# Patient Record
Sex: MALE | Race: WHITE | NOT HISPANIC OR LATINO | Employment: OTHER | URBAN - METROPOLITAN AREA
[De-identification: names, ages, dates, MRNs, and addresses within clinical notes are randomized per-mention and may not be internally consistent; named-entity substitution may affect disease eponyms.]

---

## 2018-12-12 ENCOUNTER — TRANSCRIBE ORDERS (OUTPATIENT)
Dept: PHYSICAL THERAPY | Facility: HOSPITAL | Age: 63
End: 2018-12-12

## 2018-12-12 DIAGNOSIS — R60.0 LOWER EXTREMITY EDEMA: Primary | ICD-10-CM

## 2018-12-14 ENCOUNTER — HOSPITAL ENCOUNTER (OUTPATIENT)
Dept: PHYSICAL THERAPY | Facility: HOSPITAL | Age: 63
Setting detail: THERAPIES SERIES
Discharge: HOME | End: 2018-12-14

## 2018-12-14 DIAGNOSIS — I89.0 LYMPHEDEMA: Primary | ICD-10-CM

## 2018-12-14 PROCEDURE — 97161 PT EVAL LOW COMPLEX 20 MIN: CPT

## 2018-12-14 NOTE — THERAPY EVALUATION
Physical Therapy Lymphedema Initial Evaluation  [facility]     Patient Name: Sergio Daniel  : 1955  MRN: [id number]  Today's Date: 2018      Visit Date: 2018    Visit Dx:    ICD-10-CM ICD-9-CM   1. Lymphedema I89.0 457.1       There is no problem list on file for this patient.       Past Medical History:   Diagnosis Date   • Diabetes mellitus (CMS/HCC)         No past surgical history on file.    Visit Dx:    ICD-10-CM ICD-9-CM   1. Lymphedema I89.0 457.1       Patient History     Row Name 18 1100             History    Chief Complaint  Swelling;Ulcer, wound or other skin conditions  -PC      Date Current Problem(s) Began  18  -PC      Brief Description of Current Complaint  Pt states he began having a rash on legs approx 1 month ago.  He scratched them a lot and develped an infection and swelling.  He is taking antibiotics now.  States he gets this skin rash every winter, but it has never been this bad.  He has very sensitive skin and is allergic to a lot of things. States his wife has been rubbing  his legs with witch hazel and applying tea tree oil and sometimes vaseline. States he has worn compression stockings but they irritate his skin.   -PC      Patient/Caregiver Goals  Decrease swelling;Know what to do to help the symptoms  -PC         Pain     Pain at Present  0  -PC      Pain at Best  0  -PC      Pain at Worst  0  -PC      What Performance Factors Make the Current Problem(s) WORSE?  winter, sitting  -PC      What Performance Factors Make the Current Problem(s) BETTER?  walking, swimming, summer  -PC      Difficulties at work?  no- sits at desk  -PC      Difficulties with ADL's?  able to do regular activities  -PC         Fall Risk Assessment    Any falls in the past year:  No  -PC         Services    Are you currently receiving Home Health services  No  -PC         Daily Activities    Primary Language  English  -PC      How does patient learn best?  Listening;Reading   -PC      Teaching needs identified  Management of Condition  -PC      Does patient have problems with the following?  None  -PC      Barriers to learning  None  -PC      Pt Participated in POC and Goals  Yes  -PC         Safety    Are you being hurt, hit, or frightened by anyone at home or in your life?  No  -PC      Are you being neglected by a caregiver  No  -PC        User Key  (r) = Recorded By, (t) = Taken By, (c) = Cosigned By    Initials Name Provider Type    Lyla Villafana PT Physical Therapist          Lymphedema     Row Name 12/14/18 1100             Subjective Pain    Able to rate subjective pain?  yes  -PC      Pre-Treatment Pain Level  0  -PC      Post-Treatment Pain Level  0  -PC         Subjective Comments    Subjective Comments  States his skin seems to break out in the winter.    -PC         Lymphedema Assessment    Lymphedema Classification  RLE:;LLE:;stage 2 (Spontaneously Irreversible)  -PC      Infections or Cellulitis?  yes  -PC      Infection/Cellulitis Treatment  antibiotics  -PC      Lymphedema Assessment Comments  Mod edema left foot to knee, min edema right foot to knee  -PC         General ROM    GENERAL ROM COMMENTS  WFL, left knee slightly limited.  -PC         Lymphedema Edema Assessment    Ptting Edema Category  By grade out of 4  -PC      Pitting Edema  + 3/4;+ 2/4 +3 on left, +2 on right  -PC         Skin Changes/Observations    Skin Observations Comment  skin is red with rash, dry, scaly, flaky. No open sores or weeping noted  -PC         Lymphedema Sensation    Lymphedema Sensation Tests  light touch  -PC      Lymphedema Light Touch  WNL  -PC         Lymphedema Measurements    Measurement Type(s)  Circumferential  -PC      Circumferential Areas  Lower extremities  -PC         BLE Circumferential (cm)    Measurement Location 1  Knee  -PC      Left 1  43.2 cm  -PC      Right 1  42 cm  -PC      Measurement Location 2  10cm below knee  -PC      Left 2  49 cm  -PC      Right 2   45.5 cm  -PC      Measurement Location 3  20cm below knee  -PC      Left 3  44.3 cm  -PC      Right 3  40.5 cm  -PC      Measurement Location 4  30cm below knee  -PC      Left 4  33 cm  -PC      Right 4  31 cm  -PC      Measurement Location 5  Ankle  -PC      Left 5  26.3 cm  -PC      Right 5  24.5 cm  -PC      Measurement Location 6  Midfoot  -PC      Left 6  23.8 cm  -PC      Right 6  23.5 cm  -PC      LLE Circumferential Total  219.6 cm  -PC      RLE Circumferential Total  207 cm  -PC        User Key  (r) = Recorded By, (t) = Taken By, (c) = Cosigned By    Initials Name Provider Type    Lyla Villafana PT Physical Therapist                          Therapy Education  Education Details: Reviewed treatment program and plan of care.  Discussed skin care.  Given: Other (comment)  Program: New  How Provided: Verbal  Provided to: Patient  Level of Understanding: Verbalized      Exercises     Row Name 12/14/18 1100             Subjective Comments    Subjective Comments  States his skin seems to break out in the winter.    -PC         Subjective Pain    Able to rate subjective pain?  yes  -PC      Pre-Treatment Pain Level  0  -PC      Post-Treatment Pain Level  0  -PC        User Key  (r) = Recorded By, (t) = Taken By, (c) = Cosigned By    Initials Name Provider Type    Lyla Villafana, PT Physical Therapist                        PT OP Goals     Row Name 12/14/18 1100          PT Short Term Goals    STG Date to Achieve  12/28/18  -PC     STG 1  Pt demo awareness of condition and precautions for improved prevention, management, care of symptoms, and ease of transition to self-care of condition.  -PC     STG 1 Progress  New  -PC     STG 2  Pt/family independent with self-wrapping techniques of compression bandages as indicated for improved self-management of condition.  -PC     STG 2 Progress  New  -PC     STG 3  Pt demo decreased net edema of >/=5-10cm for decreased edema symptoms, decreased risk of infection, and  improved skin care.  -PC     STG 3 Progress  New  -PC        Long Term Goals    LTG Date to Achieve  01/14/19  -PC     LTG 1  Pt/family independent with self-care techniques for self-management of condition.  -PC     LTG 1 Progress  New  -PC     LTG 2  Pt demo decreased net edema of >/=10-20cm for decreased edema symptoms, decreased risk of infection, and improved skin care.  -PC     LTG 2 Progress  New  -PC     LTG 3  Pt/family independent with compression garments as indicated for self-management of condition.  -PC     LTG 3 Progress  New  -PC        Time Calculation    PT Goal Re-Cert Due Date  03/14/19  -PC       User Key  (r) = Recorded By, (t) = Taken By, (c) = Cosigned By    Initials Name Provider Type    Lyla Villafana, PT Physical Therapist          PT Assessment/Plan     Row Name 12/14/18 1207          PT Assessment    Functional Limitations  Performance in self-care ADL  -PC     Impairments  Edema;Integumentary integrity;Impaired lymphatic circulation  -PC     Assessment Comments  Pt  is a 63 yr old male who presents with mod edema in left leg foot to knee, min edema in right leg foot to knee.  His lower legs are bright red with rash and skin is dry, flaky, and scaly.  Msmts total 219.6cm on left and 207cm on right.  Pt reports having very sensitive skin and many products cause his skin to break out.  He has worn compression stockings in the past, but they seem to break his legs out as well.   Pt is a good candidate for therapy to decrease edema, improve skin condition, and learn self care.  -PC     Please refer to paper survey for additional self-reported information  Yes  -PC     Rehab Potential  Good  -PC     Patient/caregiver participated in establishment of treatment plan and goals  Yes  -PC     Patient would benefit from skilled therapy intervention  Yes  -PC        PT Plan    PT Frequency  5x/week  -PC     Predicted Duration of Therapy Intervention (Therapy Eval)  4 weeks  -PC     Planned  CPT's?  PT EVAL LOW COMPLEXITY: 29193;PT MANUAL THERAPY EA 15 MIN: 43061;PT SELF CARE/HOME MGMT/TRAIN EA 15: 08491  -PC     Physical Therapy Interventions (Optional Details)  bandaging;home exercise program;manual lymphatic drainage;patient/family education  -PC     PT Plan Comments  See POC.  -PC       User Key  (r) = Recorded By, (t) = Taken By, (c) = Cosigned By    Initials Name Provider Type    PC Lyla Santana, PT Physical Therapist                       Time Calculation:   Start Time: 1100  Stop Time: 1130  Time Calculation (min): 30 min   Therapy Suggested Charges     Code   Minutes Charges    None           Therapy Charges for Today     Code Description Service Date Service Provider Modifiers Qty    57041968924  PT EVAL LOW COMPLEXITY 2 12/14/2018 Lyla Santana, PT GP 1                    Lyla Santana, PT  12/14/2018

## 2019-02-07 ENCOUNTER — HOSPITAL ENCOUNTER (OUTPATIENT)
Dept: PHYSICAL THERAPY | Facility: HOSPITAL | Age: 64
Setting detail: THERAPIES SERIES
Discharge: HOME | End: 2019-02-07

## 2019-02-07 DIAGNOSIS — I89.0 LYMPHEDEMA: Primary | ICD-10-CM

## 2019-02-07 PROCEDURE — 97140 MANUAL THERAPY 1/> REGIONS: CPT

## 2019-02-07 NOTE — THERAPY TREATMENT NOTE
Outpatient Physical Therapy Lymphedema Re-assessment/ Treatment Note  [facility]     Patient Name: Sergio Daniel  : 1955  MRN: [id number]  Today's Date: 2019        Visit Date: 2019    Visit Dx:    ICD-10-CM ICD-9-CM   1. Lymphedema I89.0 457.1       There is no problem list on file for this patient.       Lymphedema     Row Name 19 1100             Subjective Pain    Able to rate subjective pain?  yes  -KD      Pre-Treatment Pain Level  0  -KD      Post-Treatment Pain Level  0  -KD         Subjective Comments    Subjective Comments  States his medical condition is about the same as when he was here for an evaluation. States he took a steroid for the rash on his legs and arms which helped, but rash has returned since finishing the steroids. Had a skin cultur, waiting for results. States wife has been wrapping his legs at home.  -KD         Skin Changes/Observations    Skin Observations Comment  min to mod weeping left leg, radha post. ; skin is red, rashy, sclay, flaky.  -KD         Lymphedema Measurements    Measurement Type(s)  Circumferential  -KD      Circumferential Areas  Lower extremities  -KD         BLE Circumferential (cm)    Measurement Location 1  Knee  -KD      Left 1  43.5 cm  -KD      Right 1  42 cm  -KD      Measurement Location 2  10cm below knee  -KD      Left 2  46 cm  -KD      Right 2  44.5 cm  -KD      Measurement Location 3  20cm below knee  -KD      Left 3  44.5 cm  -KD      Right 3  39.5 cm  -KD      Measurement Location 4  30cm below knee  -KD      Left 4  33.5 cm  -KD      Right 4  30 cm  -KD      Measurement Location 5  Ankle  -KD      Left 5  27 cm  -KD      Right 5  26 cm  -KD      Measurement Location 6  Midfoot  -KD      Left 6  23.5 cm  -KD      Right 6  23.5 cm  -KD      LLE Circumferential Total  218 cm  -KD      RLE Circumferential Total  205.5 cm  -KD         Manual Lymphatic Drainage    Manual Lymphatic Drainage Comments  B inguinal, B LE's  -KD          Compression/Skin Care    Compression/Skin Care  skin care  -KD      Skin Care  moisturizing lotion applied Eucerin lotion to intact skin  -KD      Wrapping Location  lower extremity  -KD      Wrapping Location LE  bilateral:;foot to knee  -KD      Wrapping Comments  Tg9, Artiflex X 1 1/2, short stretch bandages 1-8cm & 2-10cm.  -KD      Compression/Skin Care Comments  ABD pad X 2 to lower half of left leg & Kerlix roll X 1 ankle to knee.  -KD        User Key  (r) = Recorded By, (t) = Taken By, (c) = Cosigned By    Initials Name Provider Type    Charisma De La Paz, PT Physical Therapist                        PT Assessment/Plan     Row Name 02/07/19 1140          PT Assessment    Assessment Comments  Pt starting therapy today, condition essentially the same as evaluation except today he did have min to mod drainage of left lower leg so that was addressed. Also awaiting results of skin test-- will need to monitor his skin closely.  -KD        PT Plan    PT Plan Comments  See pt per PT Plan.  -KD       User Key  (r) = Recorded By, (t) = Taken By, (c) = Cosigned By    Initials Name Provider Type    Charisma De La Paz, PT Physical Therapist               Exercises     Row Name 02/07/19 1144 02/07/19 1100          Subjective Comments    Subjective Comments  --  States his medical condition is about the same as when he was here for an evaluation. States he took a steroid for the rash on his legs and arms which helped, but rash has returned since finishing the steroids. Had a skin cultur, waiting for results. States wife has been wrapping his legs at home.  -KD        Subjective Pain    Able to rate subjective pain?  --  yes  -KD     Pre-Treatment Pain Level  --  0  -KD     Post-Treatment Pain Level  --  0  -KD        Total Minutes    62353 - PT Manual Therapy Minutes  62  -KD  --       User Key  (r) = Recorded By, (t) = Taken By, (c) = Cosigned By    Initials Name Provider Type    Charisma De La Paz, PT Physical  Therapist                        PT OP Goals     Row Name 02/07/19 1100          PT Short Term Goals    STG Date to Achieve  02/22/18  -KD     STG 1  Pt demo awareness of condition and precautions for improved prevention, management, care of symptoms, and ease of transition to self-care of condition.  -KD     STG 1 Progress  New  -KD     STG 2  Pt/family independent with self-wrapping techniques of compression bandages as indicated for improved self-management of condition.  -KD     STG 2 Progress  New  -KD     STG 3  Pt demo decreased net edema of >/=5-10cm for decreased edema symptoms, decreased risk of infection, and improved skin care.  -KD     STG 3 Progress  New  -KD        Long Term Goals    LTG Date to Achieve  03/09/19  -KD     LTG 1  Pt/family independent with self-care techniques for self-management of condition.  -KD     LTG 1 Progress  New  -KD     LTG 2  Pt demo decreased net edema of >/=10-20cm for decreased edema symptoms, decreased risk of infection, and improved skin care.  -KD     LTG 2 Progress  New  -KD     LTG 3  Pt/family independent with compression garments as indicated for self-management of condition.  -KD     LTG 3 Progress  New  -KD        Time Calculation    PT Goal Re-Cert Due Date  03/14/19  -KD       User Key  (r) = Recorded By, (t) = Taken By, (c) = Cosigned By    Initials Name Provider Type    Charisma De La Paz, PT Physical Therapist          Therapy Education  Education Details: Bandage precautions and wear, bandaging technique.  Given: Bandaging/dressing change  Program: New  How Provided: Verbal, Demonstration  Provided to: Patient  Level of Understanding: Verbalized              Time Calculation:   Start Time: 0945  Stop Time: 1047  Time Calculation (min): 62 min  Total Timed Code Minutes- PT: 62 minute(s)   Therapy Suggested Charges     Code   Minutes Charges    05062 (CPT®) Hc Pt Neuromusc Re Education Ea 15 Min      87613 (CPT®) Hc Pt Ther Proc Ea 15 Min      30869 (CPT®)  Hc Gait Training Ea 15 Min      19862 (CPT®) Hc Pt Therapeutic Act Ea 15 Min      61153 (CPT®) Hc Pt Manual Therapy Ea 15 Min 62 4    04501 (CPT®) Hc Pt Ther Massage- Per 15 Min      03109 (CPT®) Hc Pt Iontophoresis Ea 15 Min      53279 (CPT®) Hc Pt Elec Stim Ea-Per 15 Min      22615 (CPT®) Hc Pt Ultrasound Ea 15 Min      75690 (CPT®) Hc Pt Self Care/Mgmt/Train Ea 15 Min      60933 (CPT®) Hc Pt Prosthetic (S) Train Initial Encounter, Each 15 Min      38209 (CPT®) Hc Orthotic(S) Mgmt/Train Initial Encounter, Each 15min      39803 (CPT®) Hc Pt Aquatic Therapy Ea 15 Min      37264 (CPT®) Hc Pt Orthotic(S)/Prosthetic(S) Encounter, Each 15 Min       (CPT®) Hc Pt Electrical Stim Unattended      Total  62 4        Therapy Charges for Today     Code Description Service Date Service Provider Modifiers Qty    90819086540 HC PT MANUAL THERAPY EA 15 MIN 2/7/2019 Charisma Jacobo, PT GP 4                    Charisma Jacobo, PT  2/7/2019

## 2019-02-08 ENCOUNTER — HOSPITAL ENCOUNTER (OUTPATIENT)
Dept: PHYSICAL THERAPY | Facility: HOSPITAL | Age: 64
Setting detail: THERAPIES SERIES
Discharge: HOME | End: 2019-02-08

## 2019-02-08 DIAGNOSIS — I89.0 LYMPHEDEMA: Primary | ICD-10-CM

## 2019-02-08 PROCEDURE — 97140 MANUAL THERAPY 1/> REGIONS: CPT

## 2019-02-08 NOTE — THERAPY TREATMENT NOTE
Outpatient Physical Therapy Lymphedema Treatment Note  [facility]     Patient Name: Sergio Daniel  : 1955  MRN: [id number]  Today's Date: 2019        Visit Date: 2019    Visit Dx:    ICD-10-CM ICD-9-CM   1. Lymphedema I89.0 457.1       There is no problem list on file for this patient.       Lymphedema     Row Name 19 1600             Subjective Pain    Able to rate subjective pain?  yes  -PC      Pre-Treatment Pain Level  0  -PC      Post-Treatment Pain Level  0  -PC         Subjective Comments    Subjective Comments  States he did fine with the bandages.  -PC         Skin Changes/Observations    Skin Observations Comment  Min weeping around left ankle, soaked through to bandages.  SKin very dry and flaky on rest of lower legs.  -PC         Manual Lymphatic Drainage    Manual Lymphatic Drainage Comments  B inguinal, B LE's  -PC         Compression/Skin Care    Compression/Skin Care  remove bandages  -PC      Skin Care  washed/dried;moisturizing lotion applied Zinc oxide to weeping areas, Hydrophor to rest of lower legs  -PC      Wrapping Location  lower extremity  -PC      Wrapping Location LE  bilateral:;foot to knee  -PC      Wrapping Comments  Tg9, Artiflex X 1 1/2, short stretch bandages 1-8cm & 2-10cm.  -PC      Compression/Skin Care Comments  ABD pad X 2 to lower half of left leg & Kerlix roll X 1 ankle to knee.  -PC        User Key  (r) = Recorded By, (t) = Taken By, (c) = Cosigned By    Initials Name Provider Type    Lyla Villafana, PT Physical Therapist                        PT Assessment/Plan     Row Name 19 7812          PT Assessment    Assessment Comments  Pt had weeping around left ankle through the abd pads and Kerlix and onto the short stretch bandages. Added zinc oxide around areas of drainage for skin protection, and Hydrophor to rest of lower legs to help decreased the dryness and flaking.  -PC        PT Plan    PT Plan Comments  Cont.  -PC       User Key   (r) = Recorded By, (t) = Taken By, (c) = Cosigned By    Initials Name Provider Type    PC Lyla Santana, JASMIN Physical Therapist               Exercises     Row Name 02/08/19 1656 02/08/19 1600          Subjective Comments    Subjective Comments  --  States he did fine with the bandages.  -PC        Subjective Pain    Able to rate subjective pain?  --  yes  -PC     Pre-Treatment Pain Level  --  0  -PC     Post-Treatment Pain Level  --  0  -PC        Total Minutes    25300 - PT Manual Therapy Minutes  60  -PC  --       User Key  (r) = Recorded By, (t) = Taken By, (c) = Cosigned By    Initials Name Provider Type    PC Lyla Santana, PT Physical Therapist                            Therapy Education  Education Details: Bandaging change for the weekend, skin care, use of zinc oxide and Hydrophor.  Given: Bandaging/dressing change  Program: Reinforced  How Provided: Verbal, Demonstration, Written(Issued written bandaging instructions.)  Provided to: Patient  Level of Understanding: Verbalized              Time Calculation:   Start Time: 0945  Stop Time: 1045  Time Calculation (min): 60 min  Total Timed Code Minutes- PT: 60 minute(s)   Therapy Suggested Charges     Code   Minutes Charges    09130 (CPT®) Hc Pt Neuromusc Re Education Ea 15 Min      76876 (CPT®) Hc Pt Ther Proc Ea 15 Min      74041 (CPT®) Hc Gait Training Ea 15 Min      50869 (CPT®) Hc Pt Therapeutic Act Ea 15 Min      18467 (CPT®) Hc Pt Manual Therapy Ea 15 Min 60 4    19444 (CPT®) Hc Pt Ther Massage- Per 15 Min      13552 (CPT®) Hc Pt Iontophoresis Ea 15 Min      92102 (CPT®) Hc Pt Elec Stim Ea-Per 15 Min      40260 (CPT®) Hc Pt Ultrasound Ea 15 Min      47804 (CPT®) Hc Pt Self Care/Mgmt/Train Ea 15 Min      88466 (CPT®) Hc Pt Prosthetic (S) Train Initial Encounter, Each 15 Min      33017 (CPT®) Hc Orthotic(S) Mgmt/Train Initial Encounter, Each 15min      03857 (CPT®) Hc Pt Aquatic Therapy Ea 15 Min      90700 (CPT®) Hc Pt Orthotic(S)/Prosthetic(S)  Encounter, Each 15 Min       (CPT®) Hc Pt Electrical Stim Unattended      Total  60 4        Therapy Charges for Today     Code Description Service Date Service Provider Modifiers Qty    85244503189 HC PT MANUAL THERAPY EA 15 MIN 2/8/2019 Lyla Santana, PT GP 4                    Lyla Santana, PT  2/8/2019

## 2019-02-11 ENCOUNTER — APPOINTMENT (OUTPATIENT)
Dept: PHYSICAL THERAPY | Facility: HOSPITAL | Age: 64
End: 2019-02-11

## 2019-02-12 ENCOUNTER — HOSPITAL ENCOUNTER (OUTPATIENT)
Dept: PHYSICAL THERAPY | Facility: HOSPITAL | Age: 64
Setting detail: THERAPIES SERIES
Discharge: HOME | End: 2019-02-12

## 2019-02-12 DIAGNOSIS — I89.0 LYMPHEDEMA: Primary | ICD-10-CM

## 2019-02-12 PROCEDURE — 97140 MANUAL THERAPY 1/> REGIONS: CPT

## 2019-02-12 NOTE — THERAPY TREATMENT NOTE
Outpatient Physical Therapy Lymphedema Treatment Note  [facility]     Patient Name: Sergio Daniel  : 1955  MRN: [id number]  Today's Date: 2019        Visit Date: 2019    Visit Dx:    ICD-10-CM ICD-9-CM   1. Lymphedema I89.0 457.1       There is no problem list on file for this patient.       Lymphedema     Row Name 19 1200             Subjective Pain    Able to rate subjective pain?  yes  -KD      Pre-Treatment Pain Level  0  -KD      Post-Treatment Pain Level  0  -KD         Subjective Comments    Subjective Comments  States his wife wrapped his legs over the weekend without difficulty.  -KD         Manual Lymphatic Drainage    Manual Lymphatic Drainage Comments  B inguinal, B LE's  -KD         Compression/Skin Care    Compression/Skin Care  remove bandages  -KD      Skin Care  washed/dried;moisturizing lotion applied Zinc oxide to weeping areas, Hydrophor to rest of lower legs  -KD      Wrapping Location  lower extremity  -KD      Wrapping Location LE  bilateral:;foot to knee  -KD      Wrapping Comments  Tg9, Artiflex X 1 1/2, short stretch bandages 1-8cm & 2-10cm.  -KD      Compression/Skin Care Comments  Kerlix X 1 to left leg  -KD        User Key  (r) = Recorded By, (t) = Taken By, (c) = Cosigned By    Initials Name Provider Type    Charisma De La Paz, PT Physical Therapist                        PT Assessment/Plan     Row Name 19 1238          PT Assessment    Assessment Comments  Pt's wife had bandaged over the weekend with good technique (had left cotton off), less drainage from left lower leg today.  -KD       User Key  (r) = Recorded By, (t) = Taken By, (c) = Cosigned By    Initials Name Provider Type    Charisma De La Paz, PT Physical Therapist               Exercises     Row Name 19 1240 19 1200          Subjective Comments    Subjective Comments  --  States his wife wrapped his legs over the weekend without difficulty.  -KD        Subjective Pain    Able  to rate subjective pain?  --  yes  -KD     Pre-Treatment Pain Level  --  0  -KD     Post-Treatment Pain Level  --  0  -KD        Total Minutes    42989 - PT Manual Therapy Minutes  64  -KD  --       User Key  (r) = Recorded By, (t) = Taken By, (c) = Cosigned By    Initials Name Provider Type    Charisma De La Paz PT Physical Therapist                       Manual Rx (last 36 hours)      Manual Treatments     Row Name 02/12/19 1240             Total Minutes    61286 - PT Manual Therapy Minutes  64  -KD        User Key  (r) = Recorded By, (t) = Taken By, (c) = Cosigned By    Initials Name Provider Type    Charisma De La Paz PT Physical Therapist              Therapy Education  Education Details: skin care  Given: Symptoms/condition management  Program: Reinforced  How Provided: Verbal  Provided to: Patient  Level of Understanding: Verbalized              Time Calculation:   Start Time: 0950  Stop Time: 1054  Time Calculation (min): 64 min  Total Timed Code Minutes- PT: 64 minute(s)   Therapy Suggested Charges     Code   Minutes Charges    64283 (CPT®) Hc Pt Neuromusc Re Education Ea 15 Min      46523 (CPT®) Hc Pt Ther Proc Ea 15 Min      71825 (CPT®) Hc Gait Training Ea 15 Min      81864 (CPT®) Hc Pt Therapeutic Act Ea 15 Min      56986 (CPT®) Hc Pt Manual Therapy Ea 15 Min 64 4    94457 (CPT®) Hc Pt Ther Massage- Per 15 Min      84099 (CPT®) Hc Pt Iontophoresis Ea 15 Min      61265 (CPT®) Hc Pt Elec Stim Ea-Per 15 Min      66644 (CPT®) Hc Pt Ultrasound Ea 15 Min      99103 (CPT®) Hc Pt Self Care/Mgmt/Train Ea 15 Min      30829 (CPT®) Hc Pt Prosthetic (S) Train Initial Encounter, Each 15 Min      92914 (CPT®) Hc Orthotic(S) Mgmt/Train Initial Encounter, Each 15min      46015 (CPT®) Hc Pt Aquatic Therapy Ea 15 Min      23623 (CPT®) Hc Pt Orthotic(S)/Prosthetic(S) Encounter, Each 15 Min       (CPT®) Hc Pt Electrical Stim Unattended      Total  64 4        Therapy Charges for Today     Code Description Service  Date Service Provider Modifiers Qty    38116982771 HC PT MANUAL THERAPY EA 15 MIN 2/12/2019 Charisma Jacobo, PT GP 4                    Charisma Jacobo, PT  2/12/2019

## 2019-02-13 ENCOUNTER — APPOINTMENT (OUTPATIENT)
Dept: PHYSICAL THERAPY | Facility: HOSPITAL | Age: 64
End: 2019-02-13

## 2019-02-14 ENCOUNTER — HOSPITAL ENCOUNTER (OUTPATIENT)
Dept: PHYSICAL THERAPY | Facility: HOSPITAL | Age: 64
Setting detail: THERAPIES SERIES
Discharge: HOME | End: 2019-02-14

## 2019-02-14 DIAGNOSIS — I89.0 LYMPHEDEMA: Primary | ICD-10-CM

## 2019-02-14 PROCEDURE — 97140 MANUAL THERAPY 1/> REGIONS: CPT

## 2019-02-14 NOTE — THERAPY TREATMENT NOTE
Outpatient Physical Therapy Lymphedema Treatment Note  [facility]     Patient Name: Sergio Daniel  : 1955  MRN: [id number]  Today's Date: 2019        Visit Date: 2019    Visit Dx:    ICD-10-CM ICD-9-CM   1. Lymphedema I89.0 457.1       There is no problem list on file for this patient.       Lymphedema     Row Name 19 1200             Subjective Pain    Able to rate subjective pain?  yes  -KD      Pre-Treatment Pain Level  0  -KD      Post-Treatment Pain Level  0  -KD         Subjective Comments    Subjective Comments  States his wife re-wrapped his legs yesterday.  -KD         Skin Changes/Observations    Skin Observations Comment  Slight to min drainage on right distal lower leg, min on left.  -KD         Lymphedema Measurements    Measurement Type(s)  Circumferential  -KD      Circumferential Areas  Lower extremities  -KD         BLE Circumferential (cm)    Measurement Location 1  Knee  -KD      Left 1  42.5 cm  -KD      Right 1  42.3 cm  -KD      Measurement Location 2  10cm below knee  -KD      Left 2  44 cm  -KD      Right 2  42.5 cm  -KD      Measurement Location 3  20cm below knee  -KD      Left 3  42.5 cm  -KD      Right 3  38.5 cm  -KD      Measurement Location 4  30cm below knee  -KD      Left 4  31.5 cm  -KD      Right 4  28.5 cm  -KD      Measurement Location 5  Ankle  -KD      Left 5  26 cm  -KD      Right 5  26 cm  -KD      Measurement Location 6  Midfoot  -KD      Left 6  22 cm  -KD      Right 6  23 cm  -KD      LLE Circumferential Total  208.5 cm  -KD      RLE Circumferential Total  200.8 cm  -KD         Manual Lymphatic Drainage    Manual Lymphatic Drainage Comments  B inguinal, B LE's  -KD         Compression/Skin Care    Compression/Skin Care  remove bandages  -KD      Skin Care  washed/dried;moisturizing lotion applied Zinc oxide to weeping areas, Hydrophor to rest of lower legs  -KD      Wrapping Location  lower extremity  -KD      Wrapping Location LE   bilateral:;foot to knee  -KD      Wrapping Comments  Tg9, Artiflex X 1 1/2, short stretch bandages 1-8cm & 2-10cm.  -KD      Compression/Skin Care Comments  Kerlix on each leg.  -KD        User Key  (r) = Recorded By, (t) = Taken By, (c) = Cosigned By    Initials Name Provider Type    Charisma De La Paz, PT Physical Therapist                        PT Assessment/Plan     Row Name 02/14/19 1223          PT Assessment    Assessment Comments  Feel that his right leg may have been a bit weepy today due to friction of short stretch bandage on his shin, using padding consistently should help. Decrease in edema of 4.7cm on right, 9.5cm on left.  -KD        PT Plan    PT Plan Comments  Cont.  -KD       User Key  (r) = Recorded By, (t) = Taken By, (c) = Cosigned By    Initials Name Provider Type    Charisma De La Paz, PT Physical Therapist               Exercises     Row Name 02/14/19 1225 02/14/19 1200          Subjective Comments    Subjective Comments  --  States his wife re-wrapped his legs yesterday.  -KD        Subjective Pain    Able to rate subjective pain?  --  yes  -KD     Pre-Treatment Pain Level  --  0  -KD     Post-Treatment Pain Level  --  0  -KD        Total Minutes    54165 - PT Manual Therapy Minutes  68  -KD  --       User Key  (r) = Recorded By, (t) = Taken By, (c) = Cosigned By    Initials Name Provider Type    Charisma De La Paz, PT Physical Therapist                       Manual Rx (last 36 hours)      Manual Treatments     Row Name 02/14/19 1225             Total Minutes    24615 - PT Manual Therapy Minutes  68  -KD        User Key  (r) = Recorded By, (t) = Taken By, (c) = Cosigned By    Initials Name Provider Type    Charisma De La Paz, PT Physical Therapist          PT OP Goals     Row Name 02/14/19 1200          PT Short Term Goals    STG Date to Achieve  02/22/18  -KD     STG 1  Pt demo awareness of condition and precautions for improved prevention, management, care of symptoms, and ease of  transition to self-care of condition.  -KD     STG 1 Progress  Progressing  -KD     STG 2  Pt/family independent with self-wrapping techniques of compression bandages as indicated for improved self-management of condition.  -KD     STG 2 Progress  Met  -KD     STG 2 Progress Comments  Pt's wife able to bandage his legs.  -KD     STG 3  Pt demo decreased net edema of >/=5-10cm for decreased edema symptoms, decreased risk of infection, and improved skin care.  -KD     STG 3 Progress  Partially Met  -KD     STG 3 Progress Comments  Left leg decrease of 9.5cm, right 4.7cm.  -KD        Long Term Goals    LTG Date to Achieve  03/09/19  -KD     LTG 1  Pt/family independent with self-care techniques for self-management of condition.  -KD     LTG 1 Progress  New  -KD     LTG 2  Pt demo decreased net edema of >/=10-20cm for decreased edema symptoms, decreased risk of infection, and improved skin care.  -KD     LTG 2 Progress  New  -KD     LTG 3  Pt/family independent with compression garments as indicated for self-management of condition.  -KD     LTG 3 Progress  New  -KD        Time Calculation    PT Goal Re-Cert Due Date  03/14/19  -KD       User Key  (r) = Recorded By, (t) = Taken By, (c) = Cosigned By    Initials Name Provider Type    Charisma De La Paz, JASMIN Physical Therapist          Therapy Education  Education Details: Advised pt to have his wife use the Artiflex padding when bandaging his legs at home.   Given: Bandaging/dressing change  Program: Reinforced  How Provided: Verbal, Demonstration  Provided to: Patient  Level of Understanding: Verbalized              Time Calculation:   Start Time: 0949  Stop Time: 1057  Time Calculation (min): 68 min  Total Timed Code Minutes- PT: 68 minute(s)   Therapy Suggested Charges     Code   Minutes Charges    55833 (CPT®) Hc Pt Neuromusc Re Education Ea 15 Min      95577 (CPT®) Hc Pt Ther Proc Ea 15 Min      87660 (CPT®) Hc Gait Training Ea 15 Min      55503 (CPT®) Hc Pt  Therapeutic Act Ea 15 Min      14868 (CPT®) Hc Pt Manual Therapy Ea 15 Min 68 5    83147 (CPT®) Hc Pt Ther Massage- Per 15 Min      70978 (CPT®) Hc Pt Iontophoresis Ea 15 Min      56163 (CPT®) Hc Pt Elec Stim Ea-Per 15 Min      64964 (CPT®) Hc Pt Ultrasound Ea 15 Min      26226 (CPT®) Hc Pt Self Care/Mgmt/Train Ea 15 Min      04709 (CPT®) Hc Pt Prosthetic (S) Train Initial Encounter, Each 15 Min      42269 (CPT®) Hc Orthotic(S) Mgmt/Train Initial Encounter, Each 15min      83018 (CPT®) Hc Pt Aquatic Therapy Ea 15 Min      00868 (CPT®) Hc Pt Orthotic(S)/Prosthetic(S) Encounter, Each 15 Min       (CPT®) Hc Pt Electrical Stim Unattended      Total  68 5        Therapy Charges for Today     Code Description Service Date Service Provider Modifiers Qty    25461431740 HC PT MANUAL THERAPY EA 15 MIN 2/14/2019 Charisma Jacobo, PT GP 5                    Charisma Jacobo, PT  2/14/2019

## 2019-02-15 ENCOUNTER — HOSPITAL ENCOUNTER (OUTPATIENT)
Dept: PHYSICAL THERAPY | Facility: HOSPITAL | Age: 64
Setting detail: THERAPIES SERIES
Discharge: HOME | End: 2019-02-15

## 2019-02-15 DIAGNOSIS — I89.0 LYMPHEDEMA: Primary | ICD-10-CM

## 2019-02-15 PROCEDURE — 97140 MANUAL THERAPY 1/> REGIONS: CPT

## 2019-02-15 NOTE — THERAPY TREATMENT NOTE
Outpatient Physical Therapy Lymphedema Treatment Note  [facility]     Patient Name: Sergio Daniel  : 1955  MRN: [id number]  Today's Date: 2/15/2019        Visit Date: 02/15/2019    Visit Dx:    ICD-10-CM ICD-9-CM   1. Lymphedema I89.0 457.1       There is no problem list on file for this patient.       Lymphedema     Row Name 02/15/19 1200             Subjective Pain    Able to rate subjective pain?  yes  -PC      Pre-Treatment Pain Level  0  -PC      Post-Treatment Pain Level  0  -PC         Subjective Comments    Subjective Comments  States his skin is getting better, hasn't had as much weeping.  -PC         Skin Changes/Observations    Skin Observations Comment  Slight drainage on both legs around ankles.  -PC         Manual Lymphatic Drainage    Manual Lymphatic Drainage Comments  B inguinal, B LE's  -PC         Compression/Skin Care    Compression/Skin Care  remove bandages  -PC      Skin Care  washed/dried;moisturizing lotion applied Zinc oxide to weeping areas, Hydrophor to rest of lower legs  -PC      Wrapping Location  lower extremity  -PC      Wrapping Location LE  bilateral:;foot to knee  -PC      Wrapping Comments  Kerlix ankle to knee, Tg9, Artiflex X 1 1/2, short stretch bandages 1-8cm & 2-10cm.  -PC        User Key  (r) = Recorded By, (t) = Taken By, (c) = Cosigned By    Initials Name Provider Type    Lyla Villafana, PT Physical Therapist                        PT Assessment/Plan     Row Name 02/15/19 1232          PT Assessment    Assessment Comments  Skin is still very red, dry, and flaky, but the drainage is less.  -PC        PT Plan    PT Plan Comments  Cont.  -PC       User Key  (r) = Recorded By, (t) = Taken By, (c) = Cosigned By    Initials Name Provider Type    Lyla Villafana, PT Physical Therapist               Exercises     Row Name 02/15/19 1233 02/15/19 1200          Subjective Comments    Subjective Comments  --  States his skin is getting better, hasn't had as much  gerald.  -PC        Subjective Pain    Able to rate subjective pain?  --  yes  -PC     Pre-Treatment Pain Level  --  0  -PC     Post-Treatment Pain Level  --  0  -PC        Total Minutes    71767 - PT Manual Therapy Minutes  65  -PC  --       User Key  (r) = Recorded By, (t) = Taken By, (c) = Cosigned By    Initials Name Provider Type    PC Lyla Santana, PT Physical Therapist                       Manual Rx (last 36 hours)      Manual Treatments     Row Name 02/15/19 1233 02/14/19 1225          Total Minutes    66143 - PT Manual Therapy Minutes  65  -PC  68  -KD       User Key  (r) = Recorded By, (t) = Taken By, (c) = Cosigned By    Initials Name Provider Type    PC Lyla Santana, PT Physical Therapist    Charisma De La Paz, JASMIN Physical Therapist              Therapy Education  Given: Bandaging/dressing change  Program: Reinforced  How Provided: Verbal, Demonstration  Provided to: Patient  Level of Understanding: Verbalized              Time Calculation:   Start Time: 0945  Stop Time: 1050  Time Calculation (min): 65 min  Total Timed Code Minutes- PT: 65 minute(s)   Therapy Suggested Charges     Code   Minutes Charges    13344 (CPT®) Hc Pt Neuromusc Re Education Ea 15 Min      16184 (CPT®) Hc Pt Ther Proc Ea 15 Min      26716 (CPT®) Hc Gait Training Ea 15 Min      86092 (CPT®) Hc Pt Therapeutic Act Ea 15 Min      55666 (CPT®) Hc Pt Manual Therapy Ea 15 Min 65 4    29882 (CPT®) Hc Pt Ther Massage- Per 15 Min      50410 (CPT®) Hc Pt Iontophoresis Ea 15 Min      08680 (CPT®) Hc Pt Elec Stim Ea-Per 15 Min      49976 (CPT®) Hc Pt Ultrasound Ea 15 Min      15201 (CPT®) Hc Pt Self Care/Mgmt/Train Ea 15 Min      43044 (CPT®) Hc Pt Prosthetic (S) Train Initial Encounter, Each 15 Min      29924 (CPT®) Hc Orthotic(S) Mgmt/Train Initial Encounter, Each 15min      65717 (CPT®) Hc Pt Aquatic Therapy Ea 15 Min      54839 (CPT®) Hc Pt Orthotic(S)/Prosthetic(S) Encounter, Each 15 Min       (CPT®) Hc Pt Electrical Stim  Unattended      Total  65 4        Therapy Charges for Today     Code Description Service Date Service Provider Modifiers Qty    79448928805 HC PT MANUAL THERAPY EA 15 MIN 2/15/2019 Lyla Santana, PT GP 4                    Lyla Santana, PT  2/15/2019

## 2019-02-18 ENCOUNTER — APPOINTMENT (OUTPATIENT)
Dept: PHYSICAL THERAPY | Facility: HOSPITAL | Age: 64
End: 2019-02-18

## 2019-02-19 ENCOUNTER — HOSPITAL ENCOUNTER (OUTPATIENT)
Dept: PHYSICAL THERAPY | Facility: HOSPITAL | Age: 64
Setting detail: THERAPIES SERIES
Discharge: HOME | End: 2019-02-19

## 2019-02-19 DIAGNOSIS — I89.0 LYMPHEDEMA: Primary | ICD-10-CM

## 2019-02-19 PROCEDURE — 97140 MANUAL THERAPY 1/> REGIONS: CPT

## 2019-02-19 NOTE — THERAPY TREATMENT NOTE
Outpatient Physical Therapy Lymphedema Treatment Note  [facility]     Patient Name: Sergio Daniel  : 1955  MRN: [id number]  Today's Date: 2019        Visit Date: 2019    Visit Dx:    ICD-10-CM ICD-9-CM   1. Lymphedema I89.0 457.1       There is no problem list on file for this patient.       Lymphedema     Row Name 19 1200             Subjective Pain    Able to rate subjective pain?  yes  -KD      Pre-Treatment Pain Level  0  -KD      Post-Treatment Pain Level  0  -KD         Subjective Comments    Subjective Comments  States his wife re-wrapped his legs Saturday night and he hasn't changed them since then. States it's hard for his wife to change his wraps since she is also caring for her mother.  -KD         Skin Changes/Observations    Skin Observations Comment  Min drainage right, mod on dressings on the left but very little active drainage during treatment.  -KD         Manual Lymphatic Drainage    Manual Lymphatic Drainage Comments  B inguinal, B LE's  -KD         Compression/Skin Care    Compression/Skin Care  remove bandages  -KD      Skin Care  washed/dried;moisturizing lotion applied Zinc oxide to weeping areas, Hydrophor to rest of lower legs  -KD      Wrapping Location  lower extremity  -KD      Wrapping Location LE  bilateral:;foot to knee  -KD      Wrapping Comments  Kerlix ankle to knee, Tg9, Artiflex X 1 1/2, short stretch bandages 1-8cm & 3-10cm.  -KD      Compression/Skin Care Comments  1 roll of Ketrlix on right, 1 1/2 on left.  -KD        User Key  (r) = Recorded By, (t) = Taken By, (c) = Cosigned By    Initials Name Provider Type    Charisma De La Paz, PT Physical Therapist                        PT Assessment/Plan     Row Name 19 1196          PT Assessment    Assessment Comments  Noted moderate drainage on the left leg dressings today, but very little active drainage during treatment. Stressed to him the importance of daily bandage changes for skin health.  Also added 4th bandage today to encourage further reduction.  -KD        PT Plan    PT Plan Comments  Cont.  -KD       User Key  (r) = Recorded By, (t) = Taken By, (c) = Cosigned By    Initials Name Provider Type    Charisma De La Paz, JASMIN Physical Therapist               Exercises     Row Name 02/19/19 1229 02/19/19 1200          Subjective Comments    Subjective Comments  --  States his wife re-wrapped his legs Saturday night and he hasn't changed them since then. States it's hard for his wife to change his wraps since she is also caring for her mother.  -KD        Subjective Pain    Able to rate subjective pain?  --  yes  -KD     Pre-Treatment Pain Level  --  0  -KD     Post-Treatment Pain Level  --  0  -KD        Total Minutes    73318 - PT Manual Therapy Minutes  71  -KD  --       User Key  (r) = Recorded By, (t) = Taken By, (c) = Cosigned By    Initials Name Provider Type    Charisma De La Paz, JASMIN Physical Therapist                       Manual Rx (last 36 hours)      Manual Treatments     Row Name 02/19/19 1229             Total Minutes    36077 - PT Manual Therapy Minutes  71  -KD        User Key  (r) = Recorded By, (t) = Taken By, (c) = Cosigned By    Initials Name Provider Type    Charisma De La Paz, JASMIN Physical Therapist              Therapy Education  Education Details: Discussed the need to re-bandage daily while skin is weeping.  Given: Bandaging/dressing change  Program: Reinforced  How Provided: Verbal  Provided to: Patient  Level of Understanding: Verbalized              Time Calculation:   Start Time: 0948  Stop Time: 1059  Time Calculation (min): 71 min  Total Timed Code Minutes- PT: 71 minute(s)   Therapy Suggested Charges     Code   Minutes Charges    12243 (CPT®) Hc Pt Neuromusc Re Education Ea 15 Min      98012 (CPT®) Hc Pt Ther Proc Ea 15 Min      67911 (CPT®) Hc Gait Training Ea 15 Min      52727 (CPT®) Hc Pt Therapeutic Act Ea 15 Min      28754 (CPT®) Hc Pt Manual Therapy Ea 15 Min 71 5     25512 (CPT®) Hc Pt Ther Massage- Per 15 Min      48818 (CPT®) Hc Pt Iontophoresis Ea 15 Min      44621 (CPT®) Hc Pt Elec Stim Ea-Per 15 Min      66926 (CPT®) Hc Pt Ultrasound Ea 15 Min      47078 (CPT®) Hc Pt Self Care/Mgmt/Train Ea 15 Min      21769 (CPT®) Hc Pt Prosthetic (S) Train Initial Encounter, Each 15 Min      83230 (CPT®) Hc Orthotic(S) Mgmt/Train Initial Encounter, Each 15min      90787 (CPT®) Hc Pt Aquatic Therapy Ea 15 Min      88936 (CPT®) Hc Pt Orthotic(S)/Prosthetic(S) Encounter, Each 15 Min       (CPT®) Hc Pt Electrical Stim Unattended      Total  71 5        Therapy Charges for Today     Code Description Service Date Service Provider Modifiers Qty    [id number] HC PT MANUAL THERAPY EA 15 MIN 2/19/2019 Charisma Jacobo, PT GP 5                    Charisma Jacobo, PT  2/19/2019

## 2019-02-20 ENCOUNTER — HOSPITAL ENCOUNTER (OUTPATIENT)
Dept: PHYSICAL THERAPY | Facility: HOSPITAL | Age: 64
Setting detail: THERAPIES SERIES
Discharge: HOME | End: 2019-02-20

## 2019-02-20 DIAGNOSIS — I89.0 LYMPHEDEMA: Primary | ICD-10-CM

## 2019-02-20 PROCEDURE — 97140 MANUAL THERAPY 1/> REGIONS: CPT

## 2019-02-20 NOTE — THERAPY TREATMENT NOTE
Outpatient Physical Therapy Lymphedema Treatment Note  [facility]     Patient Name: Sergio Daniel  : 1955  MRN: [id number]  Today's Date: 2019        Visit Date: 2019    Visit Dx:    ICD-10-CM ICD-9-CM   1. Lymphedema I89.0 457.1       There is no problem list on file for this patient.       Lymphedema     Row Name 19 1200             Subjective Pain    Able to rate subjective pain?  yes  -PC      Pre-Treatment Pain Level  0  -PC      Post-Treatment Pain Level  0  -PC         Subjective Comments    Subjective Comments  States his legs are getting smaller.  -PC         Skin Changes/Observations    Skin Observations Comment  Min drainage on right, mod on left, but no active drainage during treatment.  -PC         Manual Lymphatic Drainage    Manual Lymphatic Drainage Comments  B inguinal, B LE's  -PC         Compression/Skin Care    Compression/Skin Care  remove bandages  -PC      Skin Care  washed/dried;moisturizing lotion applied Zinc oxide to weeping areas, Hydrophor to rest of lower legs  -PC      Wrapping Location  lower extremity  -PC      Wrapping Location LE  bilateral:;foot to knee  -PC      Wrapping Comments  Kerlix ankle to knee, Tg9, Artiflex X 1 1/2, short stretch bandages 1-8cm & 3-10cm.  -PC      Compression/Skin Care Comments  1 roll of Ketrlix on right, 1 1/2 on left.  -PC        User Key  (r) = Recorded By, (t) = Taken By, (c) = Cosigned By    Initials Name Provider Type    PC Lyla Santana, PT Physical Therapist                        PT Assessment/Plan     Row Name 19 1233          PT Assessment    Assessment Comments  Skin is slowly improving, but cont to have some weeping onto Kerlix.  -PC        PT Plan    PT Plan Comments  Cont.  -PC       User Key  (r) = Recorded By, (t) = Taken By, (c) = Cosigned By    Initials Name Provider Type    PC Lyla Santana, PT Physical Therapist               Exercises     Row Name 19 1236 19 1200           Subjective Comments    Subjective Comments  --  States his legs are getting smaller.  -PC        Subjective Pain    Able to rate subjective pain?  --  yes  -PC     Pre-Treatment Pain Level  --  0  -PC     Post-Treatment Pain Level  --  0  -PC        Total Minutes    54217 - PT Manual Therapy Minutes  70  -PC  --       User Key  (r) = Recorded By, (t) = Taken By, (c) = Cosigned By    Initials Name Provider Type    Lyla Villafana, PT Physical Therapist                       Manual Rx (last 36 hours)      Manual Treatments     Row Name 02/20/19 1236 02/19/19 1229          Total Minutes    24721 - PT Manual Therapy Minutes  70  -PC  71  -KD       User Key  (r) = Recorded By, (t) = Taken By, (c) = Cosigned By    Initials Name Provider Type    Lyla Villafana, PT Physical Therapist    Charisma De La Paz PT Physical Therapist              Therapy Education  Given: Symptoms/condition management  Program: Reinforced  How Provided: Verbal  Provided to: Patient  Level of Understanding: Verbalized              Time Calculation:   Start Time: 0948  Stop Time: 1058  Time Calculation (min): 70 min  Total Timed Code Minutes- PT: 70 minute(s)   Therapy Suggested Charges     Code   Minutes Charges    25240 (CPT®) Hc Pt Neuromusc Re Education Ea 15 Min      29212 (CPT®) Hc Pt Ther Proc Ea 15 Min      83194 (CPT®) Hc Gait Training Ea 15 Min      42173 (CPT®) Hc Pt Therapeutic Act Ea 15 Min      97021 (CPT®) Hc Pt Manual Therapy Ea 15 Min 70 5    80528 (CPT®) Hc Pt Ther Massage- Per 15 Min      69716 (CPT®) Hc Pt Iontophoresis Ea 15 Min      18799 (CPT®) Hc Pt Elec Stim Ea-Per 15 Min      40097 (CPT®) Hc Pt Ultrasound Ea 15 Min      97120 (CPT®) Hc Pt Self Care/Mgmt/Train Ea 15 Min      50640 (CPT®) Hc Pt Prosthetic (S) Train Initial Encounter, Each 15 Min      52471 (CPT®) Hc Orthotic(S) Mgmt/Train Initial Encounter, Each 15min      91709 (CPT®) Hc Pt Aquatic Therapy Ea 15 Min      05466 (CPT®) Hc Pt Orthotic(S)/Prosthetic(S)  Encounter, Each 15 Min       (CPT®) Hc Pt Electrical Stim Unattended      Total  70 5        Therapy Charges for Today     Code Description Service Date Service Provider Modifiers Qty    [id number] HC PT MANUAL THERAPY EA 15 MIN 2/20/2019 Lyla Santana, PT GP 5                    Lyla Santana, PT  2/20/2019

## 2019-02-21 ENCOUNTER — HOSPITAL ENCOUNTER (OUTPATIENT)
Dept: PHYSICAL THERAPY | Facility: HOSPITAL | Age: 64
Setting detail: THERAPIES SERIES
Discharge: HOME | End: 2019-02-21

## 2019-02-21 DIAGNOSIS — I89.0 LYMPHEDEMA: Primary | ICD-10-CM

## 2019-02-21 PROCEDURE — 97140 MANUAL THERAPY 1/> REGIONS: CPT

## 2019-02-21 NOTE — THERAPY TREATMENT NOTE
Outpatient Physical Therapy Lymphedema Treatment Note  [facility]     Patient Name: Sergio Daniel  : 1955  MRN: [id number]  Today's Date: 2019        Visit Date: 2019    Visit Dx:    ICD-10-CM ICD-9-CM   1. Lymphedema I89.0 457.1       There is no problem list on file for this patient.       Lymphedema     Row Name 19 1100 19 1200          Subjective Pain    Able to rate subjective pain?  yes  -KD  yes  -PC     Pre-Treatment Pain Level  0  -KD  0  -PC     Post-Treatment Pain Level  0  -KD  0  -PC        Subjective Comments    Subjective Comments  Nothing new today.  -KD  States his legs are getting smaller.  -PC        Skin Changes/Observations    Skin Observations Comment  Min drainage on Kerlix, no active weeping during treatment.  -KD  Min drainage on right, mod on left, but no active drainage during treatment.  -PC        Lymphedema Measurements    Measurement Type(s)  Circumferential  -KD  --     Circumferential Areas  Lower extremities  -KD  --        BLE Circumferential (cm)    Measurement Location 1  Knee  -KD  --     Left 1  42.5 cm  -KD  --     Right 1  42.5 cm  -KD  --     Measurement Location 2  10cm below knee  -KD  --     Left 2  43 cm  -KD  --     Right 2  42.5 cm  -KD  --     Measurement Location 3  20cm below knee  -KD  --     Left 3  40 cm  -KD  --     Right 3  38.5 cm  -KD  --     Measurement Location 4  30cm below knee  -KD  --     Left 4  29.7 cm  -KD  --     Right 4  27.2 cm  -KD  --     Measurement Location 5  Ankle  -KD  --     Left 5  26 cm  -KD  --     Right 5  27 cm  -KD  --     Measurement Location 6  Midfoot  -KD  --     Left 6  22.7 cm  -KD  --     Right 6  22.5 cm  -KD  --     LLE Circumferential Total  203.9 cm  -KD  --     RLE Circumferential Total  200.2 cm  -KD  --        Manual Lymphatic Drainage    Manual Lymphatic Drainage Comments  B inguinal, B LE's  -KD  B inguinal, B LE's  -PC        Compression/Skin Care    Compression/Skin Care  remove  bandages  -KD  remove bandages  -PC     Skin Care  washed/dried;moisturizing lotion applied Zinc oxide to weeping areas, Hydrophor to rest of lower legs  -KD  washed/dried;moisturizing lotion applied Zinc oxide to weeping areas, Hydrophor to rest of lower legs  -PC     Wrapping Location  lower extremity  -KD  lower extremity  -PC     Wrapping Location LE  bilateral:;foot to knee  -KD  bilateral:;foot to knee  -PC     Wrapping Comments  Kerlix ankle to knee, Tg9, Artiflex X 1 1/2, short stretch bandages 1-8cm & 3-10cm.  -KD  Kerlix ankle to knee, Tg9, Artiflex X 1 1/2, short stretch bandages 1-8cm & 3-10cm.  -PC     Compression/Skin Care Comments  --  1 roll of Ketrlix on right, 1 1/2 on left.  -PC       User Key  (r) = Recorded By, (t) = Taken By, (c) = Cosigned By    Initials Name Provider Type    Lyla Villafana, PT Physical Therapist    Charisma De La Paz, PT Physical Therapist                        PT Assessment/Plan     Row Name 02/21/19 1117 02/20/19 1233       PT Assessment    Assessment Comments  STG's met, skin is improving (drainage still present, but is less). Pt starting to have an understanding of condition management.  -KD  Skin is slowly improving, but cont to have some weeping onto Kerlix.  -PC       PT Plan    PT Plan Comments  Cont.  -KD  Cont.  -PC      User Key  (r) = Recorded By, (t) = Taken By, (c) = Cosigned By    Initials Name Provider Type    Lyla Villafana, PT Physical Therapist    Charisma De La Paz, PT Physical Therapist               Exercises     Row Name 02/21/19 1119 02/21/19 1100 02/20/19 1236       Subjective Comments    Subjective Comments  --  Nothing new today.  -KD  --       Subjective Pain    Able to rate subjective pain?  --  yes  -KD  --    Pre-Treatment Pain Level  --  0  -KD  --    Post-Treatment Pain Level  --  0  -KD  --       Total Minutes    50769 - PT Manual Therapy Minutes  69  -KD  --  70  -PC    Row Name 02/20/19 1200             Subjective Comments     Subjective Comments  States his legs are getting smaller.  -PC         Subjective Pain    Able to rate subjective pain?  yes  -PC      Pre-Treatment Pain Level  0  -PC      Post-Treatment Pain Level  0  -PC        User Key  (r) = Recorded By, (t) = Taken By, (c) = Cosigned By    Initials Name Provider Type    PC Lyla Santana, PT Physical Therapist    Charisma De La Paz, PT Physical Therapist                       Manual Rx (last 36 hours)      Manual Treatments     Row Name 02/21/19 1119 02/20/19 1236          Total Minutes    38670 - PT Manual Therapy Minutes  69  -KD  70  -PC       User Key  (r) = Recorded By, (t) = Taken By, (c) = Cosigned By    Initials Name Provider Type    PC Lyla Santana, PT Physical Therapist    Charisma De La Paz, PT Physical Therapist          PT OP Goals     Row Name 02/21/19 1100          PT Short Term Goals    STG Date to Achieve  02/22/18  -KD     STG 1  Pt demo awareness of condition and precautions for improved prevention, management, care of symptoms, and ease of transition to self-care of condition.  -KD     STG 1 Progress  Met  -KD     STG 2  Pt/family independent with self-wrapping techniques of compression bandages as indicated for improved self-management of condition.  -KD     STG 2 Progress  Met  -KD     STG 3  Pt demo decreased net edema of >/=5-10cm for decreased edema symptoms, decreased risk of infection, and improved skin care.  -KD     STG 3 Progress  Met  -KD     STG 3 Progress Comments  Decrease of 5.3cm total on right, 14.1cm on left  -KD        Long Term Goals    LTG Date to Achieve  03/09/19  -KD     LTG 1  Pt/family independent with self-care techniques for self-management of condition.  -KD     LTG 1 Progress  Progressing  -KD     LTG 2  Pt demo decreased net edema of >/=10-20cm for decreased edema symptoms, decreased risk of infection, and improved skin care.  -KD     LTG 2 Progress  Progressing  -KD     LTG 2 Progress Comments  Decrease of 14.1cm on left.   -KD     LTG 3  Pt/family independent with compression garments as indicated for self-management of condition.  -KD     LTG 3 Progress  New  -KD        Time Calculation    PT Goal Re-Cert Due Date  03/14/19  -KD       User Key  (r) = Recorded By, (t) = Taken By, (c) = Cosigned By    Initials Name Provider Type    Charisma De La Paz, PT Physical Therapist          Therapy Education  Education Details: Reviewed NLN info: Healthy Habits for Patients at Risk for Lymphedema  Given: Symptoms/condition management  Program: New, Reinforced  How Provided: Verbal, Written  Provided to: Patient  Level of Understanding: Verbalized              Time Calculation:   Start Time: 0945  Stop Time: 1054  Time Calculation (min): 69 min  Total Timed Code Minutes- PT: 69 minute(s)   Therapy Suggested Charges     Code   Minutes Charges    13065 (CPT®) Hc Pt Neuromusc Re Education Ea 15 Min      59334 (CPT®) Hc Pt Ther Proc Ea 15 Min      69541 (CPT®) Hc Gait Training Ea 15 Min      84083 (CPT®) Hc Pt Therapeutic Act Ea 15 Min      04782 (CPT®) Hc Pt Manual Therapy Ea 15 Min 69 5    45457 (CPT®) Hc Pt Ther Massage- Per 15 Min      48334 (CPT®) Hc Pt Iontophoresis Ea 15 Min      24282 (CPT®) Hc Pt Elec Stim Ea-Per 15 Min      34824 (CPT®) Hc Pt Ultrasound Ea 15 Min      95513 (CPT®) Hc Pt Self Care/Mgmt/Train Ea 15 Min      62460 (CPT®) Hc Pt Prosthetic (S) Train Initial Encounter, Each 15 Min      80137 (CPT®) Hc Orthotic(S) Mgmt/Train Initial Encounter, Each 15min      13156 (CPT®) Hc Pt Aquatic Therapy Ea 15 Min      48041 (CPT®) Hc Pt Orthotic(S)/Prosthetic(S) Encounter, Each 15 Min       (CPT®) Hc Pt Electrical Stim Unattended      Total  69 5        Therapy Charges for Today     Code Description Service Date Service Provider Modifiers Qty    84869059035 HC PT MANUAL THERAPY EA 15 MIN 2/21/2019 Charisma Jacobo, PT GP 5                    Charisma Jacobo, PT  2/21/2019

## 2019-02-22 ENCOUNTER — HOSPITAL ENCOUNTER (OUTPATIENT)
Dept: PHYSICAL THERAPY | Facility: HOSPITAL | Age: 64
Setting detail: THERAPIES SERIES
Discharge: HOME | End: 2019-02-22

## 2019-02-22 DIAGNOSIS — I89.0 LYMPHEDEMA: Primary | ICD-10-CM

## 2019-02-22 PROCEDURE — 97140 MANUAL THERAPY 1/> REGIONS: CPT

## 2019-02-22 NOTE — THERAPY TREATMENT NOTE
Outpatient Physical Therapy Lymphedema Treatment Note  [facility]     Patient Name: Sergio Daniel  : 1955  MRN: [id number]  Today's Date: 2019        Visit Date: 2019    Visit Dx:    ICD-10-CM ICD-9-CM   1. Lymphedema I89.0 457.1       There is no problem list on file for this patient.       Lymphedema     Row Name 19 1200             Subjective Pain    Able to rate subjective pain?  yes  -PC      Pre-Treatment Pain Level  0  -PC      Post-Treatment Pain Level  0  -PC         Subjective Comments    Subjective Comments  States he had a biopsy done at the dermatologist. Should get results today.  -PC         Skin Changes/Observations    Skin Observations Comment  No drainage on right leg Kerlix, min on left leg Kerlix.  -PC         Manual Lymphatic Drainage    Manual Lymphatic Drainage Comments  B inguinal, B LE's  -PC         Compression/Skin Care    Compression/Skin Care  remove bandages  -PC      Skin Care  washed/dried;moisturizing lotion applied Hydrophor to right leg, zinc oxide and hydrophor to left  -PC      Wrapping Location  lower extremity  -PC      Wrapping Location LE  bilateral:;foot to knee  -PC      Wrapping Comments  Kerlix ankle to knee, Tg9, Artiflex X 1 1/2, short stretch bandages 1-8cm & 3-10cm.  -PC        User Key  (r) = Recorded By, (t) = Taken By, (c) = Cosigned By    Initials Name Provider Type    PC Lyla Santana, PT Physical Therapist                        PT Assessment/Plan     Row Name 19 1239          PT Assessment    Assessment Comments  Skin is healing, no drainage from right leg today, min on left.  -PC        PT Plan    PT Plan Comments  Cont.  -PC       User Key  (r) = Recorded By, (t) = Taken By, (c) = Cosigned By    Initials Name Provider Type    PC Lyla Santana, PT Physical Therapist               Exercises     Row Name 19 1243 19 1200          Subjective Comments    Subjective Comments  --  States he had a biopsy done at the  dermatologist. Should get results today.  -PC        Subjective Pain    Able to rate subjective pain?  --  yes  -PC     Pre-Treatment Pain Level  --  0  -PC     Post-Treatment Pain Level  --  0  -PC        Total Minutes    21804 - PT Manual Therapy Minutes  73  -PC  --       User Key  (r) = Recorded By, (t) = Taken By, (c) = Cosigned By    Initials Name Provider Type    PC Lyla Santana, PT Physical Therapist                       Manual Rx (last 36 hours)      Manual Treatments     Row Name 02/22/19 1243 02/21/19 1119          Total Minutes    67942 - PT Manual Therapy Minutes  73  -PC  69  -KD       User Key  (r) = Recorded By, (t) = Taken By, (c) = Cosigned By    Initials Name Provider Type    Lyla Villafana, PT Physical Therapist    Charisma De La Paz PT Physical Therapist              Therapy Education  Education Details: Skin care  Given: Symptoms/condition management  Program: Reinforced  How Provided: Verbal  Provided to: Patient  Level of Understanding: Verbalized              Time Calculation:   Start Time: 0950  Stop Time: 1103  Time Calculation (min): 73 min  Total Timed Code Minutes- PT: 73 minute(s)   Therapy Suggested Charges     Code   Minutes Charges    77275 (CPT®) Hc Pt Neuromusc Re Education Ea 15 Min      04202 (CPT®) Hc Pt Ther Proc Ea 15 Min      26306 (CPT®) Hc Gait Training Ea 15 Min      97981 (CPT®) Hc Pt Therapeutic Act Ea 15 Min      91823 (CPT®) Hc Pt Manual Therapy Ea 15 Min 73 5    17015 (CPT®) Hc Pt Ther Massage- Per 15 Min      17687 (CPT®) Hc Pt Iontophoresis Ea 15 Min      96676 (CPT®) Hc Pt Elec Stim Ea-Per 15 Min      40756 (CPT®) Hc Pt Ultrasound Ea 15 Min      94103 (CPT®) Hc Pt Self Care/Mgmt/Train Ea 15 Min      18035 (CPT®) Hc Pt Prosthetic (S) Train Initial Encounter, Each 15 Min      64679 (CPT®) Hc Orthotic(S) Mgmt/Train Initial Encounter, Each 15min      06473 (CPT®) Hc Pt Aquatic Therapy Ea 15 Min      64323 (CPT®) Hc Pt Orthotic(S)/Prosthetic(S) Encounter, Each  15 Min       (CPT®) Hc Pt Electrical Stim Unattended      Total  73 5        Therapy Charges for Today     Code Description Service Date Service Provider Modifiers Qty    76156667512 HC PT MANUAL THERAPY EA 15 MIN 2/22/2019 Lyla Santana, PT GP 5                    Lyla Santana, PT  2/22/2019

## 2019-02-25 ENCOUNTER — HOSPITAL ENCOUNTER (OUTPATIENT)
Dept: PHYSICAL THERAPY | Facility: HOSPITAL | Age: 64
Setting detail: THERAPIES SERIES
Discharge: HOME | End: 2019-02-25

## 2019-02-25 DIAGNOSIS — I89.0 LYMPHEDEMA: Primary | ICD-10-CM

## 2019-02-25 PROCEDURE — 97140 MANUAL THERAPY 1/> REGIONS: CPT

## 2019-02-25 NOTE — THERAPY TREATMENT NOTE
Outpatient Physical Therapy Lymphedema Treatment Note  [facility]     Patient Name: Sergio Daniel  : 1955  MRN: [id number]  Today's Date: 2019        Visit Date: 2019    Visit Dx:    ICD-10-CM ICD-9-CM   1. Lymphedema I89.0 457.1       There is no problem list on file for this patient.       Lymphedema     Row Name 19 1200             Subjective Pain    Able to rate subjective pain?  yes  -PC      Pre-Treatment Pain Level  0  -PC      Post-Treatment Pain Level  0  -PC         Subjective Comments    Subjective Comments  States he took the bandages off to shower and his wife re-wrapped him.  Thinks he scrubbed his legs too hard and used too hot water, because now the skin is raw again.  States the dermatologist said he has some typr of dermatitis.  He goes back to them on Fri.  -PC         Skin Changes/Observations    Skin Observations Comment  Min drainage on right, mod on left.    -PC         Manual Lymphatic Drainage    Manual Lymphatic Drainage Comments  B inguinal, B LE's  -PC         Compression/Skin Care    Compression/Skin Care  remove bandages  -PC      Skin Care  washed/dried;moisturizing lotion applied Zinc oxide to open areas, Hydrophor  -PC      Wrapping Location  lower extremity  -PC      Wrapping Location LE  bilateral:;foot to knee  -PC      Wrapping Comments  B inguinal, B LE's  -PC      Compression/Skin Care Comments  Used 1 Kerlix on right, 2 on left.  -PC        User Key  (r) = Recorded By, (t) = Taken By, (c) = Cosigned By    Initials Name Provider Type    Lyla Villafana, PT Physical Therapist                        PT Assessment/Plan     Row Name 19 7037          PT Assessment    Assessment Comments  Skin looks worse today after the weekend.  Pt showered and used very hot water and scrubbed his legs, so opened up some new spots and had increased drainage.  -PC        PT Plan    PT Plan Comments  Cont.  -PC       User Key  (r) = Recorded By, (t) = Taken By,  (c) = Cosigned By    Initials Name Provider Type    Lyla Villafana, PT Physical Therapist               Exercises     Row Name 02/25/19 1247 02/25/19 1200          Subjective Comments    Subjective Comments  --  States he took the bandages off to shower and his wife re-wrapped him.  Thinks he scrubbed his legs too hard and used too hot water, because now the skin is raw again.  States the dermatologist said he has some typr of dermatitis.  He goes back to them on Fri.  -PC        Subjective Pain    Able to rate subjective pain?  --  yes  -PC     Pre-Treatment Pain Level  --  0  -PC     Post-Treatment Pain Level  --  0  -PC        Total Minutes    38217 - PT Manual Therapy Minutes  69  -PC  --       User Key  (r) = Recorded By, (t) = Taken By, (c) = Cosigned By    Initials Name Provider Type    Lyla Villafana, PT Physical Therapist                       Manual Rx (last 36 hours)      Manual Treatments     Row Name 02/25/19 1247             Total Minutes    45592 - PT Manual Therapy Minutes  69  -PC        User Key  (r) = Recorded By, (t) = Taken By, (c) = Cosigned By    Initials Name Provider Type    Lyla Villafana, PT Physical Therapist              Therapy Education  Given: Symptoms/condition management  Program: Reinforced  How Provided: Verbal  Provided to: Patient  Level of Understanding: Verbalized              Time Calculation:   Start Time: 0950  Stop Time: 1059  Time Calculation (min): 69 min  Total Timed Code Minutes- PT: 69 minute(s)   Therapy Suggested Charges     Code   Minutes Charges    97711 (CPT®) Hc Pt Neuromusc Re Education Ea 15 Min      53192 (CPT®) Hc Pt Ther Proc Ea 15 Min      86155 (CPT®) Hc Gait Training Ea 15 Min      54396 (CPT®) Hc Pt Therapeutic Act Ea 15 Min      96829 (CPT®) Hc Pt Manual Therapy Ea 15 Min 69 5    61421 (CPT®) Hc Pt Ther Massage- Per 15 Min      77287 (CPT®) Hc Pt Iontophoresis Ea 15 Min      85101 (CPT®) Hc Pt Elec Stim Ea-Per 15 Min      83949 (CPT®) Hc Pt  Ultrasound Ea 15 Min      21954 (CPT®) Hc Pt Self Care/Mgmt/Train Ea 15 Min      12161 (CPT®) Hc Pt Prosthetic (S) Train Initial Encounter, Each 15 Min      92141 (CPT®) Hc Orthotic(S) Mgmt/Train Initial Encounter, Each 15min      10663 (CPT®) Hc Pt Aquatic Therapy Ea 15 Min      90614 (CPT®) Hc Pt Orthotic(S)/Prosthetic(S) Encounter, Each 15 Min       (CPT®) Hc Pt Electrical Stim Unattended      Total  69 5        Therapy Charges for Today     Code Description Service Date Service Provider Modifiers Qty    23066896338 HC PT MANUAL THERAPY EA 15 MIN 2/25/2019 Lyla Santana, PT GP 5                    Lyla Santana, PT  2/25/2019

## 2019-02-26 ENCOUNTER — HOSPITAL ENCOUNTER (OUTPATIENT)
Dept: PHYSICAL THERAPY | Facility: HOSPITAL | Age: 64
Setting detail: THERAPIES SERIES
Discharge: HOME | End: 2019-02-26

## 2019-02-26 DIAGNOSIS — I89.0 LYMPHEDEMA: Primary | ICD-10-CM

## 2019-02-26 PROCEDURE — 97140 MANUAL THERAPY 1/> REGIONS: CPT

## 2019-02-26 NOTE — THERAPY TREATMENT NOTE
Outpatient Physical Therapy Lymphedema Treatment Note  [facility]     Patient Name: Sergio Daniel  : 1955  MRN: [id number]  Today's Date: 2019        Visit Date: 2019    Visit Dx:    ICD-10-CM ICD-9-CM   1. Lymphedema I89.0 457.1       There is no problem list on file for this patient.       Lymphedema     Row Name 19 1200             Subjective Pain    Able to rate subjective pain?  yes  -KD      Pre-Treatment Pain Level  0  -KD      Post-Treatment Pain Level  0  -KD         Subjective Comments    Subjective Comments  States he started taking Methotrexate last week for rash/dermatitis.  -KD         Manual Lymphatic Drainage    Manual Lymphatic Drainage Comments  B inguinal, B LE's  -KD         Compression/Skin Care    Compression/Skin Care  remove bandages  -KD      Skin Care  washed/dried;moisturizing lotion applied Zinc oxide to open areas, Hydrophor  -KD      Wrapping Location  lower extremity  -KD      Wrapping Location LE  bilateral:;foot to knee  -KD      Wrapping Comments  Kerlix ankle to knee, Tg9, Artiflex X 1 1/2, short stretch bandages 1-8cm & 3-10cm.  -KD        User Key  (r) = Recorded By, (t) = Taken By, (c) = Cosigned By    Initials Name Provider Type    Charisma De La Paz, PT Physical Therapist                        PT Assessment/Plan     Row Name 19 1215 19 1245       PT Assessment    Assessment Comments  Skin on left leg still weeping, radha distal medial lower leg. Question the role of his particular type of dermatitis with regards to healing/short term prognosis. Hoping to be able to help him obtain compression stockings soon.  -KD  Skin looks worse today after the weekend.  Pt showered and used very hot water and scrubbed his legs, so opened up some new spots and had increased drainage.  -PC       PT Plan    PT Plan Comments  Cont.  -KD  Cont.  -PC      User Key  (r) = Recorded By, (t) = Taken By, (c) = Cosigned By    Initials Name Provider Type    PC  Lyla Santana, PT Physical Therapist    Charisma De La Paz, PT Physical Therapist               Exercises     Row Name 02/26/19 1219 02/26/19 1200 02/25/19 1247       Subjective Comments    Subjective Comments  --  States he started taking Methotrexate last week for rash/dermatitis.  -KD  --       Subjective Pain    Able to rate subjective pain?  --  yes  -KD  --    Pre-Treatment Pain Level  --  0  -KD  --    Post-Treatment Pain Level  --  0  -KD  --       Total Minutes    55180 - PT Manual Therapy Minutes  69  -KD  --  69  -PC      User Key  (r) = Recorded By, (t) = Taken By, (c) = Cosigned By    Initials Name Provider Type    PC Lyla Santana, PT Physical Therapist    Charisma De La Paz, PT Physical Therapist                       Manual Rx (last 36 hours)      Manual Treatments     Row Name 02/26/19 1219 02/25/19 1247          Total Minutes    76834 - PT Manual Therapy Minutes  69  -KD  69  -PC       User Key  (r) = Recorded By, (t) = Taken By, (c) = Cosigned By    Initials Name Provider Type    PC Lyla Santana, PT Physical Therapist    Charisma De La Paz, PT Physical Therapist              Therapy Education  Education Details: Discussed dermatitis, skin care.  Given: Symptoms/condition management, Other (comment)(skin care)  Program: Reinforced, New  How Provided: Verbal  Provided to: Patient  Level of Understanding: Verbalized              Time Calculation:   Start Time: 0949  Stop Time: 1058  Time Calculation (min): 69 min  Total Timed Code Minutes- PT: 69 minute(s)   Therapy Suggested Charges     Code   Minutes Charges    08645 (CPT®) Hc Pt Neuromusc Re Education Ea 15 Min      12594 (CPT®) Hc Pt Ther Proc Ea 15 Min      55150 (CPT®) Hc Gait Training Ea 15 Min      51914 (CPT®) Hc Pt Therapeutic Act Ea 15 Min      25613 (CPT®) Hc Pt Manual Therapy Ea 15 Min 69 5    36026 (CPT®) Hc Pt Ther Massage- Per 15 Min      92417 (CPT®) Hc Pt Iontophoresis Ea 15 Min      92720 (CPT®) Hc Pt Elec Stim Ea-Per 15 Min       67335 (CPT®) Hc Pt Ultrasound Ea 15 Min      26850 (CPT®) Hc Pt Self Care/Mgmt/Train Ea 15 Min      30617 (CPT®) Hc Pt Prosthetic (S) Train Initial Encounter, Each 15 Min      56703 (CPT®) Hc Orthotic(S) Mgmt/Train Initial Encounter, Each 15min      99728 (CPT®) Hc Pt Aquatic Therapy Ea 15 Min      86344 (CPT®) Hc Pt Orthotic(S)/Prosthetic(S) Encounter, Each 15 Min       (CPT®) Hc Pt Electrical Stim Unattended      Total  69 5        Therapy Charges for Today     Code Description Service Date Service Provider Modifiers Qty    77948875887 HC PT MANUAL THERAPY EA 15 MIN 2/26/2019 Charisma Jacobo, PT GP 5                    Charisma Jacobo, PT  2/26/2019

## 2019-02-27 ENCOUNTER — APPOINTMENT (OUTPATIENT)
Dept: PHYSICAL THERAPY | Facility: HOSPITAL | Age: 64
End: 2019-02-27

## 2019-02-28 ENCOUNTER — HOSPITAL ENCOUNTER (OUTPATIENT)
Dept: PHYSICAL THERAPY | Facility: HOSPITAL | Age: 64
Setting detail: THERAPIES SERIES
Discharge: HOME | End: 2019-02-28

## 2019-02-28 DIAGNOSIS — I89.0 LYMPHEDEMA: Primary | ICD-10-CM

## 2019-02-28 PROCEDURE — 97140 MANUAL THERAPY 1/> REGIONS: CPT

## 2019-03-04 ENCOUNTER — HOSPITAL ENCOUNTER (OUTPATIENT)
Dept: PHYSICAL THERAPY | Facility: HOSPITAL | Age: 64
Setting detail: THERAPIES SERIES
Discharge: HOME | End: 2019-03-04

## 2019-03-04 DIAGNOSIS — I89.0 LYMPHEDEMA: Primary | ICD-10-CM

## 2019-03-04 PROCEDURE — 97140 MANUAL THERAPY 1/> REGIONS: CPT

## 2019-03-04 NOTE — THERAPY TREATMENT NOTE
Outpatient Physical Therapy Lymphedema Treatment Note  [facility]     Patient Name: Sergio Daniel  : 1955  MRN: [id number]  Today's Date: 3/4/2019        Visit Date: 2019    Visit Dx:    ICD-10-CM ICD-9-CM   1. Lymphedema I89.0 457.1       There is no problem list on file for this patient.       Lymphedema     Row Name 19 1000             Subjective Pain    Able to rate subjective pain?  yes  -PC      Pre-Treatment Pain Level  0  -PC      Post-Treatment Pain Level  0  -PC         Subjective Comments    Subjective Comments  States the dermatologist says his skin problems are due to an immune system response, but it is not psoriasis.  States he had no drainage on Fri but over the weekend he had a lot of drainage.  Wonders if it is diet related.  -PC         Skin Changes/Observations    Skin Observations Comment  No drainage on right leg, mod drainage on left.  Skin above ankles bright red and raw with active drainage.  -PC         Manual Lymphatic Drainage    Manual Lymphatic Drainage Comments  B inguinal, B LE's  -PC         Compression/Skin Care    Compression/Skin Care  remove bandages  -PC      Skin Care  washed/dried;moisturizing lotion applied Zinc oxide to open areas, Eucerin  -PC      Wrapping Location  lower extremity  -PC      Wrapping Location LE  bilateral:;foot to knee  -PC      Wrapping Comments  Tg9, Artiflex X 1 1/2, short stretch bandages 1-8cm & 3-10cm.  -PC        User Key  (r) = Recorded By, (t) = Taken By, (c) = Cosigned By    Initials Name Provider Type    Lyla Villafana, PT Physical Therapist                        PT Assessment/Plan     Row Name 19 1056          PT Assessment    Assessment Comments  Left leg much worse today with increased drainage and raw, red skin.  Pt is under a lot of stress which seems to make his condition worse.  -PC        PT Plan    PT Plan Comments  Cont.  -PC       User Key  (r) = Recorded By, (t) = Taken By, (c) = Cosigned By     Initials Name Provider Type    Lyla Villafana PT Physical Therapist               Exercises     Row Name 03/04/19 1058 03/04/19 1000          Subjective Comments    Subjective Comments  --  States the dermatologist says his skin problems are due to an immune system response, but it is not psoriasis.  States he had no drainage on Fri but over the weekend he had a lot of drainage.  Wonders if it is diet related.  -PC        Subjective Pain    Able to rate subjective pain?  --  yes  -PC     Pre-Treatment Pain Level  --  0  -PC     Post-Treatment Pain Level  --  0  -PC        Total Minutes    43756 - PT Manual Therapy Minutes  49  -PC  --       User Key  (r) = Recorded By, (t) = Taken By, (c) = Cosigned By    Initials Name Provider Type    Lyla Villafana PT Physical Therapist                       Manual Rx (last 36 hours)      Manual Treatments     Row Name 03/04/19 1058             Total Minutes    81253 - PT Manual Therapy Minutes  49  -PC        User Key  (r) = Recorded By, (t) = Taken By, (c) = Cosigned By    Initials Name Provider Type    Lyla Villafana PT Physical Therapist              Therapy Education  Education Details: Discussed auto-immune disease and stress.  Given: Symptoms/condition management  Program: Reinforced  How Provided: Verbal  Provided to: Patient  Level of Understanding: Verbalized              Time Calculation:   Start Time: 0950  Stop Time: 1039  Time Calculation (min): 49 min  Total Timed Code Minutes- PT: 49 minute(s)   Therapy Suggested Charges     Code   Minutes Charges    49765 (CPT®) Hc Pt Neuromusc Re Education Ea 15 Min      92446 (CPT®) Hc Pt Ther Proc Ea 15 Min      29401 (CPT®) Hc Gait Training Ea 15 Min      66382 (CPT®) Hc Pt Therapeutic Act Ea 15 Min      05257 (CPT®) Hc Pt Manual Therapy Ea 15 Min 49 3    59005 (CPT®) Hc Pt Ther Massage- Per 15 Min      71874 (CPT®) Hc Pt Iontophoresis Ea 15 Min      96456 (CPT®) Hc Pt Elec Stim Ea-Per 15 Min      18035 (CPT®)  Hc Pt Ultrasound Ea 15 Min      65480 (CPT®) Hc Pt Self Care/Mgmt/Train Ea 15 Min      49067 (CPT®) Hc Pt Prosthetic (S) Train Initial Encounter, Each 15 Min      28321 (CPT®) Hc Orthotic(S) Mgmt/Train Initial Encounter, Each 15min      05625 (CPT®) Hc Pt Aquatic Therapy Ea 15 Min      73272 (CPT®) Hc Pt Orthotic(S)/Prosthetic(S) Encounter, Each 15 Min       (CPT®) Hc Pt Electrical Stim Unattended      Total  49 3        Therapy Charges for Today     Code Description Service Date Service Provider Modifiers Qty    72074699233 HC PT MANUAL THERAPY EA 15 MIN 3/4/2019 Lyla Santana, PT GP 3                    Lyla Santana, PT  3/4/2019

## 2019-03-05 ENCOUNTER — HOSPITAL ENCOUNTER (OUTPATIENT)
Dept: PHYSICAL THERAPY | Facility: HOSPITAL | Age: 64
Setting detail: THERAPIES SERIES
Discharge: HOME | End: 2019-03-05

## 2019-03-05 DIAGNOSIS — I89.0 LYMPHEDEMA: Primary | ICD-10-CM

## 2019-03-05 PROCEDURE — 97140 MANUAL THERAPY 1/> REGIONS: CPT

## 2019-03-05 NOTE — THERAPY TREATMENT NOTE
Outpatient Physical Therapy Lymphedema Treatment Note  [facility]     Patient Name: Sergio Daniel  : 1955  MRN: [id number]  Today's Date: 3/5/2019        Visit Date: 2019    Visit Dx:    ICD-10-CM ICD-9-CM   1. Lymphedema I89.0 457.1       There is no problem list on file for this patient.       Lymphedema     Row Name 19 1100             Subjective Pain    Able to rate subjective pain?  yes  -KD      Pre-Treatment Pain Level  0  -KD      Post-Treatment Pain Level  0  -KD         Subjective Comments    Subjective Comments  Nothing new today.  -KD         Manual Lymphatic Drainage    Manual Lymphatic Drainage Comments  B inguinal, B LE's  -KD         Compression/Skin Care    Compression/Skin Care  remove bandages  -KD      Skin Care  washed/dried;moisturizing lotion applied Zinc oxide to open areas, Eucerin  -KD      Wrapping Location  lower extremity  -KD      Wrapping Location LE  bilateral:;foot to knee  -KD      Wrapping Comments  Tg9, Artiflex X 1 1/2, short stretch bandages 1-8cm & 3-10cm.  -KD        User Key  (r) = Recorded By, (t) = Taken By, (c) = Cosigned By    Initials Name Provider Type    Charisma De La Paz, PT Physical Therapist                        PT Assessment/Plan     Row Name 19 1111          PT Assessment    Assessment Comments  Mod drainage from left leg today, none from right.  -KD        PT Plan    PT Plan Comments  Cont.  -KD       User Key  (r) = Recorded By, (t) = Taken By, (c) = Cosigned By    Initials Name Provider Type    Charisma De La Paz, PT Physical Therapist               Exercises     Row Name 19 1112 19 1100          Subjective Comments    Subjective Comments  --  Nothing new today.  -KD        Subjective Pain    Able to rate subjective pain?  --  yes  -KD     Pre-Treatment Pain Level  --  0  -KD     Post-Treatment Pain Level  --  0  -KD        Total Minutes    72622 - PT Manual Therapy Minutes  69  -KD  --       User Key  (r) =  Recorded By, (t) = Taken By, (c) = Cosigned By    Initials Name Provider Type    Charisma De La Paz, JASMIN Physical Therapist                       Manual Rx (last 36 hours)      Manual Treatments     Row Name 03/05/19 1112 03/04/19 1058          Total Minutes    00918 - PT Manual Therapy Minutes  69  -KD  49  -PC       User Key  (r) = Recorded By, (t) = Taken By, (c) = Cosigned By    Initials Name Provider Type    Lyla Villafana, PT Physical Therapist    Charisma De La Paz, JASMIN Physical Therapist              Therapy Education  Education Details: Discussed condition  Given: Symptoms/condition management  Program: Reinforced  How Provided: Verbal  Provided to: Patient  Level of Understanding: Verbalized              Time Calculation:   Start Time: 0949  Stop Time: 1058  Time Calculation (min): 69 min  Total Timed Code Minutes- PT: 69 minute(s)   Therapy Suggested Charges     Code   Minutes Charges    45875 (CPT®) Hc Pt Neuromusc Re Education Ea 15 Min      90581 (CPT®) Hc Pt Ther Proc Ea 15 Min      05569 (CPT®) Hc Gait Training Ea 15 Min      19630 (CPT®) Hc Pt Therapeutic Act Ea 15 Min      07654 (CPT®) Hc Pt Manual Therapy Ea 15 Min 69 5    10066 (CPT®) Hc Pt Ther Massage- Per 15 Min      64390 (CPT®) Hc Pt Iontophoresis Ea 15 Min      82958 (CPT®) Hc Pt Elec Stim Ea-Per 15 Min      80874 (CPT®) Hc Pt Ultrasound Ea 15 Min      84799 (CPT®) Hc Pt Self Care/Mgmt/Train Ea 15 Min      07248 (CPT®) Hc Pt Prosthetic (S) Train Initial Encounter, Each 15 Min      94591 (CPT®) Hc Orthotic(S) Mgmt/Train Initial Encounter, Each 15min      72208 (CPT®) Hc Pt Aquatic Therapy Ea 15 Min      75466 (CPT®) Hc Pt Orthotic(S)/Prosthetic(S) Encounter, Each 15 Min       (CPT®) Hc Pt Electrical Stim Unattended      Total  69 5        Therapy Charges for Today     Code Description Service Date Service Provider Modifiers Qty    44246232298 HC PT MANUAL THERAPY EA 15 MIN 3/5/2019 Charisma Jacobo, PT GP 5                    Charisma  Donnell, PT  3/5/2019

## 2019-03-06 ENCOUNTER — HOSPITAL ENCOUNTER (OUTPATIENT)
Dept: PHYSICAL THERAPY | Facility: HOSPITAL | Age: 64
Setting detail: THERAPIES SERIES
Discharge: HOME | End: 2019-03-06

## 2019-03-06 DIAGNOSIS — I89.0 LYMPHEDEMA: Primary | ICD-10-CM

## 2019-03-06 PROCEDURE — 97140 MANUAL THERAPY 1/> REGIONS: CPT

## 2019-03-06 NOTE — THERAPY TREATMENT NOTE
Outpatient Physical Therapy Lymphedema Treatment Note  [facility]     Patient Name: Sergio Daniel  : 1955  MRN: [id number]  Today's Date: 3/6/2019        Visit Date: 2019    Visit Dx:    ICD-10-CM ICD-9-CM   1. Lymphedema I89.0 457.1       There is no problem list on file for this patient.       Lymphedema     Row Name 19 1200             Subjective Pain    Able to rate subjective pain?  yes  -PC      Pre-Treatment Pain Level  0  -PC      Post-Treatment Pain Level  0  -PC         Subjective Comments    Subjective Comments  States he has been avoiding gluten and sugar this week and he thinks it helps his legs.  -PC         Skin Changes/Observations    Skin Observations Comment  No active drainage from either leg today.  Min drainage noted on kerlix around left ankle. Pt had a lot of dead skin flaking off during MLD.  -PC         Manual Lymphatic Drainage    Manual Lymphatic Drainage Comments  B inguinal, B LE's  -PC         Compression/Skin Care    Compression/Skin Care  remove bandages  -PC      Skin Care  washed/dried;moisturizing lotion applied Zinc oxide to open areas, Eucerin  -PC      Wrapping Location  lower extremity  -PC      Wrapping Location LE  bilateral:;foot to knee  -PC      Wrapping Comments  Tg9, Artiflex X 1 1/2, short stretch bandages 1-8cm & 2-10cm.  -PC      Compression/Skin Care Comments  Applied 1 Kerlix to right leg and 2 to left leg.  -PC        User Key  (r) = Recorded By, (t) = Taken By, (c) = Cosigned By    Initials Name Provider Type    PC Lyla Santana, PT Physical Therapist                        PT Assessment/Plan     Row Name 19 1229          PT Assessment    Assessment Comments  Mush less drainage today, skin still very fragile with redness, dryness, and flaking.  -PC        PT Plan    PT Plan Comments  COnt this week.  -PC       User Key  (r) = Recorded By, (t) = Taken By, (c) = Cosigned By    Initials Name Provider Type    PC Lyla Santana, PT  Physical Therapist               Exercises     Row Name 03/06/19 1230 03/06/19 1200          Subjective Comments    Subjective Comments  --  States he has been avoiding gluten and sugar this week and he thinks it helps his legs.  -PC        Subjective Pain    Able to rate subjective pain?  --  yes  -PC     Pre-Treatment Pain Level  --  0  -PC     Post-Treatment Pain Level  --  0  -PC        Total Minutes    07969 - PT Manual Therapy Minutes  74  -PC  --       User Key  (r) = Recorded By, (t) = Taken By, (c) = Cosigned By    Initials Name Provider Type    Lyla Villafana, PT Physical Therapist                       Manual Rx (last 36 hours)      Manual Treatments     Row Name 03/06/19 1230 03/05/19 1112          Total Minutes    21976 - PT Manual Therapy Minutes  74  -PC  69  -KD       User Key  (r) = Recorded By, (t) = Taken By, (c) = Cosigned By    Initials Name Provider Type    Lyla Villafana, PT Physical Therapist    Charisma De La Paz, PT Physical Therapist              Therapy Education  Given: Edema management  Program: Reinforced  How Provided: Verbal  Provided to: Patient  Level of Understanding: Verbalized              Time Calculation:   Start Time: 0945  Stop Time: 1059  Time Calculation (min): 74 min  Total Timed Code Minutes- PT: 74 minute(s)   Therapy Suggested Charges     Code   Minutes Charges    03720 (CPT®) Hc Pt Neuromusc Re Education Ea 15 Min      52731 (CPT®) Hc Pt Ther Proc Ea 15 Min      41828 (CPT®) Hc Gait Training Ea 15 Min      76989 (CPT®) Hc Pt Therapeutic Act Ea 15 Min      20414 (CPT®) Hc Pt Manual Therapy Ea 15 Min 74 5    15552 (CPT®) Hc Pt Ther Massage- Per 15 Min      73984 (CPT®) Hc Pt Iontophoresis Ea 15 Min      89722 (CPT®) Hc Pt Elec Stim Ea-Per 15 Min      40169 (CPT®) Hc Pt Ultrasound Ea 15 Min      78856 (CPT®) Hc Pt Self Care/Mgmt/Train Ea 15 Min      00717 (CPT®) Hc Pt Prosthetic (S) Train Initial Encounter, Each 15 Min      69973 (CPT®) Hc Orthotic(S) Mgmt/Train  Initial Encounter, Each 15min      88431 (CPT®) Hc Pt Aquatic Therapy Ea 15 Min      72792 (CPT®) Hc Pt Orthotic(S)/Prosthetic(S) Encounter, Each 15 Min       (CPT®) Hc Pt Electrical Stim Unattended      Total  74 5        Therapy Charges for Today     Code Description Service Date Service Provider Modifiers Qty    14696067433 HC PT MANUAL THERAPY EA 15 MIN 3/6/2019 Lyla Santana, PT GP 5                    Lyla Santana, PT  3/6/2019

## 2019-03-07 ENCOUNTER — HOSPITAL ENCOUNTER (OUTPATIENT)
Dept: PHYSICAL THERAPY | Facility: HOSPITAL | Age: 64
Setting detail: THERAPIES SERIES
Discharge: HOME | End: 2019-03-07

## 2019-03-07 DIAGNOSIS — I89.0 LYMPHEDEMA: Primary | ICD-10-CM

## 2019-03-07 PROCEDURE — 97140 MANUAL THERAPY 1/> REGIONS: CPT

## 2019-03-07 NOTE — THERAPY TREATMENT NOTE
Outpatient Physical Therapy Lymphedema Re-assessment Progress Note  [facility]     Patient Name: Sergio Daniel  : 1955  MRN: [id number]  Today's Date: 3/7/2019        Visit Date: 2019    Visit Dx:    ICD-10-CM ICD-9-CM   1. Lymphedema I89.0 457.1       There is no problem list on file for this patient.       Lymphedema     Row Name 19 1100 19 1200          Subjective Pain    Able to rate subjective pain?  yes  -KD  yes  -PC     Pre-Treatment Pain Level  0  -KD  0  -PC     Post-Treatment Pain Level  0  -KD  0  -PC        Subjective Comments    Subjective Comments  States he thinks tomorrow is his last session here--he will continue to bandage at home and get stockings when skin fully heals.  -KD  States he has been avoiding gluten and sugar this week and he thinks it helps his legs.  -PC        Skin Changes/Observations    Skin Observations Comment  No active drainage from either leg today.  Min drainage noted on kerlix around left ankle. Pt had a lot of dead skin flaking off during MLD.  -KD  No active drainage from either leg today.  Min drainage noted on kerlix around left ankle. Pt had a lot of dead skin flaking off during MLD.  -PC        Lymphedema Measurements    Measurement Type(s)  Circumferential  -KD  --     Circumferential Areas  Lower extremities  -KD  --        BLE Circumferential (cm)    Measurement Location 1  Knee  -KD  --     Left 1  41.5 cm  -KD  --     Right 1  42.4 cm  -KD  --     Measurement Location 2  10cm below knee  -KD  --     Left 2  41.5 cm  -KD  --     Right 2  42 cm  -KD  --     Measurement Location 3  20cm below knee  -KD  --     Left 3  37.5 cm  -KD  --     Right 3  36.5 cm  -KD  --     Measurement Location 4  30cm below knee  -KD  --     Left 4  30 cm  -KD  --     Right 4  27 cm  -KD  --     Measurement Location 5  Ankle  -KD  --     Left 5  27.5 cm  -KD  --     Right 5  26.3 cm  -KD  --     Measurement Location 6  Midfoot  -KD  --     Left 6  22.5 cm   -KD  --     Right 6  21.7 cm  -KD  --     LLE Circumferential Total  200.5 cm  -KD  --     RLE Circumferential Total  195.9 cm  -KD  --        Manual Lymphatic Drainage    Manual Lymphatic Drainage Comments  B inguinal, B LE's  -KD  B inguinal, B LE's  -PC        Compression/Skin Care    Compression/Skin Care  remove bandages  -KD  remove bandages  -PC     Skin Care  washed/dried;moisturizing lotion applied Zinc oxide to open areas, Eucerin  -KD  washed/dried;moisturizing lotion applied Zinc oxide to open areas, Eucerin  -PC     Wrapping Location  lower extremity  -KD  lower extremity  -PC     Wrapping Location LE  bilateral:;foot to knee  -KD  bilateral:;foot to knee  -PC     Wrapping Comments  Tg9, Artiflex X 1 1/2, short stretch bandages 1-8cm & 2-10cm.  -KD  Tg9, Artiflex X 1 1/2, short stretch bandages 1-8cm & 2-10cm.  -PC     Compression/Skin Care Comments  --  Applied 1 Kerlix to right leg and 2 to left leg.  -PC       User Key  (r) = Recorded By, (t) = Taken By, (c) = Cosigned By    Initials Name Provider Type    Lyla Villafana, PT Physical Therapist    KD Charisma Jacobo, JASMIN Physical Therapist                        PT Assessment/Plan     Row Name 03/07/19 1114 03/06/19 1942       PT Assessment    Assessment Comments  Measurements were close to the same today as last week. Feel that he has reached a plateau with edema, but skin is still in the process of healing so he cannot wear stockings yet. Pt's wife is able to bandage his legs. He will complete his therapy sessions tomorrow then continue to bandage at home until skin heals. At that point he will obtain stockings (right now Juzo 3511 size IV appears to be the best fit). Recommended he start with class 1, increasing to class 2 if he needs more edema control (as long as skin is fully healed). Also suggested he go to [facility] to be measured once skin heals as he may need a different size by that point.  -KD  Mush less drainage today, skin still very  fragile with redness, dryness, and flaking.  -PC       PT Plan    PT Plan Comments  Cont X 1, then D/C.  -KD  COnt this week.  -PC      User Key  (r) = Recorded By, (t) = Taken By, (c) = Cosigned By    Initials Name Provider Type    PC Lyla Santana, PT Physical Therapist    Charisma De La Paz, PT Physical Therapist               Exercises     Row Name 03/07/19 1121 03/07/19 1100 03/06/19 1230       Subjective Comments    Subjective Comments  --  States he thinks tomorrow is his last session here--he will continue to bandage at home and get stockings when skin fully heals.  -KD  --       Subjective Pain    Able to rate subjective pain?  --  yes  -KD  --    Pre-Treatment Pain Level  --  0  -KD  --    Post-Treatment Pain Level  --  0  -KD  --       Total Minutes    04709 - PT Manual Therapy Minutes  71  -KD  --  74  -PC    Row Name 03/06/19 1200             Subjective Comments    Subjective Comments  States he has been avoiding gluten and sugar this week and he thinks it helps his legs.  -PC         Subjective Pain    Able to rate subjective pain?  yes  -PC      Pre-Treatment Pain Level  0  -PC      Post-Treatment Pain Level  0  -PC        User Key  (r) = Recorded By, (t) = Taken By, (c) = Cosigned By    Initials Name Provider Type    PC Lyla Santana, PT Physical Therapist    Charisma De La Paz, PT Physical Therapist                       Manual Rx (last 36 hours)      Manual Treatments     Row Name 03/07/19 1121 03/06/19 1230          Total Minutes    86624 - PT Manual Therapy Minutes  71  -KD  74  -PC       User Key  (r) = Recorded By, (t) = Taken By, (c) = Cosigned By    Initials Name Provider Type    PC Lyla Santana, PT Physical Therapist    Charisma De La Paz, PT Physical Therapist          PT OP Goals     Row Name 03/07/19 1100          PT Short Term Goals    STG Date to Achieve  02/22/18  -KD     STG 1  Pt demo awareness of condition and precautions for improved prevention, management, care of symptoms,  and ease of transition to self-care of condition.  -KD     STG 1 Progress  Met  -KD     STG 2  Pt/family independent with self-wrapping techniques of compression bandages as indicated for improved self-management of condition.  -KD     STG 2 Progress  Met  -KD     STG 3  Pt demo decreased net edema of >/=5-10cm for decreased edema symptoms, decreased risk of infection, and improved skin care.  -KD     STG 3 Progress  Met  -KD        Long Term Goals    LTG Date to Achieve  03/09/19  -KD     LTG 1  Pt/family independent with self-care techniques for self-management of condition.  -KD     LTG 1 Progress  Met  -KD     LTG 2  Pt demo decreased net edema of >/=10-20cm for decreased edema symptoms, decreased risk of infection, and improved skin care.  -KD     LTG 2 Progress  Met  -KD     LTG 3  Pt/family independent with compression garments as indicated for self-management of condition.  -KD     LTG 3 Progress  New  -KD        Time Calculation    PT Goal Re-Cert Due Date  03/19/19  -KD       User Key  (r) = Recorded By, (t) = Taken By, (c) = Cosigned By    Initials Name Provider Type    Charisma De La Paz, PT Physical Therapist          Therapy Education  Education Details: Discussed discharge--wrote down info for pt to order compression stockings once skin is healed. Recommended initially he wear class 1 stockings, may need to increase to class 2 if edema is not well controlled with class 1, however skin needs to be fully healed.  Given: Edema management  Program: New  How Provided: Verbal, Written  Provided to: Patient  Level of Understanding: Verbalized              Time Calculation:   Start Time: 0945  Stop Time: 1056  Time Calculation (min): 71 min  Total Timed Code Minutes- PT: 71 minute(s)   Therapy Suggested Charges     Code   Minutes Charges    83488 (CPT®) Hc Pt Neuromusc Re Education Ea 15 Min      72042 (CPT®) Hc Pt Ther Proc Ea 15 Min      15643 (CPT®) Hc Gait Training Ea 15 Min      10105 (CPT®) Hc Pt  Therapeutic Act Ea 15 Min      49811 (CPT®) Hc Pt Manual Therapy Ea 15 Min 71 5    73791 (CPT®) Hc Pt Ther Massage- Per 15 Min      47597 (CPT®) Hc Pt Iontophoresis Ea 15 Min      49779 (CPT®) Hc Pt Elec Stim Ea-Per 15 Min      19256 (CPT®) Hc Pt Ultrasound Ea 15 Min      85185 (CPT®) Hc Pt Self Care/Mgmt/Train Ea 15 Min      46013 (CPT®) Hc Pt Prosthetic (S) Train Initial Encounter, Each 15 Min      94493 (CPT®) Hc Orthotic(S) Mgmt/Train Initial Encounter, Each 15min      84277 (CPT®) Hc Pt Aquatic Therapy Ea 15 Min      92112 (CPT®) Hc Pt Orthotic(S)/Prosthetic(S) Encounter, Each 15 Min       (CPT®) Hc Pt Electrical Stim Unattended      Total  71 5        Therapy Charges for Today     Code Description Service Date Service Provider Modifiers Qty    71440933010 HC PT MANUAL THERAPY EA 15 MIN 3/7/2019 Charisma Jacobo, PT GP 5                    Charisma Jacobo, PT  3/7/2019

## 2019-03-08 ENCOUNTER — HOSPITAL ENCOUNTER (OUTPATIENT)
Dept: PHYSICAL THERAPY | Facility: HOSPITAL | Age: 64
Setting detail: THERAPIES SERIES
Discharge: HOME | End: 2019-03-08

## 2019-03-08 DIAGNOSIS — I89.0 LYMPHEDEMA: Primary | ICD-10-CM

## 2019-03-08 PROCEDURE — 97140 MANUAL THERAPY 1/> REGIONS: CPT

## 2019-03-08 NOTE — THERAPY DISCHARGE NOTE
Outpatient Physical Therapy Lymphedema Treatment Note/Discharge Summary  [facility]     Patient Name: Sergio Daniel  : 1955  MRN: [id number]  Today's Date: 3/8/2019      Visit Date: 2019    Visit Dx:    ICD-10-CM ICD-9-CM   1. Lymphedema I89.0 457.1       There is no problem list on file for this patient.       Lymphedema     Row Name 19 1100             Subjective Pain    Able to rate subjective pain?  yes  -PC      Pre-Treatment Pain Level  0  -PC      Post-Treatment Pain Level  0  -PC         Subjective Comments    Subjective Comments  States his wife will cont to bandage his legs until he can get stockings.  -PC         Skin Changes/Observations    Skin Observations Comment  No active drainage today and none on Kerlix.  Cont to have lots of dead skin flaking off during treatment.  -PC         Manual Lymphatic Drainage    Manual Lymphatic Drainage Comments  B inguinal, B LE's  -PC         Compression/Skin Care    Compression/Skin Care  remove bandages  -PC      Skin Care  washed/dried;moisturizing lotion applied Zinc oxide to red areas, Hydrophor  -PC      Wrapping Location  lower extremity  -PC      Wrapping Location LE  bilateral:;foot to knee  -PC      Wrapping Comments  Tg9, Artiflex X 1 1/2, short stretch bandages 1-8cm & 3-10cm.  -PC        User Key  (r) = Recorded By, (t) = Taken By, (c) = Cosigned By    Initials Name Provider Type    PC Lyla Santana, PT Physical Therapist                        PT Assessment/Plan     Row Name 19 1138          PT Assessment    Assessment Comments  Pt to cont bandaging and skin care at home until ready for stockings.    -PC        PT Plan    PT Plan Comments  D/C.  -PC       User Key  (r) = Recorded By, (t) = Taken By, (c) = Cosigned By    Initials Name Provider Type    PC Lyla Santana, PT Physical Therapist               Exercises     Row Name 19 1140 19 1100          Subjective Comments    Subjective Comments  --  States  his wife will cont to bandage his legs until he can get stockings.  -PC        Subjective Pain    Able to rate subjective pain?  --  yes  -PC     Pre-Treatment Pain Level  --  0  -PC     Post-Treatment Pain Level  --  0  -PC        Total Minutes    91222 - PT Manual Therapy Minutes  69  -PC  --       User Key  (r) = Recorded By, (t) = Taken By, (c) = Cosigned By    Initials Name Provider Type    PC Lyla Santana, PT Physical Therapist                       Manual Rx (last 36 hours)      Manual Treatments     Row Name 03/08/19 1140 03/07/19 1121          Total Minutes    88492 - PT Manual Therapy Minutes  69  -PC  71  -KD       User Key  (r) = Recorded By, (t) = Taken By, (c) = Cosigned By    Initials Name Provider Type    PC Lyla Santana, PT Physical Therapist    KD Charisma Jacobo, PT Physical Therapist          PT OP Goals     Row Name 03/08/19 1100          PT Short Term Goals    STG 1  Pt demo awareness of condition and precautions for improved prevention, management, care of symptoms, and ease of transition to self-care of condition.  -PC     STG 1 Progress  Met  -PC     STG 2  Pt/family independent with self-wrapping techniques of compression bandages as indicated for improved self-management of condition.  -PC     STG 2 Progress  Met  -PC     STG 3  Pt demo decreased net edema of >/=5-10cm for decreased edema symptoms, decreased risk of infection, and improved skin care.  -PC     STG 3 Progress  Met  -PC        Long Term Goals    LTG 1  Pt/family independent with self-care techniques for self-management of condition.  -PC     LTG 1 Progress  Met  -PC     LTG 2  Pt demo decreased net edema of >/=10-20cm for decreased edema symptoms, decreased risk of infection, and improved skin care.  -PC     LTG 2 Progress  Met  -PC     LTG 3  Pt/family independent with compression garments as indicated for self-management of condition.  -PC     LTG 3 Progress  Ongoing  -PC     LTG 3 Progress Comments  Pt's skin is not  ready for compression stockings yet, he will purchase when skin is fully healed.  -PC       User Key  (r) = Recorded By, (t) = Taken By, (c) = Cosigned By    Initials Name Provider Type    PC Lyla Santana, PT Physical Therapist          Therapy Education  Education Details: Reviewed d/c info incl skin care, compression stockings, elevation, exercise.  Given: Other (comment)  Program: Reinforced  How Provided: Verbal  Provided to: Patient  Level of Understanding: Verbalized              Time Calculation:   Start Time: 0950  Stop Time: 1059  Time Calculation (min): 69 min  Total Timed Code Minutes- PT: 69 minute(s)   Therapy Suggested Charges     Code   Minutes Charges    53128 (CPT®) Hc Pt Neuromusc Re Education Ea 15 Min      42229 (CPT®) Hc Pt Ther Proc Ea 15 Min      83529 (CPT®) Hc Gait Training Ea 15 Min      13734 (CPT®) Hc Pt Therapeutic Act Ea 15 Min      86067 (CPT®) Hc Pt Manual Therapy Ea 15 Min 69 5    43736 (CPT®) Hc Pt Ther Massage- Per 15 Min      50893 (CPT®) Hc Pt Iontophoresis Ea 15 Min      37111 (CPT®) Hc Pt Elec Stim Ea-Per 15 Min      71215 (CPT®) Hc Pt Ultrasound Ea 15 Min      05350 (CPT®) Hc Pt Self Care/Mgmt/Train Ea 15 Min      68014 (CPT®) Hc Pt Prosthetic (S) Train Initial Encounter, Each 15 Min      02756 (CPT®) Hc Orthotic(S) Mgmt/Train Initial Encounter, Each 15min      90651 (CPT®) Hc Pt Aquatic Therapy Ea 15 Min      44107 (CPT®) Hc Pt Orthotic(S)/Prosthetic(S) Encounter, Each 15 Min       (CPT®) Hc Pt Electrical Stim Unattended      Total  69 5        Therapy Charges for Today     Code Description Service Date Service Provider Modifiers Qty    04269502712 HC PT MANUAL THERAPY EA 15 MIN 3/8/2019 Lyla Santana, PT GP 5                 OP PT Discharge Summary  Date of Discharge: 03/08/19  Reason for Discharge: Maximum functional potential achieved  Outcomes Achieved: Patient able to partially acheive established goals  Discharge Destination: Home with home program  Discharge  Instructions/Additional Comments: Cont with skin care and bandaging at home, purchase compression stockings when skin is fully healed.  Cont with elevation and ex.      Lyla Santana, PT  3/8/2019

## 2019-03-11 ENCOUNTER — APPOINTMENT (OUTPATIENT)
Dept: PHYSICAL THERAPY | Facility: HOSPITAL | Age: 64
End: 2019-03-11

## 2019-03-12 ENCOUNTER — APPOINTMENT (OUTPATIENT)
Dept: PHYSICAL THERAPY | Facility: HOSPITAL | Age: 64
End: 2019-03-12

## 2019-03-13 ENCOUNTER — APPOINTMENT (OUTPATIENT)
Dept: PHYSICAL THERAPY | Facility: HOSPITAL | Age: 64
End: 2019-03-13

## 2019-03-14 ENCOUNTER — APPOINTMENT (OUTPATIENT)
Dept: PHYSICAL THERAPY | Facility: HOSPITAL | Age: 64
End: 2019-03-14

## 2019-03-15 ENCOUNTER — APPOINTMENT (OUTPATIENT)
Dept: PHYSICAL THERAPY | Facility: HOSPITAL | Age: 64
End: 2019-03-15

## 2021-12-16 ENCOUNTER — HOSPITAL ENCOUNTER (INPATIENT)
Facility: HOSPITAL | Age: 66
LOS: 28 days | Discharge: NURSING, REHAB OR LONG-TERM CARE | End: 2022-01-13
Attending: EMERGENCY MEDICINE | Admitting: HOSPITALIST

## 2021-12-16 ENCOUNTER — APPOINTMENT (OUTPATIENT)
Dept: GENERAL RADIOLOGY | Facility: HOSPITAL | Age: 66
End: 2021-12-16

## 2021-12-16 DIAGNOSIS — R13.13 PHARYNGEAL DYSPHAGIA: ICD-10-CM

## 2021-12-16 DIAGNOSIS — U07.1 PNEUMONIA DUE TO COVID-19 VIRUS: ICD-10-CM

## 2021-12-16 DIAGNOSIS — J12.82 PNEUMONIA DUE TO COVID-19 VIRUS: ICD-10-CM

## 2021-12-16 DIAGNOSIS — J96.01 ACUTE RESPIRATORY FAILURE WITH HYPOXIA: Primary | ICD-10-CM

## 2021-12-16 DIAGNOSIS — I48.0 PAROXYSMAL ATRIAL FIBRILLATION: ICD-10-CM

## 2021-12-16 DIAGNOSIS — R13.12 OROPHARYNGEAL DYSPHAGIA: ICD-10-CM

## 2021-12-16 LAB
ALBUMIN SERPL-MCNC: 3.2 G/DL (ref 3.5–5.2)
ALBUMIN/GLOB SERPL: 0.7 G/DL
ALP SERPL-CCNC: 119 U/L (ref 39–117)
ALT SERPL W P-5'-P-CCNC: 83 U/L (ref 1–41)
ANION GAP SERPL CALCULATED.3IONS-SCNC: 24.5 MMOL/L (ref 5–15)
APTT PPP: 24.6 SECONDS (ref 24.3–38.1)
AST SERPL-CCNC: 89 U/L (ref 1–40)
BASOPHILS # BLD AUTO: 0.09 10*3/MM3 (ref 0–0.2)
BASOPHILS NFR BLD AUTO: 0.7 % (ref 0–1.5)
BILIRUB SERPL-MCNC: 0.7 MG/DL (ref 0–1.2)
BUN SERPL-MCNC: 18 MG/DL (ref 8–23)
BUN/CREAT SERPL: 18.6 (ref 7–25)
CALCIUM SPEC-SCNC: 9.6 MG/DL (ref 8.6–10.5)
CHLORIDE SERPL-SCNC: 91 MMOL/L (ref 98–107)
CO2 SERPL-SCNC: 16.5 MMOL/L (ref 22–29)
CREAT SERPL-MCNC: 0.97 MG/DL (ref 0.76–1.27)
CRP SERPL-MCNC: 25.56 MG/DL (ref 0–0.5)
D DIMER PPP FEU-MCNC: >20 MCGFEU/ML (ref 0–0.46)
D-LACTATE SERPL-SCNC: 2.6 MMOL/L (ref 0.5–2)
D-LACTATE SERPL-SCNC: 3.8 MMOL/L (ref 0.5–2)
DEPRECATED RDW RBC AUTO: 45 FL (ref 37–54)
EOSINOPHIL # BLD AUTO: 0.01 10*3/MM3 (ref 0–0.4)
EOSINOPHIL NFR BLD AUTO: 0.1 % (ref 0.3–6.2)
ERYTHROCYTE [DISTWIDTH] IN BLOOD BY AUTOMATED COUNT: 13.2 % (ref 12.3–15.4)
ERYTHROCYTE [SEDIMENTATION RATE] IN BLOOD: 16 MM/HR (ref 0–20)
FERRITIN SERPL-MCNC: 7583 NG/ML (ref 30–400)
FLUAV RNA RESP QL NAA+PROBE: NOT DETECTED
FLUBV RNA RESP QL NAA+PROBE: NOT DETECTED
GFR SERPL CREATININE-BSD FRML MDRD: 78 ML/MIN/1.73
GLOBULIN UR ELPH-MCNC: 4.7 GM/DL
GLUCOSE SERPL-MCNC: 371 MG/DL (ref 65–99)
HCT VFR BLD AUTO: 54.2 % (ref 37.5–51)
HGB BLD-MCNC: 18 G/DL (ref 13–17.7)
IMM GRANULOCYTES # BLD AUTO: 0.17 10*3/MM3 (ref 0–0.05)
IMM GRANULOCYTES NFR BLD AUTO: 1.3 % (ref 0–0.5)
INR PPP: 1.2 (ref 0.9–1.1)
LYMPHOCYTES # BLD AUTO: 0.93 10*3/MM3 (ref 0.7–3.1)
LYMPHOCYTES NFR BLD AUTO: 7.2 % (ref 19.6–45.3)
MCH RBC QN AUTO: 30.8 PG (ref 26.6–33)
MCHC RBC AUTO-ENTMCNC: 33.2 G/DL (ref 31.5–35.7)
MCV RBC AUTO: 92.8 FL (ref 79–97)
MONOCYTES # BLD AUTO: 0.44 10*3/MM3 (ref 0.1–0.9)
MONOCYTES NFR BLD AUTO: 3.4 % (ref 5–12)
NEUTROPHILS NFR BLD AUTO: 11.3 10*3/MM3 (ref 1.7–7)
NEUTROPHILS NFR BLD AUTO: 87.3 % (ref 42.7–76)
NRBC BLD AUTO-RTO: 0 /100 WBC (ref 0–0.2)
PLATELET # BLD AUTO: 279 10*3/MM3 (ref 140–450)
PMV BLD AUTO: 11.3 FL (ref 6–12)
POTASSIUM SERPL-SCNC: 4 MMOL/L (ref 3.5–5.2)
PROCALCITONIN SERPL-MCNC: 0.36 NG/ML (ref 0–0.25)
PROT SERPL-MCNC: 7.9 G/DL (ref 6–8.5)
PROTHROMBIN TIME: 15.5 SECONDS (ref 12.1–15)
QT INTERVAL: 362 MS
RBC # BLD AUTO: 5.84 10*6/MM3 (ref 4.14–5.8)
SARS-COV-2 RNA RESP QL NAA+PROBE: DETECTED
SODIUM SERPL-SCNC: 132 MMOL/L (ref 136–145)
TROPONIN T SERPL-MCNC: <0.01 NG/ML (ref 0–0.03)
WBC NRBC COR # BLD: 12.94 10*3/MM3 (ref 3.4–10.8)

## 2021-12-16 PROCEDURE — 82962 GLUCOSE BLOOD TEST: CPT

## 2021-12-16 PROCEDURE — 86140 C-REACTIVE PROTEIN: CPT | Performed by: EMERGENCY MEDICINE

## 2021-12-16 PROCEDURE — 93010 ELECTROCARDIOGRAM REPORT: CPT | Performed by: INTERNAL MEDICINE

## 2021-12-16 PROCEDURE — 83605 ASSAY OF LACTIC ACID: CPT | Performed by: EMERGENCY MEDICINE

## 2021-12-16 PROCEDURE — 94799 UNLISTED PULMONARY SVC/PX: CPT

## 2021-12-16 PROCEDURE — 83735 ASSAY OF MAGNESIUM: CPT | Performed by: STUDENT IN AN ORGANIZED HEALTH CARE EDUCATION/TRAINING PROGRAM

## 2021-12-16 PROCEDURE — 94761 N-INVAS EAR/PLS OXIMETRY MLT: CPT

## 2021-12-16 PROCEDURE — 84100 ASSAY OF PHOSPHORUS: CPT | Performed by: STUDENT IN AN ORGANIZED HEALTH CARE EDUCATION/TRAINING PROGRAM

## 2021-12-16 PROCEDURE — 84484 ASSAY OF TROPONIN QUANT: CPT | Performed by: EMERGENCY MEDICINE

## 2021-12-16 PROCEDURE — 85025 COMPLETE CBC W/AUTO DIFF WBC: CPT | Performed by: EMERGENCY MEDICINE

## 2021-12-16 PROCEDURE — 99285 EMERGENCY DEPT VISIT HI MDM: CPT | Performed by: EMERGENCY MEDICINE

## 2021-12-16 PROCEDURE — 82728 ASSAY OF FERRITIN: CPT | Performed by: EMERGENCY MEDICINE

## 2021-12-16 PROCEDURE — 85379 FIBRIN DEGRADATION QUANT: CPT | Performed by: EMERGENCY MEDICINE

## 2021-12-16 PROCEDURE — 85652 RBC SED RATE AUTOMATED: CPT | Performed by: EMERGENCY MEDICINE

## 2021-12-16 PROCEDURE — 84145 PROCALCITONIN (PCT): CPT | Performed by: EMERGENCY MEDICINE

## 2021-12-16 PROCEDURE — 71045 X-RAY EXAM CHEST 1 VIEW: CPT

## 2021-12-16 PROCEDURE — 87040 BLOOD CULTURE FOR BACTERIA: CPT | Performed by: EMERGENCY MEDICINE

## 2021-12-16 PROCEDURE — 25010000002 DEXAMETHASONE SODIUM PHOSPHATE 10 MG/ML SOLUTION: Performed by: INTERNAL MEDICINE

## 2021-12-16 PROCEDURE — 85730 THROMBOPLASTIN TIME PARTIAL: CPT | Performed by: EMERGENCY MEDICINE

## 2021-12-16 PROCEDURE — 83036 HEMOGLOBIN GLYCOSYLATED A1C: CPT | Performed by: STUDENT IN AN ORGANIZED HEALTH CARE EDUCATION/TRAINING PROGRAM

## 2021-12-16 PROCEDURE — 94660 CPAP INITIATION&MGMT: CPT

## 2021-12-16 PROCEDURE — 99291 CRITICAL CARE FIRST HOUR: CPT | Performed by: STUDENT IN AN ORGANIZED HEALTH CARE EDUCATION/TRAINING PROGRAM

## 2021-12-16 PROCEDURE — 87636 SARSCOV2 & INF A&B AMP PRB: CPT | Performed by: EMERGENCY MEDICINE

## 2021-12-16 PROCEDURE — 99285 EMERGENCY DEPT VISIT HI MDM: CPT

## 2021-12-16 PROCEDURE — 80076 HEPATIC FUNCTION PANEL: CPT | Performed by: STUDENT IN AN ORGANIZED HEALTH CARE EDUCATION/TRAINING PROGRAM

## 2021-12-16 PROCEDURE — 85610 PROTHROMBIN TIME: CPT | Performed by: EMERGENCY MEDICINE

## 2021-12-16 PROCEDURE — 80053 COMPREHEN METABOLIC PANEL: CPT | Performed by: EMERGENCY MEDICINE

## 2021-12-16 PROCEDURE — 83605 ASSAY OF LACTIC ACID: CPT | Performed by: STUDENT IN AN ORGANIZED HEALTH CARE EDUCATION/TRAINING PROGRAM

## 2021-12-16 PROCEDURE — 93005 ELECTROCARDIOGRAM TRACING: CPT | Performed by: EMERGENCY MEDICINE

## 2021-12-16 RX ORDER — ALBUTEROL SULFATE 90 UG/1
2 AEROSOL, METERED RESPIRATORY (INHALATION) EVERY 6 HOURS PRN
Status: DISCONTINUED | OUTPATIENT
Start: 2021-12-16 | End: 2021-12-23

## 2021-12-16 RX ORDER — DEXTROMETHORPHAN POLISTIREX 30 MG/5ML
60 SUSPENSION ORAL EVERY 12 HOURS PRN
Status: DISCONTINUED | OUTPATIENT
Start: 2021-12-16 | End: 2022-01-13 | Stop reason: HOSPADM

## 2021-12-16 RX ORDER — DEXAMETHASONE SODIUM PHOSPHATE 10 MG/ML
10 INJECTION INTRAMUSCULAR; INTRAVENOUS ONCE
Status: COMPLETED | OUTPATIENT
Start: 2021-12-16 | End: 2021-12-16

## 2021-12-16 RX ORDER — SODIUM CHLORIDE 9 MG/ML
INJECTION, SOLUTION INTRAVENOUS
Status: DISPENSED
Start: 2021-12-16 | End: 2021-12-17

## 2021-12-16 RX ORDER — DEXAMETHASONE SODIUM PHOSPHATE 4 MG/ML
6 INJECTION, SOLUTION INTRA-ARTICULAR; INTRALESIONAL; INTRAMUSCULAR; INTRAVENOUS; SOFT TISSUE DAILY
Status: DISCONTINUED | OUTPATIENT
Start: 2021-12-17 | End: 2021-12-17

## 2021-12-16 RX ORDER — DILTIAZEM HYDROCHLORIDE 5 MG/ML
INJECTION INTRAVENOUS
Status: COMPLETED
Start: 2021-12-16 | End: 2021-12-16

## 2021-12-16 RX ORDER — DEXMEDETOMIDINE HYDROCHLORIDE 4 UG/ML
.2-1.5 INJECTION, SOLUTION INTRAVENOUS
Status: DISCONTINUED | OUTPATIENT
Start: 2021-12-17 | End: 2021-12-17

## 2021-12-16 RX ORDER — DILTIAZEM HYDROCHLORIDE 5 MG/ML
10 INJECTION INTRAVENOUS ONCE
Status: COMPLETED | OUTPATIENT
Start: 2021-12-16 | End: 2021-12-16

## 2021-12-16 RX ORDER — DEXTROSE MONOHYDRATE 25 G/50ML
25 INJECTION, SOLUTION INTRAVENOUS
Status: DISCONTINUED | OUTPATIENT
Start: 2021-12-16 | End: 2021-12-17

## 2021-12-16 RX ORDER — IPRATROPIUM BROMIDE AND ALBUTEROL SULFATE 2.5; .5 MG/3ML; MG/3ML
3 SOLUTION RESPIRATORY (INHALATION) EVERY 6 HOURS PRN
Status: DISCONTINUED | OUTPATIENT
Start: 2021-12-16 | End: 2022-01-13 | Stop reason: HOSPADM

## 2021-12-16 RX ORDER — NICOTINE POLACRILEX 4 MG
15 LOZENGE BUCCAL
Status: DISCONTINUED | OUTPATIENT
Start: 2021-12-16 | End: 2021-12-17

## 2021-12-16 RX ORDER — SODIUM CHLORIDE 0.9 % (FLUSH) 0.9 %
10 SYRINGE (ML) INJECTION AS NEEDED
Status: DISCONTINUED | OUTPATIENT
Start: 2021-12-16 | End: 2022-01-13 | Stop reason: HOSPADM

## 2021-12-16 RX ADMIN — SODIUM CHLORIDE 10 MG/HR: 900 INJECTION, SOLUTION INTRAVENOUS at 18:29

## 2021-12-16 RX ADMIN — SODIUM CHLORIDE 1000 ML: 9 INJECTION, SOLUTION INTRAVENOUS at 16:03

## 2021-12-16 RX ADMIN — DILTIAZEM HYDROCHLORIDE 10 MG: 5 INJECTION INTRAVENOUS at 18:21

## 2021-12-16 RX ADMIN — DILTIAZEM HYDROCHLORIDE 10 MG: 5 INJECTION INTRAVENOUS at 19:12

## 2021-12-16 RX ADMIN — DEXAMETHASONE SODIUM PHOSPHATE 10 MG: 10 INJECTION INTRAMUSCULAR; INTRAVENOUS at 18:21

## 2021-12-17 ENCOUNTER — APPOINTMENT (OUTPATIENT)
Dept: ULTRASOUND IMAGING | Facility: HOSPITAL | Age: 66
End: 2021-12-17

## 2021-12-17 LAB
ACETONE BLD QL: ABNORMAL
ALBUMIN SERPL-MCNC: 2.7 G/DL (ref 3.5–5.2)
ALBUMIN SERPL-MCNC: 2.8 G/DL (ref 3.5–5.2)
ALBUMIN SERPL-MCNC: 2.8 G/DL (ref 3.5–5.2)
ALBUMIN/GLOB SERPL: 0.7 G/DL
ALP SERPL-CCNC: 106 U/L (ref 39–117)
ALP SERPL-CCNC: 97 U/L (ref 39–117)
ALT SERPL W P-5'-P-CCNC: 59 U/L (ref 1–41)
ALT SERPL W P-5'-P-CCNC: 62 U/L (ref 1–41)
ANION GAP SERPL CALCULATED.3IONS-SCNC: 17 MMOL/L (ref 5–15)
ANION GAP SERPL CALCULATED.3IONS-SCNC: 23.7 MMOL/L (ref 5–15)
ARTERIAL PATENCY WRIST A: POSITIVE
AST SERPL-CCNC: 57 U/L (ref 1–40)
AST SERPL-CCNC: 60 U/L (ref 1–40)
ATMOSPHERIC PRESS: 742 MMHG
BASE EXCESS BLDA CALC-SCNC: -6.7 MMOL/L (ref 0–2)
BASOPHILS # BLD AUTO: 0.05 10*3/MM3 (ref 0–0.2)
BASOPHILS NFR BLD AUTO: 0.5 % (ref 0–1.5)
BDY SITE: ABNORMAL
BILIRUB CONJ SERPL-MCNC: 0.2 MG/DL (ref 0–0.3)
BILIRUB INDIRECT SERPL-MCNC: 0.3 MG/DL
BILIRUB SERPL-MCNC: 0.5 MG/DL (ref 0–1.2)
BILIRUB SERPL-MCNC: 0.6 MG/DL (ref 0–1.2)
BODY TEMPERATURE: 37 C
BUN SERPL-MCNC: 22 MG/DL (ref 8–23)
BUN SERPL-MCNC: 29 MG/DL (ref 8–23)
BUN/CREAT SERPL: 25.9 (ref 7–25)
BUN/CREAT SERPL: 37.7 (ref 7–25)
CALCIUM SPEC-SCNC: 9.1 MG/DL (ref 8.6–10.5)
CALCIUM SPEC-SCNC: 9.2 MG/DL (ref 8.6–10.5)
CHLORIDE SERPL-SCNC: 102 MMOL/L (ref 98–107)
CHLORIDE SERPL-SCNC: 95 MMOL/L (ref 98–107)
CO2 SERPL-SCNC: 14.3 MMOL/L (ref 22–29)
CO2 SERPL-SCNC: 18 MMOL/L (ref 22–29)
CREAT SERPL-MCNC: 0.77 MG/DL (ref 0.76–1.27)
CREAT SERPL-MCNC: 0.85 MG/DL (ref 0.76–1.27)
D DIMER PPP FEU-MCNC: >20 MCGFEU/ML (ref 0–0.46)
D-LACTATE SERPL-SCNC: 2 MMOL/L (ref 0.5–2)
DEPRECATED RDW RBC AUTO: 46.3 FL (ref 37–54)
EOSINOPHIL # BLD AUTO: 0.01 10*3/MM3 (ref 0–0.4)
EOSINOPHIL NFR BLD AUTO: 0.1 % (ref 0.3–6.2)
EPAP: 12
ERYTHROCYTE [DISTWIDTH] IN BLOOD BY AUTOMATED COUNT: 13.5 % (ref 12.3–15.4)
GFR SERPL CREATININE-BSD FRML MDRD: 101 ML/MIN/1.73
GFR SERPL CREATININE-BSD FRML MDRD: 90 ML/MIN/1.73
GLOBULIN UR ELPH-MCNC: 3.9 GM/DL
GLUCOSE BLDC GLUCOMTR-MCNC: 129 MG/DL (ref 70–130)
GLUCOSE BLDC GLUCOMTR-MCNC: 137 MG/DL (ref 70–130)
GLUCOSE BLDC GLUCOMTR-MCNC: 195 MG/DL (ref 70–130)
GLUCOSE BLDC GLUCOMTR-MCNC: 290 MG/DL (ref 70–130)
GLUCOSE BLDC GLUCOMTR-MCNC: 315 MG/DL (ref 70–130)
GLUCOSE BLDC GLUCOMTR-MCNC: 315 MG/DL (ref 70–130)
GLUCOSE BLDC GLUCOMTR-MCNC: 317 MG/DL (ref 70–130)
GLUCOSE BLDC GLUCOMTR-MCNC: 321 MG/DL (ref 70–130)
GLUCOSE BLDC GLUCOMTR-MCNC: 354 MG/DL (ref 70–130)
GLUCOSE BLDC GLUCOMTR-MCNC: 379 MG/DL (ref 70–130)
GLUCOSE SERPL-MCNC: 147 MG/DL (ref 65–99)
GLUCOSE SERPL-MCNC: 400 MG/DL (ref 65–99)
HBA1C MFR BLD: 11 % (ref 4.8–5.6)
HCO3 BLDA-SCNC: 16.2 MMOL/L (ref 20–26)
HCT VFR BLD AUTO: 49.9 % (ref 37.5–51)
HGB BLD-MCNC: 16.3 G/DL (ref 13–17.7)
HGB BLDA-MCNC: 15.7 G/DL (ref 14–18)
IMM GRANULOCYTES # BLD AUTO: 0.13 10*3/MM3 (ref 0–0.05)
IMM GRANULOCYTES NFR BLD AUTO: 1.2 % (ref 0–0.5)
INHALED O2 CONCENTRATION: 100 %
IPAP: 15
LDH SERPL-CCNC: 646 U/L (ref 135–225)
LYMPHOCYTES # BLD AUTO: 0.73 10*3/MM3 (ref 0.7–3.1)
LYMPHOCYTES NFR BLD AUTO: 6.6 % (ref 19.6–45.3)
Lab: ABNORMAL
MAGNESIUM SERPL-MCNC: 2.5 MG/DL (ref 1.6–2.4)
MCH RBC QN AUTO: 30.6 PG (ref 26.6–33)
MCHC RBC AUTO-ENTMCNC: 32.7 G/DL (ref 31.5–35.7)
MCV RBC AUTO: 93.8 FL (ref 79–97)
MODALITY: ABNORMAL
MONOCYTES # BLD AUTO: 0.31 10*3/MM3 (ref 0.1–0.9)
MONOCYTES NFR BLD AUTO: 2.8 % (ref 5–12)
NEUTROPHILS NFR BLD AUTO: 88.8 % (ref 42.7–76)
NEUTROPHILS NFR BLD AUTO: 9.87 10*3/MM3 (ref 1.7–7)
NRBC BLD AUTO-RTO: 0 /100 WBC (ref 0–0.2)
PCO2 BLDA: 26.3 MM HG (ref 35–45)
PCO2 TEMP ADJ BLD: 26.3 MM HG (ref 35–45)
PH BLDA: 7.4 PH UNITS (ref 7.35–7.45)
PH, TEMP CORRECTED: 7.4 PH UNITS (ref 7.35–7.45)
PHOSPHATE SERPL-MCNC: 1.9 MG/DL (ref 2.5–4.5)
PHOSPHATE SERPL-MCNC: 3.1 MG/DL (ref 2.5–4.5)
PLATELET # BLD AUTO: 246 10*3/MM3 (ref 140–450)
PMV BLD AUTO: 11.2 FL (ref 6–12)
PO2 BLDA: 74.8 MM HG (ref 83–108)
PO2 TEMP ADJ BLD: 74.8 MM HG (ref 83–108)
POTASSIUM SERPL-SCNC: 3 MMOL/L (ref 3.5–5.2)
POTASSIUM SERPL-SCNC: 4.1 MMOL/L (ref 3.5–5.2)
PROT SERPL-MCNC: 6.5 G/DL (ref 6–8.5)
PROT SERPL-MCNC: 6.7 G/DL (ref 6–8.5)
QT INTERVAL: 333 MS
RBC # BLD AUTO: 5.32 10*6/MM3 (ref 4.14–5.8)
SAO2 % BLDCOA: 95.7 % (ref 94–99)
SET MECH RESP RATE: 16
SODIUM SERPL-SCNC: 133 MMOL/L (ref 136–145)
SODIUM SERPL-SCNC: 137 MMOL/L (ref 136–145)
VENTILATOR MODE: ABNORMAL
VT ON VENT VENT: 850 ML
WBC NRBC COR # BLD: 11.1 10*3/MM3 (ref 3.4–10.8)

## 2021-12-17 PROCEDURE — 94799 UNLISTED PULMONARY SVC/PX: CPT

## 2021-12-17 PROCEDURE — 83615 LACTATE (LD) (LDH) ENZYME: CPT | Performed by: STUDENT IN AN ORGANIZED HEALTH CARE EDUCATION/TRAINING PROGRAM

## 2021-12-17 PROCEDURE — 80069 RENAL FUNCTION PANEL: CPT | Performed by: HOSPITALIST

## 2021-12-17 PROCEDURE — 93970 EXTREMITY STUDY: CPT

## 2021-12-17 PROCEDURE — 25010000002 DEXAMETHASONE PER 1 MG: Performed by: HOSPITALIST

## 2021-12-17 PROCEDURE — 85025 COMPLETE CBC W/AUTO DIFF WBC: CPT | Performed by: STUDENT IN AN ORGANIZED HEALTH CARE EDUCATION/TRAINING PROGRAM

## 2021-12-17 PROCEDURE — XW033E5 INTRODUCTION OF REMDESIVIR ANTI-INFECTIVE INTO PERIPHERAL VEIN, PERCUTANEOUS APPROACH, NEW TECHNOLOGY GROUP 5: ICD-10-PCS | Performed by: HOSPITALIST

## 2021-12-17 PROCEDURE — 82803 BLOOD GASES ANY COMBINATION: CPT

## 2021-12-17 PROCEDURE — 80053 COMPREHEN METABOLIC PANEL: CPT | Performed by: STUDENT IN AN ORGANIZED HEALTH CARE EDUCATION/TRAINING PROGRAM

## 2021-12-17 PROCEDURE — 85379 FIBRIN DEGRADATION QUANT: CPT | Performed by: STUDENT IN AN ORGANIZED HEALTH CARE EDUCATION/TRAINING PROGRAM

## 2021-12-17 PROCEDURE — 82962 GLUCOSE BLOOD TEST: CPT

## 2021-12-17 PROCEDURE — 63710000001 INSULIN DETEMIR PER 5 UNITS: Performed by: HOSPITALIST

## 2021-12-17 PROCEDURE — 36600 WITHDRAWAL OF ARTERIAL BLOOD: CPT

## 2021-12-17 PROCEDURE — 63710000001 INSULIN DETEMIR PER 5 UNITS: Performed by: STUDENT IN AN ORGANIZED HEALTH CARE EDUCATION/TRAINING PROGRAM

## 2021-12-17 PROCEDURE — 25010000002 ENOXAPARIN PER 10 MG: Performed by: HOSPITALIST

## 2021-12-17 PROCEDURE — 94660 CPAP INITIATION&MGMT: CPT

## 2021-12-17 PROCEDURE — 63710000001 INSULIN ASPART PER 5 UNITS: Performed by: STUDENT IN AN ORGANIZED HEALTH CARE EDUCATION/TRAINING PROGRAM

## 2021-12-17 PROCEDURE — 25010000002 DEXAMETHASONE PER 1 MG: Performed by: STUDENT IN AN ORGANIZED HEALTH CARE EDUCATION/TRAINING PROGRAM

## 2021-12-17 PROCEDURE — 99233 SBSQ HOSP IP/OBS HIGH 50: CPT | Performed by: HOSPITALIST

## 2021-12-17 PROCEDURE — 82009 KETONE BODYS QUAL: CPT | Performed by: INTERNAL MEDICINE

## 2021-12-17 PROCEDURE — 25010000002 ENOXAPARIN PER 10 MG: Performed by: STUDENT IN AN ORGANIZED HEALTH CARE EDUCATION/TRAINING PROGRAM

## 2021-12-17 RX ORDER — DEXTROSE MONOHYDRATE 25 G/50ML
25-50 INJECTION, SOLUTION INTRAVENOUS
Status: DISCONTINUED | OUTPATIENT
Start: 2021-12-17 | End: 2021-12-19

## 2021-12-17 RX ORDER — DEXMEDETOMIDINE HYDROCHLORIDE 4 UG/ML
.2-1.5 INJECTION, SOLUTION INTRAVENOUS
Status: DISCONTINUED | OUTPATIENT
Start: 2021-12-17 | End: 2021-12-17 | Stop reason: SDUPTHER

## 2021-12-17 RX ORDER — DEXAMETHASONE SODIUM PHOSPHATE 4 MG/ML
6 INJECTION, SOLUTION INTRA-ARTICULAR; INTRALESIONAL; INTRAMUSCULAR; INTRAVENOUS; SOFT TISSUE EVERY 12 HOURS SCHEDULED
Status: DISCONTINUED | OUTPATIENT
Start: 2021-12-17 | End: 2021-12-24

## 2021-12-17 RX ORDER — DEXMEDETOMIDINE HYDROCHLORIDE 4 UG/ML
.2-1.5 INJECTION, SOLUTION INTRAVENOUS
Status: DISCONTINUED | OUTPATIENT
Start: 2021-12-17 | End: 2022-01-04

## 2021-12-17 RX ORDER — METOPROLOL SUCCINATE 50 MG/1
50 TABLET, EXTENDED RELEASE ORAL
Status: DISCONTINUED | OUTPATIENT
Start: 2021-12-17 | End: 2021-12-18

## 2021-12-17 RX ORDER — POTASSIUM CHLORIDE 7.45 MG/ML
10 INJECTION INTRAVENOUS
Status: DISCONTINUED | OUTPATIENT
Start: 2021-12-17 | End: 2022-01-13 | Stop reason: HOSPADM

## 2021-12-17 RX ORDER — DEXMEDETOMIDINE HYDROCHLORIDE 4 UG/ML
INJECTION, SOLUTION INTRAVENOUS
Status: COMPLETED
Start: 2021-12-17 | End: 2021-12-17

## 2021-12-17 RX ADMIN — DEXAMETHASONE SODIUM PHOSPHATE 6 MG: 4 INJECTION, SOLUTION INTRAMUSCULAR; INTRAVENOUS at 20:50

## 2021-12-17 RX ADMIN — ENOXAPARIN SODIUM 120 MG: 120 INJECTION SUBCUTANEOUS at 20:50

## 2021-12-17 RX ADMIN — REMDESIVIR 200 MG: 100 INJECTION, POWDER, LYOPHILIZED, FOR SOLUTION INTRAVENOUS at 00:18

## 2021-12-17 RX ADMIN — INSULIN ASPART 8 UNITS: 100 INJECTION, SOLUTION INTRAVENOUS; SUBCUTANEOUS at 08:43

## 2021-12-17 RX ADMIN — INSULIN DETEMIR 15 UNITS: 100 INJECTION, SOLUTION SUBCUTANEOUS at 10:55

## 2021-12-17 RX ADMIN — SODIUM CHLORIDE 10 MG/HR: 900 INJECTION, SOLUTION INTRAVENOUS at 10:50

## 2021-12-17 RX ADMIN — INSULIN ASPART 7 UNITS: 100 INJECTION, SOLUTION INTRAVENOUS; SUBCUTANEOUS at 12:31

## 2021-12-17 RX ADMIN — METOPROLOL SUCCINATE 50 MG: 50 TABLET, EXTENDED RELEASE ORAL at 14:34

## 2021-12-17 RX ADMIN — DEXMEDETOMIDINE HYDROCHLORIDE 0.2 MCG/KG/HR: 400 INJECTION, SOLUTION INTRAVENOUS at 09:56

## 2021-12-17 RX ADMIN — SODIUM CHLORIDE 10 MG/HR: 900 INJECTION, SOLUTION INTRAVENOUS at 01:29

## 2021-12-17 RX ADMIN — DILTIAZEM HYDROCHLORIDE 10 MG/HR: 100 INJECTION, POWDER, LYOPHILIZED, FOR SOLUTION INTRAVENOUS at 01:29

## 2021-12-17 RX ADMIN — ENOXAPARIN SODIUM 40 MG: 40 INJECTION SUBCUTANEOUS at 08:44

## 2021-12-17 RX ADMIN — DEXMEDETOMIDINE HYDROCHLORIDE 0.2 MCG/KG/HR: 400 INJECTION, SOLUTION INTRAVENOUS at 20:45

## 2021-12-17 RX ADMIN — ENOXAPARIN SODIUM 40 MG: 40 INJECTION SUBCUTANEOUS at 00:17

## 2021-12-17 RX ADMIN — INSULIN DETEMIR 10 UNITS: 100 INJECTION, SOLUTION SUBCUTANEOUS at 00:27

## 2021-12-17 RX ADMIN — DEXAMETHASONE SODIUM PHOSPHATE 6 MG: 4 INJECTION, SOLUTION INTRAMUSCULAR; INTRAVENOUS at 08:44

## 2021-12-17 RX ADMIN — INSULIN HUMAN 16 UNITS/HR: 1 INJECTION, SOLUTION INTRAVENOUS at 23:34

## 2021-12-17 RX ADMIN — INSULIN HUMAN 4 UNITS/HR: 1 INJECTION, SOLUTION INTRAVENOUS at 17:15

## 2021-12-18 ENCOUNTER — APPOINTMENT (OUTPATIENT)
Dept: GENERAL RADIOLOGY | Facility: HOSPITAL | Age: 66
End: 2021-12-18

## 2021-12-18 ENCOUNTER — APPOINTMENT (OUTPATIENT)
Dept: CT IMAGING | Facility: HOSPITAL | Age: 66
End: 2021-12-18

## 2021-12-18 LAB
ALBUMIN SERPL-MCNC: 2.1 G/DL (ref 3.5–5.2)
ALBUMIN SERPL-MCNC: 2.4 G/DL (ref 3.5–5.2)
ALBUMIN SERPL-MCNC: 2.7 G/DL (ref 3.5–5.2)
ALBUMIN SERPL-MCNC: 2.7 G/DL (ref 3.5–5.2)
ALBUMIN/GLOB SERPL: 0.6 G/DL
ALP SERPL-CCNC: 111 U/L (ref 39–117)
ALT SERPL W P-5'-P-CCNC: 60 U/L (ref 1–41)
ANION GAP SERPL CALCULATED.3IONS-SCNC: 10.3 MMOL/L (ref 5–15)
ANION GAP SERPL CALCULATED.3IONS-SCNC: 13.2 MMOL/L (ref 5–15)
ANION GAP SERPL CALCULATED.3IONS-SCNC: 13.8 MMOL/L (ref 5–15)
ANION GAP SERPL CALCULATED.3IONS-SCNC: 13.8 MMOL/L (ref 5–15)
ARTERIAL PATENCY WRIST A: POSITIVE
AST SERPL-CCNC: 63 U/L (ref 1–40)
ATMOSPHERIC PRESS: 737 MMHG
ATMOSPHERIC PRESS: 737 MMHG
ATMOSPHERIC PRESS: 738 MMHG
BASE EXCESS BLDA CALC-SCNC: -2.9 MMOL/L (ref 0–2)
BASE EXCESS BLDA CALC-SCNC: -3.6 MMOL/L (ref 0–2)
BASE EXCESS BLDA CALC-SCNC: 0.8 MMOL/L (ref 0–2)
BASOPHILS # BLD AUTO: 0.07 10*3/MM3 (ref 0–0.2)
BASOPHILS NFR BLD AUTO: 0.5 % (ref 0–1.5)
BDY SITE: ABNORMAL
BILIRUB SERPL-MCNC: 0.4 MG/DL (ref 0–1.2)
BODY TEMPERATURE: 37 C
BUN SERPL-MCNC: 34 MG/DL (ref 8–23)
BUN SERPL-MCNC: 34 MG/DL (ref 8–23)
BUN SERPL-MCNC: 35 MG/DL (ref 8–23)
BUN SERPL-MCNC: 37 MG/DL (ref 8–23)
BUN/CREAT SERPL: 34.3 (ref 7–25)
BUN/CREAT SERPL: 34.3 (ref 7–25)
BUN/CREAT SERPL: 37.4 (ref 7–25)
BUN/CREAT SERPL: 43.2 (ref 7–25)
CALCIUM SPEC-SCNC: 6.8 MG/DL (ref 8.6–10.5)
CALCIUM SPEC-SCNC: 8 MG/DL (ref 8.6–10.5)
CALCIUM SPEC-SCNC: 8.7 MG/DL (ref 8.6–10.5)
CALCIUM SPEC-SCNC: 8.7 MG/DL (ref 8.6–10.5)
CHLORIDE SERPL-SCNC: 109 MMOL/L (ref 98–107)
CHLORIDE SERPL-SCNC: 109 MMOL/L (ref 98–107)
CHLORIDE SERPL-SCNC: 112 MMOL/L (ref 98–107)
CHLORIDE SERPL-SCNC: 116 MMOL/L (ref 98–107)
CO2 SERPL-SCNC: 14.7 MMOL/L (ref 22–29)
CO2 SERPL-SCNC: 14.8 MMOL/L (ref 22–29)
CO2 SERPL-SCNC: 19.2 MMOL/L (ref 22–29)
CO2 SERPL-SCNC: 19.2 MMOL/L (ref 22–29)
CREAT SERPL-MCNC: 0.81 MG/DL (ref 0.76–1.27)
CREAT SERPL-MCNC: 0.99 MG/DL (ref 0.76–1.27)
DEPRECATED RDW RBC AUTO: 45.5 FL (ref 37–54)
EOSINOPHIL # BLD AUTO: 0 10*3/MM3 (ref 0–0.4)
EOSINOPHIL NFR BLD AUTO: 0 % (ref 0.3–6.2)
EPAP: 10
ERYTHROCYTE [DISTWIDTH] IN BLOOD BY AUTOMATED COUNT: 13.3 % (ref 12.3–15.4)
GFR SERPL CREATININE-BSD FRML MDRD: 76 ML/MIN/1.73
GFR SERPL CREATININE-BSD FRML MDRD: 96 ML/MIN/1.73
GLOBULIN UR ELPH-MCNC: 4.3 GM/DL
GLUCOSE BLDC GLUCOMTR-MCNC: 101 MG/DL (ref 70–130)
GLUCOSE BLDC GLUCOMTR-MCNC: 115 MG/DL (ref 70–130)
GLUCOSE BLDC GLUCOMTR-MCNC: 118 MG/DL (ref 70–130)
GLUCOSE BLDC GLUCOMTR-MCNC: 119 MG/DL (ref 70–130)
GLUCOSE BLDC GLUCOMTR-MCNC: 119 MG/DL (ref 70–130)
GLUCOSE BLDC GLUCOMTR-MCNC: 120 MG/DL (ref 70–130)
GLUCOSE BLDC GLUCOMTR-MCNC: 122 MG/DL (ref 70–130)
GLUCOSE BLDC GLUCOMTR-MCNC: 126 MG/DL (ref 70–130)
GLUCOSE BLDC GLUCOMTR-MCNC: 130 MG/DL (ref 70–130)
GLUCOSE BLDC GLUCOMTR-MCNC: 131 MG/DL (ref 70–130)
GLUCOSE BLDC GLUCOMTR-MCNC: 140 MG/DL (ref 70–130)
GLUCOSE BLDC GLUCOMTR-MCNC: 163 MG/DL (ref 70–130)
GLUCOSE BLDC GLUCOMTR-MCNC: 171 MG/DL (ref 70–130)
GLUCOSE BLDC GLUCOMTR-MCNC: 229 MG/DL (ref 70–130)
GLUCOSE BLDC GLUCOMTR-MCNC: 61 MG/DL (ref 70–130)
GLUCOSE BLDC GLUCOMTR-MCNC: 87 MG/DL (ref 70–130)
GLUCOSE BLDC GLUCOMTR-MCNC: 89 MG/DL (ref 70–130)
GLUCOSE SERPL-MCNC: 137 MG/DL (ref 65–99)
GLUCOSE SERPL-MCNC: 148 MG/DL (ref 65–99)
GLUCOSE SERPL-MCNC: 158 MG/DL (ref 65–99)
GLUCOSE SERPL-MCNC: 158 MG/DL (ref 65–99)
HCO3 BLDA-SCNC: 22 MMOL/L (ref 20–26)
HCO3 BLDA-SCNC: 22.1 MMOL/L (ref 20–26)
HCO3 BLDA-SCNC: 24.7 MMOL/L (ref 20–26)
HCT VFR BLD AUTO: 48.8 % (ref 37.5–51)
HGB BLD-MCNC: 16.4 G/DL (ref 13–17.7)
HGB BLDA-MCNC: 17.3 G/DL (ref 14–18)
HGB BLDA-MCNC: 17.6 G/DL (ref 14–18)
HGB BLDA-MCNC: 18 G/DL (ref 14–18)
IMM GRANULOCYTES # BLD AUTO: 0.2 10*3/MM3 (ref 0–0.05)
IMM GRANULOCYTES NFR BLD AUTO: 1.5 % (ref 0–0.5)
INHALED O2 CONCENTRATION: 100 %
IPAP: 15
LYMPHOCYTES # BLD AUTO: 0.62 10*3/MM3 (ref 0.7–3.1)
LYMPHOCYTES NFR BLD AUTO: 4.6 % (ref 19.6–45.3)
Lab: ABNORMAL
MAGNESIUM SERPL-MCNC: 2.3 MG/DL (ref 1.6–2.4)
MCH RBC QN AUTO: 31.5 PG (ref 26.6–33)
MCHC RBC AUTO-ENTMCNC: 33.6 G/DL (ref 31.5–35.7)
MCV RBC AUTO: 93.7 FL (ref 79–97)
MODALITY: ABNORMAL
MONOCYTES # BLD AUTO: 0.46 10*3/MM3 (ref 0.1–0.9)
MONOCYTES NFR BLD AUTO: 3.4 % (ref 5–12)
NEUTROPHILS NFR BLD AUTO: 12.09 10*3/MM3 (ref 1.7–7)
NEUTROPHILS NFR BLD AUTO: 90 % (ref 42.7–76)
NOTIFIED BY: ABNORMAL
NOTIFIED WHO: ABNORMAL
NOTIFIED WHO: ABNORMAL
NRBC BLD AUTO-RTO: 0 /100 WBC (ref 0–0.2)
PAW @ PEAK INSP FLOW SETTING VENT: 12 CMH2O
PAW @ PEAK INSP FLOW SETTING VENT: 15 CMH2O
PCO2 BLDA: 27.6 MM HG (ref 35–45)
PCO2 BLDA: 41 MM HG (ref 35–45)
PCO2 BLDA: 51.7 MM HG (ref 35–45)
PCO2 TEMP ADJ BLD: 27.6 MM HG (ref 35–45)
PCO2 TEMP ADJ BLD: 41 MM HG (ref 35–45)
PCO2 TEMP ADJ BLD: 51.7 MM HG (ref 35–45)
PEEP RESPIRATORY: 14 CM[H2O]
PEEP RESPIRATORY: 14 CM[H2O]
PH BLDA: 7.29 PH UNITS (ref 7.35–7.45)
PH BLDA: 7.34 PH UNITS (ref 7.35–7.45)
PH BLDA: 7.51 PH UNITS (ref 7.35–7.45)
PH, TEMP CORRECTED: 7.29 PH UNITS (ref 7.35–7.45)
PH, TEMP CORRECTED: 7.34 PH UNITS (ref 7.35–7.45)
PH, TEMP CORRECTED: 7.51 PH UNITS (ref 7.35–7.45)
PHOSPHATE SERPL-MCNC: 2.8 MG/DL (ref 2.5–4.5)
PHOSPHATE SERPL-MCNC: 3.5 MG/DL (ref 2.5–4.5)
PHOSPHATE SERPL-MCNC: 3.7 MG/DL (ref 2.5–4.5)
PLAT MORPH BLD: NORMAL
PLATELET # BLD AUTO: 280 10*3/MM3 (ref 140–450)
PMV BLD AUTO: 11.4 FL (ref 6–12)
PO2 BLDA: 46.3 MM HG (ref 83–108)
PO2 BLDA: 64.4 MM HG (ref 83–108)
PO2 BLDA: 91.2 MM HG (ref 83–108)
PO2 TEMP ADJ BLD: 46.3 MM HG (ref 83–108)
PO2 TEMP ADJ BLD: 64.4 MM HG (ref 83–108)
PO2 TEMP ADJ BLD: 91.2 MM HG (ref 83–108)
POTASSIUM SERPL-SCNC: 3.2 MMOL/L (ref 3.5–5.2)
POTASSIUM SERPL-SCNC: 3.9 MMOL/L (ref 3.5–5.2)
POTASSIUM SERPL-SCNC: 3.9 MMOL/L (ref 3.5–5.2)
POTASSIUM SERPL-SCNC: 4.4 MMOL/L (ref 3.5–5.2)
PROCALCITONIN SERPL-MCNC: 0.37 NG/ML (ref 0–0.25)
PROT SERPL-MCNC: 7 G/DL (ref 6–8.5)
RBC # BLD AUTO: 5.21 10*6/MM3 (ref 4.14–5.8)
RBC MORPH BLD: NORMAL
SAO2 % BLDCOA: 86.4 % (ref 94–99)
SAO2 % BLDCOA: 89 % (ref 94–99)
SAO2 % BLDCOA: 96.9 % (ref 94–99)
SET MECH RESP RATE: 16
SET MECH RESP RATE: 20
SET MECH RESP RATE: 20
SODIUM SERPL-SCNC: 140 MMOL/L (ref 136–145)
SODIUM SERPL-SCNC: 141 MMOL/L (ref 136–145)
SODIUM SERPL-SCNC: 142 MMOL/L (ref 136–145)
SODIUM SERPL-SCNC: 142 MMOL/L (ref 136–145)
VENTILATOR MODE: ABNORMAL
VT ON VENT VENT: 757 ML
VT ON VENT VENT: 786 ML
WBC MORPH BLD: NORMAL
WBC NRBC COR # BLD: 13.44 10*3/MM3 (ref 3.4–10.8)

## 2021-12-18 PROCEDURE — 25010000002 PROPOFOL 10 MG/ML EMULSION: Performed by: STUDENT IN AN ORGANIZED HEALTH CARE EDUCATION/TRAINING PROGRAM

## 2021-12-18 PROCEDURE — 85007 BL SMEAR W/DIFF WBC COUNT: CPT | Performed by: STUDENT IN AN ORGANIZED HEALTH CARE EDUCATION/TRAINING PROGRAM

## 2021-12-18 PROCEDURE — 94799 UNLISTED PULMONARY SVC/PX: CPT

## 2021-12-18 PROCEDURE — 0 POTASSIUM CHLORIDE 10 MEQ/100ML SOLUTION: Performed by: STUDENT IN AN ORGANIZED HEALTH CARE EDUCATION/TRAINING PROGRAM

## 2021-12-18 PROCEDURE — 25010000002 DEXAMETHASONE PER 1 MG: Performed by: HOSPITALIST

## 2021-12-18 PROCEDURE — 93005 ELECTROCARDIOGRAM TRACING: CPT | Performed by: STUDENT IN AN ORGANIZED HEALTH CARE EDUCATION/TRAINING PROGRAM

## 2021-12-18 PROCEDURE — 0BH17EZ INSERTION OF ENDOTRACHEAL AIRWAY INTO TRACHEA, VIA NATURAL OR ARTIFICIAL OPENING: ICD-10-PCS | Performed by: INTERNAL MEDICINE

## 2021-12-18 PROCEDURE — 84145 PROCALCITONIN (PCT): CPT | Performed by: INTERNAL MEDICINE

## 2021-12-18 PROCEDURE — 25010000002 FENTANYL CITRATE (PF) 50 MCG/ML SOLUTION: Performed by: STUDENT IN AN ORGANIZED HEALTH CARE EDUCATION/TRAINING PROGRAM

## 2021-12-18 PROCEDURE — 80053 COMPREHEN METABOLIC PANEL: CPT | Performed by: STUDENT IN AN ORGANIZED HEALTH CARE EDUCATION/TRAINING PROGRAM

## 2021-12-18 PROCEDURE — 71275 CT ANGIOGRAPHY CHEST: CPT

## 2021-12-18 PROCEDURE — 80069 RENAL FUNCTION PANEL: CPT | Performed by: HOSPITALIST

## 2021-12-18 PROCEDURE — 25010000002 ENOXAPARIN PER 10 MG: Performed by: HOSPITALIST

## 2021-12-18 PROCEDURE — 25010000002 FENTANYL CITRATE (PF) 2500 MCG/50ML SOLUTION: Performed by: INTERNAL MEDICINE

## 2021-12-18 PROCEDURE — 36600 WITHDRAWAL OF ARTERIAL BLOOD: CPT

## 2021-12-18 PROCEDURE — 25010000002 MIDAZOLAM PER 1MG: Performed by: STUDENT IN AN ORGANIZED HEALTH CARE EDUCATION/TRAINING PROGRAM

## 2021-12-18 PROCEDURE — 94003 VENT MGMT INPAT SUBQ DAY: CPT

## 2021-12-18 PROCEDURE — 82803 BLOOD GASES ANY COMBINATION: CPT

## 2021-12-18 PROCEDURE — 0 IOPAMIDOL PER 1 ML: Performed by: INTERNAL MEDICINE

## 2021-12-18 PROCEDURE — 31500 INSERT EMERGENCY AIRWAY: CPT

## 2021-12-18 PROCEDURE — 93010 ELECTROCARDIOGRAM REPORT: CPT | Performed by: INTERNAL MEDICINE

## 2021-12-18 PROCEDURE — 31500 INSERT EMERGENCY AIRWAY: CPT | Performed by: EMERGENCY MEDICINE

## 2021-12-18 PROCEDURE — 94002 VENT MGMT INPAT INIT DAY: CPT

## 2021-12-18 PROCEDURE — 83735 ASSAY OF MAGNESIUM: CPT | Performed by: STUDENT IN AN ORGANIZED HEALTH CARE EDUCATION/TRAINING PROGRAM

## 2021-12-18 PROCEDURE — 99233 SBSQ HOSP IP/OBS HIGH 50: CPT | Performed by: HOSPITALIST

## 2021-12-18 PROCEDURE — 85025 COMPLETE CBC W/AUTO DIFF WBC: CPT | Performed by: STUDENT IN AN ORGANIZED HEALTH CARE EDUCATION/TRAINING PROGRAM

## 2021-12-18 PROCEDURE — 71045 X-RAY EXAM CHEST 1 VIEW: CPT

## 2021-12-18 PROCEDURE — 5A1955Z RESPIRATORY VENTILATION, GREATER THAN 96 CONSECUTIVE HOURS: ICD-10-PCS | Performed by: INTERNAL MEDICINE

## 2021-12-18 PROCEDURE — 82962 GLUCOSE BLOOD TEST: CPT

## 2021-12-18 PROCEDURE — 94761 N-INVAS EAR/PLS OXIMETRY MLT: CPT

## 2021-12-18 RX ORDER — SODIUM CHLORIDE 0.9 % (FLUSH) 0.9 %
10 SYRINGE (ML) INJECTION EVERY 12 HOURS SCHEDULED
Status: DISCONTINUED | OUTPATIENT
Start: 2021-12-18 | End: 2021-12-21 | Stop reason: SDUPTHER

## 2021-12-18 RX ORDER — MIDAZOLAM HYDROCHLORIDE 2 MG/2ML
INJECTION, SOLUTION INTRAMUSCULAR; INTRAVENOUS
Status: DISPENSED
Start: 2021-12-18 | End: 2021-12-18

## 2021-12-18 RX ORDER — SODIUM CHLORIDE 9 MG/ML
INJECTION, SOLUTION INTRAVENOUS
Status: DISPENSED
Start: 2021-12-18 | End: 2021-12-18

## 2021-12-18 RX ORDER — SODIUM CHLORIDE 0.9 % (FLUSH) 0.9 %
10 SYRINGE (ML) INJECTION AS NEEDED
Status: DISCONTINUED | OUTPATIENT
Start: 2021-12-18 | End: 2021-12-21 | Stop reason: SDUPTHER

## 2021-12-18 RX ORDER — MIDAZOLAM HYDROCHLORIDE 2 MG/2ML
2 INJECTION, SOLUTION INTRAMUSCULAR; INTRAVENOUS ONCE
Status: COMPLETED | OUTPATIENT
Start: 2021-12-18 | End: 2021-12-18

## 2021-12-18 RX ORDER — SODIUM CHLORIDE 0.9 % (FLUSH) 0.9 %
20 SYRINGE (ML) INJECTION AS NEEDED
Status: DISCONTINUED | OUTPATIENT
Start: 2021-12-18 | End: 2021-12-21 | Stop reason: SDUPTHER

## 2021-12-18 RX ORDER — FENTANYL CITRATE 50 UG/ML
INJECTION, SOLUTION INTRAMUSCULAR; INTRAVENOUS
Status: DISPENSED
Start: 2021-12-18 | End: 2021-12-18

## 2021-12-18 RX ORDER — PROPOFOL 10 MG/ML
VIAL (ML) INTRAVENOUS
Status: DISPENSED
Start: 2021-12-18 | End: 2021-12-18

## 2021-12-18 RX ORDER — PROPOFOL 10 MG/ML
50 VIAL (ML) INTRAVENOUS ONCE
Status: COMPLETED | OUTPATIENT
Start: 2021-12-18 | End: 2021-12-18

## 2021-12-18 RX ORDER — FENTANYL CITRATE 50 UG/ML
50 INJECTION, SOLUTION INTRAMUSCULAR; INTRAVENOUS ONCE
Status: COMPLETED | OUTPATIENT
Start: 2021-12-18 | End: 2021-12-18

## 2021-12-18 RX ORDER — SODIUM CHLORIDE 9 MG/ML
100 INJECTION, SOLUTION INTRAVENOUS CONTINUOUS
Status: DISCONTINUED | OUTPATIENT
Start: 2021-12-18 | End: 2021-12-19

## 2021-12-18 RX ORDER — FENTANYL CITRATE 50 UG/ML
25 INJECTION, SOLUTION INTRAMUSCULAR; INTRAVENOUS
Status: DISCONTINUED | OUTPATIENT
Start: 2021-12-18 | End: 2021-12-20

## 2021-12-18 RX ADMIN — REMDESIVIR 100 MG: 100 INJECTION, POWDER, LYOPHILIZED, FOR SOLUTION INTRAVENOUS at 01:12

## 2021-12-18 RX ADMIN — INSULIN HUMAN 4 UNITS/HR: 1 INJECTION, SOLUTION INTRAVENOUS at 22:54

## 2021-12-18 RX ADMIN — PROPOFOL 45 MCG/KG/MIN: 10 INJECTION, EMULSION INTRAVENOUS at 06:30

## 2021-12-18 RX ADMIN — MIDAZOLAM HYDROCHLORIDE 2 MG: 1 INJECTION, SOLUTION INTRAMUSCULAR; INTRAVENOUS at 03:15

## 2021-12-18 RX ADMIN — SODIUM CHLORIDE 100 ML/HR: 9 INJECTION, SOLUTION INTRAVENOUS at 14:38

## 2021-12-18 RX ADMIN — POTASSIUM CHLORIDE 10 MEQ: 10 INJECTION, SOLUTION INTRAVENOUS at 18:24

## 2021-12-18 RX ADMIN — ENOXAPARIN SODIUM 120 MG: 120 INJECTION SUBCUTANEOUS at 09:13

## 2021-12-18 RX ADMIN — DEXAMETHASONE SODIUM PHOSPHATE 6 MG: 4 INJECTION, SOLUTION INTRAMUSCULAR; INTRAVENOUS at 09:13

## 2021-12-18 RX ADMIN — POTASSIUM CHLORIDE 10 MEQ: 10 INJECTION, SOLUTION INTRAVENOUS at 04:26

## 2021-12-18 RX ADMIN — ENOXAPARIN SODIUM 120 MG: 120 INJECTION SUBCUTANEOUS at 22:00

## 2021-12-18 RX ADMIN — PROPOFOL 50 MCG/KG/MIN: 10 INJECTION, EMULSION INTRAVENOUS at 22:22

## 2021-12-18 RX ADMIN — POTASSIUM CHLORIDE 10 MEQ: 10 INJECTION, SOLUTION INTRAVENOUS at 19:43

## 2021-12-18 RX ADMIN — POTASSIUM CHLORIDE 10 MEQ: 10 INJECTION, SOLUTION INTRAVENOUS at 10:29

## 2021-12-18 RX ADMIN — SODIUM CHLORIDE, PRESERVATIVE FREE 10 ML: 5 INJECTION INTRAVENOUS at 09:13

## 2021-12-18 RX ADMIN — SODIUM PHOSPHATE, MONOBASIC, MONOHYDRATE 15 MMOL: 276; 142 INJECTION, SOLUTION INTRAVENOUS at 04:29

## 2021-12-18 RX ADMIN — PROPOFOL 50 MG: 10 INJECTION, EMULSION INTRAVENOUS at 03:15

## 2021-12-18 RX ADMIN — IOPAMIDOL 100 ML: 755 INJECTION, SOLUTION INTRAVENOUS at 12:32

## 2021-12-18 RX ADMIN — MIDAZOLAM HYDROCHLORIDE 2 MG: 1 INJECTION, SOLUTION INTRAMUSCULAR; INTRAVENOUS at 02:30

## 2021-12-18 RX ADMIN — PROPOFOL 50 MCG/KG/MIN: 10 INJECTION, EMULSION INTRAVENOUS at 08:28

## 2021-12-18 RX ADMIN — SODIUM CHLORIDE, PRESERVATIVE FREE 10 ML: 5 INJECTION INTRAVENOUS at 22:01

## 2021-12-18 RX ADMIN — DEXAMETHASONE SODIUM PHOSPHATE 6 MG: 4 INJECTION, SOLUTION INTRAMUSCULAR; INTRAVENOUS at 22:00

## 2021-12-18 RX ADMIN — FENTANYL CITRATE 50 MCG: 50 INJECTION, SOLUTION INTRAMUSCULAR; INTRAVENOUS at 03:15

## 2021-12-18 RX ADMIN — DEXTROSE MONOHYDRATE 25 ML: 25 INJECTION, SOLUTION INTRAVENOUS at 00:36

## 2021-12-18 RX ADMIN — POTASSIUM CHLORIDE 10 MEQ: 10 INJECTION, SOLUTION INTRAVENOUS at 09:12

## 2021-12-18 RX ADMIN — POTASSIUM CHLORIDE 10 MEQ: 10 INJECTION, SOLUTION INTRAVENOUS at 08:04

## 2021-12-18 RX ADMIN — NOREPINEPHRINE BITARTRATE 0.12 MCG/KG/MIN: 1 INJECTION, SOLUTION, CONCENTRATE INTRAVENOUS at 12:27

## 2021-12-18 RX ADMIN — PROPOFOL 50 MCG/KG/MIN: 10 INJECTION, EMULSION INTRAVENOUS at 17:07

## 2021-12-18 RX ADMIN — NOREPINEPHRINE BITARTRATE 0.12 MCG/KG/MIN: 1 INJECTION, SOLUTION, CONCENTRATE INTRAVENOUS at 22:54

## 2021-12-18 RX ADMIN — SODIUM CHLORIDE, PRESERVATIVE FREE 10 ML: 5 INJECTION INTRAVENOUS at 10:29

## 2021-12-18 RX ADMIN — FENTANYL CITRATE 25 MCG: 50 INJECTION INTRAMUSCULAR; INTRAVENOUS at 08:25

## 2021-12-18 RX ADMIN — SODIUM PHOSPHATE, MONOBASIC, MONOHYDRATE AND SODIUM PHOSPHATE, DIBASIC, ANHYDROUS: 276; 142 INJECTION, SOLUTION INTRAVENOUS at 04:28

## 2021-12-18 RX ADMIN — SODIUM CHLORIDE, PRESERVATIVE FREE 10 ML: 5 INJECTION INTRAVENOUS at 09:12

## 2021-12-18 RX ADMIN — FENTANYL CITRATE 50 MCG/HR: 50 INJECTION INTRAVENOUS at 09:29

## 2021-12-18 RX ADMIN — POTASSIUM CHLORIDE 10 MEQ: 10 INJECTION, SOLUTION INTRAVENOUS at 16:56

## 2021-12-18 RX ADMIN — POTASSIUM CHLORIDE 10 MEQ: 10 INJECTION, SOLUTION INTRAVENOUS at 05:34

## 2021-12-18 RX ADMIN — POTASSIUM CHLORIDE 10 MEQ: 10 INJECTION, SOLUTION INTRAVENOUS at 06:35

## 2021-12-18 RX ADMIN — SODIUM CHLORIDE 100 ML/HR: 9 INJECTION, SOLUTION INTRAVENOUS at 03:15

## 2021-12-18 RX ADMIN — PROPOFOL 50 MCG/KG/MIN: 10 INJECTION, EMULSION INTRAVENOUS at 11:00

## 2021-12-18 RX ADMIN — SODIUM CHLORIDE, PRESERVATIVE FREE 10 ML: 5 INJECTION INTRAVENOUS at 22:00

## 2021-12-18 RX ADMIN — PROPOFOL 50 MCG/KG/MIN: 10 INJECTION, EMULSION INTRAVENOUS at 20:02

## 2021-12-18 RX ADMIN — POTASSIUM CHLORIDE 10 MEQ: 10 INJECTION, SOLUTION INTRAVENOUS at 21:09

## 2021-12-19 ENCOUNTER — APPOINTMENT (OUTPATIENT)
Dept: GENERAL RADIOLOGY | Facility: HOSPITAL | Age: 66
End: 2021-12-19

## 2021-12-19 ENCOUNTER — ANESTHESIA EVENT (OUTPATIENT)
Dept: ICU | Facility: HOSPITAL | Age: 66
End: 2021-12-19

## 2021-12-19 ENCOUNTER — ANESTHESIA (OUTPATIENT)
Dept: ICU | Facility: HOSPITAL | Age: 66
End: 2021-12-19

## 2021-12-19 LAB
ACETONE BLD QL: ABNORMAL
ALBUMIN SERPL-MCNC: 1.8 G/DL (ref 3.5–5.2)
ALBUMIN SERPL-MCNC: 2.1 G/DL (ref 3.5–5.2)
ALBUMIN SERPL-MCNC: 2.2 G/DL (ref 3.5–5.2)
ALBUMIN/GLOB SERPL: 0.7 G/DL
ALP SERPL-CCNC: 66 U/L (ref 39–117)
ALT SERPL W P-5'-P-CCNC: 33 U/L (ref 1–41)
ANION GAP SERPL CALCULATED.3IONS-SCNC: 8.4 MMOL/L (ref 5–15)
ARTERIAL PATENCY WRIST A: POSITIVE
AST SERPL-CCNC: 35 U/L (ref 1–40)
ATMOSPHERIC PRESS: 745 MMHG
BASE EXCESS BLDA CALC-SCNC: -3.2 MMOL/L (ref 0–2)
BASOPHILS # BLD AUTO: 0.02 10*3/MM3 (ref 0–0.2)
BASOPHILS NFR BLD AUTO: 0.2 % (ref 0–1.5)
BDY SITE: ABNORMAL
BILIRUB SERPL-MCNC: 0.2 MG/DL (ref 0–1.2)
BODY TEMPERATURE: 37 C
BUN SERPL-MCNC: 25 MG/DL (ref 8–23)
BUN SERPL-MCNC: 28 MG/DL (ref 8–23)
BUN SERPL-MCNC: 31 MG/DL (ref 8–23)
BUN/CREAT SERPL: 32.9 (ref 7–25)
BUN/CREAT SERPL: 41.3 (ref 7–25)
BUN/CREAT SERPL: 43.9 (ref 7–25)
CALCIUM SPEC-SCNC: 6.1 MG/DL (ref 8.6–10.5)
CALCIUM SPEC-SCNC: 6.6 MG/DL (ref 8.6–10.5)
CALCIUM SPEC-SCNC: 7.7 MG/DL (ref 8.6–10.5)
CHLORIDE SERPL-SCNC: 115 MMOL/L (ref 98–107)
CHLORIDE SERPL-SCNC: 115 MMOL/L (ref 98–107)
CHLORIDE SERPL-SCNC: 121 MMOL/L (ref 98–107)
CO2 SERPL-SCNC: 16.6 MMOL/L (ref 22–29)
CO2 SERPL-SCNC: 17.6 MMOL/L (ref 22–29)
CO2 SERPL-SCNC: 21.6 MMOL/L (ref 22–29)
CREAT SERPL-MCNC: 0.57 MG/DL (ref 0.76–1.27)
CREAT SERPL-MCNC: 0.75 MG/DL (ref 0.76–1.27)
CREAT SERPL-MCNC: 0.85 MG/DL (ref 0.76–1.27)
D DIMER PPP FEU-MCNC: 11.07 MCGFEU/ML (ref 0–0.46)
D-LACTATE SERPL-SCNC: 2.2 MMOL/L (ref 0.5–2)
DEPRECATED RDW RBC AUTO: 51.6 FL (ref 37–54)
EOSINOPHIL # BLD AUTO: 0 10*3/MM3 (ref 0–0.4)
EOSINOPHIL NFR BLD AUTO: 0 % (ref 0.3–6.2)
ERYTHROCYTE [DISTWIDTH] IN BLOOD BY AUTOMATED COUNT: 14 % (ref 12.3–15.4)
FERRITIN SERPL-MCNC: 3835 NG/ML (ref 30–400)
GFR SERPL CREATININE-BSD FRML MDRD: 105 ML/MIN/1.73
GFR SERPL CREATININE-BSD FRML MDRD: 143 ML/MIN/1.73
GFR SERPL CREATININE-BSD FRML MDRD: 90 ML/MIN/1.73
GLOBULIN UR ELPH-MCNC: 2.5 GM/DL
GLUCOSE BLDC GLUCOMTR-MCNC: 101 MG/DL (ref 70–130)
GLUCOSE BLDC GLUCOMTR-MCNC: 107 MG/DL (ref 70–130)
GLUCOSE BLDC GLUCOMTR-MCNC: 117 MG/DL (ref 70–130)
GLUCOSE BLDC GLUCOMTR-MCNC: 120 MG/DL (ref 70–130)
GLUCOSE BLDC GLUCOMTR-MCNC: 125 MG/DL (ref 70–130)
GLUCOSE BLDC GLUCOMTR-MCNC: 139 MG/DL (ref 70–130)
GLUCOSE BLDC GLUCOMTR-MCNC: 142 MG/DL (ref 70–130)
GLUCOSE BLDC GLUCOMTR-MCNC: 147 MG/DL (ref 70–130)
GLUCOSE BLDC GLUCOMTR-MCNC: 155 MG/DL (ref 70–130)
GLUCOSE SERPL-MCNC: 101 MG/DL (ref 65–99)
GLUCOSE SERPL-MCNC: 150 MG/DL (ref 65–99)
GLUCOSE SERPL-MCNC: 185 MG/DL (ref 65–99)
HCO3 BLDA-SCNC: 23.3 MMOL/L (ref 20–26)
HCT VFR BLD AUTO: 43.8 % (ref 37.5–51)
HGB BLD-MCNC: 13.7 G/DL (ref 13–17.7)
HGB BLDA-MCNC: 16.1 G/DL (ref 14–18)
IMM GRANULOCYTES # BLD AUTO: 0.12 10*3/MM3 (ref 0–0.05)
IMM GRANULOCYTES NFR BLD AUTO: 1.2 % (ref 0–0.5)
INHALED O2 CONCENTRATION: 65 %
LDH SERPL-CCNC: 362 U/L (ref 135–225)
LYMPHOCYTES # BLD AUTO: 0.5 10*3/MM3 (ref 0.7–3.1)
LYMPHOCYTES NFR BLD AUTO: 4.9 % (ref 19.6–45.3)
Lab: ABNORMAL
MCH RBC QN AUTO: 31 PG (ref 26.6–33)
MCHC RBC AUTO-ENTMCNC: 31.3 G/DL (ref 31.5–35.7)
MCV RBC AUTO: 99.1 FL (ref 79–97)
MODALITY: ABNORMAL
MONOCYTES # BLD AUTO: 0.43 10*3/MM3 (ref 0.1–0.9)
MONOCYTES NFR BLD AUTO: 4.2 % (ref 5–12)
NEUTROPHILS NFR BLD AUTO: 89.5 % (ref 42.7–76)
NEUTROPHILS NFR BLD AUTO: 9.06 10*3/MM3 (ref 1.7–7)
NRBC BLD AUTO-RTO: 0 /100 WBC (ref 0–0.2)
PAW @ PEAK INSP FLOW SETTING VENT: 12 CMH2O
PCO2 BLDA: 45.8 MM HG (ref 35–45)
PCO2 TEMP ADJ BLD: 45.8 MM HG (ref 35–45)
PEEP RESPIRATORY: 10 CM[H2O]
PH BLDA: 7.31 PH UNITS (ref 7.35–7.45)
PH, TEMP CORRECTED: 7.31 PH UNITS (ref 7.35–7.45)
PHOSPHATE SERPL-MCNC: 3.2 MG/DL (ref 2.5–4.5)
PHOSPHATE SERPL-MCNC: 3.6 MG/DL (ref 2.5–4.5)
PLATELET # BLD AUTO: 173 10*3/MM3 (ref 140–450)
PMV BLD AUTO: 11.2 FL (ref 6–12)
PO2 BLDA: 76.6 MM HG (ref 83–108)
PO2 TEMP ADJ BLD: 76.6 MM HG (ref 83–108)
POTASSIUM SERPL-SCNC: 3.7 MMOL/L (ref 3.5–5.2)
POTASSIUM SERPL-SCNC: 4.8 MMOL/L (ref 3.5–5.2)
POTASSIUM SERPL-SCNC: 6.1 MMOL/L (ref 3.5–5.2)
PROT SERPL-MCNC: 4.3 G/DL (ref 6–8.5)
RBC # BLD AUTO: 4.42 10*6/MM3 (ref 4.14–5.8)
SAO2 % BLDCOA: 95.5 % (ref 94–99)
SET MECH RESP RATE: 24
SODIUM SERPL-SCNC: 141 MMOL/L (ref 136–145)
SODIUM SERPL-SCNC: 145 MMOL/L (ref 136–145)
SODIUM SERPL-SCNC: 146 MMOL/L (ref 136–145)
VENTILATOR MODE: ABNORMAL
WBC NRBC COR # BLD: 10.13 10*3/MM3 (ref 3.4–10.8)

## 2021-12-19 PROCEDURE — 80053 COMPREHEN METABOLIC PANEL: CPT | Performed by: STUDENT IN AN ORGANIZED HEALTH CARE EDUCATION/TRAINING PROGRAM

## 2021-12-19 PROCEDURE — 94799 UNLISTED PULMONARY SVC/PX: CPT

## 2021-12-19 PROCEDURE — 02HV33Z INSERTION OF INFUSION DEVICE INTO SUPERIOR VENA CAVA, PERCUTANEOUS APPROACH: ICD-10-PCS | Performed by: HOSPITALIST

## 2021-12-19 PROCEDURE — C1751 CATH, INF, PER/CENT/MIDLINE: HCPCS | Performed by: NURSE ANESTHETIST, CERTIFIED REGISTERED

## 2021-12-19 PROCEDURE — 71045 X-RAY EXAM CHEST 1 VIEW: CPT

## 2021-12-19 PROCEDURE — 94760 N-INVAS EAR/PLS OXIMETRY 1: CPT

## 2021-12-19 PROCEDURE — 85379 FIBRIN DEGRADATION QUANT: CPT | Performed by: STUDENT IN AN ORGANIZED HEALTH CARE EDUCATION/TRAINING PROGRAM

## 2021-12-19 PROCEDURE — 82009 KETONE BODYS QUAL: CPT | Performed by: HOSPITALIST

## 2021-12-19 PROCEDURE — 82728 ASSAY OF FERRITIN: CPT | Performed by: INTERNAL MEDICINE

## 2021-12-19 PROCEDURE — 36600 WITHDRAWAL OF ARTERIAL BLOOD: CPT

## 2021-12-19 PROCEDURE — 94761 N-INVAS EAR/PLS OXIMETRY MLT: CPT

## 2021-12-19 PROCEDURE — 82803 BLOOD GASES ANY COMBINATION: CPT

## 2021-12-19 PROCEDURE — 85025 COMPLETE CBC W/AUTO DIFF WBC: CPT | Performed by: STUDENT IN AN ORGANIZED HEALTH CARE EDUCATION/TRAINING PROGRAM

## 2021-12-19 PROCEDURE — 83615 LACTATE (LD) (LDH) ENZYME: CPT | Performed by: STUDENT IN AN ORGANIZED HEALTH CARE EDUCATION/TRAINING PROGRAM

## 2021-12-19 PROCEDURE — 99233 SBSQ HOSP IP/OBS HIGH 50: CPT | Performed by: HOSPITALIST

## 2021-12-19 PROCEDURE — 83605 ASSAY OF LACTIC ACID: CPT | Performed by: HOSPITALIST

## 2021-12-19 PROCEDURE — 25010000002 CALCIUM GLUCONATE PER 10 ML: Performed by: INTERNAL MEDICINE

## 2021-12-19 PROCEDURE — 74018 RADEX ABDOMEN 1 VIEW: CPT

## 2021-12-19 PROCEDURE — 25010000002 FENTANYL CITRATE (PF) 2500 MCG/50ML SOLUTION: Performed by: INTERNAL MEDICINE

## 2021-12-19 PROCEDURE — 25010000002 DEXAMETHASONE PER 1 MG: Performed by: HOSPITALIST

## 2021-12-19 PROCEDURE — 25010000002 PROPOFOL 10 MG/ML EMULSION: Performed by: STUDENT IN AN ORGANIZED HEALTH CARE EDUCATION/TRAINING PROGRAM

## 2021-12-19 PROCEDURE — 25010000002 ENOXAPARIN PER 10 MG: Performed by: HOSPITALIST

## 2021-12-19 PROCEDURE — 63710000001 INSULIN DETEMIR PER 5 UNITS: Performed by: HOSPITALIST

## 2021-12-19 PROCEDURE — 82962 GLUCOSE BLOOD TEST: CPT

## 2021-12-19 PROCEDURE — 94003 VENT MGMT INPAT SUBQ DAY: CPT

## 2021-12-19 PROCEDURE — 80069 RENAL FUNCTION PANEL: CPT | Performed by: HOSPITALIST

## 2021-12-19 RX ORDER — SODIUM CHLORIDE 9 MG/ML
INJECTION, SOLUTION INTRAVENOUS
Status: DISPENSED
Start: 2021-12-19 | End: 2021-12-19

## 2021-12-19 RX ORDER — DEXTROSE MONOHYDRATE 25 G/50ML
25 INJECTION, SOLUTION INTRAVENOUS
Status: DISCONTINUED | OUTPATIENT
Start: 2021-12-19 | End: 2022-01-13 | Stop reason: HOSPADM

## 2021-12-19 RX ORDER — CALCIUM GLUCONATE 94 MG/ML
INJECTION, SOLUTION INTRAVENOUS
Status: DISPENSED
Start: 2021-12-19 | End: 2021-12-19

## 2021-12-19 RX ORDER — NICOTINE POLACRILEX 4 MG
15 LOZENGE BUCCAL
Status: DISCONTINUED | OUTPATIENT
Start: 2021-12-19 | End: 2022-01-13 | Stop reason: HOSPADM

## 2021-12-19 RX ADMIN — PROPOFOL 50 MCG/KG/MIN: 10 INJECTION, EMULSION INTRAVENOUS at 01:13

## 2021-12-19 RX ADMIN — SODIUM CHLORIDE, PRESERVATIVE FREE 10 ML: 5 INJECTION INTRAVENOUS at 08:36

## 2021-12-19 RX ADMIN — NOREPINEPHRINE BITARTRATE 0.1 MCG/KG/MIN: 1 INJECTION, SOLUTION, CONCENTRATE INTRAVENOUS at 10:01

## 2021-12-19 RX ADMIN — DEXAMETHASONE SODIUM PHOSPHATE 6 MG: 4 INJECTION, SOLUTION INTRAMUSCULAR; INTRAVENOUS at 08:37

## 2021-12-19 RX ADMIN — PROPOFOL 50 MCG/KG/MIN: 10 INJECTION, EMULSION INTRAVENOUS at 10:01

## 2021-12-19 RX ADMIN — CALCIUM GLUCONATE 1 G: 98 INJECTION, SOLUTION INTRAVENOUS at 01:54

## 2021-12-19 RX ADMIN — DILTIAZEM HYDROCHLORIDE 60 MG: 30 TABLET ORAL at 21:00

## 2021-12-19 RX ADMIN — SODIUM CHLORIDE, PRESERVATIVE FREE 10 ML: 5 INJECTION INTRAVENOUS at 08:37

## 2021-12-19 RX ADMIN — SODIUM CHLORIDE 100 ML/HR: 9 INJECTION, SOLUTION INTRAVENOUS at 00:39

## 2021-12-19 RX ADMIN — PROPOFOL 50 MCG/KG/MIN: 10 INJECTION, EMULSION INTRAVENOUS at 13:14

## 2021-12-19 RX ADMIN — FENTANYL CITRATE 150 MCG/HR: 50 INJECTION INTRAVENOUS at 07:30

## 2021-12-19 RX ADMIN — PROPOFOL 50 MCG/KG/MIN: 10 INJECTION, EMULSION INTRAVENOUS at 07:08

## 2021-12-19 RX ADMIN — DEXAMETHASONE SODIUM PHOSPHATE 6 MG: 4 INJECTION, SOLUTION INTRAMUSCULAR; INTRAVENOUS at 21:01

## 2021-12-19 RX ADMIN — ENOXAPARIN SODIUM 120 MG: 120 INJECTION SUBCUTANEOUS at 21:00

## 2021-12-19 RX ADMIN — SODIUM CHLORIDE, PRESERVATIVE FREE 10 ML: 5 INJECTION INTRAVENOUS at 16:33

## 2021-12-19 RX ADMIN — PROPOFOL 50 MCG/KG/MIN: 10 INJECTION, EMULSION INTRAVENOUS at 16:30

## 2021-12-19 RX ADMIN — PROPOFOL 50 MCG/KG/MIN: 10 INJECTION, EMULSION INTRAVENOUS at 04:12

## 2021-12-19 RX ADMIN — SODIUM CHLORIDE 100 ML/HR: 9 INJECTION, SOLUTION INTRAVENOUS at 10:00

## 2021-12-19 RX ADMIN — REMDESIVIR 100 MG: 100 INJECTION, POWDER, LYOPHILIZED, FOR SOLUTION INTRAVENOUS at 00:39

## 2021-12-19 RX ADMIN — INSULIN DETEMIR 20 UNITS: 100 INJECTION, SOLUTION SUBCUTANEOUS at 21:03

## 2021-12-19 RX ADMIN — FENTANYL CITRATE 200 MCG/HR: 50 INJECTION INTRAVENOUS at 21:03

## 2021-12-19 RX ADMIN — ENOXAPARIN SODIUM 120 MG: 120 INJECTION SUBCUTANEOUS at 08:37

## 2021-12-20 ENCOUNTER — APPOINTMENT (OUTPATIENT)
Dept: GENERAL RADIOLOGY | Facility: HOSPITAL | Age: 66
End: 2021-12-20

## 2021-12-20 LAB
ALBUMIN SERPL-MCNC: 2.3 G/DL (ref 3.5–5.2)
ALBUMIN/GLOB SERPL: 0.7 G/DL
ALP SERPL-CCNC: 102 U/L (ref 39–117)
ALT SERPL W P-5'-P-CCNC: 37 U/L (ref 1–41)
ANION GAP SERPL CALCULATED.3IONS-SCNC: 7.3 MMOL/L (ref 5–15)
ARTERIAL PATENCY WRIST A: POSITIVE
ARTERIAL PATENCY WRIST A: POSITIVE
AST SERPL-CCNC: 40 U/L (ref 1–40)
ATMOSPHERIC PRESS: 742 MMHG
ATMOSPHERIC PRESS: 746 MMHG
BASE EXCESS BLDA CALC-SCNC: -1.9 MMOL/L (ref 0–2)
BASE EXCESS BLDA CALC-SCNC: 1.5 MMOL/L (ref 0–2)
BASOPHILS # BLD AUTO: 0.05 10*3/MM3 (ref 0–0.2)
BASOPHILS NFR BLD AUTO: 0.5 % (ref 0–1.5)
BDY SITE: ABNORMAL
BDY SITE: ABNORMAL
BILIRUB SERPL-MCNC: 0.4 MG/DL (ref 0–1.2)
BODY TEMPERATURE: 37 C
BODY TEMPERATURE: 37 C
BUN SERPL-MCNC: 28 MG/DL (ref 8–23)
BUN/CREAT SERPL: 32.6 (ref 7–25)
CALCIUM SPEC-SCNC: 8 MG/DL (ref 8.6–10.5)
CHLORIDE SERPL-SCNC: 112 MMOL/L (ref 98–107)
CO2 SERPL-SCNC: 25.7 MMOL/L (ref 22–29)
CREAT SERPL-MCNC: 0.86 MG/DL (ref 0.76–1.27)
CRP SERPL-MCNC: 8.98 MG/DL (ref 0–0.5)
DEPRECATED RDW RBC AUTO: 52.7 FL (ref 37–54)
EOSINOPHIL # BLD AUTO: 0 10*3/MM3 (ref 0–0.4)
EOSINOPHIL NFR BLD AUTO: 0 % (ref 0.3–6.2)
ERYTHROCYTE [DISTWIDTH] IN BLOOD BY AUTOMATED COUNT: 14.2 % (ref 12.3–15.4)
FERRITIN SERPL-MCNC: 4147 NG/ML (ref 30–400)
GFR SERPL CREATININE-BSD FRML MDRD: 89 ML/MIN/1.73
GLOBULIN UR ELPH-MCNC: 3.5 GM/DL
GLUCOSE BLDC GLUCOMTR-MCNC: 200 MG/DL (ref 70–130)
GLUCOSE BLDC GLUCOMTR-MCNC: 260 MG/DL (ref 70–130)
GLUCOSE BLDC GLUCOMTR-MCNC: 277 MG/DL (ref 70–130)
GLUCOSE SERPL-MCNC: 335 MG/DL (ref 65–99)
HCO3 BLDA-SCNC: 28.9 MMOL/L (ref 20–26)
HCO3 BLDA-SCNC: 29 MMOL/L (ref 20–26)
HCT VFR BLD AUTO: 48 % (ref 37.5–51)
HGB BLD-MCNC: 14.9 G/DL (ref 13–17.7)
HGB BLDA-MCNC: 13.6 G/DL (ref 14–18)
HGB BLDA-MCNC: 15.1 G/DL (ref 14–18)
IMM GRANULOCYTES # BLD AUTO: 0.24 10*3/MM3 (ref 0–0.05)
IMM GRANULOCYTES NFR BLD AUTO: 2.3 % (ref 0–0.5)
INHALED O2 CONCENTRATION: 60 %
INHALED O2 CONCENTRATION: 65 %
LYMPHOCYTES # BLD AUTO: 0.42 10*3/MM3 (ref 0.7–3.1)
LYMPHOCYTES NFR BLD AUTO: 4 % (ref 19.6–45.3)
Lab: ABNORMAL
MCH RBC QN AUTO: 31 PG (ref 26.6–33)
MCHC RBC AUTO-ENTMCNC: 31 G/DL (ref 31.5–35.7)
MCV RBC AUTO: 100 FL (ref 79–97)
MODALITY: ABNORMAL
MODALITY: ABNORMAL
MONOCYTES # BLD AUTO: 0.4 10*3/MM3 (ref 0.1–0.9)
MONOCYTES NFR BLD AUTO: 3.9 % (ref 5–12)
NEUTROPHILS NFR BLD AUTO: 89.3 % (ref 42.7–76)
NEUTROPHILS NFR BLD AUTO: 9.27 10*3/MM3 (ref 1.7–7)
NOTIFIED BY: ABNORMAL
NOTIFIED WHO: ABNORMAL
NRBC BLD AUTO-RTO: 0 /100 WBC (ref 0–0.2)
PCO2 BLDA: 56.9 MM HG (ref 35–45)
PCO2 BLDA: 78.9 MM HG (ref 35–45)
PCO2 TEMP ADJ BLD: 56.9 MM HG (ref 35–45)
PCO2 TEMP ADJ BLD: 78.9 MM HG (ref 35–45)
PEEP RESPIRATORY: 10 CM[H2O]
PEEP RESPIRATORY: 10 CM[H2O]
PH BLDA: 7.17 PH UNITS (ref 7.35–7.45)
PH BLDA: 7.32 PH UNITS (ref 7.35–7.45)
PH, TEMP CORRECTED: 7.17 PH UNITS (ref 7.35–7.45)
PH, TEMP CORRECTED: 7.32 PH UNITS (ref 7.35–7.45)
PLATELET # BLD AUTO: 206 10*3/MM3 (ref 140–450)
PMV BLD AUTO: 10.9 FL (ref 6–12)
PO2 BLDA: 108 MM HG (ref 83–108)
PO2 BLDA: 70.5 MM HG (ref 83–108)
PO2 TEMP ADJ BLD: 108 MM HG (ref 83–108)
PO2 TEMP ADJ BLD: 70.5 MM HG (ref 83–108)
POTASSIUM SERPL-SCNC: 5.1 MMOL/L (ref 3.5–5.2)
PROCALCITONIN SERPL-MCNC: 0.27 NG/ML (ref 0–0.25)
PROT SERPL-MCNC: 5.8 G/DL (ref 6–8.5)
RBC # BLD AUTO: 4.8 10*6/MM3 (ref 4.14–5.8)
SAO2 % BLDCOA: 95.1 % (ref 94–99)
SAO2 % BLDCOA: 97.5 % (ref 94–99)
SET MECH RESP RATE: 26
SET MECH RESP RATE: 28
SODIUM SERPL-SCNC: 145 MMOL/L (ref 136–145)
VENTILATOR MODE: ABNORMAL
VENTILATOR MODE: ABNORMAL
VT ON VENT VENT: 400 ML
VT ON VENT VENT: 500 ML
WBC NRBC COR # BLD: 10.38 10*3/MM3 (ref 3.4–10.8)

## 2021-12-20 PROCEDURE — 25010000002 DEXAMETHASONE PER 1 MG: Performed by: HOSPITALIST

## 2021-12-20 PROCEDURE — 25010000002 FENTANYL CITRATE (PF) 2500 MCG/50ML SOLUTION: Performed by: INTERNAL MEDICINE

## 2021-12-20 PROCEDURE — 94799 UNLISTED PULMONARY SVC/PX: CPT

## 2021-12-20 PROCEDURE — 71045 X-RAY EXAM CHEST 1 VIEW: CPT

## 2021-12-20 PROCEDURE — 63710000001 INSULIN DETEMIR PER 5 UNITS: Performed by: INTERNAL MEDICINE

## 2021-12-20 PROCEDURE — 84145 PROCALCITONIN (PCT): CPT | Performed by: INTERNAL MEDICINE

## 2021-12-20 PROCEDURE — 36600 WITHDRAWAL OF ARTERIAL BLOOD: CPT

## 2021-12-20 PROCEDURE — 85025 COMPLETE CBC W/AUTO DIFF WBC: CPT | Performed by: STUDENT IN AN ORGANIZED HEALTH CARE EDUCATION/TRAINING PROGRAM

## 2021-12-20 PROCEDURE — 94003 VENT MGMT INPAT SUBQ DAY: CPT

## 2021-12-20 PROCEDURE — 25010000002 ENOXAPARIN PER 10 MG: Performed by: HOSPITALIST

## 2021-12-20 PROCEDURE — 82728 ASSAY OF FERRITIN: CPT | Performed by: INTERNAL MEDICINE

## 2021-12-20 PROCEDURE — 25010000002 PROPOFOL 10 MG/ML EMULSION: Performed by: STUDENT IN AN ORGANIZED HEALTH CARE EDUCATION/TRAINING PROGRAM

## 2021-12-20 PROCEDURE — 80053 COMPREHEN METABOLIC PANEL: CPT | Performed by: STUDENT IN AN ORGANIZED HEALTH CARE EDUCATION/TRAINING PROGRAM

## 2021-12-20 PROCEDURE — 82962 GLUCOSE BLOOD TEST: CPT

## 2021-12-20 PROCEDURE — 63710000001 INSULIN ASPART PER 5 UNITS: Performed by: HOSPITALIST

## 2021-12-20 PROCEDURE — 99233 SBSQ HOSP IP/OBS HIGH 50: CPT | Performed by: INTERNAL MEDICINE

## 2021-12-20 PROCEDURE — 86140 C-REACTIVE PROTEIN: CPT | Performed by: INTERNAL MEDICINE

## 2021-12-20 PROCEDURE — 94761 N-INVAS EAR/PLS OXIMETRY MLT: CPT

## 2021-12-20 PROCEDURE — 82803 BLOOD GASES ANY COMBINATION: CPT

## 2021-12-20 RX ORDER — FENTANYL CITRATE 50 UG/ML
50 INJECTION, SOLUTION INTRAMUSCULAR; INTRAVENOUS
Status: DISPENSED | OUTPATIENT
Start: 2021-12-20 | End: 2021-12-25

## 2021-12-20 RX ADMIN — PROPOFOL 45 MCG/KG/MIN: 10 INJECTION, EMULSION INTRAVENOUS at 20:46

## 2021-12-20 RX ADMIN — SODIUM CHLORIDE, PRESERVATIVE FREE 10 ML: 5 INJECTION INTRAVENOUS at 20:48

## 2021-12-20 RX ADMIN — ENOXAPARIN SODIUM 120 MG: 120 INJECTION SUBCUTANEOUS at 20:48

## 2021-12-20 RX ADMIN — INSULIN ASPART 12 UNITS: 100 INJECTION, SOLUTION INTRAVENOUS; SUBCUTANEOUS at 12:18

## 2021-12-20 RX ADMIN — REMDESIVIR 100 MG: 100 INJECTION, POWDER, LYOPHILIZED, FOR SOLUTION INTRAVENOUS at 23:38

## 2021-12-20 RX ADMIN — DILTIAZEM HYDROCHLORIDE 60 MG: 30 TABLET ORAL at 05:09

## 2021-12-20 RX ADMIN — DILTIAZEM HYDROCHLORIDE 60 MG: 30 TABLET ORAL at 12:18

## 2021-12-20 RX ADMIN — PROPOFOL 50 MCG/KG/MIN: 10 INJECTION, EMULSION INTRAVENOUS at 04:09

## 2021-12-20 RX ADMIN — SODIUM CHLORIDE, PRESERVATIVE FREE 10 ML: 5 INJECTION INTRAVENOUS at 08:15

## 2021-12-20 RX ADMIN — PROPOFOL 50 MCG/KG/MIN: 10 INJECTION, EMULSION INTRAVENOUS at 00:24

## 2021-12-20 RX ADMIN — INSULIN ASPART 16 UNITS: 100 INJECTION, SOLUTION INTRAVENOUS; SUBCUTANEOUS at 08:16

## 2021-12-20 RX ADMIN — PROPOFOL 35 MCG/KG/MIN: 10 INJECTION, EMULSION INTRAVENOUS at 23:38

## 2021-12-20 RX ADMIN — SODIUM CHLORIDE, PRESERVATIVE FREE 10 ML: 5 INJECTION INTRAVENOUS at 20:47

## 2021-12-20 RX ADMIN — INSULIN ASPART 12 UNITS: 100 INJECTION, SOLUTION INTRAVENOUS; SUBCUTANEOUS at 19:03

## 2021-12-20 RX ADMIN — DEXAMETHASONE SODIUM PHOSPHATE 6 MG: 4 INJECTION, SOLUTION INTRAMUSCULAR; INTRAVENOUS at 08:15

## 2021-12-20 RX ADMIN — REMDESIVIR 100 MG: 100 INJECTION, POWDER, LYOPHILIZED, FOR SOLUTION INTRAVENOUS at 00:24

## 2021-12-20 RX ADMIN — INSULIN DETEMIR 25 UNITS: 100 INJECTION, SOLUTION SUBCUTANEOUS at 08:17

## 2021-12-20 RX ADMIN — SODIUM CHLORIDE, PRESERVATIVE FREE 10 ML: 5 INJECTION INTRAVENOUS at 08:29

## 2021-12-20 RX ADMIN — DEXAMETHASONE SODIUM PHOSPHATE 6 MG: 4 INJECTION, SOLUTION INTRAMUSCULAR; INTRAVENOUS at 20:41

## 2021-12-20 RX ADMIN — PROPOFOL 45 MCG/KG/MIN: 10 INJECTION, EMULSION INTRAVENOUS at 07:17

## 2021-12-20 RX ADMIN — DILTIAZEM HYDROCHLORIDE 60 MG: 30 TABLET ORAL at 23:43

## 2021-12-20 RX ADMIN — PROPOFOL 45 MCG/KG/MIN: 10 INJECTION, EMULSION INTRAVENOUS at 17:20

## 2021-12-20 RX ADMIN — DILTIAZEM HYDROCHLORIDE 60 MG: 30 TABLET ORAL at 00:24

## 2021-12-20 RX ADMIN — NOREPINEPHRINE BITARTRATE 0.1 MCG/KG/MIN: 1 INJECTION, SOLUTION, CONCENTRATE INTRAVENOUS at 19:04

## 2021-12-20 RX ADMIN — FENTANYL CITRATE 200 MCG/HR: 50 INJECTION INTRAVENOUS at 12:14

## 2021-12-20 RX ADMIN — INSULIN DETEMIR 25 UNITS: 100 INJECTION, SOLUTION SUBCUTANEOUS at 20:49

## 2021-12-20 RX ADMIN — PROPOFOL 50 MCG/KG/MIN: 10 INJECTION, EMULSION INTRAVENOUS at 14:22

## 2021-12-20 RX ADMIN — PROPOFOL 45 MCG/KG/MIN: 10 INJECTION, EMULSION INTRAVENOUS at 11:11

## 2021-12-20 RX ADMIN — ENOXAPARIN SODIUM 120 MG: 120 INJECTION SUBCUTANEOUS at 08:14

## 2021-12-21 LAB
ALBUMIN SERPL-MCNC: 1.9 G/DL (ref 3.5–5.2)
ALBUMIN/GLOB SERPL: 0.6 G/DL
ALP SERPL-CCNC: 86 U/L (ref 39–117)
ALT SERPL W P-5'-P-CCNC: 30 U/L (ref 1–41)
ANION GAP SERPL CALCULATED.3IONS-SCNC: 5.2 MMOL/L (ref 5–15)
AST SERPL-CCNC: 28 U/L (ref 1–40)
BACTERIA SPEC AEROBE CULT: NORMAL
BACTERIA SPEC AEROBE CULT: NORMAL
BASOPHILS # BLD AUTO: 0.04 10*3/MM3 (ref 0–0.2)
BASOPHILS NFR BLD AUTO: 0.3 % (ref 0–1.5)
BILIRUB SERPL-MCNC: 0.5 MG/DL (ref 0–1.2)
BUN SERPL-MCNC: 35 MG/DL (ref 8–23)
BUN/CREAT SERPL: 42.2 (ref 7–25)
CALCIUM SPEC-SCNC: 7.6 MG/DL (ref 8.6–10.5)
CHLORIDE SERPL-SCNC: 107 MMOL/L (ref 98–107)
CO2 SERPL-SCNC: 26.8 MMOL/L (ref 22–29)
CREAT SERPL-MCNC: 0.83 MG/DL (ref 0.76–1.27)
CRP SERPL-MCNC: 18.81 MG/DL (ref 0–0.5)
D DIMER PPP FEU-MCNC: 3.67 MCGFEU/ML (ref 0–0.46)
DEPRECATED RDW RBC AUTO: 49.1 FL (ref 37–54)
EOSINOPHIL # BLD AUTO: 0 10*3/MM3 (ref 0–0.4)
EOSINOPHIL NFR BLD AUTO: 0 % (ref 0.3–6.2)
ERYTHROCYTE [DISTWIDTH] IN BLOOD BY AUTOMATED COUNT: 13.7 % (ref 12.3–15.4)
FERRITIN SERPL-MCNC: 3454 NG/ML (ref 30–400)
GFR SERPL CREATININE-BSD FRML MDRD: 93 ML/MIN/1.73
GLOBULIN UR ELPH-MCNC: 3.1 GM/DL
GLUCOSE BLDC GLUCOMTR-MCNC: 225 MG/DL (ref 70–130)
GLUCOSE BLDC GLUCOMTR-MCNC: 229 MG/DL (ref 70–130)
GLUCOSE BLDC GLUCOMTR-MCNC: 240 MG/DL (ref 70–130)
GLUCOSE BLDC GLUCOMTR-MCNC: 281 MG/DL (ref 70–130)
GLUCOSE SERPL-MCNC: 312 MG/DL (ref 65–99)
HCT VFR BLD AUTO: 40.7 % (ref 37.5–51)
HGB BLD-MCNC: 13.1 G/DL (ref 13–17.7)
IMM GRANULOCYTES # BLD AUTO: 0.09 10*3/MM3 (ref 0–0.05)
IMM GRANULOCYTES NFR BLD AUTO: 0.7 % (ref 0–0.5)
LDH SERPL-CCNC: 557 U/L (ref 135–225)
LYMPHOCYTES # BLD AUTO: 0.43 10*3/MM3 (ref 0.7–3.1)
LYMPHOCYTES NFR BLD AUTO: 3.4 % (ref 19.6–45.3)
MCH RBC QN AUTO: 31.3 PG (ref 26.6–33)
MCHC RBC AUTO-ENTMCNC: 32.2 G/DL (ref 31.5–35.7)
MCV RBC AUTO: 97.1 FL (ref 79–97)
MONOCYTES # BLD AUTO: 0.46 10*3/MM3 (ref 0.1–0.9)
MONOCYTES NFR BLD AUTO: 3.7 % (ref 5–12)
NEUTROPHILS NFR BLD AUTO: 11.54 10*3/MM3 (ref 1.7–7)
NEUTROPHILS NFR BLD AUTO: 91.9 % (ref 42.7–76)
NRBC BLD AUTO-RTO: 0.2 /100 WBC (ref 0–0.2)
PLATELET # BLD AUTO: 160 10*3/MM3 (ref 140–450)
PMV BLD AUTO: 11 FL (ref 6–12)
POTASSIUM SERPL-SCNC: 4.2 MMOL/L (ref 3.5–5.2)
PROCALCITONIN SERPL-MCNC: 0.43 NG/ML (ref 0–0.25)
PROT SERPL-MCNC: 5 G/DL (ref 6–8.5)
RBC # BLD AUTO: 4.19 10*6/MM3 (ref 4.14–5.8)
SODIUM SERPL-SCNC: 139 MMOL/L (ref 136–145)
WBC NRBC COR # BLD: 12.56 10*3/MM3 (ref 3.4–10.8)

## 2021-12-21 PROCEDURE — 84145 PROCALCITONIN (PCT): CPT | Performed by: INTERNAL MEDICINE

## 2021-12-21 PROCEDURE — 63710000001 INSULIN DETEMIR PER 5 UNITS: Performed by: INTERNAL MEDICINE

## 2021-12-21 PROCEDURE — 80053 COMPREHEN METABOLIC PANEL: CPT | Performed by: STUDENT IN AN ORGANIZED HEALTH CARE EDUCATION/TRAINING PROGRAM

## 2021-12-21 PROCEDURE — 87081 CULTURE SCREEN ONLY: CPT | Performed by: INTERNAL MEDICINE

## 2021-12-21 PROCEDURE — 87186 SC STD MICRODIL/AGAR DIL: CPT | Performed by: INTERNAL MEDICINE

## 2021-12-21 PROCEDURE — 87205 SMEAR GRAM STAIN: CPT | Performed by: INTERNAL MEDICINE

## 2021-12-21 PROCEDURE — 87147 CULTURE TYPE IMMUNOLOGIC: CPT | Performed by: INTERNAL MEDICINE

## 2021-12-21 PROCEDURE — 94799 UNLISTED PULMONARY SVC/PX: CPT

## 2021-12-21 PROCEDURE — 99233 SBSQ HOSP IP/OBS HIGH 50: CPT | Performed by: INTERNAL MEDICINE

## 2021-12-21 PROCEDURE — 94761 N-INVAS EAR/PLS OXIMETRY MLT: CPT

## 2021-12-21 PROCEDURE — 25010000002 DEXAMETHASONE PER 1 MG: Performed by: HOSPITALIST

## 2021-12-21 PROCEDURE — 94003 VENT MGMT INPAT SUBQ DAY: CPT

## 2021-12-21 PROCEDURE — 25010000002 PROPOFOL 10 MG/ML EMULSION: Performed by: STUDENT IN AN ORGANIZED HEALTH CARE EDUCATION/TRAINING PROGRAM

## 2021-12-21 PROCEDURE — 25010000002 FENTANYL CITRATE (PF) 2500 MCG/50ML SOLUTION: Performed by: INTERNAL MEDICINE

## 2021-12-21 PROCEDURE — 63710000001 INSULIN ASPART PER 5 UNITS: Performed by: HOSPITALIST

## 2021-12-21 PROCEDURE — 63710000001 INSULIN ASPART PER 5 UNITS: Performed by: INTERNAL MEDICINE

## 2021-12-21 PROCEDURE — 85025 COMPLETE CBC W/AUTO DIFF WBC: CPT | Performed by: STUDENT IN AN ORGANIZED HEALTH CARE EDUCATION/TRAINING PROGRAM

## 2021-12-21 PROCEDURE — 85379 FIBRIN DEGRADATION QUANT: CPT | Performed by: STUDENT IN AN ORGANIZED HEALTH CARE EDUCATION/TRAINING PROGRAM

## 2021-12-21 PROCEDURE — 87077 CULTURE AEROBIC IDENTIFY: CPT | Performed by: INTERNAL MEDICINE

## 2021-12-21 PROCEDURE — 82728 ASSAY OF FERRITIN: CPT | Performed by: INTERNAL MEDICINE

## 2021-12-21 PROCEDURE — 25010000002 ENOXAPARIN PER 10 MG: Performed by: HOSPITALIST

## 2021-12-21 PROCEDURE — 86140 C-REACTIVE PROTEIN: CPT | Performed by: INTERNAL MEDICINE

## 2021-12-21 PROCEDURE — 83615 LACTATE (LD) (LDH) ENZYME: CPT | Performed by: STUDENT IN AN ORGANIZED HEALTH CARE EDUCATION/TRAINING PROGRAM

## 2021-12-21 PROCEDURE — 82962 GLUCOSE BLOOD TEST: CPT

## 2021-12-21 PROCEDURE — 87070 CULTURE OTHR SPECIMN AEROBIC: CPT | Performed by: INTERNAL MEDICINE

## 2021-12-21 RX ORDER — SODIUM CHLORIDE 0.9 % (FLUSH) 0.9 %
20 SYRINGE (ML) INJECTION AS NEEDED
Status: DISCONTINUED | OUTPATIENT
Start: 2021-12-21 | End: 2022-01-13 | Stop reason: HOSPADM

## 2021-12-21 RX ORDER — SODIUM CHLORIDE 0.9 % (FLUSH) 0.9 %
10 SYRINGE (ML) INJECTION AS NEEDED
Status: DISCONTINUED | OUTPATIENT
Start: 2021-12-21 | End: 2022-01-13 | Stop reason: HOSPADM

## 2021-12-21 RX ORDER — SODIUM CHLORIDE 0.9 % (FLUSH) 0.9 %
10 SYRINGE (ML) INJECTION EVERY 12 HOURS SCHEDULED
Status: DISCONTINUED | OUTPATIENT
Start: 2021-12-21 | End: 2021-12-30

## 2021-12-21 RX ADMIN — INSULIN ASPART 8 UNITS: 100 INJECTION, SOLUTION INTRAVENOUS; SUBCUTANEOUS at 12:50

## 2021-12-21 RX ADMIN — SODIUM CHLORIDE, PRESERVATIVE FREE 10 ML: 5 INJECTION INTRAVENOUS at 08:35

## 2021-12-21 RX ADMIN — DILTIAZEM HYDROCHLORIDE 60 MG: 30 TABLET ORAL at 12:51

## 2021-12-21 RX ADMIN — DILTIAZEM HYDROCHLORIDE 60 MG: 30 TABLET ORAL at 18:42

## 2021-12-21 RX ADMIN — DILTIAZEM HYDROCHLORIDE 60 MG: 30 TABLET ORAL at 23:34

## 2021-12-21 RX ADMIN — PROPOFOL 30 MCG/KG/MIN: 10 INJECTION, EMULSION INTRAVENOUS at 20:41

## 2021-12-21 RX ADMIN — DEXAMETHASONE SODIUM PHOSPHATE 6 MG: 4 INJECTION, SOLUTION INTRAMUSCULAR; INTRAVENOUS at 20:25

## 2021-12-21 RX ADMIN — SODIUM CHLORIDE, PRESERVATIVE FREE 10 ML: 5 INJECTION INTRAVENOUS at 20:26

## 2021-12-21 RX ADMIN — DEXMEDETOMIDINE HYDROCHLORIDE 0.2 MCG/KG/HR: 400 INJECTION, SOLUTION INTRAVENOUS at 15:53

## 2021-12-21 RX ADMIN — INSULIN DETEMIR 25 UNITS: 100 INJECTION, SOLUTION SUBCUTANEOUS at 08:38

## 2021-12-21 RX ADMIN — ENOXAPARIN SODIUM 120 MG: 120 INJECTION SUBCUTANEOUS at 08:35

## 2021-12-21 RX ADMIN — DEXMEDETOMIDINE HYDROCHLORIDE 0.4 MCG/KG/HR: 400 INJECTION, SOLUTION INTRAVENOUS at 23:33

## 2021-12-21 RX ADMIN — DEXAMETHASONE SODIUM PHOSPHATE 6 MG: 4 INJECTION, SOLUTION INTRAMUSCULAR; INTRAVENOUS at 08:35

## 2021-12-21 RX ADMIN — ENOXAPARIN SODIUM 120 MG: 120 INJECTION SUBCUTANEOUS at 20:26

## 2021-12-21 RX ADMIN — INSULIN DETEMIR 25 UNITS: 100 INJECTION, SOLUTION SUBCUTANEOUS at 20:42

## 2021-12-21 RX ADMIN — PROPOFOL 40 MCG/KG/MIN: 10 INJECTION, EMULSION INTRAVENOUS at 12:19

## 2021-12-21 RX ADMIN — INSULIN ASPART 8 UNITS: 100 INJECTION, SOLUTION INTRAVENOUS; SUBCUTANEOUS at 18:36

## 2021-12-21 RX ADMIN — PROPOFOL 35.07 MCG/KG/MIN: 10 INJECTION, EMULSION INTRAVENOUS at 08:34

## 2021-12-21 RX ADMIN — INSULIN ASPART 8 UNITS: 100 INJECTION, SOLUTION INTRAVENOUS; SUBCUTANEOUS at 23:36

## 2021-12-21 RX ADMIN — PROPOFOL 35 MCG/KG/MIN: 10 INJECTION, EMULSION INTRAVENOUS at 03:50

## 2021-12-21 RX ADMIN — INSULIN ASPART 16 UNITS: 100 INJECTION, SOLUTION INTRAVENOUS; SUBCUTANEOUS at 06:37

## 2021-12-21 RX ADMIN — PROPOFOL 35 MCG/KG/MIN: 10 INJECTION, EMULSION INTRAVENOUS at 15:55

## 2021-12-21 RX ADMIN — SODIUM CHLORIDE, PRESERVATIVE FREE 10 ML: 5 INJECTION INTRAVENOUS at 12:51

## 2021-12-21 RX ADMIN — DILTIAZEM HYDROCHLORIDE 60 MG: 30 TABLET ORAL at 05:18

## 2021-12-21 RX ADMIN — FENTANYL CITRATE 100 MCG/HR: 50 INJECTION INTRAVENOUS at 12:18

## 2021-12-22 ENCOUNTER — APPOINTMENT (OUTPATIENT)
Dept: GENERAL RADIOLOGY | Facility: HOSPITAL | Age: 66
End: 2021-12-22

## 2021-12-22 LAB
ALBUMIN SERPL-MCNC: 2 G/DL (ref 3.5–5.2)
ALBUMIN/GLOB SERPL: 0.6 G/DL
ALP SERPL-CCNC: 89 U/L (ref 39–117)
ALT SERPL W P-5'-P-CCNC: 29 U/L (ref 1–41)
ANION GAP SERPL CALCULATED.3IONS-SCNC: 8.4 MMOL/L (ref 5–15)
AST SERPL-CCNC: 23 U/L (ref 1–40)
BASOPHILS # BLD AUTO: 0.14 10*3/MM3 (ref 0–0.2)
BASOPHILS NFR BLD AUTO: 0.9 % (ref 0–1.5)
BILIRUB SERPL-MCNC: 0.7 MG/DL (ref 0–1.2)
BUN SERPL-MCNC: 30 MG/DL (ref 8–23)
BUN/CREAT SERPL: 41.7 (ref 7–25)
CALCIUM SPEC-SCNC: 7.8 MG/DL (ref 8.6–10.5)
CHLORIDE SERPL-SCNC: 106 MMOL/L (ref 98–107)
CO2 SERPL-SCNC: 26.6 MMOL/L (ref 22–29)
CREAT SERPL-MCNC: 0.72 MG/DL (ref 0.76–1.27)
CRP SERPL-MCNC: 27.77 MG/DL (ref 0–0.5)
DEPRECATED RDW RBC AUTO: 47.6 FL (ref 37–54)
EOSINOPHIL # BLD AUTO: 0 10*3/MM3 (ref 0–0.4)
EOSINOPHIL NFR BLD AUTO: 0 % (ref 0.3–6.2)
ERYTHROCYTE [DISTWIDTH] IN BLOOD BY AUTOMATED COUNT: 13.3 % (ref 12.3–15.4)
FERRITIN SERPL-MCNC: 4823 NG/ML (ref 30–400)
GFR SERPL CREATININE-BSD FRML MDRD: 110 ML/MIN/1.73
GLOBULIN UR ELPH-MCNC: 3.5 GM/DL
GLUCOSE BLDC GLUCOMTR-MCNC: 241 MG/DL (ref 70–130)
GLUCOSE BLDC GLUCOMTR-MCNC: 241 MG/DL (ref 70–130)
GLUCOSE BLDC GLUCOMTR-MCNC: 268 MG/DL (ref 70–130)
GLUCOSE SERPL-MCNC: 316 MG/DL (ref 65–99)
HCT VFR BLD AUTO: 44 % (ref 37.5–51)
HGB BLD-MCNC: 13.9 G/DL (ref 13–17.7)
IMM GRANULOCYTES # BLD AUTO: 0.09 10*3/MM3 (ref 0–0.05)
IMM GRANULOCYTES NFR BLD AUTO: 0.6 % (ref 0–0.5)
LYMPHOCYTES # BLD AUTO: 0.51 10*3/MM3 (ref 0.7–3.1)
LYMPHOCYTES NFR BLD AUTO: 3.3 % (ref 19.6–45.3)
MCH RBC QN AUTO: 30.3 PG (ref 26.6–33)
MCHC RBC AUTO-ENTMCNC: 31.6 G/DL (ref 31.5–35.7)
MCV RBC AUTO: 95.9 FL (ref 79–97)
MONOCYTES # BLD AUTO: 0.46 10*3/MM3 (ref 0.1–0.9)
MONOCYTES NFR BLD AUTO: 3 % (ref 5–12)
NEUTROPHILS NFR BLD AUTO: 14.06 10*3/MM3 (ref 1.7–7)
NEUTROPHILS NFR BLD AUTO: 92.2 % (ref 42.7–76)
PLATELET # BLD AUTO: 165 10*3/MM3 (ref 140–450)
PMV BLD AUTO: 11.3 FL (ref 6–12)
POTASSIUM SERPL-SCNC: 4.9 MMOL/L (ref 3.5–5.2)
PROCALCITONIN SERPL-MCNC: 0.43 NG/ML (ref 0–0.25)
PROT SERPL-MCNC: 5.5 G/DL (ref 6–8.5)
QT INTERVAL: 390 MS
RBC # BLD AUTO: 4.59 10*6/MM3 (ref 4.14–5.8)
RBC MORPH BLD: NORMAL
SMALL PLATELETS BLD QL SMEAR: ADEQUATE
SODIUM SERPL-SCNC: 141 MMOL/L (ref 136–145)
WBC MORPH BLD: NORMAL
WBC NRBC COR # BLD: 15.26 10*3/MM3 (ref 3.4–10.8)

## 2021-12-22 PROCEDURE — 99233 SBSQ HOSP IP/OBS HIGH 50: CPT | Performed by: INTERNAL MEDICINE

## 2021-12-22 PROCEDURE — 94003 VENT MGMT INPAT SUBQ DAY: CPT

## 2021-12-22 PROCEDURE — 80053 COMPREHEN METABOLIC PANEL: CPT | Performed by: INTERNAL MEDICINE

## 2021-12-22 PROCEDURE — 63710000001 INSULIN ASPART PER 5 UNITS: Performed by: INTERNAL MEDICINE

## 2021-12-22 PROCEDURE — 86140 C-REACTIVE PROTEIN: CPT | Performed by: INTERNAL MEDICINE

## 2021-12-22 PROCEDURE — 25010000002 PIPERACILLIN SOD-TAZOBACTAM PER 1 G: Performed by: INTERNAL MEDICINE

## 2021-12-22 PROCEDURE — 84145 PROCALCITONIN (PCT): CPT | Performed by: INTERNAL MEDICINE

## 2021-12-22 PROCEDURE — 85025 COMPLETE CBC W/AUTO DIFF WBC: CPT | Performed by: INTERNAL MEDICINE

## 2021-12-22 PROCEDURE — 82728 ASSAY OF FERRITIN: CPT | Performed by: INTERNAL MEDICINE

## 2021-12-22 PROCEDURE — 63710000001 INSULIN DETEMIR PER 5 UNITS: Performed by: INTERNAL MEDICINE

## 2021-12-22 PROCEDURE — 25010000002 FENTANYL CITRATE (PF) 50 MCG/ML SOLUTION: Performed by: INTERNAL MEDICINE

## 2021-12-22 PROCEDURE — 94799 UNLISTED PULMONARY SVC/PX: CPT

## 2021-12-22 PROCEDURE — 94761 N-INVAS EAR/PLS OXIMETRY MLT: CPT

## 2021-12-22 PROCEDURE — 71045 X-RAY EXAM CHEST 1 VIEW: CPT

## 2021-12-22 PROCEDURE — 82962 GLUCOSE BLOOD TEST: CPT

## 2021-12-22 PROCEDURE — 25010000002 DEXAMETHASONE PER 1 MG: Performed by: HOSPITALIST

## 2021-12-22 PROCEDURE — 25010000002 ENOXAPARIN PER 10 MG: Performed by: HOSPITALIST

## 2021-12-22 PROCEDURE — 25010000002 FUROSEMIDE PER 20 MG: Performed by: INTERNAL MEDICINE

## 2021-12-22 PROCEDURE — 85007 BL SMEAR W/DIFF WBC COUNT: CPT | Performed by: INTERNAL MEDICINE

## 2021-12-22 PROCEDURE — 74018 RADEX ABDOMEN 1 VIEW: CPT

## 2021-12-22 PROCEDURE — 25010000002 PROPOFOL 10 MG/ML EMULSION: Performed by: STUDENT IN AN ORGANIZED HEALTH CARE EDUCATION/TRAINING PROGRAM

## 2021-12-22 RX ORDER — FUROSEMIDE 10 MG/ML
40 INJECTION INTRAMUSCULAR; INTRAVENOUS ONCE
Status: COMPLETED | OUTPATIENT
Start: 2021-12-22 | End: 2021-12-22

## 2021-12-22 RX ORDER — ACETAMINOPHEN 325 MG/1
650 TABLET ORAL EVERY 6 HOURS PRN
Status: DISCONTINUED | OUTPATIENT
Start: 2021-12-22 | End: 2022-01-13 | Stop reason: HOSPADM

## 2021-12-22 RX ADMIN — FUROSEMIDE 40 MG: 10 INJECTION, SOLUTION INTRAMUSCULAR; INTRAVENOUS at 18:59

## 2021-12-22 RX ADMIN — PROPOFOL 25 MCG/KG/MIN: 10 INJECTION, EMULSION INTRAVENOUS at 18:14

## 2021-12-22 RX ADMIN — PIPERACILLIN SODIUM AND TAZOBACTAM SODIUM 3.38 G: 3; .375 INJECTION, POWDER, LYOPHILIZED, FOR SOLUTION INTRAVENOUS at 19:00

## 2021-12-22 RX ADMIN — PROPOFOL 20 MCG/KG/MIN: 10 INJECTION, EMULSION INTRAVENOUS at 13:15

## 2021-12-22 RX ADMIN — SODIUM CHLORIDE, PRESERVATIVE FREE 10 ML: 5 INJECTION INTRAVENOUS at 08:06

## 2021-12-22 RX ADMIN — INSULIN ASPART 8 UNITS: 100 INJECTION, SOLUTION INTRAVENOUS; SUBCUTANEOUS at 19:35

## 2021-12-22 RX ADMIN — DEXAMETHASONE SODIUM PHOSPHATE 6 MG: 4 INJECTION, SOLUTION INTRAMUSCULAR; INTRAVENOUS at 08:06

## 2021-12-22 RX ADMIN — INSULIN ASPART 8 UNITS: 100 INJECTION, SOLUTION INTRAVENOUS; SUBCUTANEOUS at 13:53

## 2021-12-22 RX ADMIN — DEXMEDETOMIDINE HYDROCHLORIDE 0.5 MCG/KG/HR: 400 INJECTION, SOLUTION INTRAVENOUS at 11:12

## 2021-12-22 RX ADMIN — FENTANYL CITRATE 50 MCG: 50 INJECTION, SOLUTION INTRAMUSCULAR; INTRAVENOUS at 16:20

## 2021-12-22 RX ADMIN — PROPOFOL 20 MCG/KG/MIN: 10 INJECTION, EMULSION INTRAVENOUS at 07:26

## 2021-12-22 RX ADMIN — PROPOFOL 25 MCG/KG/MIN: 10 INJECTION, EMULSION INTRAVENOUS at 01:20

## 2021-12-22 RX ADMIN — DILTIAZEM HYDROCHLORIDE 60 MG: 30 TABLET ORAL at 06:03

## 2021-12-22 RX ADMIN — ENOXAPARIN SODIUM 120 MG: 120 INJECTION SUBCUTANEOUS at 20:39

## 2021-12-22 RX ADMIN — SODIUM CHLORIDE, PRESERVATIVE FREE 10 ML: 5 INJECTION INTRAVENOUS at 07:29

## 2021-12-22 RX ADMIN — DEXAMETHASONE SODIUM PHOSPHATE 6 MG: 4 INJECTION, SOLUTION INTRAMUSCULAR; INTRAVENOUS at 20:40

## 2021-12-22 RX ADMIN — PROPOFOL 25 MCG/KG/MIN: 10 INJECTION, EMULSION INTRAVENOUS at 22:37

## 2021-12-22 RX ADMIN — SODIUM CHLORIDE, PRESERVATIVE FREE 10 ML: 5 INJECTION INTRAVENOUS at 08:07

## 2021-12-22 RX ADMIN — INSULIN DETEMIR 25 UNITS: 100 INJECTION, SOLUTION SUBCUTANEOUS at 08:12

## 2021-12-22 RX ADMIN — SODIUM CHLORIDE, PRESERVATIVE FREE 10 ML: 5 INJECTION INTRAVENOUS at 20:40

## 2021-12-22 RX ADMIN — DEXMEDETOMIDINE HYDROCHLORIDE 0.6 MCG/KG/HR: 400 INJECTION, SOLUTION INTRAVENOUS at 18:30

## 2021-12-22 RX ADMIN — ENOXAPARIN SODIUM 120 MG: 120 INJECTION SUBCUTANEOUS at 08:06

## 2021-12-22 RX ADMIN — DILTIAZEM HYDROCHLORIDE 60 MG: 30 TABLET ORAL at 13:15

## 2021-12-22 RX ADMIN — DILTIAZEM HYDROCHLORIDE 60 MG: 30 TABLET ORAL at 18:58

## 2021-12-22 RX ADMIN — DEXMEDETOMIDINE HYDROCHLORIDE 0.5 MCG/KG/HR: 400 INJECTION, SOLUTION INTRAVENOUS at 03:59

## 2021-12-22 RX ADMIN — INSULIN DETEMIR 25 UNITS: 100 INJECTION, SOLUTION SUBCUTANEOUS at 20:48

## 2021-12-22 RX ADMIN — SODIUM CHLORIDE, PRESERVATIVE FREE 10 ML: 5 INJECTION INTRAVENOUS at 11:12

## 2021-12-22 RX ADMIN — INSULIN ASPART 12 UNITS: 100 INJECTION, SOLUTION INTRAVENOUS; SUBCUTANEOUS at 06:04

## 2021-12-23 LAB
ALBUMIN SERPL-MCNC: 1.9 G/DL (ref 3.5–5.2)
ALBUMIN/GLOB SERPL: 0.5 G/DL
ALP SERPL-CCNC: 108 U/L (ref 39–117)
ALT SERPL W P-5'-P-CCNC: 23 U/L (ref 1–41)
ANION GAP SERPL CALCULATED.3IONS-SCNC: 8.1 MMOL/L (ref 5–15)
ARTERIAL PATENCY WRIST A: POSITIVE
AST SERPL-CCNC: 22 U/L (ref 1–40)
ATMOSPHERIC PRESS: 743 MMHG
BASE EXCESS BLDA CALC-SCNC: 12.8 MMOL/L (ref 0–2)
BASOPHILS # BLD AUTO: 0.05 10*3/MM3 (ref 0–0.2)
BASOPHILS NFR BLD AUTO: 0.3 % (ref 0–1.5)
BDY SITE: ABNORMAL
BILIRUB SERPL-MCNC: 0.8 MG/DL (ref 0–1.2)
BODY TEMPERATURE: 37 C
BUN SERPL-MCNC: 30 MG/DL (ref 8–23)
BUN/CREAT SERPL: 38 (ref 7–25)
CALCIUM SPEC-SCNC: 7.8 MG/DL (ref 8.6–10.5)
CHLORIDE SERPL-SCNC: 97 MMOL/L (ref 98–107)
CO2 SERPL-SCNC: 31.9 MMOL/L (ref 22–29)
CREAT SERPL-MCNC: 0.79 MG/DL (ref 0.76–1.27)
CRP SERPL-MCNC: 33.27 MG/DL (ref 0–0.5)
D DIMER PPP FEU-MCNC: 2.49 MCGFEU/ML (ref 0–0.46)
DEPRECATED RDW RBC AUTO: 46.1 FL (ref 37–54)
EOSINOPHIL # BLD AUTO: 0 10*3/MM3 (ref 0–0.4)
EOSINOPHIL NFR BLD AUTO: 0 % (ref 0.3–6.2)
ERYTHROCYTE [DISTWIDTH] IN BLOOD BY AUTOMATED COUNT: 13.3 % (ref 12.3–15.4)
FERRITIN SERPL-MCNC: 5948 NG/ML (ref 30–400)
GFR SERPL CREATININE-BSD FRML MDRD: 98 ML/MIN/1.73
GLOBULIN UR ELPH-MCNC: 3.8 GM/DL
GLUCOSE BLDC GLUCOMTR-MCNC: 217 MG/DL (ref 70–130)
GLUCOSE BLDC GLUCOMTR-MCNC: 258 MG/DL (ref 70–130)
GLUCOSE BLDC GLUCOMTR-MCNC: 283 MG/DL (ref 70–130)
GLUCOSE BLDC GLUCOMTR-MCNC: 288 MG/DL (ref 70–130)
GLUCOSE BLDC GLUCOMTR-MCNC: 295 MG/DL (ref 70–130)
GLUCOSE SERPL-MCNC: 347 MG/DL (ref 65–99)
HCO3 BLDA-SCNC: 36.3 MMOL/L (ref 20–26)
HCT VFR BLD AUTO: 43.2 % (ref 37.5–51)
HGB BLD-MCNC: 14.2 G/DL (ref 13–17.7)
HGB BLDA-MCNC: 14.7 G/DL (ref 14–18)
IMM GRANULOCYTES # BLD AUTO: 0.15 10*3/MM3 (ref 0–0.05)
IMM GRANULOCYTES NFR BLD AUTO: 0.9 % (ref 0–0.5)
INHALED O2 CONCENTRATION: 50 %
LDH SERPL-CCNC: 485 U/L (ref 135–225)
LYMPHOCYTES # BLD AUTO: 0.66 10*3/MM3 (ref 0.7–3.1)
LYMPHOCYTES NFR BLD AUTO: 4.2 % (ref 19.6–45.3)
Lab: ABNORMAL
MCH RBC QN AUTO: 31.1 PG (ref 26.6–33)
MCHC RBC AUTO-ENTMCNC: 32.9 G/DL (ref 31.5–35.7)
MCV RBC AUTO: 94.5 FL (ref 79–97)
MODALITY: ABNORMAL
MONOCYTES # BLD AUTO: 0.59 10*3/MM3 (ref 0.1–0.9)
MONOCYTES NFR BLD AUTO: 3.7 % (ref 5–12)
MRSA SPEC QL CULT: NORMAL
NEUTROPHILS NFR BLD AUTO: 14.43 10*3/MM3 (ref 1.7–7)
NEUTROPHILS NFR BLD AUTO: 90.9 % (ref 42.7–76)
NRBC BLD AUTO-RTO: 0 /100 WBC (ref 0–0.2)
PAW @ PEAK INSP FLOW SETTING VENT: 18 CMH2O
PCO2 BLDA: 40.5 MM HG (ref 35–45)
PCO2 TEMP ADJ BLD: 40.5 MM HG (ref 35–45)
PEEP RESPIRATORY: 5 CM[H2O]
PH BLDA: 7.56 PH UNITS (ref 7.35–7.45)
PH, TEMP CORRECTED: 7.56 PH UNITS (ref 7.35–7.45)
PLATELET # BLD AUTO: 151 10*3/MM3 (ref 140–450)
PMV BLD AUTO: 11.6 FL (ref 6–12)
PO2 BLDA: 55.3 MM HG (ref 83–108)
PO2 TEMP ADJ BLD: 55.3 MM HG (ref 83–108)
POTASSIUM SERPL-SCNC: 4.5 MMOL/L (ref 3.5–5.2)
PROCALCITONIN SERPL-MCNC: 0.54 NG/ML (ref 0–0.25)
PROT SERPL-MCNC: 5.7 G/DL (ref 6–8.5)
RBC # BLD AUTO: 4.57 10*6/MM3 (ref 4.14–5.8)
SAO2 % BLDCOA: 91.8 % (ref 94–99)
SET MECH RESP RATE: 24
SODIUM SERPL-SCNC: 137 MMOL/L (ref 136–145)
VENTILATOR MODE: ABNORMAL
WBC NRBC COR # BLD: 15.88 10*3/MM3 (ref 3.4–10.8)

## 2021-12-23 PROCEDURE — 63710000001 INSULIN ASPART PER 5 UNITS: Performed by: INTERNAL MEDICINE

## 2021-12-23 PROCEDURE — 25010000002 DEXAMETHASONE PER 1 MG: Performed by: HOSPITALIST

## 2021-12-23 PROCEDURE — 25010000002 PIPERACILLIN SOD-TAZOBACTAM PER 1 G: Performed by: INTERNAL MEDICINE

## 2021-12-23 PROCEDURE — 94799 UNLISTED PULMONARY SVC/PX: CPT

## 2021-12-23 PROCEDURE — 94003 VENT MGMT INPAT SUBQ DAY: CPT

## 2021-12-23 PROCEDURE — 85379 FIBRIN DEGRADATION QUANT: CPT | Performed by: STUDENT IN AN ORGANIZED HEALTH CARE EDUCATION/TRAINING PROGRAM

## 2021-12-23 PROCEDURE — 82962 GLUCOSE BLOOD TEST: CPT

## 2021-12-23 PROCEDURE — 25010000002 FUROSEMIDE PER 20 MG: Performed by: INTERNAL MEDICINE

## 2021-12-23 PROCEDURE — 25010000002 FENTANYL CITRATE (PF) 2500 MCG/50ML SOLUTION: Performed by: INTERNAL MEDICINE

## 2021-12-23 PROCEDURE — 84145 PROCALCITONIN (PCT): CPT | Performed by: INTERNAL MEDICINE

## 2021-12-23 PROCEDURE — 36600 WITHDRAWAL OF ARTERIAL BLOOD: CPT

## 2021-12-23 PROCEDURE — 99233 SBSQ HOSP IP/OBS HIGH 50: CPT | Performed by: INTERNAL MEDICINE

## 2021-12-23 PROCEDURE — 83615 LACTATE (LD) (LDH) ENZYME: CPT | Performed by: STUDENT IN AN ORGANIZED HEALTH CARE EDUCATION/TRAINING PROGRAM

## 2021-12-23 PROCEDURE — 25010000002 PROPOFOL 10 MG/ML EMULSION: Performed by: STUDENT IN AN ORGANIZED HEALTH CARE EDUCATION/TRAINING PROGRAM

## 2021-12-23 PROCEDURE — 86140 C-REACTIVE PROTEIN: CPT | Performed by: INTERNAL MEDICINE

## 2021-12-23 PROCEDURE — 80053 COMPREHEN METABOLIC PANEL: CPT | Performed by: INTERNAL MEDICINE

## 2021-12-23 PROCEDURE — 63710000001 INSULIN DETEMIR PER 5 UNITS: Performed by: INTERNAL MEDICINE

## 2021-12-23 PROCEDURE — 82803 BLOOD GASES ANY COMBINATION: CPT

## 2021-12-23 PROCEDURE — 82728 ASSAY OF FERRITIN: CPT | Performed by: INTERNAL MEDICINE

## 2021-12-23 PROCEDURE — 85025 COMPLETE CBC W/AUTO DIFF WBC: CPT | Performed by: INTERNAL MEDICINE

## 2021-12-23 PROCEDURE — 25010000002 ENOXAPARIN PER 10 MG: Performed by: HOSPITALIST

## 2021-12-23 PROCEDURE — 94761 N-INVAS EAR/PLS OXIMETRY MLT: CPT

## 2021-12-23 RX ORDER — FUROSEMIDE 10 MG/ML
40 INJECTION INTRAMUSCULAR; INTRAVENOUS ONCE
Status: COMPLETED | OUTPATIENT
Start: 2021-12-23 | End: 2021-12-23

## 2021-12-23 RX ADMIN — PROPOFOL 25 MCG/KG/MIN: 10 INJECTION, EMULSION INTRAVENOUS at 04:38

## 2021-12-23 RX ADMIN — INSULIN DETEMIR 25 UNITS: 100 INJECTION, SOLUTION SUBCUTANEOUS at 08:14

## 2021-12-23 RX ADMIN — DEXMEDETOMIDINE HYDROCHLORIDE 0.7 MCG/KG/HR: 400 INJECTION, SOLUTION INTRAVENOUS at 20:43

## 2021-12-23 RX ADMIN — DEXMEDETOMIDINE HYDROCHLORIDE 0.7 MCG/KG/HR: 400 INJECTION, SOLUTION INTRAVENOUS at 05:37

## 2021-12-23 RX ADMIN — SODIUM CHLORIDE, PRESERVATIVE FREE 10 ML: 5 INJECTION INTRAVENOUS at 08:13

## 2021-12-23 RX ADMIN — INSULIN ASPART 12 UNITS: 100 INJECTION, SOLUTION INTRAVENOUS; SUBCUTANEOUS at 16:53

## 2021-12-23 RX ADMIN — INSULIN DETEMIR 25 UNITS: 100 INJECTION, SOLUTION SUBCUTANEOUS at 21:48

## 2021-12-23 RX ADMIN — PROPOFOL 20 MCG/KG/MIN: 10 INJECTION, EMULSION INTRAVENOUS at 20:43

## 2021-12-23 RX ADMIN — ENOXAPARIN SODIUM 120 MG: 120 INJECTION SUBCUTANEOUS at 21:50

## 2021-12-23 RX ADMIN — SODIUM CHLORIDE, PRESERVATIVE FREE 10 ML: 5 INJECTION INTRAVENOUS at 20:46

## 2021-12-23 RX ADMIN — INSULIN ASPART 8 UNITS: 100 INJECTION, SOLUTION INTRAVENOUS; SUBCUTANEOUS at 12:25

## 2021-12-23 RX ADMIN — DEXAMETHASONE SODIUM PHOSPHATE 6 MG: 4 INJECTION, SOLUTION INTRAMUSCULAR; INTRAVENOUS at 21:49

## 2021-12-23 RX ADMIN — DEXMEDETOMIDINE HYDROCHLORIDE 0.7 MCG/KG/HR: 400 INJECTION, SOLUTION INTRAVENOUS at 00:19

## 2021-12-23 RX ADMIN — DILTIAZEM HYDROCHLORIDE 60 MG: 30 TABLET ORAL at 05:52

## 2021-12-23 RX ADMIN — SODIUM CHLORIDE, PRESERVATIVE FREE 10 ML: 5 INJECTION INTRAVENOUS at 20:45

## 2021-12-23 RX ADMIN — DEXAMETHASONE SODIUM PHOSPHATE 6 MG: 4 INJECTION, SOLUTION INTRAMUSCULAR; INTRAVENOUS at 08:12

## 2021-12-23 RX ADMIN — PIPERACILLIN SODIUM AND TAZOBACTAM SODIUM 3.38 G: 3; .375 INJECTION, POWDER, LYOPHILIZED, FOR SOLUTION INTRAVENOUS at 08:12

## 2021-12-23 RX ADMIN — PROPOFOL 25 MCG/KG/MIN: 10 INJECTION, EMULSION INTRAVENOUS at 08:58

## 2021-12-23 RX ADMIN — INSULIN ASPART 12 UNITS: 100 INJECTION, SOLUTION INTRAVENOUS; SUBCUTANEOUS at 06:19

## 2021-12-23 RX ADMIN — DEXMEDETOMIDINE HYDROCHLORIDE 0.7 MCG/KG/HR: 400 INJECTION, SOLUTION INTRAVENOUS at 16:00

## 2021-12-23 RX ADMIN — PIPERACILLIN SODIUM AND TAZOBACTAM SODIUM 3.38 G: 3; .375 INJECTION, POWDER, LYOPHILIZED, FOR SOLUTION INTRAVENOUS at 15:50

## 2021-12-23 RX ADMIN — PIPERACILLIN SODIUM AND TAZOBACTAM SODIUM 3.38 G: 3; .375 INJECTION, POWDER, LYOPHILIZED, FOR SOLUTION INTRAVENOUS at 00:30

## 2021-12-23 RX ADMIN — INSULIN ASPART 12 UNITS: 100 INJECTION, SOLUTION INTRAVENOUS; SUBCUTANEOUS at 00:42

## 2021-12-23 RX ADMIN — FUROSEMIDE 40 MG: 10 INJECTION, SOLUTION INTRAMUSCULAR; INTRAVENOUS at 16:53

## 2021-12-23 RX ADMIN — FENTANYL CITRATE 50 MCG/HR: 50 INJECTION INTRAVENOUS at 11:02

## 2021-12-23 RX ADMIN — DILTIAZEM HYDROCHLORIDE 60 MG: 30 TABLET ORAL at 00:31

## 2021-12-23 RX ADMIN — ACETAMINOPHEN 650 MG: 325 TABLET ORAL at 21:50

## 2021-12-23 RX ADMIN — PROPOFOL 25 MCG/KG/MIN: 10 INJECTION, EMULSION INTRAVENOUS at 14:48

## 2021-12-23 RX ADMIN — DEXMEDETOMIDINE HYDROCHLORIDE 0.7 MCG/KG/HR: 400 INJECTION, SOLUTION INTRAVENOUS at 11:01

## 2021-12-23 RX ADMIN — ENOXAPARIN SODIUM 120 MG: 120 INJECTION SUBCUTANEOUS at 08:12

## 2021-12-24 LAB
ALBUMIN SERPL-MCNC: 1.9 G/DL (ref 3.5–5.2)
ALBUMIN/GLOB SERPL: 0.5 G/DL
ALP SERPL-CCNC: 94 U/L (ref 39–117)
ALT SERPL W P-5'-P-CCNC: 42 U/L (ref 1–41)
ANION GAP SERPL CALCULATED.3IONS-SCNC: 7.8 MMOL/L (ref 5–15)
ARTERIAL PATENCY WRIST A: POSITIVE
AST SERPL-CCNC: 41 U/L (ref 1–40)
ATMOSPHERIC PRESS: 736 MMHG
BACTERIA SPEC RESP CULT: ABNORMAL
BASE EXCESS BLDA CALC-SCNC: 15 MMOL/L (ref 0–2)
BASOPHILS # BLD AUTO: 0.03 10*3/MM3 (ref 0–0.2)
BASOPHILS NFR BLD AUTO: 0.2 % (ref 0–1.5)
BDY SITE: ABNORMAL
BILIRUB SERPL-MCNC: 0.9 MG/DL (ref 0–1.2)
BODY TEMPERATURE: 37 C
BUN SERPL-MCNC: 35 MG/DL (ref 8–23)
BUN/CREAT SERPL: 40.2 (ref 7–25)
CALCIUM SPEC-SCNC: 7.7 MG/DL (ref 8.6–10.5)
CHLORIDE SERPL-SCNC: 94 MMOL/L (ref 98–107)
CO2 SERPL-SCNC: 35.2 MMOL/L (ref 22–29)
CREAT SERPL-MCNC: 0.87 MG/DL (ref 0.76–1.27)
CRP SERPL-MCNC: 20.87 MG/DL (ref 0–0.5)
DEPRECATED RDW RBC AUTO: 46.5 FL (ref 37–54)
EOSINOPHIL # BLD AUTO: 0 10*3/MM3 (ref 0–0.4)
EOSINOPHIL NFR BLD AUTO: 0 % (ref 0.3–6.2)
ERYTHROCYTE [DISTWIDTH] IN BLOOD BY AUTOMATED COUNT: 13.2 % (ref 12.3–15.4)
FERRITIN SERPL-MCNC: 6187 NG/ML (ref 30–400)
GFR SERPL CREATININE-BSD FRML MDRD: 88 ML/MIN/1.73
GLOBULIN UR ELPH-MCNC: 4.1 GM/DL
GLUCOSE BLDC GLUCOMTR-MCNC: 213 MG/DL (ref 70–130)
GLUCOSE BLDC GLUCOMTR-MCNC: 239 MG/DL (ref 70–130)
GLUCOSE BLDC GLUCOMTR-MCNC: 252 MG/DL (ref 70–130)
GLUCOSE BLDC GLUCOMTR-MCNC: 270 MG/DL (ref 70–130)
GLUCOSE BLDC GLUCOMTR-MCNC: 293 MG/DL (ref 70–130)
GLUCOSE SERPL-MCNC: 315 MG/DL (ref 65–99)
GRAM STN SPEC: ABNORMAL
HCO3 BLDA-SCNC: 39.4 MMOL/L (ref 20–26)
HCT VFR BLD AUTO: 44.5 % (ref 37.5–51)
HGB BLD-MCNC: 14.4 G/DL (ref 13–17.7)
HGB BLDA-MCNC: 14.9 G/DL (ref 14–18)
IMM GRANULOCYTES # BLD AUTO: 0.09 10*3/MM3 (ref 0–0.05)
IMM GRANULOCYTES NFR BLD AUTO: 0.6 % (ref 0–0.5)
INHALED O2 CONCENTRATION: 50 %
LYMPHOCYTES # BLD AUTO: 0.69 10*3/MM3 (ref 0.7–3.1)
LYMPHOCYTES NFR BLD AUTO: 4.7 % (ref 19.6–45.3)
Lab: ABNORMAL
MCH RBC QN AUTO: 30.8 PG (ref 26.6–33)
MCHC RBC AUTO-ENTMCNC: 32.4 G/DL (ref 31.5–35.7)
MCV RBC AUTO: 95.1 FL (ref 79–97)
MODALITY: ABNORMAL
MONOCYTES # BLD AUTO: 0.75 10*3/MM3 (ref 0.1–0.9)
MONOCYTES NFR BLD AUTO: 5.2 % (ref 5–12)
NEUTROPHILS NFR BLD AUTO: 12.98 10*3/MM3 (ref 1.7–7)
NEUTROPHILS NFR BLD AUTO: 89.3 % (ref 42.7–76)
NRBC BLD AUTO-RTO: 0 /100 WBC (ref 0–0.2)
PAW @ PEAK INSP FLOW SETTING VENT: 15 CMH2O
PCO2 BLDA: 45.3 MM HG (ref 35–45)
PCO2 TEMP ADJ BLD: 45.3 MM HG (ref 35–45)
PEEP RESPIRATORY: 8 CM[H2O]
PH BLDA: 7.55 PH UNITS (ref 7.35–7.45)
PH, TEMP CORRECTED: 7.55 PH UNITS (ref 7.35–7.45)
PLATELET # BLD AUTO: 128 10*3/MM3 (ref 140–450)
PMV BLD AUTO: 12.3 FL (ref 6–12)
PO2 BLDA: 61.9 MM HG (ref 83–108)
PO2 TEMP ADJ BLD: 61.9 MM HG (ref 83–108)
POTASSIUM SERPL-SCNC: 4.5 MMOL/L (ref 3.5–5.2)
PROCALCITONIN SERPL-MCNC: 0.42 NG/ML (ref 0–0.25)
PROT SERPL-MCNC: 6 G/DL (ref 6–8.5)
RBC # BLD AUTO: 4.68 10*6/MM3 (ref 4.14–5.8)
SAO2 % BLDCOA: 93.7 % (ref 94–99)
SET MECH RESP RATE: 18
SODIUM SERPL-SCNC: 137 MMOL/L (ref 136–145)
VENTILATOR MODE: ABNORMAL
WBC NRBC COR # BLD: 14.54 10*3/MM3 (ref 3.4–10.8)

## 2021-12-24 PROCEDURE — 85025 COMPLETE CBC W/AUTO DIFF WBC: CPT | Performed by: INTERNAL MEDICINE

## 2021-12-24 PROCEDURE — 82728 ASSAY OF FERRITIN: CPT | Performed by: INTERNAL MEDICINE

## 2021-12-24 PROCEDURE — 25010000002 PROPOFOL 10 MG/ML EMULSION: Performed by: STUDENT IN AN ORGANIZED HEALTH CARE EDUCATION/TRAINING PROGRAM

## 2021-12-24 PROCEDURE — 82962 GLUCOSE BLOOD TEST: CPT

## 2021-12-24 PROCEDURE — 84145 PROCALCITONIN (PCT): CPT | Performed by: INTERNAL MEDICINE

## 2021-12-24 PROCEDURE — 94003 VENT MGMT INPAT SUBQ DAY: CPT

## 2021-12-24 PROCEDURE — 63710000001 INSULIN ASPART PER 5 UNITS: Performed by: INTERNAL MEDICINE

## 2021-12-24 PROCEDURE — 94799 UNLISTED PULMONARY SVC/PX: CPT

## 2021-12-24 PROCEDURE — 25010000002 PIPERACILLIN SOD-TAZOBACTAM PER 1 G: Performed by: INTERNAL MEDICINE

## 2021-12-24 PROCEDURE — 99233 SBSQ HOSP IP/OBS HIGH 50: CPT | Performed by: INTERNAL MEDICINE

## 2021-12-24 PROCEDURE — 63710000001 DEXAMETHASONE PER 0.25 MG: Performed by: HOSPITALIST

## 2021-12-24 PROCEDURE — 63710000001 INSULIN DETEMIR PER 5 UNITS: Performed by: INTERNAL MEDICINE

## 2021-12-24 PROCEDURE — 25010000002 ENOXAPARIN PER 10 MG: Performed by: HOSPITALIST

## 2021-12-24 PROCEDURE — 86140 C-REACTIVE PROTEIN: CPT | Performed by: INTERNAL MEDICINE

## 2021-12-24 PROCEDURE — 94761 N-INVAS EAR/PLS OXIMETRY MLT: CPT

## 2021-12-24 PROCEDURE — 80053 COMPREHEN METABOLIC PANEL: CPT | Performed by: INTERNAL MEDICINE

## 2021-12-24 PROCEDURE — 82803 BLOOD GASES ANY COMBINATION: CPT

## 2021-12-24 PROCEDURE — 36600 WITHDRAWAL OF ARTERIAL BLOOD: CPT

## 2021-12-24 RX ORDER — DEXAMETHASONE 4 MG/1
6 TABLET ORAL
Status: DISCONTINUED | OUTPATIENT
Start: 2021-12-25 | End: 2022-01-08

## 2021-12-24 RX ORDER — DEXAMETHASONE SODIUM PHOSPHATE 4 MG/ML
6 INJECTION, SOLUTION INTRA-ARTICULAR; INTRALESIONAL; INTRAMUSCULAR; INTRAVENOUS; SOFT TISSUE
Status: DISCONTINUED | OUTPATIENT
Start: 2021-12-25 | End: 2022-01-07

## 2021-12-24 RX ORDER — DILTIAZEM HYDROCHLORIDE 60 MG/1
60 TABLET, FILM COATED ORAL EVERY 8 HOURS SCHEDULED
Status: DISCONTINUED | OUTPATIENT
Start: 2021-12-24 | End: 2022-01-08

## 2021-12-24 RX ADMIN — SODIUM CHLORIDE, PRESERVATIVE FREE 10 ML: 5 INJECTION INTRAVENOUS at 09:20

## 2021-12-24 RX ADMIN — DEXMEDETOMIDINE HYDROCHLORIDE 0.8 MCG/KG/HR: 400 INJECTION, SOLUTION INTRAVENOUS at 06:02

## 2021-12-24 RX ADMIN — ACETAMINOPHEN 650 MG: 325 TABLET ORAL at 02:40

## 2021-12-24 RX ADMIN — DEXMEDETOMIDINE HYDROCHLORIDE 0.8 MCG/KG/HR: 400 INJECTION, SOLUTION INTRAVENOUS at 01:50

## 2021-12-24 RX ADMIN — INSULIN ASPART 8 UNITS: 100 INJECTION, SOLUTION INTRAVENOUS; SUBCUTANEOUS at 20:03

## 2021-12-24 RX ADMIN — PROPOFOL 30 MCG/KG/MIN: 10 INJECTION, EMULSION INTRAVENOUS at 02:08

## 2021-12-24 RX ADMIN — PROPOFOL 30 MCG/KG/MIN: 10 INJECTION, EMULSION INTRAVENOUS at 06:02

## 2021-12-24 RX ADMIN — DEXMEDETOMIDINE HYDROCHLORIDE 1 MCG/KG/HR: 400 INJECTION, SOLUTION INTRAVENOUS at 12:39

## 2021-12-24 RX ADMIN — PROPOFOL 30 MCG/KG/MIN: 10 INJECTION, EMULSION INTRAVENOUS at 12:38

## 2021-12-24 RX ADMIN — INSULIN DETEMIR 27 UNITS: 100 INJECTION, SOLUTION SUBCUTANEOUS at 21:30

## 2021-12-24 RX ADMIN — SODIUM CHLORIDE, PRESERVATIVE FREE 10 ML: 5 INJECTION INTRAVENOUS at 21:30

## 2021-12-24 RX ADMIN — DILTIAZEM HYDROCHLORIDE 60 MG: 30 TABLET ORAL at 05:49

## 2021-12-24 RX ADMIN — PIPERACILLIN SODIUM AND TAZOBACTAM SODIUM 3.38 G: 3; .375 INJECTION, POWDER, LYOPHILIZED, FOR SOLUTION INTRAVENOUS at 07:43

## 2021-12-24 RX ADMIN — DILTIAZEM HYDROCHLORIDE 60 MG: 30 TABLET ORAL at 00:00

## 2021-12-24 RX ADMIN — INSULIN ASPART 12 UNITS: 100 INJECTION, SOLUTION INTRAVENOUS; SUBCUTANEOUS at 00:10

## 2021-12-24 RX ADMIN — SODIUM CHLORIDE, PRESERVATIVE FREE 10 ML: 5 INJECTION INTRAVENOUS at 09:21

## 2021-12-24 RX ADMIN — PIPERACILLIN SODIUM AND TAZOBACTAM SODIUM 3.38 G: 3; .375 INJECTION, POWDER, LYOPHILIZED, FOR SOLUTION INTRAVENOUS at 00:02

## 2021-12-24 RX ADMIN — INSULIN ASPART 12 UNITS: 100 INJECTION, SOLUTION INTRAVENOUS; SUBCUTANEOUS at 12:44

## 2021-12-24 RX ADMIN — DEXAMETHASONE 6 MG: 4 TABLET ORAL at 09:19

## 2021-12-24 RX ADMIN — INSULIN DETEMIR 27 UNITS: 100 INJECTION, SOLUTION SUBCUTANEOUS at 09:16

## 2021-12-24 RX ADMIN — ENOXAPARIN SODIUM 120 MG: 120 INJECTION SUBCUTANEOUS at 09:18

## 2021-12-24 RX ADMIN — INSULIN ASPART 12 UNITS: 100 INJECTION, SOLUTION INTRAVENOUS; SUBCUTANEOUS at 06:03

## 2021-12-24 RX ADMIN — PROPOFOL 30 MCG/KG/MIN: 10 INJECTION, EMULSION INTRAVENOUS at 18:34

## 2021-12-24 RX ADMIN — DILTIAZEM HYDROCHLORIDE 60 MG: 30 TABLET ORAL at 21:29

## 2021-12-24 RX ADMIN — PROPOFOL 30 MCG/KG/MIN: 10 INJECTION, EMULSION INTRAVENOUS at 22:30

## 2021-12-24 RX ADMIN — DEXMEDETOMIDINE HYDROCHLORIDE 0.8 MCG/KG/HR: 400 INJECTION, SOLUTION INTRAVENOUS at 22:30

## 2021-12-24 RX ADMIN — SODIUM CHLORIDE, PRESERVATIVE FREE 10 ML: 5 INJECTION INTRAVENOUS at 21:31

## 2021-12-24 RX ADMIN — ENOXAPARIN SODIUM 120 MG: 120 INJECTION SUBCUTANEOUS at 21:29

## 2021-12-25 LAB
ARTERIAL PATENCY WRIST A: NORMAL
ARTERIAL PATENCY WRIST A: POSITIVE
ARTERIAL PATENCY WRIST A: POSITIVE
ATMOSPHERIC PRESS: 729 MMHG
ATMOSPHERIC PRESS: 730 MMHG
ATMOSPHERIC PRESS: NORMAL MM[HG]
BASE EXCESS BLDA CALC-SCNC: 14 MMOL/L (ref 0–2)
BASE EXCESS BLDA CALC-SCNC: 14.5 MMOL/L (ref 0–2)
BASE EXCESS BLDA CALC-SCNC: NORMAL MMOL/L
BDY SITE: ABNORMAL
BDY SITE: ABNORMAL
BDY SITE: NORMAL
BODY TEMPERATURE: 37 C
BODY TEMPERATURE: 37 C
CRP SERPL-MCNC: 10.23 MG/DL (ref 0–0.5)
D DIMER PPP FEU-MCNC: 1.99 MCGFEU/ML (ref 0–0.46)
FERRITIN SERPL-MCNC: 5461 NG/ML (ref 30–400)
GLUCOSE BLDC GLUCOMTR-MCNC: 200 MG/DL (ref 70–130)
GLUCOSE BLDC GLUCOMTR-MCNC: 208 MG/DL (ref 70–130)
GLUCOSE BLDC GLUCOMTR-MCNC: 234 MG/DL (ref 70–130)
GLUCOSE BLDC GLUCOMTR-MCNC: 234 MG/DL (ref 70–130)
HCO3 BLDA-SCNC: 37.6 MMOL/L (ref 20–26)
HCO3 BLDA-SCNC: 38.6 MMOL/L (ref 20–26)
HGB BLDA-MCNC: 13.8 G/DL (ref 14–18)
HGB BLDA-MCNC: 14.9 G/DL (ref 14–18)
HGB BLDA-MCNC: NORMAL G/DL
INHALED O2 CONCENTRATION: 50 %
INHALED O2 CONCENTRATION: 55 %
LDH SERPL-CCNC: 370 U/L (ref 135–225)
Lab: ABNORMAL
MODALITY: ABNORMAL
MODALITY: ABNORMAL
MODALITY: NORMAL
NOTIFIED BY: ABNORMAL
NOTIFIED WHO: ABNORMAL
PAW @ PEAK INSP FLOW SETTING VENT: 20 CMH2O
PAW @ PEAK INSP FLOW SETTING VENT: NORMAL CM[H2O]
PCO2 BLDA: 39.3 MM HG (ref 35–45)
PCO2 BLDA: 46.1 MM HG (ref 35–45)
PCO2 TEMP ADJ BLD: 39.3 MM HG (ref 35–45)
PCO2 TEMP ADJ BLD: 46.1 MM HG (ref 35–45)
PEEP RESPIRATORY: 5 CM[H2O]
PEEP RESPIRATORY: 7 CM[H2O]
PH BLDA: 7.53 PH UNITS (ref 7.35–7.45)
PH BLDA: 7.59 PH UNITS (ref 7.35–7.45)
PH BLDA: NORMAL [PH]
PH, TEMP CORRECTED: 7.53 PH UNITS (ref 7.35–7.45)
PH, TEMP CORRECTED: 7.59 PH UNITS (ref 7.35–7.45)
PO2 BLDA: 65.9 MM HG (ref 83–108)
PO2 BLDA: 70 MM HG (ref 83–108)
PO2 BLDA: NORMAL MM[HG]
PO2 TEMP ADJ BLD: 65.9 MM HG (ref 83–108)
PO2 TEMP ADJ BLD: 70 MM HG (ref 83–108)
PROCALCITONIN SERPL-MCNC: 0.31 NG/ML (ref 0–0.25)
SAO2 % BLDCOA: 94.6 % (ref 94–99)
SAO2 % BLDCOA: 95.6 % (ref 94–99)
SAO2 % BLDCOA: NORMAL %
SET MECH RESP RATE: 16
SET MECH RESP RATE: 18
VENTILATOR MODE: ABNORMAL
VENTILATOR MODE: ABNORMAL
VT ON VENT VENT: 650 ML

## 2021-12-25 PROCEDURE — 25010000002 PROPOFOL 10 MG/ML EMULSION: Performed by: STUDENT IN AN ORGANIZED HEALTH CARE EDUCATION/TRAINING PROGRAM

## 2021-12-25 PROCEDURE — 36600 WITHDRAWAL OF ARTERIAL BLOOD: CPT

## 2021-12-25 PROCEDURE — 94003 VENT MGMT INPAT SUBQ DAY: CPT

## 2021-12-25 PROCEDURE — 84145 PROCALCITONIN (PCT): CPT | Performed by: INTERNAL MEDICINE

## 2021-12-25 PROCEDURE — 25010000002 PIPERACILLIN SOD-TAZOBACTAM PER 1 G: Performed by: INTERNAL MEDICINE

## 2021-12-25 PROCEDURE — 82728 ASSAY OF FERRITIN: CPT | Performed by: INTERNAL MEDICINE

## 2021-12-25 PROCEDURE — 82803 BLOOD GASES ANY COMBINATION: CPT

## 2021-12-25 PROCEDURE — 86140 C-REACTIVE PROTEIN: CPT | Performed by: INTERNAL MEDICINE

## 2021-12-25 PROCEDURE — 94799 UNLISTED PULMONARY SVC/PX: CPT

## 2021-12-25 PROCEDURE — 83615 LACTATE (LD) (LDH) ENZYME: CPT | Performed by: STUDENT IN AN ORGANIZED HEALTH CARE EDUCATION/TRAINING PROGRAM

## 2021-12-25 PROCEDURE — 63710000001 INSULIN DETEMIR PER 5 UNITS: Performed by: INTERNAL MEDICINE

## 2021-12-25 PROCEDURE — 82962 GLUCOSE BLOOD TEST: CPT

## 2021-12-25 PROCEDURE — 25010000002 ENOXAPARIN PER 10 MG: Performed by: HOSPITALIST

## 2021-12-25 PROCEDURE — 94761 N-INVAS EAR/PLS OXIMETRY MLT: CPT

## 2021-12-25 PROCEDURE — 85379 FIBRIN DEGRADATION QUANT: CPT | Performed by: STUDENT IN AN ORGANIZED HEALTH CARE EDUCATION/TRAINING PROGRAM

## 2021-12-25 PROCEDURE — 25010000002 DEXAMETHASONE PER 1 MG: Performed by: INTERNAL MEDICINE

## 2021-12-25 PROCEDURE — 63710000001 INSULIN ASPART PER 5 UNITS: Performed by: INTERNAL MEDICINE

## 2021-12-25 PROCEDURE — 99233 SBSQ HOSP IP/OBS HIGH 50: CPT | Performed by: INTERNAL MEDICINE

## 2021-12-25 RX ADMIN — ACETAMINOPHEN 650 MG: 325 TABLET ORAL at 18:56

## 2021-12-25 RX ADMIN — INSULIN ASPART 8 UNITS: 100 INJECTION, SOLUTION INTRAVENOUS; SUBCUTANEOUS at 08:10

## 2021-12-25 RX ADMIN — INSULIN DETEMIR 30 UNITS: 100 INJECTION, SOLUTION SUBCUTANEOUS at 09:00

## 2021-12-25 RX ADMIN — SODIUM CHLORIDE, PRESERVATIVE FREE 10 ML: 5 INJECTION INTRAVENOUS at 21:52

## 2021-12-25 RX ADMIN — DILTIAZEM HYDROCHLORIDE 60 MG: 30 TABLET ORAL at 21:44

## 2021-12-25 RX ADMIN — PIPERACILLIN SODIUM AND TAZOBACTAM SODIUM 3.38 G: 3; .375 INJECTION, POWDER, LYOPHILIZED, FOR SOLUTION INTRAVENOUS at 00:09

## 2021-12-25 RX ADMIN — ACETAMINOPHEN 650 MG: 325 TABLET ORAL at 12:16

## 2021-12-25 RX ADMIN — SODIUM CHLORIDE, PRESERVATIVE FREE 10 ML: 5 INJECTION INTRAVENOUS at 08:09

## 2021-12-25 RX ADMIN — INSULIN ASPART 8 UNITS: 100 INJECTION, SOLUTION INTRAVENOUS; SUBCUTANEOUS at 18:56

## 2021-12-25 RX ADMIN — PIPERACILLIN SODIUM AND TAZOBACTAM SODIUM 3.38 G: 3; .375 INJECTION, POWDER, LYOPHILIZED, FOR SOLUTION INTRAVENOUS at 06:56

## 2021-12-25 RX ADMIN — PROPOFOL 25 MCG/KG/MIN: 10 INJECTION, EMULSION INTRAVENOUS at 08:12

## 2021-12-25 RX ADMIN — PIPERACILLIN SODIUM AND TAZOBACTAM SODIUM 3.38 G: 3; .375 INJECTION, POWDER, LYOPHILIZED, FOR SOLUTION INTRAVENOUS at 16:12

## 2021-12-25 RX ADMIN — PROPOFOL 25 MCG/KG/MIN: 10 INJECTION, EMULSION INTRAVENOUS at 19:36

## 2021-12-25 RX ADMIN — ENOXAPARIN SODIUM 120 MG: 120 INJECTION SUBCUTANEOUS at 08:09

## 2021-12-25 RX ADMIN — PROPOFOL 25 MCG/KG/MIN: 10 INJECTION, EMULSION INTRAVENOUS at 12:44

## 2021-12-25 RX ADMIN — INSULIN ASPART 8 UNITS: 100 INJECTION, SOLUTION INTRAVENOUS; SUBCUTANEOUS at 00:10

## 2021-12-25 RX ADMIN — DEXMEDETOMIDINE HYDROCHLORIDE 0.7 MCG/KG/HR: 400 INJECTION, SOLUTION INTRAVENOUS at 08:10

## 2021-12-25 RX ADMIN — DILTIAZEM HYDROCHLORIDE 60 MG: 30 TABLET ORAL at 12:11

## 2021-12-25 RX ADMIN — ENOXAPARIN SODIUM 120 MG: 120 INJECTION SUBCUTANEOUS at 21:44

## 2021-12-25 RX ADMIN — DEXMEDETOMIDINE HYDROCHLORIDE 0.7 MCG/KG/HR: 400 INJECTION, SOLUTION INTRAVENOUS at 03:22

## 2021-12-25 RX ADMIN — INSULIN DETEMIR 30 UNITS: 100 INJECTION, SOLUTION SUBCUTANEOUS at 21:44

## 2021-12-25 RX ADMIN — DEXMEDETOMIDINE HYDROCHLORIDE 0.6 MCG/KG/HR: 400 INJECTION, SOLUTION INTRAVENOUS at 18:49

## 2021-12-25 RX ADMIN — INSULIN ASPART 8 UNITS: 100 INJECTION, SOLUTION INTRAVENOUS; SUBCUTANEOUS at 12:16

## 2021-12-25 RX ADMIN — DEXAMETHASONE SODIUM PHOSPHATE 6 MG: 4 INJECTION, SOLUTION INTRAMUSCULAR; INTRAVENOUS at 08:09

## 2021-12-25 RX ADMIN — PROPOFOL 25 MCG/KG/MIN: 10 INJECTION, EMULSION INTRAVENOUS at 03:21

## 2021-12-25 RX ADMIN — DILTIAZEM HYDROCHLORIDE 60 MG: 30 TABLET ORAL at 06:56

## 2021-12-25 RX ADMIN — SODIUM CHLORIDE, PRESERVATIVE FREE 10 ML: 5 INJECTION INTRAVENOUS at 08:11

## 2021-12-25 RX ADMIN — DEXMEDETOMIDINE HYDROCHLORIDE 0.6 MCG/KG/HR: 400 INJECTION, SOLUTION INTRAVENOUS at 12:42

## 2021-12-26 ENCOUNTER — APPOINTMENT (OUTPATIENT)
Dept: GENERAL RADIOLOGY | Facility: HOSPITAL | Age: 66
End: 2021-12-26

## 2021-12-26 LAB
ANION GAP SERPL CALCULATED.3IONS-SCNC: 12.8 MMOL/L (ref 5–15)
ARTERIAL PATENCY WRIST A: POSITIVE
ATMOSPHERIC PRESS: 738 MMHG
BASE EXCESS BLDA CALC-SCNC: 13.1 MMOL/L (ref 0–2)
BDY SITE: ABNORMAL
BODY TEMPERATURE: 37 C
BUN SERPL-MCNC: 31 MG/DL (ref 8–23)
BUN/CREAT SERPL: 33.3 (ref 7–25)
CALCIUM SPEC-SCNC: 8.2 MG/DL (ref 8.6–10.5)
CHLORIDE SERPL-SCNC: 98 MMOL/L (ref 98–107)
CO2 SERPL-SCNC: 27.2 MMOL/L (ref 22–29)
CREAT SERPL-MCNC: 0.93 MG/DL (ref 0.76–1.27)
CRP SERPL-MCNC: 5 MG/DL (ref 0–0.5)
FERRITIN SERPL-MCNC: 4562 NG/ML (ref 30–400)
GFR SERPL CREATININE-BSD FRML MDRD: 82 ML/MIN/1.73
GLUCOSE BLDC GLUCOMTR-MCNC: 151 MG/DL (ref 70–130)
GLUCOSE BLDC GLUCOMTR-MCNC: 243 MG/DL (ref 70–130)
GLUCOSE BLDC GLUCOMTR-MCNC: 265 MG/DL (ref 70–130)
GLUCOSE BLDC GLUCOMTR-MCNC: 304 MG/DL (ref 70–130)
GLUCOSE SERPL-MCNC: 240 MG/DL (ref 65–99)
HCO3 BLDA-SCNC: 36.1 MMOL/L (ref 20–26)
HGB BLDA-MCNC: 13.8 G/DL (ref 14–18)
INHALED O2 CONCENTRATION: 50 %
Lab: ABNORMAL
Lab: ABNORMAL
MODALITY: ABNORMAL
NOTIFIED BY: ABNORMAL
NOTIFIED WHO: ABNORMAL
PAW @ PEAK INSP FLOW SETTING VENT: 12 CMH2O
PCO2 BLDA: 38.4 MM HG (ref 35–45)
PCO2 TEMP ADJ BLD: 38.4 MM HG (ref 35–45)
PEEP RESPIRATORY: 7 CM[H2O]
PH BLDA: 7.58 PH UNITS (ref 7.35–7.45)
PH, TEMP CORRECTED: 7.58 PH UNITS (ref 7.35–7.45)
PO2 BLDA: 63.9 MM HG (ref 83–108)
PO2 TEMP ADJ BLD: 63.9 MM HG (ref 83–108)
POTASSIUM SERPL-SCNC: 4.2 MMOL/L (ref 3.5–5.2)
PROCALCITONIN SERPL-MCNC: 0.23 NG/ML (ref 0–0.25)
SAO2 % BLDCOA: 95.2 % (ref 94–99)
SET MECH RESP RATE: 16
SODIUM SERPL-SCNC: 138 MMOL/L (ref 136–145)
VENTILATOR MODE: ABNORMAL

## 2021-12-26 PROCEDURE — 84145 PROCALCITONIN (PCT): CPT | Performed by: INTERNAL MEDICINE

## 2021-12-26 PROCEDURE — 82962 GLUCOSE BLOOD TEST: CPT

## 2021-12-26 PROCEDURE — 99233 SBSQ HOSP IP/OBS HIGH 50: CPT | Performed by: INTERNAL MEDICINE

## 2021-12-26 PROCEDURE — 36600 WITHDRAWAL OF ARTERIAL BLOOD: CPT

## 2021-12-26 PROCEDURE — 82728 ASSAY OF FERRITIN: CPT | Performed by: INTERNAL MEDICINE

## 2021-12-26 PROCEDURE — 86140 C-REACTIVE PROTEIN: CPT | Performed by: INTERNAL MEDICINE

## 2021-12-26 PROCEDURE — 94799 UNLISTED PULMONARY SVC/PX: CPT

## 2021-12-26 PROCEDURE — 87070 CULTURE OTHR SPECIMN AEROBIC: CPT | Performed by: INTERNAL MEDICINE

## 2021-12-26 PROCEDURE — 87205 SMEAR GRAM STAIN: CPT | Performed by: INTERNAL MEDICINE

## 2021-12-26 PROCEDURE — 80048 BASIC METABOLIC PNL TOTAL CA: CPT | Performed by: INTERNAL MEDICINE

## 2021-12-26 PROCEDURE — 63710000001 INSULIN ASPART PER 5 UNITS: Performed by: INTERNAL MEDICINE

## 2021-12-26 PROCEDURE — 71045 X-RAY EXAM CHEST 1 VIEW: CPT

## 2021-12-26 PROCEDURE — 25010000002 ENOXAPARIN PER 10 MG: Performed by: HOSPITALIST

## 2021-12-26 PROCEDURE — 82803 BLOOD GASES ANY COMBINATION: CPT

## 2021-12-26 PROCEDURE — 25010000002 PIPERACILLIN SOD-TAZOBACTAM PER 1 G: Performed by: INTERNAL MEDICINE

## 2021-12-26 PROCEDURE — 25010000002 DEXAMETHASONE PER 1 MG: Performed by: INTERNAL MEDICINE

## 2021-12-26 PROCEDURE — 25010000002 PROPOFOL 10 MG/ML EMULSION: Performed by: STUDENT IN AN ORGANIZED HEALTH CARE EDUCATION/TRAINING PROGRAM

## 2021-12-26 PROCEDURE — 63710000001 INSULIN DETEMIR PER 5 UNITS: Performed by: INTERNAL MEDICINE

## 2021-12-26 PROCEDURE — 94761 N-INVAS EAR/PLS OXIMETRY MLT: CPT

## 2021-12-26 PROCEDURE — 94003 VENT MGMT INPAT SUBQ DAY: CPT

## 2021-12-26 PROCEDURE — 25010000002 CEFTRIAXONE SODIUM-DEXTROSE 1-3.74 GM-%(50ML) RECONSTITUTED SOLUTION: Performed by: INTERNAL MEDICINE

## 2021-12-26 RX ORDER — CEFTRIAXONE 1 G/50ML
1 INJECTION, SOLUTION INTRAVENOUS EVERY 24 HOURS
Status: COMPLETED | OUTPATIENT
Start: 2021-12-26 | End: 2021-12-30

## 2021-12-26 RX ORDER — POLYETHYLENE GLYCOL 3350 17 G/17G
17 POWDER, FOR SOLUTION ORAL DAILY
Status: DISCONTINUED | OUTPATIENT
Start: 2021-12-26 | End: 2022-01-13 | Stop reason: HOSPADM

## 2021-12-26 RX ORDER — SODIUM CHLORIDE 9 MG/ML
INJECTION, SOLUTION INTRAVENOUS
Status: COMPLETED
Start: 2021-12-26 | End: 2021-12-26

## 2021-12-26 RX ADMIN — PIPERACILLIN SODIUM AND TAZOBACTAM SODIUM 3.38 G: 3; .375 INJECTION, POWDER, LYOPHILIZED, FOR SOLUTION INTRAVENOUS at 00:06

## 2021-12-26 RX ADMIN — PIPERACILLIN SODIUM AND TAZOBACTAM SODIUM 3.38 G: 3; .375 INJECTION, POWDER, LYOPHILIZED, FOR SOLUTION INTRAVENOUS at 07:55

## 2021-12-26 RX ADMIN — SODIUM CHLORIDE, PRESERVATIVE FREE 10 ML: 5 INJECTION INTRAVENOUS at 20:39

## 2021-12-26 RX ADMIN — INSULIN DETEMIR 30 UNITS: 100 INJECTION, SOLUTION SUBCUTANEOUS at 08:02

## 2021-12-26 RX ADMIN — SODIUM CHLORIDE 1000 ML: 9 INJECTION, SOLUTION INTRAVENOUS at 23:15

## 2021-12-26 RX ADMIN — SODIUM CHLORIDE, PRESERVATIVE FREE 10 ML: 5 INJECTION INTRAVENOUS at 20:40

## 2021-12-26 RX ADMIN — PROPOFOL 25 MCG/KG/MIN: 10 INJECTION, EMULSION INTRAVENOUS at 21:42

## 2021-12-26 RX ADMIN — ACETAMINOPHEN 650 MG: 325 TABLET ORAL at 11:53

## 2021-12-26 RX ADMIN — SODIUM CHLORIDE 500 ML: 9 INJECTION, SOLUTION INTRAVENOUS at 08:11

## 2021-12-26 RX ADMIN — DEXMEDETOMIDINE HYDROCHLORIDE 0.7 MCG/KG/HR: 400 INJECTION, SOLUTION INTRAVENOUS at 00:04

## 2021-12-26 RX ADMIN — ACETAMINOPHEN 650 MG: 325 TABLET ORAL at 03:15

## 2021-12-26 RX ADMIN — SODIUM CHLORIDE, PRESERVATIVE FREE 10 ML: 5 INJECTION INTRAVENOUS at 08:01

## 2021-12-26 RX ADMIN — PROPOFOL 25 MCG/KG/MIN: 10 INJECTION, EMULSION INTRAVENOUS at 00:20

## 2021-12-26 RX ADMIN — INSULIN ASPART 16 UNITS: 100 INJECTION, SOLUTION INTRAVENOUS; SUBCUTANEOUS at 18:56

## 2021-12-26 RX ADMIN — DEXMEDETOMIDINE HYDROCHLORIDE 0.7 MCG/KG/HR: 400 INJECTION, SOLUTION INTRAVENOUS at 08:33

## 2021-12-26 RX ADMIN — DEXMEDETOMIDINE HYDROCHLORIDE 0.6 MCG/KG/HR: 400 INJECTION, SOLUTION INTRAVENOUS at 21:15

## 2021-12-26 RX ADMIN — POLYETHYLENE GLYCOL 3350 17 G: 17 POWDER, FOR SOLUTION ORAL at 11:54

## 2021-12-26 RX ADMIN — SODIUM CHLORIDE, PRESERVATIVE FREE 10 ML: 5 INJECTION INTRAVENOUS at 08:32

## 2021-12-26 RX ADMIN — DEXAMETHASONE SODIUM PHOSPHATE 6 MG: 4 INJECTION, SOLUTION INTRAMUSCULAR; INTRAVENOUS at 07:51

## 2021-12-26 RX ADMIN — PROPOFOL 25 MCG/KG/MIN: 10 INJECTION, EMULSION INTRAVENOUS at 15:54

## 2021-12-26 RX ADMIN — ENOXAPARIN SODIUM 120 MG: 120 INJECTION SUBCUTANEOUS at 08:01

## 2021-12-26 RX ADMIN — INSULIN DETEMIR 30 UNITS: 100 INJECTION, SOLUTION SUBCUTANEOUS at 20:46

## 2021-12-26 RX ADMIN — DILTIAZEM HYDROCHLORIDE 60 MG: 30 TABLET ORAL at 21:05

## 2021-12-26 RX ADMIN — DEXMEDETOMIDINE HYDROCHLORIDE 0.6 MCG/KG/HR: 400 INJECTION, SOLUTION INTRAVENOUS at 14:39

## 2021-12-26 RX ADMIN — SODIUM CHLORIDE, PRESERVATIVE FREE 10 ML: 5 INJECTION INTRAVENOUS at 08:12

## 2021-12-26 RX ADMIN — INSULIN ASPART 8 UNITS: 100 INJECTION, SOLUTION INTRAVENOUS; SUBCUTANEOUS at 23:28

## 2021-12-26 RX ADMIN — INSULIN ASPART 12 UNITS: 100 INJECTION, SOLUTION INTRAVENOUS; SUBCUTANEOUS at 12:49

## 2021-12-26 RX ADMIN — CEFTRIAXONE 1 G: 1 INJECTION, SOLUTION INTRAVENOUS at 17:15

## 2021-12-26 RX ADMIN — PROPOFOL 25 MCG/KG/MIN: 10 INJECTION, EMULSION INTRAVENOUS at 09:39

## 2021-12-26 RX ADMIN — PROPOFOL 25 MCG/KG/MIN: 10 INJECTION, EMULSION INTRAVENOUS at 04:10

## 2021-12-26 RX ADMIN — DILTIAZEM HYDROCHLORIDE 60 MG: 30 TABLET ORAL at 14:39

## 2021-12-26 RX ADMIN — ENOXAPARIN SODIUM 120 MG: 120 INJECTION SUBCUTANEOUS at 20:39

## 2021-12-26 RX ADMIN — DEXMEDETOMIDINE HYDROCHLORIDE 0.7 MCG/KG/HR: 400 INJECTION, SOLUTION INTRAVENOUS at 04:11

## 2021-12-26 RX ADMIN — DILTIAZEM HYDROCHLORIDE 60 MG: 30 TABLET ORAL at 08:12

## 2021-12-27 ENCOUNTER — APPOINTMENT (OUTPATIENT)
Dept: GENERAL RADIOLOGY | Facility: HOSPITAL | Age: 66
End: 2021-12-27

## 2021-12-27 LAB
ALBUMIN SERPL-MCNC: 1.8 G/DL (ref 3.5–5.2)
ALBUMIN/GLOB SERPL: 0.6 G/DL
ALP SERPL-CCNC: 66 U/L (ref 39–117)
ALT SERPL W P-5'-P-CCNC: 25 U/L (ref 1–41)
ANION GAP SERPL CALCULATED.3IONS-SCNC: 6.5 MMOL/L (ref 5–15)
ARTERIAL PATENCY WRIST A: POSITIVE
AST SERPL-CCNC: 21 U/L (ref 1–40)
ATMOSPHERIC PRESS: 732 MMHG
BASE EXCESS BLDA CALC-SCNC: 11.2 MMOL/L (ref 0–2)
BDY SITE: ABNORMAL
BILIRUB SERPL-MCNC: 0.6 MG/DL (ref 0–1.2)
BODY TEMPERATURE: 37 C
BUN SERPL-MCNC: 26 MG/DL (ref 8–23)
BUN/CREAT SERPL: 37.7 (ref 7–25)
CALCIUM SPEC-SCNC: 7.9 MG/DL (ref 8.6–10.5)
CHLORIDE SERPL-SCNC: 101 MMOL/L (ref 98–107)
CO2 SERPL-SCNC: 30.5 MMOL/L (ref 22–29)
CREAT SERPL-MCNC: 0.69 MG/DL (ref 0.76–1.27)
CRP SERPL-MCNC: 2.79 MG/DL (ref 0–0.5)
D DIMER PPP FEU-MCNC: 2.06 MCGFEU/ML (ref 0–0.46)
FERRITIN SERPL-MCNC: 3835 NG/ML (ref 30–400)
GFR SERPL CREATININE-BSD FRML MDRD: 115 ML/MIN/1.73
GLOBULIN UR ELPH-MCNC: 3.1 GM/DL
GLUCOSE BLDC GLUCOMTR-MCNC: 122 MG/DL (ref 70–130)
GLUCOSE BLDC GLUCOMTR-MCNC: 129 MG/DL (ref 70–130)
GLUCOSE BLDC GLUCOMTR-MCNC: 168 MG/DL (ref 70–130)
GLUCOSE BLDC GLUCOMTR-MCNC: 187 MG/DL (ref 70–130)
GLUCOSE BLDC GLUCOMTR-MCNC: 199 MG/DL (ref 70–130)
GLUCOSE SERPL-MCNC: 138 MG/DL (ref 65–99)
HCO3 BLDA-SCNC: 35.4 MMOL/L (ref 20–26)
HGB BLDA-MCNC: 12.5 G/DL (ref 14–18)
INHALED O2 CONCENTRATION: 50 %
LDH SERPL-CCNC: 378 U/L (ref 135–225)
Lab: ABNORMAL
MODALITY: ABNORMAL
PAW @ PEAK INSP FLOW SETTING VENT: 10 CMH2O
PCO2 BLDA: 43.8 MM HG (ref 35–45)
PCO2 TEMP ADJ BLD: 43.8 MM HG (ref 35–45)
PEEP RESPIRATORY: 7 CM[H2O]
PH BLDA: 7.52 PH UNITS (ref 7.35–7.45)
PH, TEMP CORRECTED: 7.52 PH UNITS (ref 7.35–7.45)
PO2 BLDA: 63 MM HG (ref 83–108)
PO2 TEMP ADJ BLD: 63 MM HG (ref 83–108)
POTASSIUM SERPL-SCNC: 3.7 MMOL/L (ref 3.5–5.2)
PROCALCITONIN SERPL-MCNC: 0.18 NG/ML (ref 0–0.25)
PROT SERPL-MCNC: 4.9 G/DL (ref 6–8.5)
SAO2 % BLDCOA: 93.5 % (ref 94–99)
SET MECH RESP RATE: 16
SODIUM SERPL-SCNC: 138 MMOL/L (ref 136–145)
VENTILATOR MODE: ABNORMAL

## 2021-12-27 PROCEDURE — 83615 LACTATE (LD) (LDH) ENZYME: CPT | Performed by: STUDENT IN AN ORGANIZED HEALTH CARE EDUCATION/TRAINING PROGRAM

## 2021-12-27 PROCEDURE — 94003 VENT MGMT INPAT SUBQ DAY: CPT

## 2021-12-27 PROCEDURE — 25010000002 ENOXAPARIN PER 10 MG: Performed by: HOSPITALIST

## 2021-12-27 PROCEDURE — 99233 SBSQ HOSP IP/OBS HIGH 50: CPT | Performed by: HOSPITALIST

## 2021-12-27 PROCEDURE — 94002 VENT MGMT INPAT INIT DAY: CPT

## 2021-12-27 PROCEDURE — 25010000002 CEFTRIAXONE SODIUM-DEXTROSE 1-3.74 GM-%(50ML) RECONSTITUTED SOLUTION: Performed by: INTERNAL MEDICINE

## 2021-12-27 PROCEDURE — 94799 UNLISTED PULMONARY SVC/PX: CPT

## 2021-12-27 PROCEDURE — 82803 BLOOD GASES ANY COMBINATION: CPT

## 2021-12-27 PROCEDURE — 84145 PROCALCITONIN (PCT): CPT | Performed by: INTERNAL MEDICINE

## 2021-12-27 PROCEDURE — 80053 COMPREHEN METABOLIC PANEL: CPT | Performed by: INTERNAL MEDICINE

## 2021-12-27 PROCEDURE — 85379 FIBRIN DEGRADATION QUANT: CPT | Performed by: STUDENT IN AN ORGANIZED HEALTH CARE EDUCATION/TRAINING PROGRAM

## 2021-12-27 PROCEDURE — 82728 ASSAY OF FERRITIN: CPT | Performed by: INTERNAL MEDICINE

## 2021-12-27 PROCEDURE — 25010000002 FENTANYL CITRATE (PF) 50 MCG/ML SOLUTION: Performed by: HOSPITALIST

## 2021-12-27 PROCEDURE — 25010000002 DEXAMETHASONE PER 1 MG: Performed by: INTERNAL MEDICINE

## 2021-12-27 PROCEDURE — 63710000001 INSULIN DETEMIR PER 5 UNITS: Performed by: INTERNAL MEDICINE

## 2021-12-27 PROCEDURE — 82962 GLUCOSE BLOOD TEST: CPT

## 2021-12-27 PROCEDURE — 74018 RADEX ABDOMEN 1 VIEW: CPT

## 2021-12-27 PROCEDURE — 25010000002 PROPOFOL 10 MG/ML EMULSION: Performed by: STUDENT IN AN ORGANIZED HEALTH CARE EDUCATION/TRAINING PROGRAM

## 2021-12-27 PROCEDURE — 63710000001 INSULIN ASPART PER 5 UNITS: Performed by: INTERNAL MEDICINE

## 2021-12-27 PROCEDURE — 86140 C-REACTIVE PROTEIN: CPT | Performed by: INTERNAL MEDICINE

## 2021-12-27 PROCEDURE — 36600 WITHDRAWAL OF ARTERIAL BLOOD: CPT

## 2021-12-27 RX ORDER — BISACODYL 10 MG
10 SUPPOSITORY, RECTAL RECTAL DAILY
Status: DISCONTINUED | OUTPATIENT
Start: 2021-12-27 | End: 2022-01-13 | Stop reason: HOSPADM

## 2021-12-27 RX ORDER — CHLORHEXIDINE GLUCONATE 0.12 MG/ML
15 RINSE ORAL EVERY 12 HOURS SCHEDULED
Status: DISCONTINUED | OUTPATIENT
Start: 2021-12-27 | End: 2022-01-13 | Stop reason: HOSPADM

## 2021-12-27 RX ORDER — FAMOTIDINE 10 MG/ML
20 INJECTION, SOLUTION INTRAVENOUS DAILY
Status: DISCONTINUED | OUTPATIENT
Start: 2021-12-27 | End: 2022-01-11

## 2021-12-27 RX ORDER — SODIUM CHLORIDE 9 MG/ML
150 INJECTION, SOLUTION INTRAVENOUS CONTINUOUS
Status: DISCONTINUED | OUTPATIENT
Start: 2021-12-27 | End: 2021-12-29

## 2021-12-27 RX ORDER — FENTANYL CITRATE 50 UG/ML
25 INJECTION, SOLUTION INTRAMUSCULAR; INTRAVENOUS
Status: DISPENSED | OUTPATIENT
Start: 2021-12-27 | End: 2022-01-03

## 2021-12-27 RX ADMIN — DEXAMETHASONE SODIUM PHOSPHATE 6 MG: 4 INJECTION, SOLUTION INTRAMUSCULAR; INTRAVENOUS at 08:30

## 2021-12-27 RX ADMIN — SODIUM CHLORIDE, PRESERVATIVE FREE 10 ML: 5 INJECTION INTRAVENOUS at 08:30

## 2021-12-27 RX ADMIN — POLYETHYLENE GLYCOL 3350 17 G: 17 POWDER, FOR SOLUTION ORAL at 08:30

## 2021-12-27 RX ADMIN — INSULIN DETEMIR 30 UNITS: 100 INJECTION, SOLUTION SUBCUTANEOUS at 08:38

## 2021-12-27 RX ADMIN — SODIUM CHLORIDE, PRESERVATIVE FREE 10 ML: 5 INJECTION INTRAVENOUS at 20:40

## 2021-12-27 RX ADMIN — INSULIN DETEMIR 30 UNITS: 100 INJECTION, SOLUTION SUBCUTANEOUS at 20:41

## 2021-12-27 RX ADMIN — SODIUM CHLORIDE 200 ML/HR: 9 INJECTION, SOLUTION INTRAVENOUS at 09:23

## 2021-12-27 RX ADMIN — DEXMEDETOMIDINE HYDROCHLORIDE 0.6 MCG/KG/HR: 400 INJECTION, SOLUTION INTRAVENOUS at 03:04

## 2021-12-27 RX ADMIN — SODIUM CHLORIDE, PRESERVATIVE FREE 10 ML: 5 INJECTION INTRAVENOUS at 08:31

## 2021-12-27 RX ADMIN — PROPOFOL 25 MCG/KG/MIN: 10 INJECTION, EMULSION INTRAVENOUS at 20:55

## 2021-12-27 RX ADMIN — FENTANYL CITRATE 25 MCG: 50 INJECTION INTRAMUSCULAR; INTRAVENOUS at 10:32

## 2021-12-27 RX ADMIN — DEXMEDETOMIDINE HYDROCHLORIDE 0.5 MCG/KG/HR: 400 INJECTION, SOLUTION INTRAVENOUS at 16:20

## 2021-12-27 RX ADMIN — BISACODYL 10 MG: 10 SUPPOSITORY RECTAL at 17:15

## 2021-12-27 RX ADMIN — CHLORHEXIDINE GLUCONATE 0.12% ORAL RINSE 15 ML: 1.2 LIQUID ORAL at 20:40

## 2021-12-27 RX ADMIN — DEXMEDETOMIDINE HYDROCHLORIDE 0.5 MCG/KG/HR: 400 INJECTION, SOLUTION INTRAVENOUS at 21:33

## 2021-12-27 RX ADMIN — DEXMEDETOMIDINE HYDROCHLORIDE 0.5 MCG/KG/HR: 400 INJECTION, SOLUTION INTRAVENOUS at 08:38

## 2021-12-27 RX ADMIN — CHLORHEXIDINE GLUCONATE 0.12% ORAL RINSE 15 ML: 1.2 LIQUID ORAL at 10:31

## 2021-12-27 RX ADMIN — INSULIN ASPART 4 UNITS: 100 INJECTION, SOLUTION INTRAVENOUS; SUBCUTANEOUS at 23:20

## 2021-12-27 RX ADMIN — NOREPINEPHRINE BITARTRATE 0.02 MCG/KG/MIN: 1 INJECTION, SOLUTION, CONCENTRATE INTRAVENOUS at 13:53

## 2021-12-27 RX ADMIN — CEFTRIAXONE 1 G: 1 INJECTION, SOLUTION INTRAVENOUS at 17:15

## 2021-12-27 RX ADMIN — ENOXAPARIN SODIUM 120 MG: 120 INJECTION SUBCUTANEOUS at 08:30

## 2021-12-27 RX ADMIN — INSULIN ASPART 4 UNITS: 100 INJECTION, SOLUTION INTRAVENOUS; SUBCUTANEOUS at 17:55

## 2021-12-27 RX ADMIN — ENOXAPARIN SODIUM 120 MG: 120 INJECTION SUBCUTANEOUS at 20:40

## 2021-12-27 RX ADMIN — PROPOFOL 25 MCG/KG/MIN: 10 INJECTION, EMULSION INTRAVENOUS at 16:05

## 2021-12-27 RX ADMIN — SODIUM CHLORIDE 200 ML/HR: 9 INJECTION, SOLUTION INTRAVENOUS at 17:57

## 2021-12-27 RX ADMIN — SODIUM CHLORIDE 200 ML/HR: 9 INJECTION, SOLUTION INTRAVENOUS at 23:19

## 2021-12-27 RX ADMIN — FENTANYL CITRATE 25 MCG: 50 INJECTION INTRAMUSCULAR; INTRAVENOUS at 15:59

## 2021-12-27 RX ADMIN — PROPOFOL 20 MCG/KG/MIN: 10 INJECTION, EMULSION INTRAVENOUS at 03:04

## 2021-12-27 RX ADMIN — FAMOTIDINE 20 MG: 10 INJECTION INTRAVENOUS at 10:31

## 2021-12-27 RX ADMIN — PROPOFOL 25 MCG/KG/MIN: 10 INJECTION, EMULSION INTRAVENOUS at 09:49

## 2021-12-27 RX ADMIN — SODIUM CHLORIDE 200 ML/HR: 9 INJECTION, SOLUTION INTRAVENOUS at 16:15

## 2021-12-28 ENCOUNTER — APPOINTMENT (OUTPATIENT)
Dept: GENERAL RADIOLOGY | Facility: HOSPITAL | Age: 66
End: 2021-12-28

## 2021-12-28 LAB
ALBUMIN SERPL-MCNC: 1.6 G/DL (ref 3.5–5.2)
ALBUMIN/GLOB SERPL: 0.6 G/DL
ALP SERPL-CCNC: 66 U/L (ref 39–117)
ALT SERPL W P-5'-P-CCNC: 21 U/L (ref 1–41)
ANION GAP SERPL CALCULATED.3IONS-SCNC: 5.2 MMOL/L (ref 5–15)
ARTERIAL PATENCY WRIST A: POSITIVE
AST SERPL-CCNC: 20 U/L (ref 1–40)
ATMOSPHERIC PRESS: 736 MMHG
BACTERIA SPEC RESP CULT: NORMAL
BASE EXCESS BLDA CALC-SCNC: 7.8 MMOL/L (ref 0–2)
BDY SITE: ABNORMAL
BILIRUB SERPL-MCNC: 0.5 MG/DL (ref 0–1.2)
BODY TEMPERATURE: 37 C
BUN SERPL-MCNC: 24 MG/DL (ref 8–23)
BUN/CREAT SERPL: 47.1 (ref 7–25)
CALCIUM SPEC-SCNC: 7.1 MG/DL (ref 8.6–10.5)
CHLORIDE SERPL-SCNC: 104 MMOL/L (ref 98–107)
CO2 SERPL-SCNC: 27.8 MMOL/L (ref 22–29)
CREAT SERPL-MCNC: 0.51 MG/DL (ref 0.76–1.27)
CRP SERPL-MCNC: 2.19 MG/DL (ref 0–0.5)
FERRITIN SERPL-MCNC: 3020 NG/ML (ref 30–400)
GFR SERPL CREATININE-BSD FRML MDRD: >150 ML/MIN/1.73
GLOBULIN UR ELPH-MCNC: 2.8 GM/DL
GLUCOSE BLDC GLUCOMTR-MCNC: 110 MG/DL (ref 70–130)
GLUCOSE BLDC GLUCOMTR-MCNC: 193 MG/DL (ref 70–130)
GLUCOSE BLDC GLUCOMTR-MCNC: 196 MG/DL (ref 70–130)
GLUCOSE SERPL-MCNC: 114 MG/DL (ref 65–99)
GRAM STN SPEC: NORMAL
GRAM STN SPEC: NORMAL
HCO3 BLDA-SCNC: 31.8 MMOL/L (ref 20–26)
HGB BLDA-MCNC: 11.9 G/DL (ref 14–18)
INHALED O2 CONCENTRATION: 45 %
Lab: ABNORMAL
MODALITY: ABNORMAL
PAW @ PEAK INSP FLOW SETTING VENT: 10 CMH2O
PCO2 BLDA: 41.2 MM HG (ref 35–45)
PCO2 TEMP ADJ BLD: 41.2 MM HG (ref 35–45)
PEEP RESPIRATORY: 5 CM[H2O]
PH BLDA: 7.5 PH UNITS (ref 7.35–7.45)
PH, TEMP CORRECTED: 7.5 PH UNITS (ref 7.35–7.45)
PO2 BLDA: 76.1 MM HG (ref 83–108)
PO2 TEMP ADJ BLD: 76.1 MM HG (ref 83–108)
POTASSIUM SERPL-SCNC: 3.6 MMOL/L (ref 3.5–5.2)
POTASSIUM SERPL-SCNC: 4.5 MMOL/L (ref 3.5–5.2)
PROCALCITONIN SERPL-MCNC: 0.14 NG/ML (ref 0–0.25)
PROT SERPL-MCNC: 4.4 G/DL (ref 6–8.5)
SAO2 % BLDCOA: 96.5 % (ref 94–99)
SET MECH RESP RATE: 16
SODIUM SERPL-SCNC: 137 MMOL/L (ref 136–145)
VENTILATOR MODE: ABNORMAL

## 2021-12-28 PROCEDURE — 84145 PROCALCITONIN (PCT): CPT | Performed by: INTERNAL MEDICINE

## 2021-12-28 PROCEDURE — 99233 SBSQ HOSP IP/OBS HIGH 50: CPT | Performed by: HOSPITALIST

## 2021-12-28 PROCEDURE — 71045 X-RAY EXAM CHEST 1 VIEW: CPT

## 2021-12-28 PROCEDURE — 94003 VENT MGMT INPAT SUBQ DAY: CPT

## 2021-12-28 PROCEDURE — 25010000002 PROPOFOL 10 MG/ML EMULSION: Performed by: STUDENT IN AN ORGANIZED HEALTH CARE EDUCATION/TRAINING PROGRAM

## 2021-12-28 PROCEDURE — 94799 UNLISTED PULMONARY SVC/PX: CPT

## 2021-12-28 PROCEDURE — 36600 WITHDRAWAL OF ARTERIAL BLOOD: CPT

## 2021-12-28 PROCEDURE — 25010000002 CEFTRIAXONE SODIUM-DEXTROSE 1-3.74 GM-%(50ML) RECONSTITUTED SOLUTION: Performed by: INTERNAL MEDICINE

## 2021-12-28 PROCEDURE — 25010000002 DEXAMETHASONE PER 1 MG: Performed by: INTERNAL MEDICINE

## 2021-12-28 PROCEDURE — 86140 C-REACTIVE PROTEIN: CPT | Performed by: INTERNAL MEDICINE

## 2021-12-28 PROCEDURE — 25010000002 ENOXAPARIN PER 10 MG: Performed by: HOSPITALIST

## 2021-12-28 PROCEDURE — 84132 ASSAY OF SERUM POTASSIUM: CPT | Performed by: HOSPITALIST

## 2021-12-28 PROCEDURE — 82728 ASSAY OF FERRITIN: CPT | Performed by: INTERNAL MEDICINE

## 2021-12-28 PROCEDURE — 25010000002 FENTANYL CITRATE (PF) 50 MCG/ML SOLUTION: Performed by: HOSPITALIST

## 2021-12-28 PROCEDURE — 80053 COMPREHEN METABOLIC PANEL: CPT | Performed by: HOSPITALIST

## 2021-12-28 PROCEDURE — 82803 BLOOD GASES ANY COMBINATION: CPT

## 2021-12-28 PROCEDURE — 82962 GLUCOSE BLOOD TEST: CPT

## 2021-12-28 PROCEDURE — 63710000001 INSULIN ASPART PER 5 UNITS: Performed by: INTERNAL MEDICINE

## 2021-12-28 PROCEDURE — 94761 N-INVAS EAR/PLS OXIMETRY MLT: CPT

## 2021-12-28 PROCEDURE — 63710000001 INSULIN DETEMIR PER 5 UNITS: Performed by: INTERNAL MEDICINE

## 2021-12-28 PROCEDURE — 0 POTASSIUM CHLORIDE 10 MEQ/100ML SOLUTION: Performed by: STUDENT IN AN ORGANIZED HEALTH CARE EDUCATION/TRAINING PROGRAM

## 2021-12-28 RX ORDER — SODIUM CHLORIDE 9 MG/ML
INJECTION, SOLUTION INTRAVENOUS
Status: COMPLETED
Start: 2021-12-28 | End: 2021-12-28

## 2021-12-28 RX ADMIN — PROPOFOL 35 MCG/KG/MIN: 10 INJECTION, EMULSION INTRAVENOUS at 07:39

## 2021-12-28 RX ADMIN — PROPOFOL 35 MCG/KG/MIN: 10 INJECTION, EMULSION INTRAVENOUS at 16:56

## 2021-12-28 RX ADMIN — PROPOFOL 35 MCG/KG/MIN: 10 INJECTION, EMULSION INTRAVENOUS at 12:40

## 2021-12-28 RX ADMIN — SODIUM CHLORIDE 200 ML/HR: 9 INJECTION, SOLUTION INTRAVENOUS at 04:23

## 2021-12-28 RX ADMIN — FAMOTIDINE 20 MG: 10 INJECTION INTRAVENOUS at 08:25

## 2021-12-28 RX ADMIN — FENTANYL CITRATE 25 MCG: 50 INJECTION INTRAMUSCULAR; INTRAVENOUS at 06:08

## 2021-12-28 RX ADMIN — SODIUM CHLORIDE 150 ML/HR: 9 INJECTION, SOLUTION INTRAVENOUS at 16:51

## 2021-12-28 RX ADMIN — DILTIAZEM HYDROCHLORIDE 60 MG: 30 TABLET ORAL at 22:48

## 2021-12-28 RX ADMIN — POTASSIUM CHLORIDE 10 MEQ: 10 INJECTION, SOLUTION INTRAVENOUS at 05:23

## 2021-12-28 RX ADMIN — POTASSIUM CHLORIDE 10 MEQ: 10 INJECTION, SOLUTION INTRAVENOUS at 07:35

## 2021-12-28 RX ADMIN — SODIUM CHLORIDE, PRESERVATIVE FREE 10 ML: 5 INJECTION INTRAVENOUS at 07:32

## 2021-12-28 RX ADMIN — SODIUM CHLORIDE, PRESERVATIVE FREE 10 ML: 5 INJECTION INTRAVENOUS at 08:25

## 2021-12-28 RX ADMIN — SODIUM CHLORIDE, PRESERVATIVE FREE 10 ML: 5 INJECTION INTRAVENOUS at 08:30

## 2021-12-28 RX ADMIN — SODIUM CHLORIDE 150 ML/HR: 9 INJECTION, SOLUTION INTRAVENOUS at 23:58

## 2021-12-28 RX ADMIN — POLYETHYLENE GLYCOL 3350 17 G: 17 POWDER, FOR SOLUTION ORAL at 08:25

## 2021-12-28 RX ADMIN — SODIUM CHLORIDE, PRESERVATIVE FREE 10 ML: 5 INJECTION INTRAVENOUS at 20:47

## 2021-12-28 RX ADMIN — INSULIN ASPART 4 UNITS: 100 INJECTION, SOLUTION INTRAVENOUS; SUBCUTANEOUS at 23:54

## 2021-12-28 RX ADMIN — POTASSIUM CHLORIDE 10 MEQ: 10 INJECTION, SOLUTION INTRAVENOUS at 06:21

## 2021-12-28 RX ADMIN — INSULIN DETEMIR 30 UNITS: 100 INJECTION, SOLUTION SUBCUTANEOUS at 20:48

## 2021-12-28 RX ADMIN — PROPOFOL 25 MCG/KG/MIN: 10 INJECTION, EMULSION INTRAVENOUS at 03:23

## 2021-12-28 RX ADMIN — CHLORHEXIDINE GLUCONATE 0.12% ORAL RINSE 15 ML: 1.2 LIQUID ORAL at 20:45

## 2021-12-28 RX ADMIN — DEXMEDETOMIDINE HYDROCHLORIDE 0.4 MCG/KG/HR: 400 INJECTION, SOLUTION INTRAVENOUS at 20:46

## 2021-12-28 RX ADMIN — SODIUM CHLORIDE 150 ML/HR: 9 INJECTION, SOLUTION INTRAVENOUS at 09:40

## 2021-12-28 RX ADMIN — BISACODYL 10 MG: 10 SUPPOSITORY RECTAL at 08:25

## 2021-12-28 RX ADMIN — ENOXAPARIN SODIUM 120 MG: 120 INJECTION SUBCUTANEOUS at 08:25

## 2021-12-28 RX ADMIN — CEFTRIAXONE 1 G: 1 INJECTION, SOLUTION INTRAVENOUS at 16:49

## 2021-12-28 RX ADMIN — FENTANYL CITRATE 25 MCG: 50 INJECTION INTRAMUSCULAR; INTRAVENOUS at 03:26

## 2021-12-28 RX ADMIN — DILTIAZEM HYDROCHLORIDE 60 MG: 30 TABLET ORAL at 14:09

## 2021-12-28 RX ADMIN — INSULIN DETEMIR 30 UNITS: 100 INJECTION, SOLUTION SUBCUTANEOUS at 08:33

## 2021-12-28 RX ADMIN — INSULIN ASPART 2 UNITS: 100 INJECTION, SOLUTION INTRAVENOUS; SUBCUTANEOUS at 18:55

## 2021-12-28 RX ADMIN — FENTANYL CITRATE 25 MCG: 50 INJECTION INTRAMUSCULAR; INTRAVENOUS at 23:26

## 2021-12-28 RX ADMIN — POTASSIUM CHLORIDE 10 MEQ: 10 INJECTION, SOLUTION INTRAVENOUS at 08:45

## 2021-12-28 RX ADMIN — DEXMEDETOMIDINE HYDROCHLORIDE 0.5 MCG/KG/HR: 400 INJECTION, SOLUTION INTRAVENOUS at 09:39

## 2021-12-28 RX ADMIN — PROPOFOL 35 MCG/KG/MIN: 10 INJECTION, EMULSION INTRAVENOUS at 23:58

## 2021-12-28 RX ADMIN — ENOXAPARIN SODIUM 120 MG: 120 INJECTION SUBCUTANEOUS at 20:45

## 2021-12-28 RX ADMIN — DEXAMETHASONE SODIUM PHOSPHATE 6 MG: 4 INJECTION, SOLUTION INTRAMUSCULAR; INTRAVENOUS at 07:32

## 2021-12-28 RX ADMIN — CHLORHEXIDINE GLUCONATE 0.12% ORAL RINSE 15 ML: 1.2 LIQUID ORAL at 08:25

## 2021-12-28 RX ADMIN — DEXMEDETOMIDINE HYDROCHLORIDE 0.5 MCG/KG/HR: 400 INJECTION, SOLUTION INTRAVENOUS at 04:23

## 2021-12-28 RX ADMIN — PROPOFOL 35 MCG/KG/MIN: 10 INJECTION, EMULSION INTRAVENOUS at 20:46

## 2021-12-29 ENCOUNTER — APPOINTMENT (OUTPATIENT)
Dept: GENERAL RADIOLOGY | Facility: HOSPITAL | Age: 66
End: 2021-12-29

## 2021-12-29 LAB
ARTERIAL PATENCY WRIST A: ABNORMAL
ATMOSPHERIC PRESS: 733 MMHG
BASE EXCESS BLDA CALC-SCNC: 6.2 MMOL/L (ref 0–2)
BASOPHILS # BLD AUTO: 0.01 10*3/MM3 (ref 0–0.2)
BASOPHILS NFR BLD AUTO: 0 % (ref 0–1.5)
BDY SITE: ABNORMAL
BODY TEMPERATURE: 37 C
CRP SERPL-MCNC: 4.76 MG/DL (ref 0–0.5)
D DIMER PPP FEU-MCNC: 1.63 MCGFEU/ML (ref 0–0.46)
DEPRECATED RDW RBC AUTO: 47.4 FL (ref 37–54)
EOSINOPHIL # BLD AUTO: 0.02 10*3/MM3 (ref 0–0.4)
EOSINOPHIL NFR BLD AUTO: 0.1 % (ref 0.3–6.2)
ERYTHROCYTE [DISTWIDTH] IN BLOOD BY AUTOMATED COUNT: 13.1 % (ref 12.3–15.4)
FERRITIN SERPL-MCNC: 2277 NG/ML (ref 30–400)
GLUCOSE BLDC GLUCOMTR-MCNC: 128 MG/DL (ref 70–130)
GLUCOSE BLDC GLUCOMTR-MCNC: 207 MG/DL (ref 70–130)
GLUCOSE BLDC GLUCOMTR-MCNC: 64 MG/DL (ref 70–130)
GLUCOSE BLDC GLUCOMTR-MCNC: 66 MG/DL (ref 70–130)
GLUCOSE BLDC GLUCOMTR-MCNC: 78 MG/DL (ref 70–130)
HCO3 BLDA-SCNC: 30.1 MMOL/L (ref 20–26)
HCT VFR BLD AUTO: 36.1 % (ref 37.5–51)
HGB BLD-MCNC: 11.2 G/DL (ref 13–17.7)
HGB BLDA-MCNC: 12.2 G/DL (ref 14–18)
IMM GRANULOCYTES # BLD AUTO: 0.18 10*3/MM3 (ref 0–0.05)
IMM GRANULOCYTES NFR BLD AUTO: 0.9 % (ref 0–0.5)
INHALED O2 CONCENTRATION: 35 %
LDH SERPL-CCNC: 490 U/L (ref 135–225)
LYMPHOCYTES # BLD AUTO: 1.57 10*3/MM3 (ref 0.7–3.1)
LYMPHOCYTES NFR BLD AUTO: 7.5 % (ref 19.6–45.3)
Lab: ABNORMAL
MCH RBC QN AUTO: 30.2 PG (ref 26.6–33)
MCHC RBC AUTO-ENTMCNC: 31 G/DL (ref 31.5–35.7)
MCV RBC AUTO: 97.3 FL (ref 79–97)
MODALITY: ABNORMAL
MONOCYTES # BLD AUTO: 0.63 10*3/MM3 (ref 0.1–0.9)
MONOCYTES NFR BLD AUTO: 3 % (ref 5–12)
NEUTROPHILS NFR BLD AUTO: 18.54 10*3/MM3 (ref 1.7–7)
NEUTROPHILS NFR BLD AUTO: 88.5 % (ref 42.7–76)
PAW @ PEAK INSP FLOW SETTING VENT: 14 CMH2O
PCO2 BLDA: 39.8 MM HG (ref 35–45)
PCO2 TEMP ADJ BLD: 39.8 MM HG (ref 35–45)
PEEP RESPIRATORY: 5 CM[H2O]
PH BLDA: 7.49 PH UNITS (ref 7.35–7.45)
PH, TEMP CORRECTED: 7.49 PH UNITS (ref 7.35–7.45)
PLATELET # BLD AUTO: 273 10*3/MM3 (ref 140–450)
PMV BLD AUTO: 12 FL (ref 6–12)
PO2 BLDA: 55.3 MM HG (ref 83–108)
PO2 TEMP ADJ BLD: 55.3 MM HG (ref 83–108)
PROCALCITONIN SERPL-MCNC: 0.18 NG/ML (ref 0–0.25)
RBC # BLD AUTO: 3.71 10*6/MM3 (ref 4.14–5.8)
SAO2 % BLDCOA: 89.9 % (ref 94–99)
SET MECH RESP RATE: 16
TOTAL RATE: 37 BREATHS/MINUTE
VENTILATOR MODE: ABNORMAL
WBC NRBC COR # BLD: 20.95 10*3/MM3 (ref 3.4–10.8)

## 2021-12-29 PROCEDURE — 36600 WITHDRAWAL OF ARTERIAL BLOOD: CPT

## 2021-12-29 PROCEDURE — 94799 UNLISTED PULMONARY SVC/PX: CPT

## 2021-12-29 PROCEDURE — 87077 CULTURE AEROBIC IDENTIFY: CPT | Performed by: INTERNAL MEDICINE

## 2021-12-29 PROCEDURE — 86140 C-REACTIVE PROTEIN: CPT | Performed by: INTERNAL MEDICINE

## 2021-12-29 PROCEDURE — 85379 FIBRIN DEGRADATION QUANT: CPT | Performed by: STUDENT IN AN ORGANIZED HEALTH CARE EDUCATION/TRAINING PROGRAM

## 2021-12-29 PROCEDURE — 63710000001 INSULIN DETEMIR PER 5 UNITS: Performed by: HOSPITALIST

## 2021-12-29 PROCEDURE — 25010000002 CEFTRIAXONE SODIUM-DEXTROSE 1-3.74 GM-%(50ML) RECONSTITUTED SOLUTION: Performed by: INTERNAL MEDICINE

## 2021-12-29 PROCEDURE — 84145 PROCALCITONIN (PCT): CPT | Performed by: INTERNAL MEDICINE

## 2021-12-29 PROCEDURE — 82962 GLUCOSE BLOOD TEST: CPT

## 2021-12-29 PROCEDURE — 25010000002 ENOXAPARIN PER 10 MG: Performed by: HOSPITALIST

## 2021-12-29 PROCEDURE — 82803 BLOOD GASES ANY COMBINATION: CPT

## 2021-12-29 PROCEDURE — 25010000002 DEXAMETHASONE PER 1 MG: Performed by: INTERNAL MEDICINE

## 2021-12-29 PROCEDURE — 99232 SBSQ HOSP IP/OBS MODERATE 35: CPT | Performed by: HOSPITALIST

## 2021-12-29 PROCEDURE — 87070 CULTURE OTHR SPECIMN AEROBIC: CPT | Performed by: INTERNAL MEDICINE

## 2021-12-29 PROCEDURE — 87147 CULTURE TYPE IMMUNOLOGIC: CPT | Performed by: INTERNAL MEDICINE

## 2021-12-29 PROCEDURE — 87186 SC STD MICRODIL/AGAR DIL: CPT | Performed by: INTERNAL MEDICINE

## 2021-12-29 PROCEDURE — 63710000001 INSULIN ASPART PER 5 UNITS: Performed by: INTERNAL MEDICINE

## 2021-12-29 PROCEDURE — 94003 VENT MGMT INPAT SUBQ DAY: CPT

## 2021-12-29 PROCEDURE — 87205 SMEAR GRAM STAIN: CPT | Performed by: INTERNAL MEDICINE

## 2021-12-29 PROCEDURE — 82728 ASSAY OF FERRITIN: CPT | Performed by: INTERNAL MEDICINE

## 2021-12-29 PROCEDURE — 25010000002 FENTANYL CITRATE (PF) 50 MCG/ML SOLUTION: Performed by: HOSPITALIST

## 2021-12-29 PROCEDURE — 83615 LACTATE (LD) (LDH) ENZYME: CPT | Performed by: STUDENT IN AN ORGANIZED HEALTH CARE EDUCATION/TRAINING PROGRAM

## 2021-12-29 PROCEDURE — 94761 N-INVAS EAR/PLS OXIMETRY MLT: CPT

## 2021-12-29 PROCEDURE — 85025 COMPLETE CBC W/AUTO DIFF WBC: CPT | Performed by: HOSPITALIST

## 2021-12-29 PROCEDURE — 71045 X-RAY EXAM CHEST 1 VIEW: CPT

## 2021-12-29 PROCEDURE — 25010000002 PROPOFOL 10 MG/ML EMULSION: Performed by: STUDENT IN AN ORGANIZED HEALTH CARE EDUCATION/TRAINING PROGRAM

## 2021-12-29 RX ORDER — SODIUM CHLORIDE 0.9 % (FLUSH) 0.9 %
10 SYRINGE (ML) INJECTION EVERY 12 HOURS SCHEDULED
Status: DISCONTINUED | OUTPATIENT
Start: 2021-12-29 | End: 2022-01-13 | Stop reason: HOSPADM

## 2021-12-29 RX ORDER — SODIUM CHLORIDE 0.9 % (FLUSH) 0.9 %
10 SYRINGE (ML) INJECTION AS NEEDED
Status: DISCONTINUED | OUTPATIENT
Start: 2021-12-29 | End: 2022-01-13 | Stop reason: HOSPADM

## 2021-12-29 RX ORDER — SODIUM CHLORIDE 0.9 % (FLUSH) 0.9 %
20 SYRINGE (ML) INJECTION AS NEEDED
Status: DISCONTINUED | OUTPATIENT
Start: 2021-12-29 | End: 2022-01-13 | Stop reason: HOSPADM

## 2021-12-29 RX ORDER — NOREPINEPHRINE BITARTRATE 1 MG/ML
INJECTION, SOLUTION INTRAVENOUS
Status: DISCONTINUED
Start: 2021-12-29 | End: 2021-12-29 | Stop reason: WASHOUT

## 2021-12-29 RX ORDER — SODIUM CHLORIDE 9 MG/ML
INJECTION, SOLUTION INTRAVENOUS
Status: DISCONTINUED
Start: 2021-12-29 | End: 2021-12-29 | Stop reason: WASHOUT

## 2021-12-29 RX ADMIN — DEXMEDETOMIDINE HYDROCHLORIDE 0.4 MCG/KG/HR: 400 INJECTION, SOLUTION INTRAVENOUS at 08:03

## 2021-12-29 RX ADMIN — SODIUM CHLORIDE, PRESERVATIVE FREE 10 ML: 5 INJECTION INTRAVENOUS at 20:38

## 2021-12-29 RX ADMIN — INSULIN DETEMIR 20 UNITS: 100 INJECTION, SOLUTION SUBCUTANEOUS at 20:34

## 2021-12-29 RX ADMIN — FENTANYL CITRATE 25 MCG: 50 INJECTION INTRAMUSCULAR; INTRAVENOUS at 12:13

## 2021-12-29 RX ADMIN — PROPOFOL 45 MCG/KG/MIN: 10 INJECTION, EMULSION INTRAVENOUS at 13:24

## 2021-12-29 RX ADMIN — PROPOFOL 45 MCG/KG/MIN: 10 INJECTION, EMULSION INTRAVENOUS at 10:50

## 2021-12-29 RX ADMIN — DEXMEDETOMIDINE HYDROCHLORIDE 0.5 MCG/KG/HR: 400 INJECTION, SOLUTION INTRAVENOUS at 03:45

## 2021-12-29 RX ADMIN — SODIUM CHLORIDE, PRESERVATIVE FREE 10 ML: 5 INJECTION INTRAVENOUS at 09:26

## 2021-12-29 RX ADMIN — FENTANYL CITRATE 25 MCG: 50 INJECTION INTRAMUSCULAR; INTRAVENOUS at 20:02

## 2021-12-29 RX ADMIN — CHLORHEXIDINE GLUCONATE 0.12% ORAL RINSE 15 ML: 1.2 LIQUID ORAL at 20:37

## 2021-12-29 RX ADMIN — NOREPINEPHRINE BITARTRATE 0.16 MCG/KG/MIN: 1 INJECTION, SOLUTION, CONCENTRATE INTRAVENOUS at 16:19

## 2021-12-29 RX ADMIN — CEFTRIAXONE 1 G: 1 INJECTION, SOLUTION INTRAVENOUS at 17:03

## 2021-12-29 RX ADMIN — DEXMEDETOMIDINE HYDROCHLORIDE 0.4 MCG/KG/HR: 400 INJECTION, SOLUTION INTRAVENOUS at 14:16

## 2021-12-29 RX ADMIN — DILTIAZEM HYDROCHLORIDE 60 MG: 30 TABLET ORAL at 15:40

## 2021-12-29 RX ADMIN — DEXMEDETOMIDINE HYDROCHLORIDE 0.5 MCG/KG/HR: 400 INJECTION, SOLUTION INTRAVENOUS at 01:35

## 2021-12-29 RX ADMIN — PROPOFOL 45 MCG/KG/MIN: 10 INJECTION, EMULSION INTRAVENOUS at 08:02

## 2021-12-29 RX ADMIN — FENTANYL CITRATE 25 MCG: 50 INJECTION INTRAMUSCULAR; INTRAVENOUS at 17:00

## 2021-12-29 RX ADMIN — SODIUM CHLORIDE, PRESERVATIVE FREE 10 ML: 5 INJECTION INTRAVENOUS at 20:37

## 2021-12-29 RX ADMIN — PROPOFOL 45 MCG/KG/MIN: 10 INJECTION, EMULSION INTRAVENOUS at 23:02

## 2021-12-29 RX ADMIN — APIXABAN 10 MG: 2.5 TABLET, FILM COATED ORAL at 20:37

## 2021-12-29 RX ADMIN — DEXMEDETOMIDINE HYDROCHLORIDE 0.6 MCG/KG/HR: 400 INJECTION, SOLUTION INTRAVENOUS at 21:49

## 2021-12-29 RX ADMIN — NOREPINEPHRINE BITARTRATE 0.1 MCG/KG/MIN: 1 INJECTION, SOLUTION, CONCENTRATE INTRAVENOUS at 06:15

## 2021-12-29 RX ADMIN — ENOXAPARIN SODIUM 120 MG: 120 INJECTION SUBCUTANEOUS at 08:03

## 2021-12-29 RX ADMIN — FAMOTIDINE 20 MG: 10 INJECTION INTRAVENOUS at 09:26

## 2021-12-29 RX ADMIN — NOREPINEPHRINE BITARTRATE 0.06 MCG/KG/MIN: 1 INJECTION, SOLUTION, CONCENTRATE INTRAVENOUS at 04:28

## 2021-12-29 RX ADMIN — DEXMEDETOMIDINE HYDROCHLORIDE 0.4 MCG/KG/HR: 400 INJECTION, SOLUTION INTRAVENOUS at 01:10

## 2021-12-29 RX ADMIN — DILTIAZEM HYDROCHLORIDE 60 MG: 30 TABLET ORAL at 08:03

## 2021-12-29 RX ADMIN — INSULIN ASPART 8 UNITS: 100 INJECTION, SOLUTION INTRAVENOUS; SUBCUTANEOUS at 17:03

## 2021-12-29 RX ADMIN — NOREPINEPHRINE BITARTRATE 0.2 MCG/KG/MIN: 1 INJECTION, SOLUTION, CONCENTRATE INTRAVENOUS at 05:58

## 2021-12-29 RX ADMIN — PROPOFOL 40 MCG/KG/MIN: 10 INJECTION, EMULSION INTRAVENOUS at 01:32

## 2021-12-29 RX ADMIN — PROPOFOL 50 MCG/KG/MIN: 10 INJECTION, EMULSION INTRAVENOUS at 20:01

## 2021-12-29 RX ADMIN — PROPOFOL 45 MCG/KG/MIN: 10 INJECTION, EMULSION INTRAVENOUS at 16:45

## 2021-12-29 RX ADMIN — PROPOFOL 35 MCG/KG/MIN: 10 INJECTION, EMULSION INTRAVENOUS at 06:14

## 2021-12-29 RX ADMIN — NOREPINEPHRINE BITARTRATE 0.02 MCG/KG/MIN: 1 INJECTION, SOLUTION, CONCENTRATE INTRAVENOUS at 10:43

## 2021-12-29 RX ADMIN — DILTIAZEM HYDROCHLORIDE 60 MG: 30 TABLET ORAL at 21:45

## 2021-12-29 RX ADMIN — PROPOFOL 30 MCG/KG/MIN: 10 INJECTION, EMULSION INTRAVENOUS at 05:59

## 2021-12-29 RX ADMIN — PROPOFOL 40 MCG/KG/MIN: 10 INJECTION, EMULSION INTRAVENOUS at 04:17

## 2021-12-29 RX ADMIN — DEXTROSE MONOHYDRATE 25 G: 500 INJECTION PARENTERAL at 08:04

## 2021-12-29 RX ADMIN — DEXAMETHASONE SODIUM PHOSPHATE 6 MG: 4 INJECTION, SOLUTION INTRAMUSCULAR; INTRAVENOUS at 08:03

## 2021-12-29 RX ADMIN — FENTANYL CITRATE 25 MCG: 50 INJECTION INTRAMUSCULAR; INTRAVENOUS at 14:06

## 2021-12-29 RX ADMIN — DEXMEDETOMIDINE HYDROCHLORIDE 0.4 MCG/KG/HR: 400 INJECTION, SOLUTION INTRAVENOUS at 05:59

## 2021-12-29 RX ADMIN — SODIUM CHLORIDE 150 ML/HR: 9 INJECTION, SOLUTION INTRAVENOUS at 06:56

## 2021-12-29 RX ADMIN — NOREPINEPHRINE BITARTRATE 0.09 MCG/KG/MIN: 1 INJECTION, SOLUTION, CONCENTRATE INTRAVENOUS at 04:43

## 2021-12-30 ENCOUNTER — APPOINTMENT (OUTPATIENT)
Dept: GENERAL RADIOLOGY | Facility: HOSPITAL | Age: 66
End: 2021-12-30

## 2021-12-30 LAB
ARTERIAL PATENCY WRIST A: POSITIVE
ATMOSPHERIC PRESS: 734 MMHG
BASE EXCESS BLDA CALC-SCNC: 6.6 MMOL/L (ref 0–2)
BDY SITE: ABNORMAL
BODY TEMPERATURE: 37 C
CRP SERPL-MCNC: 14.7 MG/DL (ref 0–0.5)
FERRITIN SERPL-MCNC: 1839 NG/ML (ref 30–400)
GLUCOSE BLDC GLUCOMTR-MCNC: 126 MG/DL (ref 70–130)
GLUCOSE BLDC GLUCOMTR-MCNC: 159 MG/DL (ref 70–130)
GLUCOSE BLDC GLUCOMTR-MCNC: 176 MG/DL (ref 70–130)
GLUCOSE BLDC GLUCOMTR-MCNC: 184 MG/DL (ref 70–130)
GLUCOSE BLDC GLUCOMTR-MCNC: 251 MG/DL (ref 70–130)
HCO3 BLDA-SCNC: 29.5 MMOL/L (ref 20–26)
HGB BLDA-MCNC: 9.6 G/DL (ref 14–18)
INHALED O2 CONCENTRATION: 45 %
INR PPP: 1.15 (ref 0.9–1.1)
Lab: ABNORMAL
MODALITY: ABNORMAL
PAW @ PEAK INSP FLOW SETTING VENT: 14 CMH2O
PCO2 BLDA: 34.7 MM HG (ref 35–45)
PCO2 TEMP ADJ BLD: 34.7 MM HG (ref 35–45)
PEEP RESPIRATORY: 5 CM[H2O]
PH BLDA: 7.54 PH UNITS (ref 7.35–7.45)
PH, TEMP CORRECTED: 7.54 PH UNITS (ref 7.35–7.45)
PO2 BLDA: 63.4 MM HG (ref 83–108)
PO2 TEMP ADJ BLD: 63.4 MM HG (ref 83–108)
PROCALCITONIN SERPL-MCNC: 0.27 NG/ML (ref 0–0.25)
PROTHROMBIN TIME: 15 SECONDS (ref 12.1–15)
SAO2 % BLDCOA: 94.8 % (ref 94–99)
SET MECH RESP RATE: 16
VENTILATOR MODE: AC

## 2021-12-30 PROCEDURE — 63710000001 INSULIN DETEMIR PER 5 UNITS: Performed by: HOSPITALIST

## 2021-12-30 PROCEDURE — 36600 WITHDRAWAL OF ARTERIAL BLOOD: CPT

## 2021-12-30 PROCEDURE — 94799 UNLISTED PULMONARY SVC/PX: CPT

## 2021-12-30 PROCEDURE — 82803 BLOOD GASES ANY COMBINATION: CPT

## 2021-12-30 PROCEDURE — 25010000002 MORPHINE PER 10 MG: Performed by: HOSPITALIST

## 2021-12-30 PROCEDURE — 94003 VENT MGMT INPAT SUBQ DAY: CPT

## 2021-12-30 PROCEDURE — 94761 N-INVAS EAR/PLS OXIMETRY MLT: CPT

## 2021-12-30 PROCEDURE — 84145 PROCALCITONIN (PCT): CPT | Performed by: INTERNAL MEDICINE

## 2021-12-30 PROCEDURE — 36569 INSJ PICC 5 YR+ W/O IMAGING: CPT | Performed by: NURSE PRACTITIONER

## 2021-12-30 PROCEDURE — C1751 CATH, INF, PER/CENT/MIDLINE: HCPCS

## 2021-12-30 PROCEDURE — 82728 ASSAY OF FERRITIN: CPT | Performed by: INTERNAL MEDICINE

## 2021-12-30 PROCEDURE — 59999: CPT | Performed by: NURSE PRACTITIONER

## 2021-12-30 PROCEDURE — 71045 X-RAY EXAM CHEST 1 VIEW: CPT

## 2021-12-30 PROCEDURE — 25010000002 DEXAMETHASONE PER 1 MG: Performed by: INTERNAL MEDICINE

## 2021-12-30 PROCEDURE — 25010000002 FENTANYL CITRATE (PF) 50 MCG/ML SOLUTION: Performed by: HOSPITALIST

## 2021-12-30 PROCEDURE — 85610 PROTHROMBIN TIME: CPT | Performed by: NURSE PRACTITIONER

## 2021-12-30 PROCEDURE — 02HV33Z INSERTION OF INFUSION DEVICE INTO SUPERIOR VENA CAVA, PERCUTANEOUS APPROACH: ICD-10-PCS | Performed by: HOSPITALIST

## 2021-12-30 PROCEDURE — 99232 SBSQ HOSP IP/OBS MODERATE 35: CPT | Performed by: HOSPITALIST

## 2021-12-30 PROCEDURE — 63710000001 INSULIN ASPART PER 5 UNITS: Performed by: INTERNAL MEDICINE

## 2021-12-30 PROCEDURE — 86140 C-REACTIVE PROTEIN: CPT | Performed by: INTERNAL MEDICINE

## 2021-12-30 PROCEDURE — 25010000002 CEFTRIAXONE SODIUM-DEXTROSE 1-3.74 GM-%(50ML) RECONSTITUTED SOLUTION: Performed by: INTERNAL MEDICINE

## 2021-12-30 PROCEDURE — 25010000002 PROPOFOL 10 MG/ML EMULSION: Performed by: STUDENT IN AN ORGANIZED HEALTH CARE EDUCATION/TRAINING PROGRAM

## 2021-12-30 PROCEDURE — 82962 GLUCOSE BLOOD TEST: CPT

## 2021-12-30 RX ORDER — MORPHINE SULFATE 2 MG/ML
1 INJECTION, SOLUTION INTRAMUSCULAR; INTRAVENOUS EVERY 4 HOURS PRN
Status: DISCONTINUED | OUTPATIENT
Start: 2021-12-30 | End: 2021-12-31

## 2021-12-30 RX ADMIN — NOREPINEPHRINE BITARTRATE 0.06 MCG/KG/MIN: 1 INJECTION, SOLUTION, CONCENTRATE INTRAVENOUS at 03:34

## 2021-12-30 RX ADMIN — DILTIAZEM HYDROCHLORIDE 60 MG: 30 TABLET ORAL at 21:09

## 2021-12-30 RX ADMIN — SODIUM CHLORIDE, PRESERVATIVE FREE 10 ML: 5 INJECTION INTRAVENOUS at 21:10

## 2021-12-30 RX ADMIN — PROPOFOL 40 MCG/KG/MIN: 10 INJECTION, EMULSION INTRAVENOUS at 18:32

## 2021-12-30 RX ADMIN — MORPHINE SULFATE 1 MG: 2 INJECTION, SOLUTION INTRAMUSCULAR; INTRAVENOUS at 13:46

## 2021-12-30 RX ADMIN — CHLORHEXIDINE GLUCONATE 0.12% ORAL RINSE 15 ML: 1.2 LIQUID ORAL at 21:09

## 2021-12-30 RX ADMIN — PROPOFOL 25 MCG/KG/MIN: 10 INJECTION, EMULSION INTRAVENOUS at 08:36

## 2021-12-30 RX ADMIN — APIXABAN 10 MG: 2.5 TABLET, FILM COATED ORAL at 08:00

## 2021-12-30 RX ADMIN — INSULIN ASPART 4 UNITS: 100 INJECTION, SOLUTION INTRAVENOUS; SUBCUTANEOUS at 12:07

## 2021-12-30 RX ADMIN — DEXMEDETOMIDINE HYDROCHLORIDE 0.4 MCG/KG/HR: 400 INJECTION, SOLUTION INTRAVENOUS at 03:34

## 2021-12-30 RX ADMIN — PROPOFOL 40 MCG/KG/MIN: 10 INJECTION, EMULSION INTRAVENOUS at 05:31

## 2021-12-30 RX ADMIN — SODIUM CHLORIDE, PRESERVATIVE FREE 10 ML: 5 INJECTION INTRAVENOUS at 08:11

## 2021-12-30 RX ADMIN — POLYETHYLENE GLYCOL 3350 17 G: 17 POWDER, FOR SOLUTION ORAL at 08:01

## 2021-12-30 RX ADMIN — DILTIAZEM HYDROCHLORIDE 60 MG: 30 TABLET ORAL at 07:53

## 2021-12-30 RX ADMIN — FENTANYL CITRATE 25 MCG: 50 INJECTION INTRAMUSCULAR; INTRAVENOUS at 09:06

## 2021-12-30 RX ADMIN — DEXAMETHASONE SODIUM PHOSPHATE 6 MG: 4 INJECTION, SOLUTION INTRAMUSCULAR; INTRAVENOUS at 07:58

## 2021-12-30 RX ADMIN — INSULIN ASPART 12 UNITS: 100 INJECTION, SOLUTION INTRAVENOUS; SUBCUTANEOUS at 17:53

## 2021-12-30 RX ADMIN — INSULIN DETEMIR 20 UNITS: 100 INJECTION, SOLUTION SUBCUTANEOUS at 08:01

## 2021-12-30 RX ADMIN — DEXMEDETOMIDINE HYDROCHLORIDE 0.2 MCG/KG/HR: 400 INJECTION, SOLUTION INTRAVENOUS at 20:15

## 2021-12-30 RX ADMIN — INSULIN ASPART 4 UNITS: 100 INJECTION, SOLUTION INTRAVENOUS; SUBCUTANEOUS at 00:45

## 2021-12-30 RX ADMIN — MORPHINE SULFATE 1 MG: 2 INJECTION, SOLUTION INTRAMUSCULAR; INTRAVENOUS at 18:43

## 2021-12-30 RX ADMIN — CEFTRIAXONE 1 G: 1 INJECTION, SOLUTION INTRAVENOUS at 17:03

## 2021-12-30 RX ADMIN — INSULIN DETEMIR 20 UNITS: 100 INJECTION, SOLUTION SUBCUTANEOUS at 21:10

## 2021-12-30 RX ADMIN — CHLORHEXIDINE GLUCONATE 0.12% ORAL RINSE 15 ML: 1.2 LIQUID ORAL at 08:10

## 2021-12-30 RX ADMIN — SODIUM CHLORIDE, PRESERVATIVE FREE 10 ML: 5 INJECTION INTRAVENOUS at 21:09

## 2021-12-30 RX ADMIN — FENTANYL CITRATE 25 MCG: 50 INJECTION INTRAMUSCULAR; INTRAVENOUS at 21:23

## 2021-12-30 RX ADMIN — FAMOTIDINE 20 MG: 10 INJECTION INTRAVENOUS at 08:00

## 2021-12-30 RX ADMIN — DEXMEDETOMIDINE HYDROCHLORIDE 0.4 MCG/KG/HR: 400 INJECTION, SOLUTION INTRAVENOUS at 12:06

## 2021-12-30 RX ADMIN — PROPOFOL 40 MCG/KG/MIN: 10 INJECTION, EMULSION INTRAVENOUS at 15:04

## 2021-12-30 RX ADMIN — FENTANYL CITRATE 25 MCG: 50 INJECTION INTRAMUSCULAR; INTRAVENOUS at 10:51

## 2021-12-30 RX ADMIN — PROPOFOL 45 MCG/KG/MIN: 10 INJECTION, EMULSION INTRAVENOUS at 12:47

## 2021-12-30 RX ADMIN — PROPOFOL 40 MCG/KG/MIN: 10 INJECTION, EMULSION INTRAVENOUS at 01:50

## 2021-12-30 RX ADMIN — PROPOFOL 45 MCG/KG/MIN: 10 INJECTION, EMULSION INTRAVENOUS at 21:57

## 2021-12-30 RX ADMIN — APIXABAN 10 MG: 2.5 TABLET, FILM COATED ORAL at 21:08

## 2021-12-31 ENCOUNTER — APPOINTMENT (OUTPATIENT)
Dept: GENERAL RADIOLOGY | Facility: HOSPITAL | Age: 66
End: 2021-12-31

## 2021-12-31 LAB
ARTERIAL PATENCY WRIST A: POSITIVE
ATMOSPHERIC PRESS: 734 MMHG
BASE EXCESS BLDA CALC-SCNC: 8.4 MMOL/L (ref 0–2)
BDY SITE: ABNORMAL
BODY TEMPERATURE: 37 C
CRP SERPL-MCNC: 11 MG/DL (ref 0–0.5)
D DIMER PPP FEU-MCNC: 1.43 MCGFEU/ML (ref 0–0.46)
FERRITIN SERPL-MCNC: 1551 NG/ML (ref 30–400)
GLUCOSE BLDC GLUCOMTR-MCNC: 178 MG/DL (ref 70–130)
GLUCOSE BLDC GLUCOMTR-MCNC: 183 MG/DL (ref 70–130)
GLUCOSE BLDC GLUCOMTR-MCNC: 194 MG/DL (ref 70–130)
GLUCOSE BLDC GLUCOMTR-MCNC: 196 MG/DL (ref 70–130)
GLUCOSE BLDC GLUCOMTR-MCNC: 202 MG/DL (ref 70–130)
GLUCOSE BLDC GLUCOMTR-MCNC: 226 MG/DL (ref 70–130)
HCO3 BLDA-SCNC: 32.8 MMOL/L (ref 20–26)
HGB BLDA-MCNC: 8.5 G/DL (ref 14–18)
INHALED O2 CONCENTRATION: 35 %
LDH SERPL-CCNC: 226 U/L (ref 135–225)
Lab: ABNORMAL
MODALITY: ABNORMAL
PAW @ PEAK INSP FLOW SETTING VENT: 14 CMH2O
PCO2 BLDA: 44.1 MM HG (ref 35–45)
PCO2 TEMP ADJ BLD: 44.1 MM HG (ref 35–45)
PEEP RESPIRATORY: 5 CM[H2O]
PH BLDA: 7.48 PH UNITS (ref 7.35–7.45)
PH, TEMP CORRECTED: 7.48 PH UNITS (ref 7.35–7.45)
PO2 BLDA: 72.2 MM HG (ref 83–108)
PO2 TEMP ADJ BLD: 72.2 MM HG (ref 83–108)
PROCALCITONIN SERPL-MCNC: 0.17 NG/ML (ref 0–0.25)
SAO2 % BLDCOA: 96.4 % (ref 94–99)
SET MECH RESP RATE: 16
VENTILATOR MODE: AC

## 2021-12-31 PROCEDURE — 63710000001 INSULIN DETEMIR PER 5 UNITS: Performed by: HOSPITALIST

## 2021-12-31 PROCEDURE — 94003 VENT MGMT INPAT SUBQ DAY: CPT

## 2021-12-31 PROCEDURE — 99233 SBSQ HOSP IP/OBS HIGH 50: CPT | Performed by: HOSPITALIST

## 2021-12-31 PROCEDURE — 25010000002 DEXAMETHASONE PER 1 MG: Performed by: INTERNAL MEDICINE

## 2021-12-31 PROCEDURE — 82962 GLUCOSE BLOOD TEST: CPT

## 2021-12-31 PROCEDURE — 71045 X-RAY EXAM CHEST 1 VIEW: CPT

## 2021-12-31 PROCEDURE — 36600 WITHDRAWAL OF ARTERIAL BLOOD: CPT

## 2021-12-31 PROCEDURE — 94799 UNLISTED PULMONARY SVC/PX: CPT

## 2021-12-31 PROCEDURE — 82728 ASSAY OF FERRITIN: CPT | Performed by: INTERNAL MEDICINE

## 2021-12-31 PROCEDURE — 85379 FIBRIN DEGRADATION QUANT: CPT | Performed by: STUDENT IN AN ORGANIZED HEALTH CARE EDUCATION/TRAINING PROGRAM

## 2021-12-31 PROCEDURE — 83615 LACTATE (LD) (LDH) ENZYME: CPT | Performed by: STUDENT IN AN ORGANIZED HEALTH CARE EDUCATION/TRAINING PROGRAM

## 2021-12-31 PROCEDURE — 63710000001 INSULIN ASPART PER 5 UNITS: Performed by: INTERNAL MEDICINE

## 2021-12-31 PROCEDURE — 84145 PROCALCITONIN (PCT): CPT | Performed by: INTERNAL MEDICINE

## 2021-12-31 PROCEDURE — 86140 C-REACTIVE PROTEIN: CPT | Performed by: INTERNAL MEDICINE

## 2021-12-31 PROCEDURE — 25010000002 PROPOFOL 10 MG/ML EMULSION: Performed by: STUDENT IN AN ORGANIZED HEALTH CARE EDUCATION/TRAINING PROGRAM

## 2021-12-31 PROCEDURE — 82803 BLOOD GASES ANY COMBINATION: CPT

## 2021-12-31 PROCEDURE — 94761 N-INVAS EAR/PLS OXIMETRY MLT: CPT

## 2021-12-31 RX ADMIN — APIXABAN 10 MG: 2.5 TABLET, FILM COATED ORAL at 21:56

## 2021-12-31 RX ADMIN — DEXMEDETOMIDINE HYDROCHLORIDE 0.2 MCG/KG/HR: 400 INJECTION, SOLUTION INTRAVENOUS at 00:37

## 2021-12-31 RX ADMIN — PROPOFOL 40 MCG/KG/MIN: 10 INJECTION, EMULSION INTRAVENOUS at 03:37

## 2021-12-31 RX ADMIN — SODIUM CHLORIDE, PRESERVATIVE FREE 10 ML: 5 INJECTION INTRAVENOUS at 08:14

## 2021-12-31 RX ADMIN — PROPOFOL 40 MCG/KG/MIN: 10 INJECTION, EMULSION INTRAVENOUS at 20:19

## 2021-12-31 RX ADMIN — PROPOFOL 40 MCG/KG/MIN: 10 INJECTION, EMULSION INTRAVENOUS at 14:16

## 2021-12-31 RX ADMIN — INSULIN ASPART 4 UNITS: 100 INJECTION, SOLUTION INTRAVENOUS; SUBCUTANEOUS at 12:12

## 2021-12-31 RX ADMIN — SODIUM CHLORIDE, PRESERVATIVE FREE 10 ML: 5 INJECTION INTRAVENOUS at 08:15

## 2021-12-31 RX ADMIN — INSULIN DETEMIR 20 UNITS: 100 INJECTION, SOLUTION SUBCUTANEOUS at 08:20

## 2021-12-31 RX ADMIN — INSULIN ASPART 4 UNITS: 100 INJECTION, SOLUTION INTRAVENOUS; SUBCUTANEOUS at 06:37

## 2021-12-31 RX ADMIN — PROPOFOL 40 MCG/KG/MIN: 10 INJECTION, EMULSION INTRAVENOUS at 17:11

## 2021-12-31 RX ADMIN — SODIUM CHLORIDE, PRESERVATIVE FREE 10 ML: 5 INJECTION INTRAVENOUS at 20:35

## 2021-12-31 RX ADMIN — INSULIN DETEMIR 23 UNITS: 100 INJECTION, SOLUTION SUBCUTANEOUS at 21:56

## 2021-12-31 RX ADMIN — DEXAMETHASONE SODIUM PHOSPHATE 6 MG: 4 INJECTION, SOLUTION INTRAMUSCULAR; INTRAVENOUS at 08:13

## 2021-12-31 RX ADMIN — POLYETHYLENE GLYCOL 3350 17 G: 17 POWDER, FOR SOLUTION ORAL at 08:13

## 2021-12-31 RX ADMIN — PROPOFOL 40 MCG/KG/MIN: 10 INJECTION, EMULSION INTRAVENOUS at 23:31

## 2021-12-31 RX ADMIN — PROPOFOL 40 MCG/KG/MIN: 10 INJECTION, EMULSION INTRAVENOUS at 10:28

## 2021-12-31 RX ADMIN — DILTIAZEM HYDROCHLORIDE 60 MG: 30 TABLET ORAL at 06:14

## 2021-12-31 RX ADMIN — DILTIAZEM HYDROCHLORIDE 60 MG: 30 TABLET ORAL at 14:59

## 2021-12-31 RX ADMIN — FAMOTIDINE 20 MG: 10 INJECTION INTRAVENOUS at 08:13

## 2021-12-31 RX ADMIN — PROPOFOL 40 MCG/KG/MIN: 10 INJECTION, EMULSION INTRAVENOUS at 00:37

## 2021-12-31 RX ADMIN — DEXMEDETOMIDINE HYDROCHLORIDE 0.2 MCG/KG/HR: 400 INJECTION, SOLUTION INTRAVENOUS at 13:53

## 2021-12-31 RX ADMIN — CHLORHEXIDINE GLUCONATE 0.12% ORAL RINSE 15 ML: 1.2 LIQUID ORAL at 21:56

## 2021-12-31 RX ADMIN — CHLORHEXIDINE GLUCONATE 0.12% ORAL RINSE 15 ML: 1.2 LIQUID ORAL at 08:13

## 2021-12-31 RX ADMIN — PROPOFOL 40 MCG/KG/MIN: 10 INJECTION, EMULSION INTRAVENOUS at 06:44

## 2021-12-31 RX ADMIN — APIXABAN 10 MG: 2.5 TABLET, FILM COATED ORAL at 08:13

## 2021-12-31 RX ADMIN — INSULIN ASPART 8 UNITS: 100 INJECTION, SOLUTION INTRAVENOUS; SUBCUTANEOUS at 17:41

## 2021-12-31 RX ADMIN — DEXTROMETHORPHAN 60 MG: 30 SUSPENSION, EXTENDED RELEASE ORAL at 20:19

## 2022-01-01 ENCOUNTER — APPOINTMENT (OUTPATIENT)
Dept: GENERAL RADIOLOGY | Facility: HOSPITAL | Age: 67
End: 2022-01-01

## 2022-01-01 LAB
ALBUMIN SERPL-MCNC: 2 G/DL (ref 3.5–5.2)
ALBUMIN/GLOB SERPL: 0.6 G/DL
ALP SERPL-CCNC: 97 U/L (ref 39–117)
ALT SERPL W P-5'-P-CCNC: 31 U/L (ref 1–41)
ANION GAP SERPL CALCULATED.3IONS-SCNC: 5.2 MMOL/L (ref 5–15)
ARTERIAL PATENCY WRIST A: POSITIVE
AST SERPL-CCNC: 22 U/L (ref 1–40)
ATMOSPHERIC PRESS: 731 MMHG
BACTERIA SPEC RESP CULT: ABNORMAL
BASE EXCESS BLDA CALC-SCNC: 10.9 MMOL/L (ref 0–2)
BDY SITE: ABNORMAL
BILIRUB SERPL-MCNC: 0.4 MG/DL (ref 0–1.2)
BODY TEMPERATURE: 37 C
BUN SERPL-MCNC: 15 MG/DL (ref 8–23)
BUN/CREAT SERPL: 27.8 (ref 7–25)
CALCIUM SPEC-SCNC: 8.5 MG/DL (ref 8.6–10.5)
CHLORIDE SERPL-SCNC: 104 MMOL/L (ref 98–107)
CO2 SERPL-SCNC: 29.8 MMOL/L (ref 22–29)
CREAT SERPL-MCNC: 0.54 MG/DL (ref 0.76–1.27)
CRP SERPL-MCNC: 5.64 MG/DL (ref 0–0.5)
DEPRECATED RDW RBC AUTO: 49 FL (ref 37–54)
ERYTHROCYTE [DISTWIDTH] IN BLOOD BY AUTOMATED COUNT: 14.2 % (ref 12.3–15.4)
FERRITIN SERPL-MCNC: 1386 NG/ML (ref 30–400)
GFR SERPL CREATININE-BSD FRML MDRD: >150 ML/MIN/1.73
GLOBULIN UR ELPH-MCNC: 3.2 GM/DL
GLUCOSE BLDC GLUCOMTR-MCNC: 141 MG/DL (ref 70–130)
GLUCOSE BLDC GLUCOMTR-MCNC: 148 MG/DL (ref 70–130)
GLUCOSE BLDC GLUCOMTR-MCNC: 172 MG/DL (ref 70–130)
GLUCOSE BLDC GLUCOMTR-MCNC: 94 MG/DL (ref 70–130)
GLUCOSE SERPL-MCNC: 181 MG/DL (ref 65–99)
GRAM STN SPEC: ABNORMAL
HCO3 BLDA-SCNC: 34.9 MMOL/L (ref 20–26)
HCT VFR BLD AUTO: 27.8 % (ref 37.5–51)
HGB BLD-MCNC: 8.9 G/DL (ref 13–17.7)
HGB BLDA-MCNC: 9.4 G/DL (ref 14–18)
INHALED O2 CONCENTRATION: 30 %
Lab: ABNORMAL
MCH RBC QN AUTO: 31.6 PG (ref 26.6–33)
MCHC RBC AUTO-ENTMCNC: 32 G/DL (ref 31.5–35.7)
MCV RBC AUTO: 98.6 FL (ref 79–97)
MODALITY: ABNORMAL
PCO2 BLDA: 43.4 MM HG (ref 35–45)
PCO2 TEMP ADJ BLD: 43.4 MM HG (ref 35–45)
PEEP RESPIRATORY: 5 CM[H2O]
PH BLDA: 7.51 PH UNITS (ref 7.35–7.45)
PH, TEMP CORRECTED: 7.51 PH UNITS (ref 7.35–7.45)
PLATELET # BLD AUTO: 246 10*3/MM3 (ref 140–450)
PMV BLD AUTO: 10.8 FL (ref 6–12)
PO2 BLDA: 64.1 MM HG (ref 83–108)
PO2 TEMP ADJ BLD: 64.1 MM HG (ref 83–108)
POTASSIUM SERPL-SCNC: 3.8 MMOL/L (ref 3.5–5.2)
PROCALCITONIN SERPL-MCNC: 0.12 NG/ML (ref 0–0.25)
PROT SERPL-MCNC: 5.2 G/DL (ref 6–8.5)
PSV: 10 CMH2O
RBC # BLD AUTO: 2.82 10*6/MM3 (ref 4.14–5.8)
SAO2 % BLDCOA: 93.7 % (ref 94–99)
SODIUM SERPL-SCNC: 139 MMOL/L (ref 136–145)
VENTILATOR MODE: ABNORMAL
VT ON VENT VENT: 574 ML
WBC NRBC COR # BLD: 10.72 10*3/MM3 (ref 3.4–10.8)

## 2022-01-01 PROCEDURE — 63710000001 INSULIN DETEMIR PER 5 UNITS: Performed by: HOSPITALIST

## 2022-01-01 PROCEDURE — 25010000002 DEXAMETHASONE PER 1 MG: Performed by: INTERNAL MEDICINE

## 2022-01-01 PROCEDURE — 94003 VENT MGMT INPAT SUBQ DAY: CPT

## 2022-01-01 PROCEDURE — 85027 COMPLETE CBC AUTOMATED: CPT | Performed by: INTERNAL MEDICINE

## 2022-01-01 PROCEDURE — 94799 UNLISTED PULMONARY SVC/PX: CPT

## 2022-01-01 PROCEDURE — 84145 PROCALCITONIN (PCT): CPT | Performed by: INTERNAL MEDICINE

## 2022-01-01 PROCEDURE — 71045 X-RAY EXAM CHEST 1 VIEW: CPT

## 2022-01-01 PROCEDURE — 36600 WITHDRAWAL OF ARTERIAL BLOOD: CPT

## 2022-01-01 PROCEDURE — 63710000001 INSULIN ASPART PER 5 UNITS: Performed by: INTERNAL MEDICINE

## 2022-01-01 PROCEDURE — 94669 MECHANICAL CHEST WALL OSCILL: CPT

## 2022-01-01 PROCEDURE — 86140 C-REACTIVE PROTEIN: CPT | Performed by: INTERNAL MEDICINE

## 2022-01-01 PROCEDURE — 25010000002 LEVOFLOXACIN PER 250 MG: Performed by: INTERNAL MEDICINE

## 2022-01-01 PROCEDURE — 94761 N-INVAS EAR/PLS OXIMETRY MLT: CPT

## 2022-01-01 PROCEDURE — 25010000002 PROPOFOL 10 MG/ML EMULSION: Performed by: STUDENT IN AN ORGANIZED HEALTH CARE EDUCATION/TRAINING PROGRAM

## 2022-01-01 PROCEDURE — 99233 SBSQ HOSP IP/OBS HIGH 50: CPT | Performed by: INTERNAL MEDICINE

## 2022-01-01 PROCEDURE — 82728 ASSAY OF FERRITIN: CPT | Performed by: INTERNAL MEDICINE

## 2022-01-01 PROCEDURE — 82803 BLOOD GASES ANY COMBINATION: CPT

## 2022-01-01 PROCEDURE — 80053 COMPREHEN METABOLIC PANEL: CPT | Performed by: HOSPITALIST

## 2022-01-01 PROCEDURE — 82962 GLUCOSE BLOOD TEST: CPT

## 2022-01-01 RX ORDER — LEVOFLOXACIN 5 MG/ML
750 INJECTION, SOLUTION INTRAVENOUS EVERY 24 HOURS
Status: COMPLETED | OUTPATIENT
Start: 2022-01-01 | End: 2022-01-07

## 2022-01-01 RX ADMIN — APIXABAN 10 MG: 2.5 TABLET, FILM COATED ORAL at 08:37

## 2022-01-01 RX ADMIN — DILTIAZEM HYDROCHLORIDE 60 MG: 30 TABLET ORAL at 21:46

## 2022-01-01 RX ADMIN — INSULIN ASPART 4 UNITS: 100 INJECTION, SOLUTION INTRAVENOUS; SUBCUTANEOUS at 06:31

## 2022-01-01 RX ADMIN — DILTIAZEM HYDROCHLORIDE 60 MG: 30 TABLET ORAL at 06:20

## 2022-01-01 RX ADMIN — DEXMEDETOMIDINE HYDROCHLORIDE 0.6 MCG/KG/HR: 400 INJECTION, SOLUTION INTRAVENOUS at 10:06

## 2022-01-01 RX ADMIN — SODIUM CHLORIDE, PRESERVATIVE FREE 10 ML: 5 INJECTION INTRAVENOUS at 08:37

## 2022-01-01 RX ADMIN — SODIUM CHLORIDE, PRESERVATIVE FREE 10 ML: 5 INJECTION INTRAVENOUS at 21:50

## 2022-01-01 RX ADMIN — PROPOFOL 10 MCG/KG/MIN: 10 INJECTION, EMULSION INTRAVENOUS at 10:06

## 2022-01-01 RX ADMIN — DILTIAZEM HYDROCHLORIDE 60 MG: 30 TABLET ORAL at 13:34

## 2022-01-01 RX ADMIN — BISACODYL 10 MG: 10 SUPPOSITORY RECTAL at 08:35

## 2022-01-01 RX ADMIN — FAMOTIDINE 20 MG: 10 INJECTION INTRAVENOUS at 08:50

## 2022-01-01 RX ADMIN — CHLORHEXIDINE GLUCONATE 0.12% ORAL RINSE 15 ML: 1.2 LIQUID ORAL at 21:51

## 2022-01-01 RX ADMIN — INSULIN DETEMIR 23 UNITS: 100 INJECTION, SOLUTION SUBCUTANEOUS at 08:35

## 2022-01-01 RX ADMIN — INSULIN ASPART 4 UNITS: 100 INJECTION, SOLUTION INTRAVENOUS; SUBCUTANEOUS at 00:58

## 2022-01-01 RX ADMIN — LEVOFLOXACIN 750 MG: 5 INJECTION, SOLUTION INTRAVENOUS at 13:36

## 2022-01-01 RX ADMIN — DEXAMETHASONE SODIUM PHOSPHATE 6 MG: 4 INJECTION, SOLUTION INTRAMUSCULAR; INTRAVENOUS at 08:35

## 2022-01-01 RX ADMIN — INSULIN DETEMIR 23 UNITS: 100 INJECTION, SOLUTION SUBCUTANEOUS at 21:51

## 2022-01-01 RX ADMIN — SODIUM CHLORIDE, PRESERVATIVE FREE 10 ML: 5 INJECTION INTRAVENOUS at 08:38

## 2022-01-01 RX ADMIN — SODIUM CHLORIDE, PRESERVATIVE FREE 10 ML: 5 INJECTION INTRAVENOUS at 21:51

## 2022-01-01 RX ADMIN — CHLORHEXIDINE GLUCONATE 0.12% ORAL RINSE 15 ML: 1.2 LIQUID ORAL at 08:35

## 2022-01-01 RX ADMIN — DEXMEDETOMIDINE HYDROCHLORIDE 0.3 MCG/KG/HR: 400 INJECTION, SOLUTION INTRAVENOUS at 02:25

## 2022-01-01 RX ADMIN — PROPOFOL 40 MCG/KG/MIN: 10 INJECTION, EMULSION INTRAVENOUS at 06:17

## 2022-01-01 RX ADMIN — DEXMEDETOMIDINE HYDROCHLORIDE 0.3 MCG/KG/HR: 400 INJECTION, SOLUTION INTRAVENOUS at 13:12

## 2022-01-01 RX ADMIN — APIXABAN 10 MG: 2.5 TABLET, FILM COATED ORAL at 21:50

## 2022-01-01 RX ADMIN — PROPOFOL 40 MCG/KG/MIN: 10 INJECTION, EMULSION INTRAVENOUS at 03:08

## 2022-01-01 NOTE — PROGRESS NOTES
"Hospitalist Team      Patient Care Team:  Farhan Jean Jr., MD as PCP - General (Family Medicine)      Chief Complaint:  Follow-up Acute Hypoxic Respiratory Failure due COVID-19    Subjective    At time of exam on vent with sedation being weaned for weaning trial.  Follows some simple commands.    Objective    Vital Signs  Temp:  [97.1 °F (36.2 °C)-98.1 °F (36.7 °C)] 97.1 °F (36.2 °C)  Heart Rate:  [60-89] 89  Resp:  [16-31] 28  BP: (118-147)/(61-78) 118/61  FiO2 (%):  [30 %-36 %] 30 %  Oxygen Therapy  SpO2: 96 %  Pulse Oximetry Type: Continuous  Device (Oxygen Therapy): ventilator  Device (Oxygen Therapy): humidified, heated, high-flow nasal cannula (temp 31C)  $ High Flow Nasal Cannula Set-Up: yes  Flow (L/min): 50  Oxygen Concentration (%): 50  ETCO2 (mmHg): 35 mmHg  Probe Placed On (Pulse Ox): Right:, forehead  Probe Removed From (Pulse Ox): Left:, forehead}  Flowsheet Rows      First Filed Value   Admission Height 185.4 cm (73\") Documented at 12/16/2021 1515   Admission Weight 120 kg (265 lb) Documented at 12/16/2021 1515          Physical Exam:    General: Appears stated age.  WD/WN  HEENT: ET tube in place  Lungs: Soft tissue crepitus noted in the upper chest.  Diminished breath sounds all fields  CV: Irregularly, irregular  no significant peripheral edema  Abdomen: Soft with active bowel sounds.  Skin: Warm dry  Psych:  Followed some simple commands    Results Review:     I reviewed the patient's new clinical results.    Lab Results (last 24 hours)     Procedure Component Value Units Date/Time    Respiratory Culture - Sputum, ET Suction [[id number]]  (Abnormal)  (Susceptibility) Collected: 12/29/21 0804    Specimen: Sputum from ET Suction Updated: 01/01/22 0957     Respiratory Culture Heavy growth (4+) Staphylococcus aureus      Heavy growth (4+) Stenotrophomonas maltophilia      Light growth (2+) Citrobacter freundii      No Normal Respiratory Alida     Gram Stain Moderate (3+) WBCs seen      Rare (1+) " Epithelial cells seen      Moderate (3+) Gram positive cocci in pairs, chains and clusters    Susceptibility      Staphylococcus aureus     JANUSZ     Clindamycin Susceptible     Inducible Clindamycin Resistance Negative     Oxacillin Susceptible     Tetracycline Susceptible     Trimethoprim + Sulfamethoxazole Susceptible     Vancomycin Susceptible                  Linear View               Susceptibility      Stenotrophomonas maltophilia     JANUSZ     Levofloxacin Susceptible     Trimethoprim + Sulfamethoxazole Susceptible                  Linear View               Susceptibility      Citrobacter freundii     JANUSZ     Cefepime Susceptible     Ceftazidime Resistant     Ceftriaxone Resistant     Gentamicin Susceptible     Levofloxacin Susceptible     Piperacillin + Tazobactam Intermediate     Tetracycline Susceptible     Trimethoprim + Sulfamethoxazole Susceptible                  Linear View               Susceptibility Comments     Staphylococcus aureus    This isolate does not demonstrate inducible clindamycin resistance in vitro.    This isolate does not demonstrate inducible clindamycin resistance in vitro.      Citrobacter freundii    Cefazolin sensitivity will not be reported for Enterobacteriaceae in non-urine isolates. If cefazolin is preferred, please call the microbiology lab to request an E-test.  With the exception of urinary-sourced infections, aminoglycosides should not be used as monotherapy.             Blood Gas, Arterial - [[id number]]  (Abnormal) Collected: 01/01/22 0942    Specimen: Arterial Blood Updated: 01/01/22 0942     Site Left Radial     Triston's Test Positive     pH, Arterial 7.513 pH units      Comment: 83 Value above reference range        pCO2, Arterial 43.4 mm Hg      pO2, Arterial 64.1 mm Hg      Comment: 84 Value below reference range        HCO3, Arterial 34.9 mmol/L      Comment: 83 Value above reference range        Base Excess, Arterial 10.9 mmol/L      Comment: 83 Value above reference  range        O2 Saturation, Arterial 93.7 %      Comment: 84 Value below reference range        Hemoglobin, Blood Gas 9.4 g/dL      Comment: 84 Value below reference range        Temperature 37.0 C      Barometric Pressure for Blood Gas 731 mmHg      Modality Ventilator     FIO2 30 %      Ventilator Mode CPAP     Set Tidal Volume 574     PEEP 5.0     PSV 10.0 cmH2O      Collected by [id number]     Comment: Meter: [id number]     :  [id number]        pCO2, Temperature Corrected 43.4 mm Hg      pH, Temp Corrected 7.513 pH Units      pO2, Temperature Corrected 64.1 mm Hg     POC Glucose Once [[id number]]  (Abnormal) Collected: 01/01/22 0619    Specimen: Blood Updated: 01/01/22 0626     Glucose 172 mg/dL      Comment: Meter: [id number] : [id number] Sindhu Seay RN       Ferritin [[id number]]  (Abnormal) Collected: 01/01/22 0435    Specimen: Blood Updated: 01/01/22 0541     Ferritin 1,386.00 ng/mL     Narrative:      Results may be falsely decreased if patient taking Biotin.      Comprehensive Metabolic Panel [[id number]]  (Abnormal) Collected: 01/01/22 0435    Specimen: Blood Updated: 01/01/22 0530     Glucose 181 mg/dL      BUN 15 mg/dL      Creatinine 0.54 mg/dL      Sodium 139 mmol/L      Potassium 3.8 mmol/L      Chloride 104 mmol/L      CO2 29.8 mmol/L      Calcium 8.5 mg/dL      Total Protein 5.2 g/dL      Albumin 2.00 g/dL      ALT (SGPT) 31 U/L      AST (SGOT) 22 U/L      Alkaline Phosphatase 97 U/L      Total Bilirubin 0.4 mg/dL      eGFR Non African Amer >150 mL/min/1.73      Globulin 3.2 gm/dL      A/G Ratio 0.6 g/dL      BUN/Creatinine Ratio 27.8     Anion Gap 5.2 mmol/L     Narrative:      GFR Normal >60  Chronic Kidney Disease <60  Kidney Failure <15      Procalcitonin [[id number]]  (Normal) Collected: 01/01/22 0435    Specimen: Blood Updated: 01/01/22 0512     Procalcitonin 0.12 ng/mL     CBC (No Diff) [[id number]]  (Abnormal) Collected: 01/01/22 0435    Specimen: Blood Updated: 01/01/22 0448      WBC 10.72 10*3/mm3      RBC 2.82 10*6/mm3      Hemoglobin 8.9 g/dL      Hematocrit 27.8 %      MCV 98.6 fL      MCH 31.6 pg      MCHC 32.0 g/dL      RDW 14.2 %      RDW-SD 49.0 fl      MPV 10.8 fL      Platelets 246 10*3/mm3     C-reactive Protein [[id number]] Collected: 01/01/22 0435    Specimen: Blood Updated: 01/01/22 0446    POC Glucose Once [[id number]]  (Abnormal) Collected: 12/31/21 2334    Specimen: Blood Updated: 12/31/21 2340     Glucose 196 mg/dL      Comment: Meter: [id number] : [id number] Sindhu Seay RN       POC Glucose Once [[id number]]  (Abnormal) Collected: 12/31/21 2023    Specimen: Blood Updated: 12/31/21 2030     Glucose 226 mg/dL      Comment: Meter: [id number] : [id number] Sindhu Seay RN       POC Glucose Once [[id number]]  (Abnormal) Collected: 12/31/21 1710    Specimen: Blood Updated: 12/31/21 1716     Glucose 202 mg/dL      Comment: Meter: [id number] : MICHAEL Roland RN       C-reactive Protein [[id number]]  (Abnormal) Collected: 12/31/21 0512    Specimen: Blood Updated: 12/31/21 1340     C-Reactive Protein 11.00 mg/dL     POC Glucose Once [[id number]]  (Abnormal) Collected: 12/31/21 1202    Specimen: Blood Updated: 12/31/21 1208     Glucose 194 mg/dL      Comment: Meter: [id number] : [id number] Dejon Johnson RN             Imaging Results (Last 24 Hours)     Procedure Component Value Units Date/Time    XR Chest 1 View [[id number]] Collected: 01/01/22 0821     Updated: 01/01/22 0823    Narrative:      CR Chest 1 Vw    INDICATION:   : 19 positive. Ventilator patient.     COMPARISON:    12/31/2021    FINDINGS:  Portable AP view(s) of the chest.    Enteric tube tip below the diaphragm but not in the field of view. Endotracheal tube in good position above the rochelle. Left-sided PICC line remains present. The tip of the PICC line is directed cephalad at the level of the mid SVC.    Stable cardiac silhouette. Mid and lower lung zone opacities bilaterally persist. Stable  to slight interval worsening on the left. No distinct pneumothorax.       Impression:        1. Tubes and lines in position as described. The tip of the left-sided PICC line is now directed cephalad. No pneumothorax.  2. Bilateral pneumonia. Stable to interval worsening appearance compared to prior.    Signer Name: Te Kulkarni MD   Signed: 1/1/2022 8:21 AM   Workstation Name: [facility]    [facility] reviewed personally by physician.      Medication Review:   I have reviewed the patient's current medication list    Current Facility-Administered Medications:   •  acetaminophen (TYLENOL) tablet 650 mg, 650 mg, Oral, Q6H PRN, Jayden Hyatt, , 650 mg at 12/26/21 1153  •  apixaban (ELIQUIS) tablet 10 mg, 10 mg, Nasogastric, Q12H, 10 mg at 01/01/22 0837 **FOLLOWED BY** [START ON 1/5/2022] apixaban (ELIQUIS) tablet 5 mg, 5 mg, Oral, Q12H, Dakota Moore MD  •  bisacodyl (DULCOLAX) suppository 10 mg, 10 mg, Rectal, Daily, Dakota Moore MD, 10 mg at 01/01/22 0835  •  chlorhexidine (PERIDEX) 0.12 % solution 15 mL, 15 mL, Mouth/Throat, Q12H, EsterleSpencer MD, 15 mL at 01/01/22 0835  •  dexamethasone (DECADRON) tablet 6 mg, 6 mg, Oral, Daily With Breakfast **OR** dexamethasone (DECADRON) injection 6 mg, 6 mg, Intravenous, Daily With Breakfast, Charli Ayala MD, 6 mg at 01/01/22 0835  •  dexmedetomidine (PRECEDEX) 400 mcg in 100 mL NS infusion, 0.2-1.5 mcg/kg/hr, Intravenous, Titrated, Jaleesa Cárdenas MD, Last Rate: 17.3 mL/hr at 01/01/22 1006, 0.6 mcg/kg/hr at 01/01/22 1006  •  dextromethorphan polistirex ER (DELSYM) 30 MG/5ML oral suspension 60 mg, 60 mg, Oral, Q12H PRN, Jaleesa Cárdenas MD, 60 mg at 12/31/21 2019  •  dextrose (D50W) (25 g/50 mL) IV injection 25 g, 25 g, Intravenous, Q15 Min PRN, Dakota Moore MD, 25 g at 12/29/21 0804  •  dextrose (GLUTOSE) oral gel 15 g, 15 g, Oral, Q15 Min PRN, Dakota Moore MD  •  dilTIAZem  (CARDIZEM) tablet 60 mg, 60 mg, Nasogastric, Q8H, Dakota Moore MD, 60 mg at 01/01/22 0620  •  famotidine (PEPCID) injection 20 mg, 20 mg, Intravenous, Daily, Spencer Carmichael MD, 20 mg at 01/01/22 0850  •  fentaNYL citrate (PF) (SUBLIMAZE) injection 25 mcg, 25 mcg, Intravenous, Q1H PRN, Dakota Moore MD, 25 mcg at 12/30/21 2123  •  glucagon (GLUCAGEN) injection 1 mg, 1 mg, Subcutaneous, Q15 Min PRN, Dakota Moore MD  •  insulin aspart (novoLOG) injection 0-24 Units, 0-24 Units, Subcutaneous, Q6H, Jayden Hyatt DO, 4 Units at 01/01/22 0631  •  insulin detemir (LEVEMIR) injection 23 Units, 23 Units, Subcutaneous, Q12H, Dakota Moore MD, 23 Units at 01/01/22 0835  •  ipratropium-albuterol (DUO-NEB) nebulizer solution 3 mL, 3 mL, Nebulization, Q6H PRN, Jaleesa Cárdenas MD  •  polyethylene glycol (MIRALAX) packet 17 g, 17 g, Oral, Daily, Luca Hermosillo MD, 17 g at 12/31/21 0813  •  potassium chloride 10 mEq in 100 mL IVPB, 10 mEq, Intravenous, Q1H PRN, Jaleesa Cárdenas MD, Stopped at 12/28/21 0940  •  potassium phosphate 45 mmol in sodium chloride 0.9 % 500 mL infusion, 45 mmol, Intravenous, PRN **OR** potassium phosphate 30 mmol in sodium chloride 0.9 % 250 mL infusion, 30 mmol, Intravenous, PRN **OR** potassium phosphate 15 mmol in sodium chloride 0.9 % 100 mL infusion, 15 mmol, Intravenous, PRN **OR** sodium phosphates 45 mmol in sodium chloride 0.9 % 500 mL IVPB, 45 mmol, Intravenous, PRN **OR** sodium phosphates 30 mmol in sodium chloride 0.9 % 250 mL IVPB, 30 mmol, Intravenous, PRN **OR** sodium phosphates 15 mmol in sodium chloride 0.9 % 250 mL IVPB, 15 mmol, Intravenous, PRN, Jaleesa Cárdenas MD, Last Rate: 62.5 mL/hr at 12/18/21 0429, 15 mmol at 12/18/21 0429  •  propofol (DIPRIVAN) infusion 10 mg/mL 100 mL, 5-50 mcg/kg/min, Intravenous, Titrated, Jaleesa Cárdenas MD, Last Rate: 6.9 mL/hr at 01/01/22 1006, 10 mcg/kg/min at 01/01/22 1006  •   [COMPLETED] Insert peripheral IV, , , Once **AND** sodium chloride 0.9 % flush 10 mL, 10 mL, Intravenous, PRN, Jaleesa Cárdenas MD, 10 mL at 12/28/21 0732  •  sodium chloride 0.9 % flush 10 mL, 10 mL, Intravenous, PRN, Jeana Grubbs MD  •  sodium chloride 0.9 % flush 10 mL, 10 mL, Intravenous, Q12H, Dakota Moore MD, 10 mL at 01/01/22 0838  •  sodium chloride 0.9 % flush 10 mL, 10 mL, Intravenous, Q12H, Dakota Moore MD, 10 mL at 01/01/22 0838  •  sodium chloride 0.9 % flush 10 mL, 10 mL, Intravenous, Q12H, Dakota Moore MD, 10 mL at 01/01/22 0837  •  sodium chloride 0.9 % flush 10 mL, 10 mL, Intravenous, PRN, Dakota Moore MD  •  sodium chloride 0.9 % flush 20 mL, 20 mL, Intravenous, PRN, Jeana Grubbs MD  •  sodium chloride 0.9 % flush 20 mL, 20 mL, Intravenous, PRN, Dakota Moore MD      Assessment/Plan      1.  Acute Hypoxic Respiratory Failure due to COVID-19 Pneumonia w/ Superimposed MSSA and Klebsiella pneumonia:    -Hopeful for weaning trial today with potential extubation.    -Sputum culture 12/29/2021: MSSA, Citrobacter resistant to ceftriaxone and stenotrophomonas sensitive to Levaquin  -completed the Rocephin course, will add Levaquin to cover stenotrophomonas and Citrobacter  -Pulmonary following appreciate assistance     2.  Diabetes Mellitus, Type 2 in Obese w/ Hyperglycemia: Blood glucose stable.  Will increase basal slightly.    3.  Right Hand Wound: Will ask staff to re-wrap.  Last images demonstrated good healing.     4.  Atrial Fibrillation: Remains in NSR.  On Diltiazem and Eliquis.     5.   LLE DVT: On Eliquis.     Diego high risk    Plan for disposition: Trying for extubation today    Jayden Hyatt DO  01/01/22  11:49 EST

## 2022-01-01 NOTE — PROGRESS NOTES
[facility] INPATIENT PROGRESS NOTE         [facility] ICU    2022      PATIENT IDENTIFICATION:  Name: Sergio Daniel ADMIT: 2021   : 1955  PCP: Farhan Jean Jr., MD    MRN: [id number] LOS: 16 days   AGE/SEX: 66 y.o. male  ROOM: ICU4/                     LOS 16    Reason for visit: Respiratory failure with Covid pneumonia      SUBJECTIVE:      Sedated on ventilator.  Down to 30% FiO2.  Failed spontaneous breathing trial again yesterday.  Discussed with respiratory therapy.  We will try again today.      Objective   OBJECTIVE:    Vital Sign Min/Max for last 24 hours  Temp  Min: 97.1 °F (36.2 °C)  Max: 98.1 °F (36.7 °C)   BP  Min: 118/61  Max: 170/78   Pulse  Min: 60  Max: 89   Resp  Min: 16  Max: 31   SpO2  Min: 92 %  Max: 100 %   No data recorded   No data recorded       FiO2 (%):  [30 %-36 %] 30 %  S RR:  [16] 16  PEEP/CPAP (cm H2O):  [5 cm H20] 5 cm H20  IL SUP:  [10 cm H20] 10 cm H20  MAP (cm H2O):  [8.7-11] 8.9           FiO2 (%): 30 %     Body mass index is 35.13 kg/m².    Intake/Output Summary (Last 24 hours) at 2022 1414  Last data filed at 2022 0617  Gross per 24 hour   Intake 4089.25 ml   Output 3845 ml   Net 244.25 ml         Exam:  GEN:  No distress, appears stated age  EYES:   PERRL, anicteric sclerae  ENT:    External ears/nose normal, OP clear.  Orally intubated  NECK:  No adenopathy, midline trachea  LUNGS: Normal chest on inspection, palpation and menaced breath sounds bilaterally on auscultation  CV:  Normal S1S2, without murmur  ABD:  Nontender, nondistended, no hepatosplenomegaly, +BS  EXT:  Nonpitting edema.  No cyanosis or clubbing.  No mottling and normal cap refill.    Assessment     Scheduled meds:  apixaban, 10 mg, Nasogastric, Q12H   Followed by  [START ON 2022] apixaban, 5 mg, Oral, Q12H  bisacodyl, 10 mg, Rectal, Daily  chlorhexidine, 15 mL, Mouth/Throat, Q12H  dexamethasone, 6 mg, Oral, Daily With Breakfast   Or  dexamethasone, 6 mg,  Intravenous, Daily With Breakfast  dilTIAZem, 60 mg, Nasogastric, Q8H  famotidine, 20 mg, Intravenous, Daily  insulin aspart, 0-24 Units, Subcutaneous, Q6H  insulin detemir, 23 Units, Subcutaneous, Q12H  levoFLOXacin, 750 mg, Intravenous, Q24H  polyethylene glycol, 17 g, Oral, Daily  sodium chloride, 10 mL, Intravenous, Q12H  sodium chloride, 10 mL, Intravenous, Q12H  sodium chloride, 10 mL, Intravenous, Q12H      IV meds:                      dexmedetomidine, 0.2-1.5 mcg/kg/hr, Last Rate: 0.3 mcg/kg/hr (01/01/22 1312)  propofol, 5-50 mcg/kg/min, Last Rate: 10 mcg/kg/min (01/01/22 1006)      Data Review:  Results from last 7 days   Lab Units 01/01/22  0435 12/28/21  1418 12/28/21  0432 12/28/21  0432 12/27/21  0440 12/27/21  0440 12/26/21  0449 12/26/21  0449   SODIUM mmol/L 139  --   --  137  --  138  --  138   POTASSIUM mmol/L 3.8 4.5  --  3.6  --  3.7  --  4.2   CHLORIDE mmol/L 104  --   --  104  --  101  --  98   CO2 mmol/L 29.8*  --   --  27.8  --  30.5*  --  27.2   BUN mg/dL 15  --   --  24*  --  26*  --  31*   CREATININE mg/dL 0.54*  --   --  0.51*  --  0.69*  --  0.93   GLUCOSE mg/dL 181*  --    < > 114*   < > 138*   < > 240*   CALCIUM mg/dL 8.5*  --   --  7.1*  --  7.9*  --  8.2*    < > = values in this interval not displayed.         Estimated Creatinine Clearance: 128.8 mL/min (A) (by C-G formula based on SCr of 0.54 mg/dL (L)).  Results from last 7 days   Lab Units 01/01/22 0435 12/29/21  1715   WBC 10*3/mm3 10.72 20.95*   HEMOGLOBIN g/dL 8.9* 11.2*   PLATELETS 10*3/mm3 246 273     Results from last 7 days   Lab Units 12/30/21  0356   INR  1.15*     Results from last 7 days   Lab Units 01/01/22  0435 12/28/21  0432 12/27/21  0440   ALT (SGPT) U/L 31 21 25   AST (SGOT) U/L 22 20 21     Results from last 7 days   Lab Units 01/01/22  0942 12/31/21  0520 12/30/21  0523 12/29/21  0525 12/28/21  0510 12/27/21  0515 12/26/21  0508   PH, ARTERIAL pH units 7.513* 7.480* 7.538* 7.487* 7.497* 7.516* 7.581*   PO2  ART mm Hg 64.1* 72.2* 63.4* 55.3* 76.1* 63.0* 63.9*   PCO2, ARTERIAL mm Hg 43.4 44.1 34.7* 39.8 41.2 43.8 38.4   HCO3 ART mmol/L 34.9* 32.8* 29.5* 30.1* 31.8* 35.4* 36.1*     Results from last 7 days   Lab Units 01/01/22 0435 12/31/21  0512 12/30/21  0356 12/29/21  0421 12/28/21  0432 12/27/21  0440 12/26/21  0449   PROCALCITONIN ng/mL 0.12 0.17 0.27* 0.18 0.14 0.18 0.23       COVID LABS:  Results From Last 14 Days   Lab Units 01/01/22 0435 12/31/21  0512 12/30/21  0356 12/29/21  0421 12/29/21  0421 12/28/21  0432 12/27/21  0440 12/19/21  1317 12/19/21  0618   CRP mg/dL 5.64* 11.00* 14.70*   < > 4.76*   < > 2.79*   < >  --    D DIMER QUANT MCGFEU/mL  --  1.43*  --   --  1.63*  --  2.06*   < >  --    FERRITIN ng/mL 1,386.00* 1,551.00* 1,839.00*   < > 2,277.00*   < > 3,835.00*   < >  --    LACTATE mmol/L  --   --   --   --   --   --   --   --  2.2*   LDH U/L  --  226*  --   --  490*  --  378*   < >  --    PROCALCITONIN ng/mL 0.12 0.17 0.27*   < > 0.18   < > 0.18   < >  --    PROTIME Seconds  --   --  15.0  --   --   --   --   --   --    INR   --   --  1.15*  --   --   --   --   --   --     < > = values in this interval not displayed.         Glucose   Date/Time Value Ref Range Status   01/01/2022 1147 94 70 - 130 mg/dL Final     Comment:     Meter: [id number] : [id number] Norris Mari RN   01/01/2022 0619 172 (H) 70 - 130 mg/dL Final     Comment:     Meter: [id number] : [id number] Sindhu Seay RN   12/31/2021 2334 196 (H) 70 - 130 mg/dL Final     Comment:     Meter: [id number] : [id number] Sindhu Seay RN   12/31/2021 2023 226 (H) 70 - 130 mg/dL Final     Comment:     Meter: [id number] : [id number] Sindhu Seay RN   12/31/2021 1710 202 (H) 70 - 130 mg/dL Final     Comment:     Meter: [id number] : MICHAEL Roland RN   12/31/2021 1202 194 (H) 70 - 130 mg/dL Final     Comment:     Meter: [id number] : [id number] Dejon Johnson RN   12/31/2021 0609 183 (H) 70 - 130 mg/dL Final      Comment:     Meter: [id number] : [id number] Sindhu Seay RN       Chest x-ray 1/1 reviewed: Lines and tubes stable.  Bilateral infiltrates            Microbiology reviewed  12/21/2021 0418 12/24/2021 0741 Respiratory Culture - Lavage, Cough [[id number]]    (Abnormal)   Lavage from Cough    Final result Component Value   Respiratory Culture Heavy growth (4+) Staphylococcus aureus Abnormal     Heavy growth (4+) Klebsiella pneumoniae ssp pneumoniae Abnormal     No Normal Respiratory Alida Abnormal         12/16/2021 1711 12/16/2021 1828 COVID-19 and FLU A/B PCR - Swab, Nasopharynx [[id number]]    (Abnormal)   Swab from Nasopharynx    Final result Component Value   COVID19 Detected Critical    Influenza A PCR Not Detected   Influenza B PCR Not Detected                      Active Hospital Problems    Diagnosis  POA   • **Acute hypoxemic respiratory failure due to COVID-19 (HCC) [U07.1, J96.01]  Yes   • Acute respiratory failure with hypoxia (HCC) [J96.01]  Yes      Resolved Hospital Problems   No resolved problems to display.         ASSESSMENT:    1. Acute hypoxic respiratory failure s/p mechanical ventilation 12/18   2. Ac COVID-19 pneumonia  3. ARDS  4. MSSA / Klebsiella pneumonia  5. Pneumomediastinum  6. Shock  7. Cytokine release syndrome s/p remdesivir  8. Extensive left leg DVT on AC  9. A. fib RVR  10. Uncontrolled diabetes  11. Obesity  12. Mild hepatitis; Covid related  13. Fever      PLAN:    Weaning FiO2 and PEEP as able and make appropriate ventilator changes based on ABG results.   Discussed with respiratory therapy this morning.  Try sedation vacation and spontaneous breathing trial again today.  Continue antibiotics for bacterial infectious process.  Infectious disease following.  Decadron for Covid pneumonia treatment.  Status post remdesivir course.  Monitoring inflammatory labs.  Control glucose.  Control blood pressure.  Pressor for blood pressure support.  On full anticoagulation for  DVT.      CCT: 31 min    Spencer Carmichael MD  Pulmonary and Critical Care Medicine  [facility], [facility]  1/1/2022    14:14 EST

## 2022-01-01 NOTE — PLAN OF CARE
Goal Outcome Evaluation:      Patient was extubated at 1030 and put on heated high flow. Patient denies SOB but complains of pain in his throat. Patient follows commands but is weak

## 2022-01-01 NOTE — CASE MANAGEMENT/SOCIAL WORK
Continued Stay Note  ARELIS Flaherty     Patient Name: Sergio Daniel  MRN: [id number]  Today's Date: 1/1/2022    Admit Date: 12/16/2021     Discharge Plan     Row Name 01/01/22 1225       Plan    Plan continue to follow    Plan Comments Chart reviewed, continue to wean sedation meds and possible attempt to extubate today. CM will follow to assist with dc needs.               Discharge Codes    No documentation.                     Caden Juares RN

## 2022-01-01 NOTE — PROGRESS NOTES
Adult Nutrition  Assessment/PES    Patient Name:  Sergio Daniel  YOB: 1955  MRN: [id number]  Admit Date:  12/16/2021    Assessment Date:  12/31/2021       Pt tolerating tube feedings.  TF continues with Diabetisource at 70 ml/hour with flushes of 60 ml/hour.  (Provides 2,016 kcals and 2,745 kcals with propofol, 101 g protein, 168 g CHO, 1,378 ml free water and 2,818 ml free water with flushes).         Reason for Assessment     Row Name 12/31/21 1929          Reason for Assessment    Reason For Assessment follow-up protocol     Diagnosis --  acute hypoxic respiratory failure secondary to COVID-19 pneumonia, diabetes in obese with hyperglycemia, right hand wound, left lower extremity DVT                  Anthropometrics     Row Name 12/31/21 1932          Anthropometrics    Current Weight Method --  no new weight                Labs/Tests/Procedures/Meds     Row Name 12/31/21 1934          Labs/Procedures/Meds    Lab Results Reviewed reviewed            Medications    Pertinent Medications Reviewed reviewed     Pertinent Medications Comments propofol at 27.6 ml/hour                Physical Findings     Row Name 12/31/21 1948          Physical Findings    Overall Physical Appearance on ventilator support                Estimated/Assessed Needs     Row Name 12/31/21 1943          Estimated/Assessed Needs    Additional Documentation Fluid Requirements (Group); Protein Requirements (Group); Modified [place] Equation (Group); Calorie Requirements (Group)            Calorie Requirements    Estimated Calorie Need Method [place]     Estimated Calorie Requirement Comment 2,129            Protein Requirements    Est Protein Requirement Amount (gms/kg) 0.8 gm protein  99 grams            Fluid Requirements    Estimated Fluid Requirement Method RDA Method  2,129                Nutrition Prescription Ordered     Row Name 12/31/21 1950          Nutrition Prescription PO    Current PO Diet NPO                        Problem/Interventions:   Problem 1     Row Name 12/31/21 1950          Nutrition Diagnoses Problem 1    Problem 1 Inadequate Nutrient Intake     Etiology (related to) Factors Affecting Nutrition     Signs/Symptoms (evidenced by) NPO                      Intervention Goal     Row Name 12/31/21 1953          Intervention Goal    TF/PN Maintain TF/PN                    Education/Evaluation     Row Name 12/31/21 1957          Education    Education Education not appropriate at this time            Monitor/Evaluation    Monitor I&O; Pertinent labs; TF delivery/tolerance; Weight; Skin status                 Electronically signed by:  Cristina Jones RD  12/31/21 19:58 EST

## 2022-01-01 NOTE — PLAN OF CARE
Goal Outcome Evaluation:  Plan of Care Reviewed With: patient        Progress: improving  Outcome Summary: Able to wean O2 to 30%. Rested well. Krysten. being off the restraints.  VSS. UOP large. Labs pending.

## 2022-01-02 ENCOUNTER — APPOINTMENT (OUTPATIENT)
Dept: GENERAL RADIOLOGY | Facility: HOSPITAL | Age: 67
End: 2022-01-02

## 2022-01-02 LAB
ALBUMIN SERPL-MCNC: 2.4 G/DL (ref 3.5–5.2)
ALBUMIN/GLOB SERPL: 0.7 G/DL
ALP SERPL-CCNC: 126 U/L (ref 39–117)
ALT SERPL W P-5'-P-CCNC: 51 U/L (ref 1–41)
ANION GAP SERPL CALCULATED.3IONS-SCNC: 8.2 MMOL/L (ref 5–15)
AST SERPL-CCNC: 37 U/L (ref 1–40)
BILIRUB SERPL-MCNC: 0.5 MG/DL (ref 0–1.2)
BUN SERPL-MCNC: 17 MG/DL (ref 8–23)
BUN/CREAT SERPL: 31.5 (ref 7–25)
CALCIUM SPEC-SCNC: 8.8 MG/DL (ref 8.6–10.5)
CHLORIDE SERPL-SCNC: 100 MMOL/L (ref 98–107)
CO2 SERPL-SCNC: 30.8 MMOL/L (ref 22–29)
CREAT SERPL-MCNC: 0.54 MG/DL (ref 0.76–1.27)
CRP SERPL-MCNC: 5.09 MG/DL (ref 0–0.5)
D DIMER PPP FEU-MCNC: 2.08 MCGFEU/ML (ref 0–0.46)
DEPRECATED RDW RBC AUTO: 49.4 FL (ref 37–54)
ERYTHROCYTE [DISTWIDTH] IN BLOOD BY AUTOMATED COUNT: 14.5 % (ref 12.3–15.4)
FERRITIN SERPL-MCNC: 1581 NG/ML (ref 30–400)
GFR SERPL CREATININE-BSD FRML MDRD: >150 ML/MIN/1.73
GLOBULIN UR ELPH-MCNC: 3.5 GM/DL
GLUCOSE BLDC GLUCOMTR-MCNC: 133 MG/DL (ref 70–130)
GLUCOSE BLDC GLUCOMTR-MCNC: 170 MG/DL (ref 70–130)
GLUCOSE BLDC GLUCOMTR-MCNC: 197 MG/DL (ref 70–130)
GLUCOSE BLDC GLUCOMTR-MCNC: 250 MG/DL (ref 70–130)
GLUCOSE SERPL-MCNC: 127 MG/DL (ref 65–99)
HCT VFR BLD AUTO: 31.8 % (ref 37.5–51)
HGB BLD-MCNC: 10.1 G/DL (ref 13–17.7)
LDH SERPL-CCNC: 268 U/L (ref 135–225)
MCH RBC QN AUTO: 30.9 PG (ref 26.6–33)
MCHC RBC AUTO-ENTMCNC: 31.8 G/DL (ref 31.5–35.7)
MCV RBC AUTO: 97.2 FL (ref 79–97)
PLATELET # BLD AUTO: 323 10*3/MM3 (ref 140–450)
PMV BLD AUTO: 10.7 FL (ref 6–12)
POTASSIUM SERPL-SCNC: 3.9 MMOL/L (ref 3.5–5.2)
PROCALCITONIN SERPL-MCNC: 0.11 NG/ML (ref 0–0.25)
PROT SERPL-MCNC: 5.9 G/DL (ref 6–8.5)
RBC # BLD AUTO: 3.27 10*6/MM3 (ref 4.14–5.8)
SODIUM SERPL-SCNC: 139 MMOL/L (ref 136–145)
WBC NRBC COR # BLD: 13.56 10*3/MM3 (ref 3.4–10.8)

## 2022-01-02 PROCEDURE — 63710000001 INSULIN ASPART PER 5 UNITS: Performed by: INTERNAL MEDICINE

## 2022-01-02 PROCEDURE — 94799 UNLISTED PULMONARY SVC/PX: CPT

## 2022-01-02 PROCEDURE — 86140 C-REACTIVE PROTEIN: CPT | Performed by: INTERNAL MEDICINE

## 2022-01-02 PROCEDURE — 25010000002 DEXAMETHASONE PER 1 MG: Performed by: INTERNAL MEDICINE

## 2022-01-02 PROCEDURE — 25010000002 LEVOFLOXACIN PER 250 MG: Performed by: INTERNAL MEDICINE

## 2022-01-02 PROCEDURE — 92610 EVALUATE SWALLOWING FUNCTION: CPT

## 2022-01-02 PROCEDURE — 85379 FIBRIN DEGRADATION QUANT: CPT | Performed by: STUDENT IN AN ORGANIZED HEALTH CARE EDUCATION/TRAINING PROGRAM

## 2022-01-02 PROCEDURE — 84145 PROCALCITONIN (PCT): CPT | Performed by: INTERNAL MEDICINE

## 2022-01-02 PROCEDURE — 83615 LACTATE (LD) (LDH) ENZYME: CPT | Performed by: STUDENT IN AN ORGANIZED HEALTH CARE EDUCATION/TRAINING PROGRAM

## 2022-01-02 PROCEDURE — 99233 SBSQ HOSP IP/OBS HIGH 50: CPT | Performed by: INTERNAL MEDICINE

## 2022-01-02 PROCEDURE — 82962 GLUCOSE BLOOD TEST: CPT

## 2022-01-02 PROCEDURE — 82728 ASSAY OF FERRITIN: CPT | Performed by: INTERNAL MEDICINE

## 2022-01-02 PROCEDURE — 63710000001 INSULIN DETEMIR PER 5 UNITS: Performed by: HOSPITALIST

## 2022-01-02 PROCEDURE — 94761 N-INVAS EAR/PLS OXIMETRY MLT: CPT

## 2022-01-02 PROCEDURE — 80053 COMPREHEN METABOLIC PANEL: CPT | Performed by: INTERNAL MEDICINE

## 2022-01-02 PROCEDURE — 85027 COMPLETE CBC AUTOMATED: CPT | Performed by: INTERNAL MEDICINE

## 2022-01-02 RX ADMIN — APIXABAN 10 MG: 2.5 TABLET, FILM COATED ORAL at 08:26

## 2022-01-02 RX ADMIN — DILTIAZEM HYDROCHLORIDE 60 MG: 30 TABLET ORAL at 13:51

## 2022-01-02 RX ADMIN — FAMOTIDINE 20 MG: 10 INJECTION INTRAVENOUS at 08:41

## 2022-01-02 RX ADMIN — SODIUM CHLORIDE, PRESERVATIVE FREE 10 ML: 5 INJECTION INTRAVENOUS at 20:02

## 2022-01-02 RX ADMIN — SODIUM CHLORIDE, PRESERVATIVE FREE 10 ML: 5 INJECTION INTRAVENOUS at 08:27

## 2022-01-02 RX ADMIN — INSULIN ASPART 4 UNITS: 100 INJECTION, SOLUTION INTRAVENOUS; SUBCUTANEOUS at 12:03

## 2022-01-02 RX ADMIN — INSULIN ASPART 4 UNITS: 100 INJECTION, SOLUTION INTRAVENOUS; SUBCUTANEOUS at 23:11

## 2022-01-02 RX ADMIN — DEXTROMETHORPHAN 60 MG: 30 SUSPENSION, EXTENDED RELEASE ORAL at 23:06

## 2022-01-02 RX ADMIN — DILTIAZEM HYDROCHLORIDE 60 MG: 30 TABLET ORAL at 06:31

## 2022-01-02 RX ADMIN — INSULIN DETEMIR 23 UNITS: 100 INJECTION, SOLUTION SUBCUTANEOUS at 20:10

## 2022-01-02 RX ADMIN — CHLORHEXIDINE GLUCONATE 0.12% ORAL RINSE 15 ML: 1.2 LIQUID ORAL at 08:26

## 2022-01-02 RX ADMIN — SODIUM CHLORIDE, PRESERVATIVE FREE 10 ML: 5 INJECTION INTRAVENOUS at 20:07

## 2022-01-02 RX ADMIN — INSULIN ASPART 12 UNITS: 100 INJECTION, SOLUTION INTRAVENOUS; SUBCUTANEOUS at 17:03

## 2022-01-02 RX ADMIN — CHLORHEXIDINE GLUCONATE 0.12% ORAL RINSE 15 ML: 1.2 LIQUID ORAL at 20:01

## 2022-01-02 RX ADMIN — INSULIN DETEMIR 23 UNITS: 100 INJECTION, SOLUTION SUBCUTANEOUS at 08:28

## 2022-01-02 RX ADMIN — LEVOFLOXACIN 750 MG: 5 INJECTION, SOLUTION INTRAVENOUS at 12:03

## 2022-01-02 RX ADMIN — SODIUM CHLORIDE, PRESERVATIVE FREE 10 ML: 5 INJECTION INTRAVENOUS at 20:06

## 2022-01-02 RX ADMIN — POLYETHYLENE GLYCOL 3350 17 G: 17 POWDER, FOR SOLUTION ORAL at 08:27

## 2022-01-02 RX ADMIN — DILTIAZEM HYDROCHLORIDE 60 MG: 30 TABLET ORAL at 20:06

## 2022-01-02 RX ADMIN — BISACODYL 10 MG: 10 SUPPOSITORY RECTAL at 08:26

## 2022-01-02 RX ADMIN — APIXABAN 10 MG: 2.5 TABLET, FILM COATED ORAL at 20:01

## 2022-01-02 RX ADMIN — DEXAMETHASONE SODIUM PHOSPHATE 6 MG: 4 INJECTION, SOLUTION INTRAMUSCULAR; INTRAVENOUS at 08:26

## 2022-01-02 NOTE — PLAN OF CARE
Goal Outcome Evaluation:  Plan of Care Reviewed With: patient           Outcome Summary: SLP: Suspected pharyngeal dysphagia with continue high risk of aspiration on po intake. Pt with coughing following trials of ice chips by spoon. Delayed cough at times, multiple swallows noted. Weak cough noted as well with poor clearance of secretions and water. No further consistencies trialed at this time due to concerns for aspiration and heated hi flow O2. Recommend to continue with tube feedings at this time and reassess tomorrow. Continue meds via NG feeding tube.

## 2022-01-02 NOTE — PROGRESS NOTES
Adult Nutrition  Assessment/PES    Patient Name:  Sergio Daniel  YOB: 1955  MRN: [id number]  Admit Date:  12/16/2021    Assessment Date:  1/1/2022    Comments:  Per nursing tube feeding continues with Diabetisource AC at 70 ml/hour with 60 ml/hour water flushes and tolerating.     Reason for Assessment     Row Name 01/01/22 2216          Reason for Assessment    Reason For Assessment follow-up protocol     Diagnosis --  acute hypoxic respiratory failure secondary to COVID pneumonia, now extubated, diabetes in obese, right hand wound, left lower extremity DVT                  Anthropometrics     Row Name 01/01/22 2218          Anthropometrics    Current Weight Method --  12.28.21 273.6 lb                Labs/Tests/Procedures/Meds     Row Name 01/01/22 2218          Labs/Procedures/Meds    Lab Results Reviewed reviewed            Medications    Pertinent Medications Reviewed reviewed     Pertinent Medications Comments propofol now discontinued per nursing                  Estimated/Assessed Needs     Row Name 01/01/22 2219          Estimated/Assessed Needs    Additional Documentation Fluid Requirements (Group); [place] Jeor Equation (Group); Protein Requirements (Group); Calorie Requirements (Group)            Calorie Requirements    Estimated Calorie Need Method [place]  no activity factor     Estimated Calorie Requirement Comment 2,098            Protein Requirements    Est Protein Requirement Amount (gms/kg) 0.8 gm protein  99 grams            Fluid Requirements    Estimated Fluid Requirement Method RDA Method  2,098                Nutrition Prescription Ordered     Row Name 01/01/22 2221          Nutrition Prescription PO    Current PO Diet NPO                       Problem/Interventions:   Problem 1     Row Name 01/01/22 2221          Nutrition Diagnoses Problem 1    Problem 1 Inadequate Nutrient Intake     Etiology (related to) Factors Affecting Nutrition     Signs/Symptoms (evidenced  by) NPO                      Intervention Goal     Row Name 01/01/22 2221          Intervention Goal    TF/PN Maintain TF/PN                Nutrition Intervention     Row Name 01/01/22 2221          Nutrition Intervention    RD/Tech Action Follow Tx progress                  Education/Evaluation     Row Name 01/01/22 2221          Education    Education Education not appropriate at this time            Monitor/Evaluation    Monitor I&O; Pertinent labs; TF delivery/tolerance; Weight; Skin status                 Electronically signed by:  Cristina Jones RD  01/01/22 22:25 EST

## 2022-01-02 NOTE — PLAN OF CARE
Goal Outcome Evaluation:   Patient went to 4 liters high flow and has tolerated. Patient is still weak and has a productive cough

## 2022-01-02 NOTE — THERAPY EVALUATION
Acute Care - Speech Language Pathology   Swallow Initial Evaluation  Hien Neely     Patient Name: Sergio Daniel  : 1955  MRN: [id number]  Today's Date: 2022               Admit Date: 2021    Visit Dx:     ICD-10-CM ICD-9-CM   1. Acute respiratory failure with hypoxia (HCC)  J96.01 518.81   2. Pneumonia due to COVID-19 virus  U07.1 480.8    J12.82 079.89   3. Paroxysmal atrial fibrillation (HCC)  I48.0 427.31   4. Pharyngeal dysphagia  R13.13 787.23     Patient Active Problem List   Diagnosis   • Acute respiratory failure with hypoxia (HCC)   • Acute hypoxemic respiratory failure due to COVID-19 (HCC)     Past Medical History:   Diagnosis Date   • Diabetes mellitus (HCC)      No past surgical history on file.    SLP Recommendation and Plan  SLP Swallowing Diagnosis: suspected pharyngeal dysphagia (22)  SLP Diet Recommendation: NPO, temporary alternate methods of nutrition/hydration, other (see comments) (consider ice chips sparingly for moisture; 2-3 per hour) (22)  Recommended Precautions and Strategies: general aspiration precautions, reflux precautions (22)  SLP Rec. for Method of Medication Administration: meds via alternate route (22)     Monitor for Signs of Aspiration: yes, cough, notify SLP if any concerns (22)  Recommended Diagnostics: reassess via clinical swallow evaluation, other (see comments) (would benefit from MBS prior to starting po diet or meds; will monitor status to determine when ready for MBS.) (22)  Swallow Criteria for Skilled Therapeutic Interventions Met: demonstrates skilled criteria (22)  Anticipated Discharge Disposition (SLP): unknown (22)  Rehab Potential/Prognosis, Swallowing: adequate, monitor progress closely (22)  Therapy Frequency (Swallow): daily, at least, 5 days per week (22)  Predicted Duration Therapy Intervention (Days): 2 weeks, until discharge  (22)               Patient/Family Concerns, Anticipated Discharge Disposition (SLP): No concerns per patient at this time. He does state when he gets to eat, he would like some chocolate pudding. (22)                  Plan of Care Reviewed With: patient  Outcome Summary: SLP: Suspected pharyngeal dysphagia with continue high risk of aspiration on po intake. Pt with coughing following trials of ice chips by spoon. Delayed cough at times, multiple swallows noted. Weak cough noted as well with poor clearance of secretions and water. No further consistencies trialed at this time due to concerns for aspiration and heated hi flow O2. Recommend to continue with tube feedings at this time and reassess tomorrow. Continue meds via NG feeding tube.      SWALLOW EVALUATION (last 72 hours)     SLP Adult Swallow Evaluation     Row Name 22                   Rehab Evaluation    Document Type evaluation  -AD        Subjective Information complains of  -AD        Patient Observations alert; cooperative; agree to therapy  -AD        Patient/Family/Caregiver Comments/Observations Pt was seen upright in bed w/heated hi flow O2 on. Pt unable to tolerate fully upright position at this time. Alert, but confused. Knows he is in the hospital and reason why. Thought he was in [facility]. Also stated  1955 and age as 81 y/o. Able to reorient to person, place and time with verbal model from SLP.  -AD        Patient Effort adequate  -AD        Symptoms Noted During/After Treatment other (see comments)  cough and breathy voice  -AD                  General Information    Patient Profile Reviewed yes  -AD        Pertinent History Of Current Problem Pt is a 65 y/o male admitted with acute hypoxic respiratory failure due to Covid-19 infection/pneumonia. Also diagnosed with ARDS, MSSA/Klebsiella pneum, shock, cyotkine release syndrome s/p remdesivir, extensive DVT LLE, afib w/RVR, uncontrolled diabetes,  obesity, mild hepatitis r/t covid and fever. Pt intubated for greater than two weeks and extubated on 1/1/22 and placed on heated hi flow O2. CXR of 1/1/22 with bilateral pneumonia that is stable to worsening appearance in the left lung. Mid to lower lung zone opacities.  -AD        Current Method of Nutrition nasogastric feedings; NPO  -AD        Precautions/Limitations, Vision vision impairment, bilaterally; other (see comments)  no glasses currently present. Unable to see pictures on wall, but can locate therapist and track in room.  -AD        Precautions/Limitations, Hearing WFL  -AD        Prior Level of Function-Communication WFL  prior to hospitalization; current suspected cognitive deficits due to prior sedation; monitor for need for further evaluation.  -AD        Prior Level of Function-Swallowing no diet consistency restrictions; regular textures; thin liquids  -AD        Plans/Goals Discussed with patient; agreed upon  -AD        Barriers to Rehab medically complex  prolonged intubation  -AD        Patient's Goals for Discharge patient did not state  -AD                  Pain    Additional Documentation --  no current pain reported  -AD                  Oral Motor Structure and Function    Oral Lesions or Structural Abnormalities and/or variants none  -AD        Dentition Assessment natural, present and adequate  -AD        Secretion Management requires suctioning to control secretions  weak, nonproductive cough  -AD        Mucosal Quality moist, healthy  -AD        Gag Response absent or diminished  -AD        Volitional Swallow WFL  -AD        Volitional Cough non-productive; weak; reduced respiratory support  -AD                  Oral Musculature and Cranial Nerve Assessment    Oral Motor General Assessment generalized oral motor weakness  -AD        Mandibular Impairment Detail, Cranial Nerve V (Trigeminal) reduced strength bilaterally  mild  -AD        Lingual Impairment, Detail. Cranial Nerves IX,  XII (Glossopharyngeal and Hypoglossal) reduced strength; bilaterally  -AD        Vocal Impairment, Detail. Cranial Nerve X (Vagus) vocal quality abnormality (see comments); impaired throat clear/cough (see comments)  -AD        Oral Motor, Comment Generalized weakness overall with severely breathy voice and weak cough. Decreased respiratory support and nonproductive cough. Labial ROM WFL and velar elevation WFL.  -AD                  General Eating/Swallowing Observations    Respiratory Support Currently in Use high-flow nasal cannula  heated hi flow 40L  -AD        Eating/Swallowing Skills fed by SLP; unaware of safety concerns; respiratory changes during/following eating voice quality changes during/following eating  coughing weak cough  -AD        Positioning During Eating upright in bed; semi-reclined  up to near 75 degrees; unable to tolerate higher position  -AD        Utensils Used spoon  -AD        Consistencies Trialed ice chips  -AD                  Respiratory    Respiratory Status during swallowing/eating; other (see comments)  coughing intermittently after trials of ice chips; increases with bigger ice chips.  -AD        Date of Intubation 12/18/2021 @ 0542 am  extubated 1/1/2022 @ 10:30 am  -AD                  Clinical Swallow Eval    Oral Prep Phase WFL  -AD        Oral Transit WFL  -AD        Oral Residue WFL  -AD        Pharyngeal Phase suspected pharyngeal impairment  -AD        Esophageal Phase unremarkable  -AD        Clinical Swallow Evaluation Summary Pt with coughing following trials of ice chips by spoon. Delayed cough at times, multiple swallows noted. Weak cough noted as well with poor clearance of secretions and water. No further consistencies trialed at this time due to concerns for aspiration and heated hi flow O2. Recommend to continue with tube feedings at this time and reassess tomorrow. Continue meds via NG feeding tube.  -AD                  Pharyngeal Phase Concerns    Pharyngeal  Phase Concerns multiple swallows; cough; other (see comments)  -AD        Multiple Swallows thin; other (see comments)  ice chips  -AD        Cough thin; other (see comments)  ice chips  -AD        Pharyngeal Phase Concerns, Comment Pt with multiple swallows and coughing intermittently on thin ice chips. Weak, breathy voice and weak, non-productive cough.  -AD                  SLP Evaluation Clinical Impression    SLP Swallowing Diagnosis suspected pharyngeal dysphagia  -AD        Functional Impact risk of aspiration/pneumonia; risk of malnutrition; risk of dehydration  -AD        Rehab Potential/Prognosis, Swallowing adequate, monitor progress closely  -AD        Swallow Criteria for Skilled Therapeutic Interventions Met demonstrates skilled criteria  -AD                  Recommendations    Therapy Frequency (Swallow) daily; at least; 5 days per week  -AD        Predicted Duration Therapy Intervention (Days) 2 weeks; until discharge  -AD        SLP Diet Recommendation NPO; temporary alternate methods of nutrition/hydration; other (see comments)  consider ice chips sparingly for moisture; 2-3 per hour  -AD        Recommended Diagnostics reassess via clinical swallow evaluation; other (see comments)  would benefit from MBS prior to starting po diet or meds; will monitor status to determine when ready for MBS.  -AD        Recommended Precautions and Strategies general aspiration precautions; reflux precautions  -AD        Oral Care Recommendations Oral Care BID/PRN; Suction toothbrush  -AD        SLP Rec. for Method of Medication Administration meds via alternate route  -AD        Monitor for Signs of Aspiration yes; cough; notify SLP if any concerns  -AD        Anticipated Discharge Disposition (SLP) unknown  -AD        Patient/Family Concerns, Anticipated Discharge Disposition (SLP) No concerns per patient at this time. He does state when he gets to eat, he would like some chocolate pudding.  -AD                   Swallow Goals (SLP)    Oral Nutrition/Hydration Goal Selection (SLP) oral nutrition/hydration, SLP goal 1; oral nutrition/hydration, SLP goal 2  -AD        Lingual Strengthening Goal Selection (SLP) lingual strengthening, SLP goal 1  -AD        Pharyngeal Strengthening Exercise Goal Selection (SLP) pharyngeal strengthening exercise, SLP goal 1  -AD        Additional Documentation lingual strengthening goal selection (SLP); pharyngeal strengthening exercise goal selection (SLP)  -AD                  Oral Nutrition/Hydration Goal 1 (SLP)    Oral Nutrition/Hydration Goal 1, SLP Pt will be able to participate in an instrumental assessment of swallow in order to determine safest, least restrictive diet w/limited risk of aspiration.  -AD        Time Frame (Oral Nutrition/Hydration Goal 1, SLP) short term goal (STG); 4 days  -AD        Barriers (Oral Nutrition/Hydration Goal 1, SLP) medically complex; heated hi flow O2  -AD        Progress/Outcomes (Oral Nutrition/Hydration Goal 1, SLP) other (see comments)  new  -AD                  Oral Nutrition/Hydration Goal 2 (SLP)    Oral Nutrition/Hydration Goal 2, SLP Pt will demonstrate improved tolerance of thins on therapeutic trials of ice chips with SLP w/use of strategies as necessary and pt able to participate.  -AD        Time Frame (Oral Nutrition/Hydration Goal 2, SLP) short term goal (STG); 4 days  -AD        Barriers (Oral Nutrition/Hydration Goal 2, SLP) medically complex; heated hi flow O2  -AD        Progress/Outcomes (Oral Nutrition/Hydration Goal 2, SLP) other (see comments)  New  -AD                  Lingual Strengthening Goal 1 (SLP)    Activity (Lingual Strengthening Goal 1, SLP) increase lingual tone/sensation/control/coordination/movement  -AD        Increase Lingual Tone/Sensation/Control/Coordination/Movement lingual movement exercises; swallow trials  -AD        [place]/Accuracy (Lingual Strengthening Goal 1, SLP) with minimal cues (75-90% accuracy)   -AD        Time Frame (Lingual Strengthening Goal 1, SLP) short term goal (STG); 2 weeks  -AD        Barriers (Lingual Strengthening Goal 1, SLP) medically complex; heated hi flow O2  -AD        Progress/Outcomes (Lingual Strengthening Goal 1, SLP) other (see comments)  New  -AD                  Pharyngeal Strengthening Exercise Goal 1 (SLP)    Activity (Pharyngeal Strengthening Goal 1, SLP) increase closure at entrance to airway/closure of airway at glottis  -AD        Increase Closure at Entrance to Airway/Closure of Airway at Glottis hard effortful swallow; effortful pitch glide (falsetto + pharyngeal squeeze)  -AD        [place]/Accuracy (Pharyngeal Strengthening Goal 1, SLP) with moderate cues (50-74% accuracy)  -AD        Time Frame (Pharyngeal Strengthening Goal 1, SLP) short term goal (STG); 2 weeks  -AD        Barriers (Pharyngeal Strengthening Goal 1, SLP) medically complex; heated hi flow O2  -AD        Progress/Outcomes (Pharyngeal Strengthening Goal 1, SLP) other (see comments)  New  -AD              User Key  (r) = Recorded By, (t) = Taken By, (c) = Cosigned By    Initials Name Effective Dates    AD Denisa Chua MS CCC-SLP 06/16/21 -                 EDUCATION  The patient has been educated in the following areas:   Dysphagia (Swallowing Impairment) NPO rationale. Pt demonstrates understanding but reinforcement needed due to current confusion/lethargy.        SLP GOALS     Row Name 01/02/22 1122             Oral Nutrition/Hydration Goal 1 (SLP)    Oral Nutrition/Hydration Goal 1, SLP Pt will be able to participate in an instrumental assessment of swallow in order to determine safest, least restrictive diet w/limited risk of aspiration.  -AD      Time Frame (Oral Nutrition/Hydration Goal 1, SLP) short term goal (STG); 4 days  -AD      Barriers (Oral Nutrition/Hydration Goal 1, SLP) medically complex; heated hi flow O2  -AD      Progress/Outcomes (Oral Nutrition/Hydration Goal 1, SLP) other  (see comments)  new  -AD              Oral Nutrition/Hydration Goal 2 (SLP)    Oral Nutrition/Hydration Goal 2, SLP Pt will demonstrate improved tolerance of thins on therapeutic trials of ice chips with SLP w/use of strategies as necessary and pt able to participate.  -AD      Time Frame (Oral Nutrition/Hydration Goal 2, SLP) short term goal (STG); 4 days  -AD      Barriers (Oral Nutrition/Hydration Goal 2, SLP) medically complex; heated hi flow O2  -AD      Progress/Outcomes (Oral Nutrition/Hydration Goal 2, SLP) other (see comments)  New  -AD              Lingual Strengthening Goal 1 (SLP)    Activity (Lingual Strengthening Goal 1, SLP) increase lingual tone/sensation/control/coordination/movement  -AD      Increase Lingual Tone/Sensation/Control/Coordination/Movement lingual movement exercises; swallow trials  -AD      [place]/Accuracy (Lingual Strengthening Goal 1, SLP) with minimal cues (75-90% accuracy)  -AD      Time Frame (Lingual Strengthening Goal 1, SLP) short term goal (STG); 2 weeks  -AD      Barriers (Lingual Strengthening Goal 1, SLP) medically complex; heated hi flow O2  -AD      Progress/Outcomes (Lingual Strengthening Goal 1, SLP) other (see comments)  New  -AD              Pharyngeal Strengthening Exercise Goal 1 (SLP)    Activity (Pharyngeal Strengthening Goal 1, SLP) increase closure at entrance to airway/closure of airway at glottis  -AD      Increase Closure at Entrance to Airway/Closure of Airway at Glottis hard effortful swallow; effortful pitch glide (falsetto + pharyngeal squeeze)  -AD      [place]/Accuracy (Pharyngeal Strengthening Goal 1, SLP) with moderate cues (50-74% accuracy)  -AD      Time Frame (Pharyngeal Strengthening Goal 1, SLP) short term goal (STG); 2 weeks  -AD      Barriers (Pharyngeal Strengthening Goal 1, SLP) medically complex; heated hi flow O2  -AD      Progress/Outcomes (Pharyngeal Strengthening Goal 1, SLP) other (see comments)  New  -AD            User  Key  (r) = Recorded By, (t) = Taken By, (c) = Cosigned By    Initials Name Provider Type    Denisa Mcconnell MS CCC-SLP Speech and Language Pathologist                   Time Calculation:    Time Calculation- SLP     Row Name 01/02/22 1157             Time Calculation- SLP    SLP Start Time 1025  -AD      SLP Stop Time 1122  -AD      SLP Time Calculation (min) 57 min  -AD      Total Timed Code Minutes- SLP 0 minute(s)  -AD      SLP Non-Billable Time (min) 0 min  -AD      SLP Received On 01/02/22  -AD      SLP - Next Appointment 01/03/22  -AD              Untimed Charges    SLP Eval/Re-eval  ST Eval Oral Pharyng Swallow - 46332  -AD      33796-MM Eval Oral Pharyng Swallow Minutes 57  -AD              Total Minutes    Untimed Charges Total Minutes 57  -AD       Total Minutes 57  -AD            User Key  (r) = Recorded By, (t) = Taken By, (c) = Cosigned By    Initials Name Provider Type    Denisa Mcconnell MS CCC-SLP Speech and Language Pathologist                Therapy Charges for Today     Code Description Service Date Service Provider Modifiers Qty    [id number]  ST EVAL ORAL PHARYNG SWALLOW 4 1/2/2022 Denisa Chua MS CCC-SLP GN 1               Denisa Chua MS CCC-SLP  1/2/2022

## 2022-01-02 NOTE — SIGNIFICANT NOTE
01/02/22 1239   General Information   Plans/Goals Discussed with spouse/S.O.; agreed upon  (via phone)   Recommendations   Patient/Family Concerns, Anticipated Discharge Disposition (SLP) No current concerns reported per wife, Luz Elena, via telephone call. Results of clinical swallow eval and recommendations reviewed via telephone.

## 2022-01-02 NOTE — PROGRESS NOTES
[facility] INPATIENT PROGRESS NOTE         [facility]    2022      PATIENT IDENTIFICATION:  Name: Sergio Daniel ADMIT: 2021   : 1955  PCP: Farhan Jean Jr., MD    MRN: [id number] LOS: 17 days   AGE/SEX: 66 y.o. male  ROOM: ICU4/1                     LOS 17    Reason for visit: Respiratory failure with Covid pneumonia      SUBJECTIVE:      Sedated yesterday and doing fairly well off ventilator.  Weak appearing.  Discussed with nursing staff at bedside.  No new issues overnight.    Objective   OBJECTIVE:    Vital Sign Min/Max for last 24 hours  Temp  Min: 97.5 °F (36.4 °C)  Max: 98.4 °F (36.9 °C)   BP  Min: 132/71  Max: 174/92   Pulse  Min: 72  Max: 110   Resp  Min: 12  Max: 28   SpO2  Min: 91 %  Max: 99 %   No data recorded   No data recorded                   FiO2 (%): 30 %     Body mass index is 35.13 kg/m².    Intake/Output Summary (Last 24 hours) at 2022 1332  Last data filed at 2022 1100  Gross per 24 hour   Intake 780 ml   Output 6200 ml   Net -5420 ml         Exam:  GEN:  No distress, appears stated age  EYES:   PERRL, anicteric sclerae  ENT:    External ears/nose normal, OP clear.    NECK:  No adenopathy, midline trachea  LUNGS: Normal chest on inspection, palpation and menaced breath sounds bilaterally on auscultation  CV:  Normal S1S2, without murmur  ABD:  Nontender, nondistended, no hepatosplenomegaly, +BS  EXT:  Nonpitting edema.  No cyanosis or clubbing.  No mottling and normal cap refill.    Assessment     Scheduled meds:  apixaban, 10 mg, Nasogastric, Q12H   Followed by  [START ON 2022] apixaban, 5 mg, Oral, Q12H  bisacodyl, 10 mg, Rectal, Daily  chlorhexidine, 15 mL, Mouth/Throat, Q12H  dexamethasone, 6 mg, Oral, Daily With Breakfast   Or  dexamethasone, 6 mg, Intravenous, Daily With Breakfast  dilTIAZem, 60 mg, Nasogastric, Q8H  famotidine, 20 mg, Intravenous, Daily  insulin aspart, 0-24 Units, Subcutaneous, Q6H  insulin detemir, 23 Units,  Subcutaneous, Q12H  levoFLOXacin, 750 mg, Intravenous, Q24H  polyethylene glycol, 17 g, Oral, Daily  sodium chloride, 10 mL, Intravenous, Q12H  sodium chloride, 10 mL, Intravenous, Q12H  sodium chloride, 10 mL, Intravenous, Q12H      IV meds:                      dexmedetomidine, 0.2-1.5 mcg/kg/hr, Last Rate: 0.3 mcg/kg/hr (01/01/22 1312)  propofol, 5-50 mcg/kg/min, Last Rate: 10 mcg/kg/min (01/01/22 1006)      Data Review:  Results from last 7 days   Lab Units 01/02/22 0408 01/01/22 0435 01/01/22  0435 12/28/21  1418 12/28/21  0432 12/28/21  0432 12/27/21  0440 12/27/21  0440   SODIUM mmol/L 139  --  139  --   --  137  --  138   POTASSIUM mmol/L 3.9  --  3.8 4.5  --  3.6  --  3.7   CHLORIDE mmol/L 100  --  104  --   --  104  --  101   CO2 mmol/L 30.8*  --  29.8*  --   --  27.8  --  30.5*   BUN mg/dL 17  --  15  --   --  24*  --  26*   CREATININE mg/dL 0.54*  --  0.54*  --   --  0.51*  --  0.69*   GLUCOSE mg/dL 127*   < > 181*  --    < > 114*   < > 138*   CALCIUM mg/dL 8.8  --  8.5*  --   --  7.1*  --  7.9*    < > = values in this interval not displayed.         Estimated Creatinine Clearance: 128.8 mL/min (A) (by C-G formula based on SCr of 0.54 mg/dL (L)).  Results from last 7 days   Lab Units 01/02/22 0408 01/01/22 0435 12/29/21  1715   WBC 10*3/mm3 13.56* 10.72 20.95*   HEMOGLOBIN g/dL 10.1* 8.9* 11.2*   PLATELETS 10*3/mm3 323 246 273     Results from last 7 days   Lab Units 12/30/21  0356   INR  1.15*     Results from last 7 days   Lab Units 01/02/22  0408 01/01/22  0435 12/28/21  0432 12/27/21  0440   ALT (SGPT) U/L 51* 31 21 25   AST (SGOT) U/L 37 22 20 21     Results from last 7 days   Lab Units 01/01/22  0942 12/31/21  0520 12/30/21  0523 12/29/21  0525 12/28/21  0510 12/27/21  0515   PH, ARTERIAL pH units 7.513* 7.480* 7.538* 7.487* 7.497* 7.516*   PO2 ART mm Hg 64.1* 72.2* 63.4* 55.3* 76.1* 63.0*   PCO2, ARTERIAL mm Hg 43.4 44.1 34.7* 39.8 41.2 43.8   HCO3 ART mmol/L 34.9* 32.8* 29.5* 30.1* 31.8*  35.4*     Results from last 7 days   Lab Units 01/02/22  0408 01/01/22  0435 12/31/21  0512 12/30/21  0356 12/29/21  0421 12/28/21  0432 12/27/21  0440   PROCALCITONIN ng/mL 0.11 0.12 0.17 0.27* 0.18 0.14 0.18       COVID LABS:  Results From Last 14 Days   Lab Units 01/02/22  0408 01/01/22  0435 12/31/21  0512 12/30/21  0356 12/30/21  0356 12/29/21  0421 12/29/21  0421   CRP mg/dL 5.09* 5.64* 11.00*   < > 14.70*   < > 4.76*   D DIMER QUANT MCGFEU/mL 2.08*  --  1.43*  --   --   --  1.63*   FERRITIN ng/mL 1,581.00* 1,386.00* 1,551.00*   < > 1,839.00*   < > 2,277.00*   LDH U/L 268*  --  226*  --   --   --  490*   PROCALCITONIN ng/mL 0.11 0.12 0.17   < > 0.27*   < > 0.18   PROTIME Seconds  --   --   --   --  15.0  --   --    INR   --   --   --   --  1.15*  --   --     < > = values in this interval not displayed.         Glucose   Date/Time Value Ref Range Status   01/02/2022 1110 170 (H) 70 - 130 mg/dL Final     Comment:     Meter: [id number] : [id number] Issa Cleary RN Float   01/02/2022 0023 133 (H) 70 - 130 mg/dL Final     Comment:     Meter: [id number] : [id number] Sindhu Seay RN   01/01/2022 2144 141 (H) 70 - 130 mg/dL Final     Comment:     Meter: [id number] : [id number] Sindhu Seay RN   01/01/2022 1737 148 (H) 70 - 130 mg/dL Final     Comment:     Meter: [id number] : [id number] Norris Mari RN   01/01/2022 1147 94 70 - 130 mg/dL Final     Comment:     Meter: [id number] : [id number] Allyssajimenez Jacey LIEBERMAN   01/01/2022 0619 172 (H) 70 - 130 mg/dL Final     Comment:     Meter: [id number] : [id number] Sindhu Seay RN   12/31/2021 2334 196 (H) 70 - 130 mg/dL Final     Comment:     Meter: [id number] : [id number] Sindhu eSay RN       Chest x-ray 1/1 reviewed: Lines and tubes stable.  Bilateral infiltrates            Microbiology reviewed  12/21/2021 0418 12/24/2021 0741 Respiratory Culture - Lavage, Cough [[id number]]    (Abnormal)   Lavage from Cough    Final result Component Value    Respiratory Culture Heavy growth (4+) Staphylococcus aureus Abnormal     Heavy growth (4+) Klebsiella pneumoniae ssp pneumoniae Abnormal     No Normal Respiratory Alida Abnormal         12/16/2021 1711 12/16/2021 1828 COVID-19 and FLU A/B PCR - Swab, Nasopharynx [[id number]]    (Abnormal)   Swab from Nasopharynx    Final result Component Value   COVID19 Detected Critical    Influenza A PCR Not Detected   Influenza B PCR Not Detected                      Active Hospital Problems    Diagnosis  POA   • **Acute hypoxemic respiratory failure due to COVID-19 ([facility]) [U07.1, J96.01]  Yes   • Acute respiratory failure with hypoxia ([facility]) [J96.01]  Yes      Resolved Hospital Problems   No resolved problems to display.         ASSESSMENT:    1. Acute hypoxic respiratory failure s/p mechanical ventilation 12/18   2. Ac COVID-19 pneumonia  3. ARDS  4. MSSA / Klebsiella pneumonia  5. Pneumomediastinum  6. Shock  7. Cytokine release syndrome s/p remdesivir  8. Extensive left leg DVT on AC  9. A. fib RVR  10. Uncontrolled diabetes  11. Obesity  12. Mild hepatitis; Covid related  13. Fever      PLAN:    Courage pulmonary toilet.  Wean oxygen as able.  Continue antibiotics for bacterial infectious process.  Infectious disease following.  Decadron for Covid pneumonia treatment.  Status post remdesivir course.  Monitoring inflammatory labs.  Control glucose.  Control blood pressure.  Pressor for blood pressure support.  On full anticoagulation for DVT.  Add PT.          Spencer Carmichael MD  Pulmonary and Critical Care Medicine  [facility], [facility]  1/2/2022    13:32 EST

## 2022-01-02 NOTE — PROGRESS NOTES
LOS: 17 days   Patient Care Team:  Farhan Jean Jr., MD as PCP - General (Family Medicine)        Subjective     Interval History:     Patient Complaints:   Patient Denies:  weak       Review of Systems:    weak    Objective     Vital Signs  Temp:  [97.5 °F (36.4 °C)-98.4 °F (36.9 °C)] 97.8 °F (36.6 °C)  Heart Rate:  [] 82  Resp:  [12-28] 14  BP: (142-174)/(74-98) 161/85    Physical Exam:     General Appearance:    Off vent   Head:     Eyes:            Lids and lashes normal, conjunctivae and sclerae normal, no   icterus, no pallor, corneas clear, PERRLA   Ears:    Ears appear intact with no abnormalities noted   Throat:   No oral lesions, no thrush, oral mucosa moist   Neck:   No adenopathy, supple, trachea midline, no thyromegaly, no   carotid bruit, no JVD   Back:     No kyphosis present, no scoliosis present, no skin lesions,      erythema or scars, no tenderness to percussion or                   palpation,   range of motion normal   Lungs:     Clear to auscultation,respirations regular, even and                  unlabored    Heart:    Regular rhythm and normal rate, normal S1 and S2, no            murmur, no gallop, no rub, no click   Chest Wall:    No abnormalities observed   Abdomen:     Normal bowel sounds, no masses, no organomegaly, soft        non-tender, non-distended, no guarding, no rebound                tenderness   Rectal:     Deferred   Extremities:   Moves all extremities well, no edema, no cyanosis, no             redness   Pulses:   Pulses palpable and equal bilaterally   Skin:   No bleeding, bruising or rash   Lymph nodes:   No palpable adenopathy   Neurologic:   Cranial nerves 2 - 12 grossly intact, sensation intact, DTR       present and equal bilaterally          Results Review:      Lab Results (last 72 hours)     Procedure Component Value Units Date/Time    POC Glucose Once [[id number]]  (Abnormal) Collected: 01/02/22 1654    Specimen: Blood Updated: 01/02/22 1701      Glucose 250 mg/dL      Comment: Meter: [id number] : [id number] Issa Cleary RN Float       C-reactive Protein [[id number]]  (Abnormal) Collected: 01/02/22 0408    Specimen: Blood Updated: 01/02/22 1250     C-Reactive Protein 5.09 mg/dL     POC Glucose Once [[id number]]  (Abnormal) Collected: 01/02/22 1110    Specimen: Blood Updated: 01/02/22 1117     Glucose 170 mg/dL      Comment: Meter: [id number] : [id number] Issa Cleary RN Float       Ferritin [[id number]]  (Abnormal) Collected: 01/02/22 0408    Specimen: Blood Updated: 01/02/22 0515     Ferritin 1,581.00 ng/mL     Narrative:      Results may be falsely decreased if patient taking Biotin.      Comprehensive Metabolic Panel [[id number]]  (Abnormal) Collected: 01/02/22 0408    Specimen: Blood Updated: 01/02/22 0503     Glucose 127 mg/dL      BUN 17 mg/dL      Creatinine 0.54 mg/dL      Sodium 139 mmol/L      Potassium 3.9 mmol/L      Chloride 100 mmol/L      CO2 30.8 mmol/L      Calcium 8.8 mg/dL      Total Protein 5.9 g/dL      Albumin 2.40 g/dL      ALT (SGPT) 51 U/L      AST (SGOT) 37 U/L      Alkaline Phosphatase 126 U/L      Total Bilirubin 0.5 mg/dL      eGFR Non African Amer >150 mL/min/1.73      Globulin 3.5 gm/dL      A/G Ratio 0.7 g/dL      BUN/Creatinine Ratio 31.5     Anion Gap 8.2 mmol/L     Narrative:      GFR Normal >60  Chronic Kidney Disease <60  Kidney Failure <15      Lactate Dehydrogenase [[id number]]  (Abnormal) Collected: 01/02/22 0408    Specimen: Blood Updated: 01/02/22 0503      U/L     Procalcitonin [[id number]]  (Normal) Collected: 01/02/22 0408    Specimen: Blood Updated: 01/02/22 0459     Procalcitonin 0.11 ng/mL     D-dimer, Quantitative [[id number]]  (Abnormal) Collected: 01/02/22 0408    Specimen: Blood Updated: 01/02/22 0449     D-Dimer, Quantitative 2.08 MCGFEU/mL     Narrative:      Can be elevated in, but is not diagnostic for deep vein thrombosis (DVT) or pulmonary embolis (PE).  It is also elevated in other medical  conditions.  Clinical correlation is required.  The negative cut-off value for the D-Dimer is 0.50 mcg FEU/mL for DVT and PE.      CBC (No Diff) [[id number]]  (Abnormal) Collected: 01/02/22 0408    Specimen: Blood Updated: 01/02/22 0430     WBC 13.56 10*3/mm3      RBC 3.27 10*6/mm3      Hemoglobin 10.1 g/dL      Hematocrit 31.8 %      MCV 97.2 fL      MCH 30.9 pg      MCHC 31.8 g/dL      RDW 14.5 %      RDW-SD 49.4 fl      MPV 10.7 fL      Platelets 323 10*3/mm3     POC Glucose Once [[id number]]  (Abnormal) Collected: 01/02/22 0023    Specimen: Blood Updated: 01/02/22 0029     Glucose 133 mg/dL      Comment: Meter: [id number] : [id number] Sindhu Seay RN       POC Glucose Once [[id number]]  (Abnormal) Collected: 01/01/22 2144    Specimen: Blood Updated: 01/01/22 2150     Glucose 141 mg/dL      Comment: Meter: [id number] : [id number] Sindhu Seay RN       POC Glucose Once [[id number]]  (Abnormal) Collected: 01/01/22 1737    Specimen: Blood Updated: 01/01/22 1743     Glucose 148 mg/dL      Comment: Meter: [id number] : [id number] Norris Mari RN       C-reactive Protein [[id number]]  (Abnormal) Collected: 01/01/22 0435    Specimen: Blood Updated: 01/01/22 1248     C-Reactive Protein 5.64 mg/dL     POC Glucose Once [[id number]]  (Normal) Collected: 01/01/22 1147    Specimen: Blood Updated: 01/01/22 1155     Glucose 94 mg/dL      Comment: Meter: [id number] : [id number] Norris Mari RN       Respiratory Culture - Sputum, ET Suction [[id number]]  (Abnormal)  (Susceptibility) Collected: 12/29/21 0804    Specimen: Sputum from ET Suction Updated: 01/01/22 0957     Respiratory Culture Heavy growth (4+) Staphylococcus aureus      Heavy growth (4+) Stenotrophomonas maltophilia      Light growth (2+) Citrobacter freundii      No Normal Respiratory Alida     Gram Stain Moderate (3+) WBCs seen      Rare (1+) Epithelial cells seen      Moderate (3+) Gram positive cocci in pairs, chains and clusters    Susceptibility       Staphylococcus aureus     JANUSZ     Clindamycin Susceptible     Inducible Clindamycin Resistance Negative     Oxacillin Susceptible     Tetracycline Susceptible     Trimethoprim + Sulfamethoxazole Susceptible     Vancomycin Susceptible                  Linear View               Susceptibility      Stenotrophomonas maltophilia     JANUSZ     Levofloxacin Susceptible     Trimethoprim + Sulfamethoxazole Susceptible                  Linear View               Susceptibility      Citrobacter freundii     JANUSZ     Cefepime Susceptible     Ceftazidime Resistant     Ceftriaxone Resistant     Gentamicin Susceptible     Levofloxacin Susceptible     Piperacillin + Tazobactam Intermediate     Tetracycline Susceptible     Trimethoprim + Sulfamethoxazole Susceptible                  Linear View               Susceptibility Comments     Staphylococcus aureus    This isolate does not demonstrate inducible clindamycin resistance in vitro.    This isolate does not demonstrate inducible clindamycin resistance in vitro.      Citrobacter freundii    Cefazolin sensitivity will not be reported for Enterobacteriaceae in non-urine isolates. If cefazolin is preferred, please call the microbiology lab to request an E-test.  With the exception of urinary-sourced infections, aminoglycosides should not be used as monotherapy.             Blood Gas, Arterial - [[id number]]  (Abnormal) Collected: 01/01/22 0942    Specimen: Arterial Blood Updated: 01/01/22 0942     Site Left Radial     Triston's Test Positive     pH, Arterial 7.513 pH units      Comment: 83 Value above reference range        pCO2, Arterial 43.4 mm Hg      pO2, Arterial 64.1 mm Hg      Comment: 84 Value below reference range        HCO3, Arterial 34.9 mmol/L      Comment: 83 Value above reference range        Base Excess, Arterial 10.9 mmol/L      Comment: 83 Value above reference range        O2 Saturation, Arterial 93.7 %      Comment: 84 Value below reference range        Hemoglobin, Blood  Gas 9.4 g/dL      Comment: 84 Value below reference range        Temperature 37.0 C      Barometric Pressure for Blood Gas 731 mmHg      Modality Ventilator     FIO2 30 %      Ventilator Mode CPAP     Set Tidal Volume 574     PEEP 5.0     PSV 10.0 cmH2O      Collected by [id number]     Comment: Meter: [id number]     :  [id number]        pCO2, Temperature Corrected 43.4 mm Hg      pH, Temp Corrected 7.513 pH Units      pO2, Temperature Corrected 64.1 mm Hg     POC Glucose Once [[id number]]  (Abnormal) Collected: 01/01/22 0619    Specimen: Blood Updated: 01/01/22 0626     Glucose 172 mg/dL      Comment: Meter: [id number] : [id number] Sindhu Seay RN       Ferritin [[id number]]  (Abnormal) Collected: 01/01/22 0435    Specimen: Blood Updated: 01/01/22 0541     Ferritin 1,386.00 ng/mL     Narrative:      Results may be falsely decreased if patient taking Biotin.      Comprehensive Metabolic Panel [[id number]]  (Abnormal) Collected: 01/01/22 0435    Specimen: Blood Updated: 01/01/22 0530     Glucose 181 mg/dL      BUN 15 mg/dL      Creatinine 0.54 mg/dL      Sodium 139 mmol/L      Potassium 3.8 mmol/L      Chloride 104 mmol/L      CO2 29.8 mmol/L      Calcium 8.5 mg/dL      Total Protein 5.2 g/dL      Albumin 2.00 g/dL      ALT (SGPT) 31 U/L      AST (SGOT) 22 U/L      Alkaline Phosphatase 97 U/L      Total Bilirubin 0.4 mg/dL      eGFR Non African Amer >150 mL/min/1.73      Globulin 3.2 gm/dL      A/G Ratio 0.6 g/dL      BUN/Creatinine Ratio 27.8     Anion Gap 5.2 mmol/L     Narrative:      GFR Normal >60  Chronic Kidney Disease <60  Kidney Failure <15      Procalcitonin [[id number]]  (Normal) Collected: 01/01/22 0435    Specimen: Blood Updated: 01/01/22 0512     Procalcitonin 0.12 ng/mL     CBC (No Diff) [[id number]]  (Abnormal) Collected: 01/01/22 0435    Specimen: Blood Updated: 01/01/22 0448     WBC 10.72 10*3/mm3      RBC 2.82 10*6/mm3      Hemoglobin 8.9 g/dL      Hematocrit 27.8 %      MCV 98.6 fL      MCH  31.6 pg      MCHC 32.0 g/dL      RDW 14.2 %      RDW-SD 49.0 fl      MPV 10.8 fL      Platelets 246 10*3/mm3     POC Glucose Once [[id number]]  (Abnormal) Collected: 12/31/21 2334    Specimen: Blood Updated: 12/31/21 2340     Glucose 196 mg/dL      Comment: Meter: [id number] : [id number] Sindhu Seay RN       POC Glucose Once [[id number]]  (Abnormal) Collected: 12/31/21 2023    Specimen: Blood Updated: 12/31/21 2030     Glucose 226 mg/dL      Comment: Meter: [id number] : [id number] Sindhu Seay RN       POC Glucose Once [[id number]]  (Abnormal) Collected: 12/31/21 1710    Specimen: Blood Updated: 12/31/21 1716     Glucose 202 mg/dL      Comment: Meter: [id number] : MICHAEL Roland RN       C-reactive Protein [[id number]]  (Abnormal) Collected: 12/31/21 0512    Specimen: Blood Updated: 12/31/21 1340     C-Reactive Protein 11.00 mg/dL     POC Glucose Once [[id number]]  (Abnormal) Collected: 12/31/21 1202    Specimen: Blood Updated: 12/31/21 1208     Glucose 194 mg/dL      Comment: Meter: [id number] : [id number] Dejon Johnson RN       Ferritin [[id number]]  (Abnormal) Collected: 12/31/21 0512    Specimen: Blood Updated: 12/31/21 0640     Ferritin 1,551.00 ng/mL     Narrative:      Results may be falsely decreased if patient taking Biotin.      Lactate Dehydrogenase [[id number]]  (Abnormal) Collected: 12/31/21 0512    Specimen: Blood Updated: 12/31/21 0634      U/L     Procalcitonin [[id number]]  (Normal) Collected: 12/31/21 0512    Specimen: Blood Updated: 12/31/21 0623     Procalcitonin 0.17 ng/mL     POC Glucose Once [[id number]]  (Abnormal) Collected: 12/31/21 0609    Specimen: Blood Updated: 12/31/21 0616     Glucose 183 mg/dL      Comment: Meter: [id number] : [id number] Sindhu Seay RN       D-dimer, Quantitative [[id number]]  (Abnormal) Collected: 12/31/21 0512    Specimen: Blood Updated: 12/31/21 0609     D-Dimer, Quantitative 1.43 MCGFEU/mL     Narrative:      Can be elevated in,  but is not diagnostic for deep vein thrombosis (DVT) or pulmonary embolis (PE).  It is also elevated in other medical conditions.  Clinical correlation is required.  The negative cut-off value for the D-Dimer is 0.50 mcg FEU/mL for DVT and PE.      Blood Gas, Arterial - [[id number]]  (Abnormal) Collected: 12/31/21 0520    Specimen: Arterial Blood Updated: 12/31/21 0526     Site Left Radial     Triston's Test Positive     pH, Arterial 7.480 pH units      Comment: 83 Value above reference range        pCO2, Arterial 44.1 mm Hg      pO2, Arterial 72.2 mm Hg      Comment: 84 Value below reference range        HCO3, Arterial 32.8 mmol/L      Comment: 83 Value above reference range        Base Excess, Arterial 8.4 mmol/L      Comment: 83 Value above reference range        O2 Saturation, Arterial 96.4 %      Hemoglobin, Blood Gas 8.5 g/dL      Comment: 84 Value below reference range        Temperature 37.0 C      Barometric Pressure for Blood Gas 734 mmHg      Modality Ventilator     FIO2 35 %      Ventilator Mode AC     Set Mech Resp Rate 16.0     PEEP 5.0     PIP 14 cmH2O      Comment: Meter: [id number]     :  [id number]        Collected by [id number]     pCO2, Temperature Corrected 44.1 mm Hg      pH, Temp Corrected 7.480 pH Units      pO2, Temperature Corrected 72.2 mm Hg     POC Glucose Once [[id number]]  (Abnormal) Collected: 12/31/21 0007    Specimen: Blood Updated: 12/31/21 0013     Glucose 178 mg/dL      Comment: Meter: [id number] : [id number] Seay RN       POC Glucose Once [[id number]]  (Abnormal) Collected: 12/30/21 2104    Specimen: Blood Updated: 12/30/21 2110     Glucose 184 mg/dL      Comment: Meter: [id number] : [id number] Seay RN             Imaging Results (Last 72 Hours)     Procedure Component Value Units Date/Time    XR Chest 1 View [[id number]] Collected: 01/01/22 0821     Updated: 01/01/22 0823    Narrative:      CR Chest 1 Vw    INDICATION:   : 19 positive. Ventilator  patient.     COMPARISON:    12/31/2021    FINDINGS:  Portable AP view(s) of the chest.    Enteric tube tip below the diaphragm but not in the field of view. Endotracheal tube in good position above the rochelle. Left-sided PICC line remains present. The tip of the PICC line is directed cephalad at the level of the mid SVC.    Stable cardiac silhouette. Mid and lower lung zone opacities bilaterally persist. Stable to slight interval worsening on the left. No distinct pneumothorax.       Impression:        1. Tubes and lines in position as described. The tip of the left-sided PICC line is now directed cephalad. No pneumothorax.  2. Bilateral pneumonia. Stable to interval worsening appearance compared to prior.    Signer Name: Te Kulkarni MD   Signed: 1/1/2022 8:21 AM   Workstation Name: [email or web address]    [facility] Chest 1 View [[id number]] Collected: 12/31/21 0719     Updated: 12/31/21 0721    Narrative:      CHEST X-RAY, 12/31/2021      HISTORY:    66-year-old male in the ICU with respiratory failure, COVID-19 pneumonia. Patient on ventilator. Follow-up pulmonary status.      TECHNIQUE:  AP portable chest x-ray. Supine.    COMPARISON:  *  Chest x-ray, 12/30/2021.    FINDINGS:  Moderately dense patchy multifocal pulmonary airspace infiltrates, greatest in the right upper lobe. These are unchanged accounting for slightly improved overall lung expansion today. No visible recurrent pneumomediastinum. Improving soft tissue air in  the chest wall.    Right IJ central line is been removed, and a well-positioned left arm PICC is now in place. Endotracheal tube tip in the low thoracic trachea about 2.5 cm above the rochelle. Feeding tube below the diaphragm.      Impression:      1.  Stable portable chest x-ray, unchanged since yesterday.  2.  Support equipment in good position as noted.  3.  No visible recurrent pneumomediastinum.    Signer Name: Denny Marshall MD   Signed: 12/31/2021 7:19 AM    "Workstation Name: [id number]    [facility]            Medication Review:     Hospital Medications (active)       Dose Frequency Start End    acetaminophen (TYLENOL) tablet 650 mg 650 mg Every 6 Hours PRN 12/22/2021     Admin Instructions: Do not exceed 4 grams of acetaminophen in a 24 hr period. Max dose of 2gm for AST/ALT greater than 120 units/L      If given for pain, use the following pain scale:   Mild Pain = Pain Score of 1-3, CPOT 1-2  Moderate Pain = Pain Score of 4-6, CPOT 3-4  Severe Pain = Pain Score of 7-10, CPOT 5-8    Route: Oral    apixaban (ELIQUIS) tablet 10 mg 10 mg Every 12 Hours Scheduled 12/29/2021 1/5/2022    Admin Instructions: Tablet may be crushed and suspended in 60 mL of water or D5W and immediately delivered via NG tube.  Avoid grapefruit juice.    Route: Nasogastric    Linked Group 1: \"Followed by\" Linked Group Details        apixaban (ELIQUIS) tablet 5 mg 5 mg Every 12 Hours Scheduled 1/5/2022     Admin Instructions: Tablet may be crushed and suspended in 60 mL of water or D5W and immediately delivered via NG tube.  Avoid grapefruit juice.    Route: Oral    Linked Group 1: \"Followed by\" Linked Group Details        bisacodyl (DULCOLAX) suppository 10 mg 10 mg Daily 12/27/2021     Route: Rectal    chlorhexidine (PERIDEX) 0.12 % solution 15 mL 15 mL Every 12 Hours Scheduled 12/27/2021     Admin Instructions:  Do not swallow.    Route: Mouth/Throat    dexamethasone (DECADRON) injection 6 mg 6 mg Daily With Breakfast 12/25/2021     Admin Instructions: Switch to IV if unable to take medication by mouth  For IV administration.  May be pushed over a minimum of 1 minute.    Route: Intravenous    Linked Group 2: \"Or\" Linked Group Details        dexamethasone (DECADRON) tablet 6 mg 6 mg Daily With Breakfast 12/25/2021     Admin Instructions: Switch to IV if unable to take medication by mouth  Take with food.    Route: Oral    Linked Group 2: \"Or\" Linked Group Details   "      dexmedetomidine (PRECEDEX) 400 mcg in 100 mL NS infusion 0.2-1.5 mcg/kg/hr × 115 kg Titrated 12/17/2021     Admin Instructions: Begin infusion at 0.2 mcg/kg/hr and titrate by 0.1 mcg/kg/hr every 15 minutes to maintain RASS goal.  Maximum rate 1.5 mcg/kg/hr.  Notify MD if not at RASS goal and requiring 1.5 mcg/kg/hr or heart rate is less than 50 beats per minute or MAP is less than 60 mmHg.    Route: Intravenous    dextromethorphan polistirex ER (DELSYM) 30 MG/5ML oral suspension 60 mg 60 mg Every 12 Hours PRN 12/16/2021     Route: Oral    dextrose (D50W) (25 g/50 mL) IV injection 25 g 25 g Every 15 Minutes PRN 12/19/2021     Admin Instructions: Blood sugar less than 70; patient has IV access - Unresponsive, NPO or Unable To Safely Swallow    Route: Intravenous    dextrose (GLUTOSE) oral gel 15 g 15 g Every 15 Minutes PRN 12/19/2021     Admin Instructions: BS<70, Patient Alert, Is not NPO, Can safely swallow.    Route: Oral    dilTIAZem (CARDIZEM) tablet 60 mg 60 mg Every 8 Hours Scheduled 12/24/2021     Admin Instructions: HOLD FOR HR <60 and/or SBP <,= 90mmHg  Caution: Look alike/sound alike drug alert.  Take on empty stomach 1 hr before or 2 hrs after meals. Avoid grapefruit juice. Maximum simvastatin dose 10 mg.    Route: Nasogastric    famotidine (PEPCID) injection 20 mg 20 mg Daily 12/27/2021     Admin Instructions: Dilute to 10 mL total volume and give IV push over 2 minutes.    Route: Intravenous    fentaNYL citrate (PF) (SUBLIMAZE) injection 25 mcg 25 mcg Every 1 Hour PRN 12/27/2021 1/3/2022    Admin Instructions: Use filter needle to withdraw dose from ampule.  If given for pain, use the following pain scale:  Mild Pain = Pain Score of 1-3, CPOT 1-2  Moderate Pain = Pain Score of 4-6, CPOT 3-4  Severe Pain = Pain Score of 7-10, CPOT 5-8    Route: Intravenous    glucagon (GLUCAGEN) injection 1 mg 1 mg Every 15 Minutes PRN 12/19/2021     Admin Instructions: Blood Glucose Less Than 70 - Patient Without IV  Access - Unresponsive, NPO or Unable To Safely Swallow    Route: Subcutaneous    insulin aspart (novoLOG) injection 0-24 Units 0-24 Units Every 6 Hours 12/21/2021     Admin Instructions: Correction - High Dose.  Greater than 80 units/day total insulin dose or, on steroids, insulin resistant, infection.    Blood glucose 150-199 mg/dL - 4 units  Blood glucose 200-249 mg/dL - 8 units  Blood glucose 250-299 mg/dL - 12 units  Blood glucose 300-349 mg/dL - 16 units  Blood glucose 350-400 mg/dL - 20 units  Blood glucose greater than 400 mg/dL - 24 units and call provider      Route: Subcutaneous    insulin detemir (LEVEMIR) injection 23 Units 23 Units Every 12 Hours Scheduled 12/31/2021     Admin Instructions:     Route: Subcutaneous    ipratropium-albuterol (DUO-NEB) nebulizer solution 3 mL 3 mL Every 6 Hours PRN 12/16/2021     Route: Nebulization    levoFLOXacin (LEVAQUIN) 750 mg/150 mL D5W (premix) (LEVAQUIN) 750 mg 750 mg Every 24 Hours 1/1/2022 1/8/2022    Admin Instructions: Caution: Look alike/sound alike drug alert. Protect from light. Do NOT refrigerate.    Route: Intravenous    polyethylene glycol (MIRALAX) packet 17 g 17 g Daily 12/26/2021     Admin Instructions: Use 4-8 ounces of water, tea, or juice for each 17 gram dose.    Route: Oral    potassium chloride 10 mEq in 100 mL IVPB 10 mEq Every 1 Hour PRN 12/17/2021     Admin Instructions: Peripheral IV  Potassium 3.1 or Less Give KCl 10 mEq/100 mL NS IV q1h x6 Doses  Potassium 3.2 - 3.6 Give KCl 10 mEq/100 mL NS q1h x4 Doses    Check Potassium 4 Hours After Last Dose Given  Check Magnesium if Potassium Remains Low After Replacement  DO NOT GIVE if CrCl is Less Than 30 mL/minute or Urine Output Less Than 30 mL/hr.     Rates Greater Than 10 mEq/hr Require ECG Monitoring.  OUTPATIENT/NON-MONITORED UNITS: Potassium Chloride standard bolus infusion rate is a maximum of 10 mEq/hr on unmonitored patients    MONITORED UNITS: Potassium Chloride standard bolus infusion  "rate is a maximum of 20 mEq/hr on ECG monitored patients ONLY      Route: Intravenous    potassium phosphate 15 mmol in sodium chloride 0.9 % 100 mL infusion 15 mmol As Needed 12/17/2021     Admin Instructions: Recheck Phosphorus level 6 hours after replacement complete.  Refrigerate.      Doses listed as mmol of phosphate.   4.4 mEq of Potassium = 3 mmol of Phosphate    Route: Intravenous    Linked Group 3: \"Or\" Linked Group Details        potassium phosphate 30 mmol in sodium chloride 0.9 % 250 mL infusion 30 mmol As Needed 12/17/2021     Admin Instructions: Recheck Phosphorus level 6 hours after replacement complete.  Refrigerate.      Doses listed as mmol of phosphate.   4.4 mEq of Potassium = 3 mmol of Phosphate    Route: Intravenous    Linked Group 3: \"Or\" Linked Group Details        potassium phosphate 45 mmol in sodium chloride 0.9 % 500 mL infusion 45 mmol As Needed 12/17/2021     Admin Instructions: Recheck Phosphorus level 6 hours after replacement complete.  Refrigerate.      Doses listed as mmol of phosphate.   4.4 mEq of Potassium = 3 mmol of Phosphate    Route: Intravenous    Linked Group 3: \"Or\" Linked Group Details        propofol (DIPRIVAN) infusion 10 mg/mL 100 mL 5-50 mcg/kg/min × 115 kg Titrated 12/18/2021     Admin Instructions: Do not administer through the same IV catheter with blood or plasma.  Tubing and any unused portions of propofol vials should be discarded after 12 hours.  Begin infusion at 5 mcg/kg/min and titrate by by 5 mcg/kg/min every 5 minutes to maintain RASS goal. Maximum rate 50 mcg/kg/min.  Notify MD if not at goal and requiring more than 50 mcg/kg/min. Infuse into dedicated line, change vial and tube every 12 hours. Patient must be intubated.    Route: Intravenous    sodium chloride 0.9 % flush 10 mL 10 mL As Needed 12/16/2021     Route: Intravenous    Linked Group 4: \"And\" Linked Group Details        sodium chloride 0.9 % flush 10 mL 10 mL As Needed 12/21/2021     Route: " "Intravenous    sodium chloride 0.9 % flush 10 mL 10 mL Every 12 Hours Scheduled 12/29/2021     Admin Instructions: Lumen #1    Route: Intravenous    sodium chloride 0.9 % flush 10 mL 10 mL Every 12 Hours Scheduled 12/29/2021     Admin Instructions: Lumen #2    Route: Intravenous    sodium chloride 0.9 % flush 10 mL 10 mL Every 12 Hours Scheduled 12/29/2021     Admin Instructions: Lumen #3    Route: Intravenous    sodium chloride 0.9 % flush 10 mL 10 mL As Needed 12/29/2021     Route: Intravenous    sodium chloride 0.9 % flush 20 mL 20 mL As Needed 12/21/2021     Route: Intravenous    sodium chloride 0.9 % flush 20 mL 20 mL As Needed 12/29/2021     Route: Intravenous    sodium phosphates 15 mmol in sodium chloride 0.9 % 250 mL IVPB 15 mmol As Needed 12/17/2021     Admin Instructions: Recheck Phosphorus level 6 hours after replacement complete.    Route: Intravenous    Linked Group 3: \"Or\" Linked Group Details        sodium phosphates 30 mmol in sodium chloride 0.9 % 250 mL IVPB 30 mmol As Needed 12/17/2021     Admin Instructions: Recheck Phosphorus level 6 hours after replacement complete.    Route: Intravenous    Linked Group 3: \"Or\" Linked Group Details        sodium phosphates 45 mmol in sodium chloride 0.9 % 500 mL IVPB 45 mmol As Needed 12/17/2021     Admin Instructions: Recheck Phosphorus level 6 hours after replacement complete.    Route: Intravenous    Linked Group 3: \"Or\" Linked Group Details            apixaban, 10 mg, Nasogastric, Q12H   Followed by  [START ON 1/5/2022] apixaban, 5 mg, Oral, Q12H  bisacodyl, 10 mg, Rectal, Daily  chlorhexidine, 15 mL, Mouth/Throat, Q12H  dexamethasone, 6 mg, Oral, Daily With Breakfast   Or  dexamethasone, 6 mg, Intravenous, Daily With Breakfast  dilTIAZem, 60 mg, Nasogastric, Q8H  famotidine, 20 mg, Intravenous, Daily  insulin aspart, 0-24 Units, Subcutaneous, Q6H  insulin detemir, 23 Units, Subcutaneous, Q12H  levoFLOXacin, 750 mg, Intravenous, Q24H  polyethylene glycol, " 17 g, Oral, Daily  sodium chloride, 10 mL, Intravenous, Q12H  sodium chloride, 10 mL, Intravenous, Q12H  sodium chloride, 10 mL, Intravenous, Q12H        Assessment/Plan         Acute hypoxemic respiratory failure due to COVID-19 (HCC)    Acute respiratory failure with hypoxia (HCC)    1. Acute hypoxic respiratory failure s/p mechanical ventilation 12/18   2. Ac COVID-19 pneumonia  3. ARDS  4. MSSA / Klebsiella pneumonia  5. Pneumomediastinum  6. Shock  7. Cytokine release syndrome s/p remdesivir  8. Extensive left leg DVT on AC  9. A. fib RVR  10. Uncontrolled diabetes  11. Obesity  12. Mild hepatitis; Covid related  13. Fever        C&S  noted     Plan :        IV levaquine   supp care  Will follow  Thank you              William Osborne MD  01/02/22  17:37 EST

## 2022-01-02 NOTE — PROGRESS NOTES
"Hospitalist Team      Patient Care Team:  Farhan Jean Jr., MD as PCP - General (Family Medicine)      Chief Complaint:  Follow-up Acute Hypoxic Respiratory Failure due COVID-19    Subjective    Debated yesterday. Oxygen remained stable on heated high flow nasal cannula. Following some commands today but remains very relatively very weak    Objective    Vital Signs  Temp:  [97.5 °F (36.4 °C)-98.4 °F (36.9 °C)] 98.2 °F (36.8 °C)  Heart Rate:  [] 85  Resp:  [12-28] 20  BP: (132-174)/() 165/89  Oxygen Therapy  SpO2: 99 %  Pulse Oximetry Type: Continuous  Device (Oxygen Therapy): ventilator  Device (Oxygen Therapy): high-flow nasal cannula, heated  $ High Flow Nasal Cannula Set-Up: yes  Flow (L/min): 40  Oxygen Concentration (%): 35  ETCO2 (mmHg): 35 mmHg  Probe Placed On (Pulse Ox): Right:, forehead  Probe Removed From (Pulse Ox): Left:, forehead}  Flowsheet Rows      First Filed Value   Admission Height 185.4 cm (73\") Documented at 12/16/2021 1515   Admission Weight 120 kg (265 lb) Documented at 12/16/2021 1515          Physical Exam:  Vitals reviewed 1/2/22  General: Acute distress  HEENT:  Tube in place  Lungs: Manage breath sounds in all fields  CV: Irregularly, irregular  no significant peripheral edema  Abdomen: Soft with active bowel sounds.  MSK: Moves extremities spontaneously  Skin: Warm dry  Psych:  Followed some simple commands    Results Review:     I reviewed the patient's new clinical results.    Lab Results (last 24 hours)     Procedure Component Value Units Date/Time    C-reactive Protein [[id number]]  (Abnormal) Collected: 01/02/22 0408    Specimen: Blood Updated: 01/02/22 1250     C-Reactive Protein 5.09 mg/dL     POC Glucose Once [[id number]]  (Abnormal) Collected: 01/02/22 1110    Specimen: Blood Updated: 01/02/22 1117     Glucose 170 mg/dL      Comment: Meter: [id number] : [id number] Issa Cleary RN Float       Ferritin [[id number]]  (Abnormal) Collected: 01/02/22 0408    " Specimen: Blood Updated: 01/02/22 0515     Ferritin 1,581.00 ng/mL     Narrative:      Results may be falsely decreased if patient taking Biotin.      Comprehensive Metabolic Panel [[id number]]  (Abnormal) Collected: 01/02/22 0408    Specimen: Blood Updated: 01/02/22 0503     Glucose 127 mg/dL      BUN 17 mg/dL      Creatinine 0.54 mg/dL      Sodium 139 mmol/L      Potassium 3.9 mmol/L      Chloride 100 mmol/L      CO2 30.8 mmol/L      Calcium 8.8 mg/dL      Total Protein 5.9 g/dL      Albumin 2.40 g/dL      ALT (SGPT) 51 U/L      AST (SGOT) 37 U/L      Alkaline Phosphatase 126 U/L      Total Bilirubin 0.5 mg/dL      eGFR Non African Amer >150 mL/min/1.73      Globulin 3.5 gm/dL      A/G Ratio 0.7 g/dL      BUN/Creatinine Ratio 31.5     Anion Gap 8.2 mmol/L     Narrative:      GFR Normal >60  Chronic Kidney Disease <60  Kidney Failure <15      Lactate Dehydrogenase [[id number]]  (Abnormal) Collected: 01/02/22 0408    Specimen: Blood Updated: 01/02/22 0503      U/L     Procalcitonin [[id number]]  (Normal) Collected: 01/02/22 0408    Specimen: Blood Updated: 01/02/22 0459     Procalcitonin 0.11 ng/mL     D-dimer, Quantitative [[id number]]  (Abnormal) Collected: 01/02/22 0408    Specimen: Blood Updated: 01/02/22 0449     D-Dimer, Quantitative 2.08 MCGFEU/mL     Narrative:      Can be elevated in, but is not diagnostic for deep vein thrombosis (DVT) or pulmonary embolis (PE).  It is also elevated in other medical conditions.  Clinical correlation is required.  The negative cut-off value for the D-Dimer is 0.50 mcg FEU/mL for DVT and PE.      CBC (No Diff) [[id number]]  (Abnormal) Collected: 01/02/22 0408    Specimen: Blood Updated: 01/02/22 0430     WBC 13.56 10*3/mm3      RBC 3.27 10*6/mm3      Hemoglobin 10.1 g/dL      Hematocrit 31.8 %      MCV 97.2 fL      MCH 30.9 pg      MCHC 31.8 g/dL      RDW 14.5 %      RDW-SD 49.4 fl      MPV 10.7 fL      Platelets 323 10*3/mm3     POC Glucose Once [[id number]]  (Abnormal)  Collected: 01/02/22 0023    Specimen: Blood Updated: 01/02/22 0029     Glucose 133 mg/dL      Comment: Meter: [id number] : [id number] Sindhu Seay RN       POC Glucose Once [[id number]]  (Abnormal) Collected: 01/01/22 2144    Specimen: Blood Updated: 01/01/22 2150     Glucose 141 mg/dL      Comment: Meter: [id number] : [id number] Sindhu Seay RN       POC Glucose Once [[id number]]  (Abnormal) Collected: 01/01/22 1737    Specimen: Blood Updated: 01/01/22 1743     Glucose 148 mg/dL      Comment: Meter: [id number] : [id number] Norris Mari RN             Imaging Results (Last 24 Hours)     ** No results found for the last 24 hours. **          Xray reviewed personally by physician.      Medication Review:   I have reviewed the patient's current medication list    Current Facility-Administered Medications:   •  acetaminophen (TYLENOL) tablet 650 mg, 650 mg, Oral, Q6H PRN, Jayden Hyatt, , 650 mg at 12/26/21 1153  •  apixaban (ELIQUIS) tablet 10 mg, 10 mg, Nasogastric, Q12H, 10 mg at 01/02/22 0826 **FOLLOWED BY** [START ON 1/5/2022] apixaban (ELIQUIS) tablet 5 mg, 5 mg, Oral, Q12H, Dakota Moore MD  •  bisacodyl (DULCOLAX) suppository 10 mg, 10 mg, Rectal, Daily, Dakota Moore MD, 10 mg at 01/02/22 0826  •  chlorhexidine (PERIDEX) 0.12 % solution 15 mL, 15 mL, Mouth/Throat, Q12H, Spencer Carmichael MD, 15 mL at 01/02/22 0826  •  dexamethasone (DECADRON) tablet 6 mg, 6 mg, Oral, Daily With Breakfast **OR** dexamethasone (DECADRON) injection 6 mg, 6 mg, Intravenous, Daily With Breakfast, Charli Ayala MD, 6 mg at 01/02/22 0826  •  dexmedetomidine (PRECEDEX) 400 mcg in 100 mL NS infusion, 0.2-1.5 mcg/kg/hr, Intravenous, Titrated, Jaleesa Cárdenas MD, Last Rate: 8.6 mL/hr at 01/01/22 1312, 0.3 mcg/kg/hr at 01/01/22 1312  •  dextromethorphan polistirex ER (DELSYM) 30 MG/5ML oral suspension 60 mg, 60 mg, Oral, Q12H PRN, Jaleesa Cárdenas MD, 60 mg at 12/31/21 2019  •   dextrose (D50W) (25 g/50 mL) IV injection 25 g, 25 g, Intravenous, Q15 Min PRN, Dakota Moore MD, 25 g at 12/29/21 0804  •  dextrose (GLUTOSE) oral gel 15 g, 15 g, Oral, Q15 Min PRN, Dakota Moore MD  •  dilTIAZem (CARDIZEM) tablet 60 mg, 60 mg, Nasogastric, Q8H, Dakota Moore MD, 60 mg at 01/02/22 0631  •  famotidine (PEPCID) injection 20 mg, 20 mg, Intravenous, Daily, Spencer Carmichael MD, 20 mg at 01/02/22 0841  •  fentaNYL citrate (PF) (SUBLIMAZE) injection 25 mcg, 25 mcg, Intravenous, Q1H PRN, Dakota Moore MD, 25 mcg at 12/30/21 2123  •  glucagon (GLUCAGEN) injection 1 mg, 1 mg, Subcutaneous, Q15 Min PRN, Dakota Moore MD  •  insulin aspart (novoLOG) injection 0-24 Units, 0-24 Units, Subcutaneous, Q6H, Jayden Hyatt DO, 4 Units at 01/02/22 1203  •  insulin detemir (LEVEMIR) injection 23 Units, 23 Units, Subcutaneous, Q12H, Dakota Moore MD, 23 Units at 01/02/22 0828  •  ipratropium-albuterol (DUO-NEB) nebulizer solution 3 mL, 3 mL, Nebulization, Q6H PRN, Jaleesa Cárdenas MD  •  levoFLOXacin (LEVAQUIN) 750 mg/150 mL D5W (premix) (LEVAQUIN) 750 mg, 750 mg, Intravenous, Q24H, Jayden Hyatt DO, 750 mg at 01/02/22 1203  •  polyethylene glycol (MIRALAX) packet 17 g, 17 g, Oral, Daily, Luca Hermosillo MD, 17 g at 01/02/22 0827  •  potassium chloride 10 mEq in 100 mL IVPB, 10 mEq, Intravenous, Q1H PRN, Jaleesa Cárdenas MD, Stopped at 12/28/21 0940  •  potassium phosphate 45 mmol in sodium chloride 0.9 % 500 mL infusion, 45 mmol, Intravenous, PRN **OR** potassium phosphate 30 mmol in sodium chloride 0.9 % 250 mL infusion, 30 mmol, Intravenous, PRN **OR** potassium phosphate 15 mmol in sodium chloride 0.9 % 100 mL infusion, 15 mmol, Intravenous, PRN **OR** sodium phosphates 45 mmol in sodium chloride 0.9 % 500 mL IVPB, 45 mmol, Intravenous, PRN **OR** sodium phosphates 30 mmol in sodium chloride 0.9 % 250 mL IVPB, 30 mmol, Intravenous, PRN **OR** sodium phosphates 15 mmol in  sodium chloride 0.9 % 250 mL IVPB, 15 mmol, Intravenous, PRN, Jaleesa Cárdenas MD, Last Rate: 62.5 mL/hr at 12/18/21 0429, 15 mmol at 12/18/21 0429  •  propofol (DIPRIVAN) infusion 10 mg/mL 100 mL, 5-50 mcg/kg/min, Intravenous, Titrated, Jaleesa Cárdenas MD, Last Rate: 6.9 mL/hr at 01/01/22 1006, 10 mcg/kg/min at 01/01/22 1006  •  [COMPLETED] Insert peripheral IV, , , Once **AND** sodium chloride 0.9 % flush 10 mL, 10 mL, Intravenous, PRN, Jaleesa Cárdenas MD, 10 mL at 12/28/21 0732  •  sodium chloride 0.9 % flush 10 mL, 10 mL, Intravenous, PRNRomie Rami, MD  •  sodium chloride 0.9 % flush 10 mL, 10 mL, Intravenous, Q12H, Dakota Moore MD, 10 mL at 01/02/22 0827  •  sodium chloride 0.9 % flush 10 mL, 10 mL, Intravenous, Q12H, Dakota Moore MD, 10 mL at 01/02/22 0827  •  sodium chloride 0.9 % flush 10 mL, 10 mL, Intravenous, Q12H, Dakota Moore MD, 10 mL at 01/02/22 0827  •  sodium chloride 0.9 % flush 10 mL, 10 mL, Intravenous, PROscar SINCLAIR Rusty T, MD  •  sodium chloride 0.9 % flush 20 mL, 20 mL, Intravenous, PRNRomie Rami, MD  •  sodium chloride 0.9 % flush 20 mL, 20 mL, Intravenous, Oscar HARRINGTON Rusty T, MD      Assessment/Plan      1.  Acute Hypoxic Respiratory Failure due to COVID-19 Pneumonia w/ Superimposed MSSA and Klebsiella pneumonia:    -Extubated 1/1/22. On HHFNC wean as able  -Sputum culture 12/29/2021: MSSA, Citrobacter resistant to ceftriaxone and stenotrophomonas sensitive to Levaquin  -completed the Rocephin course, continue Levaquin to cover stenotrophomonas and Citrobacter  -Pulmonary following appreciate assistance     2.  Diabetes Mellitus, Type 2 in Obese w/ Hyperglycemia:  - Blood glucose stable.  Continue basal and SSI   - follow and adjust prn    3.  Right Hand Wound: Will ask staff to re-wrap.  Last images demonstrated good healing.     4.  Atrial Fibrillation: Remains in NSR.  On Diltiazem and Eliquis.     5.   LLE DVT: On  Eliquis.     6. Generalized weakness  -Have PT OT evaluate    7. Dysphagia, severe malnutrition  -Continue tube feed  -SLP following, will need MBS soon    remains high risk    Plan for disposition: Pending improvement now that he is extubated    Jayden Hyatt DO  01/02/22  13:28 EST

## 2022-01-03 ENCOUNTER — APPOINTMENT (OUTPATIENT)
Dept: GENERAL RADIOLOGY | Facility: HOSPITAL | Age: 67
End: 2022-01-03

## 2022-01-03 LAB
ALBUMIN SERPL-MCNC: 2.5 G/DL (ref 3.5–5.2)
ALBUMIN/GLOB SERPL: 0.8 G/DL
ALP SERPL-CCNC: 133 U/L (ref 39–117)
ALT SERPL W P-5'-P-CCNC: 49 U/L (ref 1–41)
ANION GAP SERPL CALCULATED.3IONS-SCNC: 4.6 MMOL/L (ref 5–15)
AST SERPL-CCNC: 27 U/L (ref 1–40)
BILIRUB SERPL-MCNC: 0.6 MG/DL (ref 0–1.2)
BUN SERPL-MCNC: 17 MG/DL (ref 8–23)
BUN/CREAT SERPL: 32.7 (ref 7–25)
CALCIUM SPEC-SCNC: 8.7 MG/DL (ref 8.6–10.5)
CHLORIDE SERPL-SCNC: 99 MMOL/L (ref 98–107)
CO2 SERPL-SCNC: 31.4 MMOL/L (ref 22–29)
CREAT SERPL-MCNC: 0.52 MG/DL (ref 0.76–1.27)
CRP SERPL-MCNC: 7.99 MG/DL (ref 0–0.5)
DEPRECATED RDW RBC AUTO: 49.3 FL (ref 37–54)
ERYTHROCYTE [DISTWIDTH] IN BLOOD BY AUTOMATED COUNT: 14.5 % (ref 12.3–15.4)
FERRITIN SERPL-MCNC: 1564 NG/ML (ref 30–400)
GFR SERPL CREATININE-BSD FRML MDRD: >150 ML/MIN/1.73
GLOBULIN UR ELPH-MCNC: 3.3 GM/DL
GLUCOSE BLDC GLUCOMTR-MCNC: 179 MG/DL (ref 70–130)
GLUCOSE BLDC GLUCOMTR-MCNC: 181 MG/DL (ref 70–130)
GLUCOSE BLDC GLUCOMTR-MCNC: 181 MG/DL (ref 70–130)
GLUCOSE BLDC GLUCOMTR-MCNC: 183 MG/DL (ref 70–130)
GLUCOSE BLDC GLUCOMTR-MCNC: 201 MG/DL (ref 70–130)
GLUCOSE SERPL-MCNC: 202 MG/DL (ref 65–99)
HCT VFR BLD AUTO: 31 % (ref 37.5–51)
HGB BLD-MCNC: 10 G/DL (ref 13–17.7)
MCH RBC QN AUTO: 31.3 PG (ref 26.6–33)
MCHC RBC AUTO-ENTMCNC: 32.3 G/DL (ref 31.5–35.7)
MCV RBC AUTO: 97.2 FL (ref 79–97)
PLATELET # BLD AUTO: 299 10*3/MM3 (ref 140–450)
PMV BLD AUTO: 10 FL (ref 6–12)
POTASSIUM SERPL-SCNC: 3.8 MMOL/L (ref 3.5–5.2)
PROCALCITONIN SERPL-MCNC: 0.1 NG/ML (ref 0–0.25)
PROT SERPL-MCNC: 5.8 G/DL (ref 6–8.5)
RBC # BLD AUTO: 3.19 10*6/MM3 (ref 4.14–5.8)
SODIUM SERPL-SCNC: 135 MMOL/L (ref 136–145)
WBC NRBC COR # BLD: 12.37 10*3/MM3 (ref 3.4–10.8)

## 2022-01-03 PROCEDURE — 25010000002 DEXAMETHASONE PER 1 MG: Performed by: INTERNAL MEDICINE

## 2022-01-03 PROCEDURE — 82728 ASSAY OF FERRITIN: CPT | Performed by: INTERNAL MEDICINE

## 2022-01-03 PROCEDURE — 97162 PT EVAL MOD COMPLEX 30 MIN: CPT

## 2022-01-03 PROCEDURE — 86140 C-REACTIVE PROTEIN: CPT | Performed by: INTERNAL MEDICINE

## 2022-01-03 PROCEDURE — 80053 COMPREHEN METABOLIC PANEL: CPT | Performed by: INTERNAL MEDICINE

## 2022-01-03 PROCEDURE — 63710000001 INSULIN DETEMIR PER 5 UNITS: Performed by: HOSPITALIST

## 2022-01-03 PROCEDURE — 25010000002 LEVOFLOXACIN PER 250 MG: Performed by: INTERNAL MEDICINE

## 2022-01-03 PROCEDURE — 94761 N-INVAS EAR/PLS OXIMETRY MLT: CPT

## 2022-01-03 PROCEDURE — 99233 SBSQ HOSP IP/OBS HIGH 50: CPT | Performed by: INTERNAL MEDICINE

## 2022-01-03 PROCEDURE — 94669 MECHANICAL CHEST WALL OSCILL: CPT

## 2022-01-03 PROCEDURE — 63710000001 INSULIN ASPART PER 5 UNITS: Performed by: INTERNAL MEDICINE

## 2022-01-03 PROCEDURE — 94799 UNLISTED PULMONARY SVC/PX: CPT

## 2022-01-03 PROCEDURE — 82962 GLUCOSE BLOOD TEST: CPT

## 2022-01-03 PROCEDURE — 85027 COMPLETE CBC AUTOMATED: CPT | Performed by: INTERNAL MEDICINE

## 2022-01-03 PROCEDURE — 97166 OT EVAL MOD COMPLEX 45 MIN: CPT

## 2022-01-03 PROCEDURE — 84145 PROCALCITONIN (PCT): CPT | Performed by: INTERNAL MEDICINE

## 2022-01-03 PROCEDURE — 92610 EVALUATE SWALLOWING FUNCTION: CPT

## 2022-01-03 RX ADMIN — SODIUM CHLORIDE, PRESERVATIVE FREE 10 ML: 5 INJECTION INTRAVENOUS at 09:46

## 2022-01-03 RX ADMIN — DILTIAZEM HYDROCHLORIDE 60 MG: 30 TABLET ORAL at 21:50

## 2022-01-03 RX ADMIN — DILTIAZEM HYDROCHLORIDE 60 MG: 30 TABLET ORAL at 14:47

## 2022-01-03 RX ADMIN — INSULIN DETEMIR 23 UNITS: 100 INJECTION, SOLUTION SUBCUTANEOUS at 21:52

## 2022-01-03 RX ADMIN — SODIUM CHLORIDE, PRESERVATIVE FREE 10 ML: 5 INJECTION INTRAVENOUS at 21:52

## 2022-01-03 RX ADMIN — INSULIN ASPART 4 UNITS: 100 INJECTION, SOLUTION INTRAVENOUS; SUBCUTANEOUS at 05:05

## 2022-01-03 RX ADMIN — LEVOFLOXACIN 750 MG: 5 INJECTION, SOLUTION INTRAVENOUS at 12:11

## 2022-01-03 RX ADMIN — APIXABAN 10 MG: 2.5 TABLET, FILM COATED ORAL at 21:50

## 2022-01-03 RX ADMIN — INSULIN DETEMIR 23 UNITS: 100 INJECTION, SOLUTION SUBCUTANEOUS at 09:47

## 2022-01-03 RX ADMIN — DILTIAZEM HYDROCHLORIDE 60 MG: 30 TABLET ORAL at 05:06

## 2022-01-03 RX ADMIN — SODIUM CHLORIDE, PRESERVATIVE FREE 10 ML: 5 INJECTION INTRAVENOUS at 21:51

## 2022-01-03 RX ADMIN — INSULIN ASPART 8 UNITS: 100 INJECTION, SOLUTION INTRAVENOUS; SUBCUTANEOUS at 18:47

## 2022-01-03 RX ADMIN — APIXABAN 10 MG: 2.5 TABLET, FILM COATED ORAL at 09:45

## 2022-01-03 RX ADMIN — FAMOTIDINE 20 MG: 10 INJECTION INTRAVENOUS at 09:45

## 2022-01-03 RX ADMIN — INSULIN ASPART 4 UNITS: 100 INJECTION, SOLUTION INTRAVENOUS; SUBCUTANEOUS at 12:11

## 2022-01-03 RX ADMIN — DEXAMETHASONE SODIUM PHOSPHATE 6 MG: 4 INJECTION, SOLUTION INTRAMUSCULAR; INTRAVENOUS at 09:45

## 2022-01-03 NOTE — PLAN OF CARE
Goal Outcome Evaluation:  Plan of Care Reviewed With: patient           Outcome Summary: Physical therapy evaluation complete.  Patient oriented to name only.  patient displays significant deficits in strength in bilateral LEs, displaying only trace movements in bilateral quads and unable to move LEs against gravity in any plane without assistance.  Patient with full PROM bilaterally. Attempted left side rolling, patient dependent and unable to grasp bedrail or maintain position.  Patient would benefit from out of bed to chair via theresa lift with nursing staff to allow patient sustained opportunity to adjust to upright positioning.  Recommend short term rehab at discharge, will continue to follow for therapy needs.

## 2022-01-03 NOTE — NURSING NOTE
CWON follow up. Right hand improving. No drainage, no necrosis. Will modify dressing change frequency to every other day. Discussed with LUISANA Johnson.

## 2022-01-03 NOTE — PROGRESS NOTES
"Hospitalist Team      Patient Care Team:  Farhan Jean Jr., MD as PCP - General (Family Medicine)      Chief Complaint:  Follow-up Acute Hypoxic Respiratory Failure due COVID-19    Subjective    Doing well post extubation stable on 4 L nasal cannula.  Remains very weak.  Otherwise no new issues.    Objective    Vital Signs  Temp:  [97.1 °F (36.2 °C)-98.8 °F (37.1 °C)] 97.3 °F (36.3 °C)  Heart Rate:  [72-98] 86  Resp:  [14-22] 18  BP: (140-180)/() 158/93  Oxygen Therapy  SpO2: 97 %  Pulse Oximetry Type: Continuous  Device (Oxygen Therapy): ventilator  Device (Oxygen Therapy): nasal cannula  $ High Flow Nasal Cannula Set-Up: yes  Flow (L/min): 4  Oxygen Concentration (%): 35  ETCO2 (mmHg): 35 mmHg  Probe Placed On (Pulse Ox): Right:, forehead  Probe Removed From (Pulse Ox): Left:, forehead}  Flowsheet Rows      First Filed Value   Admission Height 185.4 cm (73\") Documented at 12/16/2021 1515   Admission Weight 120 kg (265 lb) Documented at 12/16/2021 1515          Physical Exam:  Vitals reviewed 1/3/2022  General: Acute distress  HEENT:  Tube in place  Lungs: Manage breath sounds in all fields  CV: Irregularly, irregular  no significant peripheral edema  Abdomen: Soft with active bowel sounds.  MSK: Moves extremities spontaneously  Skin: Warm dry  Psych:  Followed some simple commands    Results Review:     I reviewed the patient's new clinical results.    Lab Results (last 24 hours)     Procedure Component Value Units Date/Time    POC Glucose Once [[id number]]  (Abnormal) Collected: 01/03/22 1202    Specimen: Blood Updated: 01/03/22 1208     Glucose 181 mg/dL      Comment: Meter: [id number] : [id number] Dejon Johnson RN       C-reactive Protein [[id number]]  (Abnormal) Collected: 01/03/22 0449    Specimen: Blood Updated: 01/03/22 1106     C-Reactive Protein 7.99 mg/dL     Procalcitonin [[id number]]  (Normal) Collected: 01/03/22 0449    Specimen: Blood Updated: 01/03/22 0545     Procalcitonin 0.10 " ng/mL     Ferritin [[id number]]  (Abnormal) Collected: 01/03/22 0449    Specimen: Blood Updated: 01/03/22 0534     Ferritin 1,564.00 ng/mL     Narrative:      Results may be falsely decreased if patient taking Biotin.      Comprehensive Metabolic Panel [[id number]]  (Abnormal) Collected: 01/03/22 0449    Specimen: Blood Updated: 01/03/22 0522     Glucose 202 mg/dL      BUN 17 mg/dL      Creatinine 0.52 mg/dL      Sodium 135 mmol/L      Potassium 3.8 mmol/L      Chloride 99 mmol/L      CO2 31.4 mmol/L      Calcium 8.7 mg/dL      Total Protein 5.8 g/dL      Albumin 2.50 g/dL      ALT (SGPT) 49 U/L      AST (SGOT) 27 U/L      Alkaline Phosphatase 133 U/L      Total Bilirubin 0.6 mg/dL      eGFR Non African Amer >150 mL/min/1.73      Globulin 3.3 gm/dL      A/G Ratio 0.8 g/dL      BUN/Creatinine Ratio 32.7     Anion Gap 4.6 mmol/L     Narrative:      GFR Normal >60  Chronic Kidney Disease <60  Kidney Failure <15      POC Glucose Once [[id number]]  (Abnormal) Collected: 01/03/22 0446    Specimen: Blood Updated: 01/03/22 0502     Glucose 183 mg/dL      Comment: Meter: [id number] : [id number] Lewis RN       CBC (No Diff) [[id number]]  (Abnormal) Collected: 01/03/22 0449    Specimen: Blood Updated: 01/03/22 0502     WBC 12.37 10*3/mm3      RBC 3.19 10*6/mm3      Hemoglobin 10.0 g/dL      Hematocrit 31.0 %      MCV 97.2 fL      MCH 31.3 pg      MCHC 32.3 g/dL      RDW 14.5 %      RDW-SD 49.3 fl      MPV 10.0 fL      Platelets 299 10*3/mm3     POC Glucose Once [[id number]]  (Abnormal) Collected: 01/02/22 2305    Specimen: Blood Updated: 01/02/22 2321     Glucose 197 mg/dL      Comment: Meter: [id number] : [id number] Lewis RN       POC Glucose Once [[id number]]  (Abnormal) Collected: 01/02/22 1654    Specimen: Blood Updated: 01/02/22 1701     Glucose 250 mg/dL      Comment: Meter: [id number] : [id number] Issa Cleary RN Float             Imaging Results (Last 24 Hours)     ** No results found for the  last 24 hours. **          Xray reviewed personally by physician.      Medication Review:   I have reviewed the patient's current medication list    Current Facility-Administered Medications:   •  acetaminophen (TYLENOL) tablet 650 mg, 650 mg, Oral, Q6H PRN, Jayden Hyatt DO, 650 mg at 12/26/21 1153  •  apixaban (ELIQUIS) tablet 10 mg, 10 mg, Nasogastric, Q12H, 10 mg at 01/03/22 0945 **FOLLOWED BY** [START ON 1/5/2022] apixaban (ELIQUIS) tablet 5 mg, 5 mg, Oral, Q12H, Dakota Moore MD  •  bisacodyl (DULCOLAX) suppository 10 mg, 10 mg, Rectal, Daily, Dakota Moore MD, 10 mg at 01/02/22 0826  •  chlorhexidine (PERIDEX) 0.12 % solution 15 mL, 15 mL, Mouth/Throat, Q12H, Spencer Carmichael MD, 15 mL at 01/02/22 2001  •  dexamethasone (DECADRON) tablet 6 mg, 6 mg, Oral, Daily With Breakfast **OR** dexamethasone (DECADRON) injection 6 mg, 6 mg, Intravenous, Daily With Breakfast, Charli Ayala MD, 6 mg at 01/03/22 0945  •  dexmedetomidine (PRECEDEX) 400 mcg in 100 mL NS infusion, 0.2-1.5 mcg/kg/hr, Intravenous, Titrated, Jaleesa Cárdenas MD, Last Rate: 8.6 mL/hr at 01/01/22 1312, 0.3 mcg/kg/hr at 01/01/22 1312  •  dextromethorphan polistirex ER (DELSYM) 30 MG/5ML oral suspension 60 mg, 60 mg, Oral, Q12H PRN, Jaleesa Cárdenas MD, 60 mg at 01/02/22 2306  •  dextrose (D50W) (25 g/50 mL) IV injection 25 g, 25 g, Intravenous, Q15 Min PRN, Dakota Moore MD, 25 g at 12/29/21 0804  •  dextrose (GLUTOSE) oral gel 15 g, 15 g, Oral, Q15 Min PRN, Dakota Moore MD  •  dilTIAZem (CARDIZEM) tablet 60 mg, 60 mg, Nasogastric, Q8H, Dakota Moore MD, 60 mg at 01/03/22 0506  •  famotidine (PEPCID) injection 20 mg, 20 mg, Intravenous, Daily, Spencer Carmichael MD, 20 mg at 01/03/22 0945  •  glucagon (GLUCAGEN) injection 1 mg, 1 mg, Subcutaneous, Q15 Min PRN, Dakota Moore MD  •  insulin aspart (novoLOG) injection 0-24 Units, 0-24 Units, Subcutaneous, Q6H, Jayden Hyatt, DO, 4 Units at  01/03/22 1211  •  insulin detemir (LEVEMIR) injection 23 Units, 23 Units, Subcutaneous, Q12H, Dakota Moore MD, 23 Units at 01/03/22 0947  •  ipratropium-albuterol (DUO-NEB) nebulizer solution 3 mL, 3 mL, Nebulization, Q6H PRN, Jaleesa Cárdenas MD  •  levoFLOXacin (LEVAQUIN) 750 mg/150 mL D5W (premix) (LEVAQUIN) 750 mg, 750 mg, Intravenous, Q24H, Jayden Hyatt, , 750 mg at 01/03/22 1211  •  polyethylene glycol (MIRALAX) packet 17 g, 17 g, Oral, Daily, Luca Hermosillo MD, 17 g at 01/02/22 0827  •  potassium chloride 10 mEq in 100 mL IVPB, 10 mEq, Intravenous, Q1H PRN, Jaleesa Cárdenas MD, Stopped at 12/28/21 0940  •  potassium phosphate 45 mmol in sodium chloride 0.9 % 500 mL infusion, 45 mmol, Intravenous, PRN **OR** potassium phosphate 30 mmol in sodium chloride 0.9 % 250 mL infusion, 30 mmol, Intravenous, PRN **OR** potassium phosphate 15 mmol in sodium chloride 0.9 % 100 mL infusion, 15 mmol, Intravenous, PRN **OR** sodium phosphates 45 mmol in sodium chloride 0.9 % 500 mL IVPB, 45 mmol, Intravenous, PRN **OR** sodium phosphates 30 mmol in sodium chloride 0.9 % 250 mL IVPB, 30 mmol, Intravenous, PRN **OR** sodium phosphates 15 mmol in sodium chloride 0.9 % 250 mL IVPB, 15 mmol, Intravenous, PRN, Jaleesa Cárdenas MD, Last Rate: 62.5 mL/hr at 12/18/21 0429, 15 mmol at 12/18/21 0429  •  propofol (DIPRIVAN) infusion 10 mg/mL 100 mL, 5-50 mcg/kg/min, Intravenous, Titrated, Jaleesa Cárdenas MD, Last Rate: 6.9 mL/hr at 01/01/22 1006, 10 mcg/kg/min at 01/01/22 1006  •  [COMPLETED] Insert peripheral IV, , , Once **AND** sodium chloride 0.9 % flush 10 mL, 10 mL, Intravenous, PRN, Jaleesa Cárdenas MD, 10 mL at 12/28/21 0732  •  sodium chloride 0.9 % flush 10 mL, 10 mL, Intravenous, PRN, Jeana Grubbs MD  •  sodium chloride 0.9 % flush 10 mL, 10 mL, Intravenous, Q12H, Dakota Moore MD, 10 mL at 01/03/22 0946  •  sodium chloride 0.9 % flush 10 mL, 10 mL,  Intravenous, Q12H, Dakota Moore MD, 10 mL at 01/03/22 0946  •  sodium chloride 0.9 % flush 10 mL, 10 mL, Intravenous, Q12H, Dakota Moore MD, 10 mL at 01/03/22 0946  •  sodium chloride 0.9 % flush 10 mL, 10 mL, Intravenous, PRN, Dakota Moore MD  •  sodium chloride 0.9 % flush 20 mL, 20 mL, Intravenous, PRN, Jeana Grubbs MD  •  sodium chloride 0.9 % flush 20 mL, 20 mL, Intravenous, PRN, Dakota Moore MD      Assessment/Plan      1.  Acute Hypoxic Respiratory Failure due to COVID-19 Pneumonia w/ Superimposed MSSA and Klebsiella pneumonia:    -Extubated 1/1/22.  Now on 4 L nasal cannula  -Sputum culture 12/29/2021: MSSA, Citrobacter resistant to ceftriaxone and stenotrophomonas sensitive to Levaquin  -completed the Rocephin course  -continue Levaquin to cover stenotrophomonas and Citrobacter  -Pulmonary following appreciate assistance     2.  Diabetes Mellitus, Type 2 in Obese w/ Hyperglycemia:  - Blood glucose stable.    - Continue basal and SSI   - follow and adjust prn    3.  Right Hand Wound: Will ask staff to re-wrap.  Last images demonstrated good healing.     4.  Atrial Fibrillation: Remains in NSR.    -On Diltiazem and Eliquis.     5.   LLE DVT: On Eliquis.     6. Generalized weakness  -Continue PT OT    7. Dysphagia, severe malnutrition  -Continue tube feed cleared for diet  -SLP following, will need MBS soon    remains high risk    Plan for disposition: Pending improvement now that he is extubated, but ultimately will likely need some STR    Jayden Hyatt,   01/03/22  14:03 EST

## 2022-01-03 NOTE — PLAN OF CARE
Goal Outcome Evaluation:  Plan of Care Reviewed With: patient        Progress: improving  Outcome Summary: tolerating tube feeding, continues on 4liter high flow doing well, uses yonker to help get secretions up, uop well, labs this am

## 2022-01-03 NOTE — PROGRESS NOTES
Adult Nutrition  Assessment/PES    Patient Name:  Sergio Daniel  YOB: 1955  MRN: [id number]  Admit Date:  12/16/2021    Assessment Date:  1/3/2022    Comments:  Pt tolerating tube feedings.  Follow up note completed with aid of nursing input and review of electronic medical record.   Reason for Assessment     Row Name 01/03/22 1523          Reason for Assessment    Reason For Assessment follow-up protocol     Diagnosis --  acute hypoxic respiratory failure due to COVID-19 pneumonia, now extubated 1.1.22, diabetes in obese with hyperglycemia, dysphagia, severe malnutrition                  Anthropometrics     Row Name 01/03/22 1525          Usual Body Weight (UBW)    Usual Body Weight --  wt up 12 lb since 12.28.21 ? fluid/edema                Labs/Tests/Procedures/Meds     Row Name 01/03/22 1650          Labs/Procedures/Meds    Lab Results Reviewed reviewed            Medications    Pertinent Medications Comments decadron                    Nutrition Prescription Ordered     Row Name 01/03/22 1651          Nutrition Prescription PO    Current PO Diet NPO                       Problem/Interventions:   Problem 1     Row Name 01/03/22 1653          Nutrition Diagnoses Problem 1    Problem 1 Inadequate Nutrient Intake     Etiology (related to) Medical Diagnosis; Factors Affecting Nutrition     Signs/Symptoms (evidenced by) NPO                      Intervention Goal     Row Name 01/03/22 1653          Intervention Goal    TF/PN Maintain TF/PN                Nutrition Intervention     Row Name 01/03/22 1654          Nutrition Intervention    RD/Tech Action Follow Tx progress                  Education/Evaluation     Row Name 01/03/22 1654          Education    Education Education not appropriate at this time            Monitor/Evaluation    Monitor I&O; Pertinent labs; TF delivery/tolerance; Weight; Skin status                 Electronically signed by:  Cristina Jones RD  01/03/22 17:09 EST

## 2022-01-03 NOTE — PROGRESS NOTES
[facility]         Dr Luca Jimied   LOS: 18 days   Patient Care Team:  Farhan Jean Jr., MD as PCP - General (Family Medicine)    Chief Complaint: Hypoxemic respiratory failure status post mechanical ventilation extubated COVID-19 pneumonia ARDS Klebsiella pneumonia pneumomediastinum    Interval History: Events noted chart reviewed.  Currently on nasal cannula oxygen 4 L.  Appears to be weak but decent cough    REVIEW OF SYSTEMS:   CARDIOVASCULAR: No chest pain, chest pressure or chest discomfort. No palpitations or edema.   RESPIRATORY: Short of breath with activity  GASTROINTESTINAL: No anorexia, nausea, vomiting or diarrhea. No abdominal pain or blood.   HEMATOLOGIC: No bleeding or bruising.     Ventilator/Non-Invasive Ventilation Settings (From admission, onward)             Start     Ordered    01/01/22 0547  Ventilator - AC/PC; (16); 30; 90%; Other (see comments); 5; 14  Continuous,   Status:  Canceled        Question Answer Comment   Vent Mode AC/PC    Breath rate  16   FiO2 30    FiO2 titrate for Sp02% =/> 90%    PEEP Other (see comments)    PEEP: 5    Inspiratory Pressure 14        01/01/22 0546    12/30/21 1806  Ventilator - AC/PC; (16); (35); 90%; Other (see comments); 5; 14  Continuous,   Status:  Canceled        Question Answer Comment   Vent Mode AC/PC    Breath rate  16   FiO2  35   FiO2 titrate for Sp02% =/> 90%    PEEP Other (see comments)    PEEP: 5    Inspiratory Pressure 14        12/30/21 1805    12/30/21 1541  Ventilator - AC/PC; (16); 40; 90%; Other (see comments); 5; 14  Continuous,   Status:  Canceled        Question Answer Comment   Vent Mode AC/PC    Breath rate  16   FiO2 40    FiO2 titrate for Sp02% =/> 90%    PEEP Other (see comments)    PEEP: 5    Inspiratory Pressure 14        12/30/21 1542    12/29/21 1823  Ventilator - AC/PC; (16); (45); 90%; Other (see comments); 5; 12  Continuous,   Status:  Canceled        Question Answer Comment   Vent Mode AC/PC     Breath rate  16   FiO2  45   FiO2 titrate for Sp02% =/> 90%    PEEP Other (see comments)    PEEP: 5    Inspiratory Pressure 12        12/29/21 1824    12/26/21 0935  Ventilator - AC/PC; (16); (50); 90%; Other (see comments); 7; 10  Continuous,   Status:  Canceled        Question Answer Comment   Vent Mode AC/PC    Breath rate  16   FiO2  50   FiO2 titrate for Sp02% =/> 90%    PEEP Other (see comments)    PEEP: 7    Inspiratory Pressure 10        12/26/21 0935    12/25/21 1828  Ventilator - AC/PC; (16); (50); 90%; Other (see comments); 7; 12  Continuous,   Status:  Canceled        Question Answer Comment   Vent Mode AC/PC    Breath rate  16   FiO2  50   FiO2 titrate for Sp02% =/> 90%    PEEP Other (see comments)    PEEP: 7    Inspiratory Pressure 12        12/25/21 1829    12/25/21 1004  Ventilator - AC/PC; (16); (50); 90%; 5; 12  Continuous,   Status:  Canceled        Question Answer Comment   Vent Mode AC/PC    Breath rate  16   FiO2  50   FiO2 titrate for Sp02% =/> 90%    PEEP 5    Inspiratory Pressure 12        12/25/21 1004    12/23/21 1832  Ventilator - AC/PC; (18); (45); 90%; 8; 18  Continuous,   Status:  Canceled        Question Answer Comment   Vent Mode AC/PC    Breath rate  18   FiO2  45   FiO2 titrate for Sp02% =/> 90%    PEEP 8    Inspiratory Pressure 18        12/23/21 1832    12/23/21 0940  Ventilator - AC/PC; (24); (50); 90%; 8; 18  Continuous,   Status:  Canceled        Question Answer Comment   Vent Mode AC/PC    Breath rate  24   FiO2  50   FiO2 titrate for Sp02% =/> 90%    PEEP 8    Inspiratory Pressure 18        12/23/21 0941    12/21/21 1541  Ventilator - AC/PC; (24); (50); 90%; 10; 18  Continuous,   Status:  Canceled        Question Answer Comment   Vent Mode AC/PC    Breath rate  24   FiO2  50   FiO2 titrate for Sp02% =/> 90%    PEEP 10    Inspiratory Pressure 18        12/21/21 1543    12/20/21 0956  Ventilator - AC/PC; (28); (65); 90%; 10; 18  Continuous,   Status:  Canceled         Question Answer Comment   Vent Mode AC/PC    Breath rate  28   FiO2  65   FiO2 titrate for Sp02% =/> 90%    PEEP 10    Inspiratory Pressure 18        12/20/21 0957    12/19/21 1812  Ventilator - AC/PC; (24); (65); 90%; 10; 12  Continuous,   Status:  Canceled        Question Answer Comment   Vent Mode AC/PC    Breath rate  24   FiO2  65   FiO2 titrate for Sp02% =/> 90%    PEEP 10    Inspiratory Pressure 12        12/19/21 1812    12/19/21 1732  Ventilator - AC/PC; 20 (24); 80 (65); 90%; 10; 12  Continuous,   Status:  Canceled        Question Answer Comment   Vent Mode AC/PC    Breath rate 20 24   FiO2 80 65   FiO2 titrate for Sp02% =/> 90%    PEEP 10    Inspiratory Pressure 12        12/19/21 1733    12/18/21 1428  Ventilator - AC/PC; 20; 80; 90%; 12; 15  Continuous,   Status:  Canceled        Question Answer Comment   Vent Mode AC/PC    Breath rate 20    FiO2 80    FiO2 titrate for Sp02% =/> 90%    PEEP 12    Inspiratory Pressure 15        12/18/21 1428    12/18/21 0258  Ventilator - AC/PC; 20; 100; 90%; Other (see comments); 14; 15  Continuous,   Status:  Canceled        Question Answer Comment   Vent Mode AC/PC    Breath rate 20    FiO2 100    FiO2 titrate for Sp02% =/> 90%    PEEP Other (see comments)    PEEP: 14    Inspiratory Pressure 15        12/18/21 0258    12/17/21 1848  NIPPV (CPAP or BIPAP)  Until Discontinued,   Status:  Canceled        Question Answer Comment   Indication: Acute Respiratory Failure    Type: BIPAP    IPAP 15    EPAP 12    Breath Rate 16    Titrate Oxygen for SpO2 equal to or greater than    Titrate Oxygen for SpO2 92%        12/17/21 1848    12/17/21 0125  NIPPV (CPAP or BIPAP)  Until Discontinued,   Status:  Canceled        Question Answer Comment   Indication: Acute Respiratory Failure    Type: BIPAP    IPAP 18    EPAP 10    Breath Rate 16    Titrate Oxygen for SpO2 equal to or greater than    Titrate Oxygen for SpO2 92%        12/17/21 0125                  Vital Signs  Temp:   "[97.1 °F (36.2 °C)-98.8 °F (37.1 °C)] 97.5 °F (36.4 °C)  Heart Rate:  [] 85  Resp:  [14-22] 20  BP: (140-180)/() 161/82    Intake/Output Summary (Last 24 hours) at 1/3/2022 0849  Last data filed at 1/3/2022 0400  Gross per 24 hour   Intake 2967 ml   Output 4850 ml   Net -1883 ml     Flowsheet Rows      First Filed Value   Admission Height 185.4 cm (73\") Documented at 12/16/2021 1515   Admission Weight 120 kg (265 lb) Documented at 12/16/2021 1515          Physical Exam:  Patient is examined using the personal protective equipment as per guidelines from infection control for this particular patient as enacted.  Hand hygiene was performed before and after patient interaction.   General Appearance:    Alert, cooperative, in no acute distress.  Following simple commands  Neck midline trachea, no thyromegaly   Lungs:    Diminished breath sounds rhonchi crackles bilaterally in the bases    Heart:    Regular rhythm and normal rate, normal S1 and S2, no            murmur, no gallop, no rub, no click   Chest Wall:    No abnormalities observed   Abdomen:     Normal bowel sounds, no masses, no organomegaly, soft        nontender, nondistended, no guarding, no rebound                tenderness   Extremities:   Moves all extremities well, no edema, no cyanosis, no             redness  CNS no focal neurological deficits normal sensory exam  Skin no rashes no nodules  Musculoskeletal no cyanosis no clubbing normal range of motion     Results Review:        Results from last 7 days   Lab Units 01/03/22  0449 01/02/22  0408 01/02/22  0408 01/01/22  0435 01/01/22  0435   SODIUM mmol/L 135*  --  139  --  139   POTASSIUM mmol/L 3.8  --  3.9  --  3.8   CHLORIDE mmol/L 99  --  100  --  104   CO2 mmol/L 31.4*  --  30.8*  --  29.8*   BUN mg/dL 17  --  17  --  15   CREATININE mg/dL 0.52*  --  0.54*  --  0.54*   GLUCOSE mg/dL 202*   < > 127*   < > 181*   CALCIUM mg/dL 8.7  --  8.8  --  8.5*    < > = values in this interval not " displayed.         Results from last 7 days   Lab Units 01/03/22  0449 01/02/22  0408 01/01/22  0435   WBC 10*3/mm3 12.37* 13.56* 10.72   HEMOGLOBIN g/dL 10.0* 10.1* 8.9*   HEMATOCRIT % 31.0* 31.8* 27.8*   PLATELETS 10*3/mm3 299 323 246     Results from last 7 days   Lab Units 12/30/21  0356   INR  1.15*                 Results from last 7 days   Lab Units 01/01/22  0942   PH, ARTERIAL pH units 7.513*   PO2 ART mm Hg 64.1*   PCO2, ARTERIAL mm Hg 43.4   HCO3 ART mmol/L 34.9*       I reviewed the patient's new clinical results.  I personally viewed and interpreted the patient's chest x-ray.        Medication Review:   apixaban, 10 mg, Nasogastric, Q12H   Followed by  [START ON 1/5/2022] apixaban, 5 mg, Oral, Q12H  bisacodyl, 10 mg, Rectal, Daily  chlorhexidine, 15 mL, Mouth/Throat, Q12H  dexamethasone, 6 mg, Oral, Daily With Breakfast   Or  dexamethasone, 6 mg, Intravenous, Daily With Breakfast  dilTIAZem, 60 mg, Nasogastric, Q8H  famotidine, 20 mg, Intravenous, Daily  insulin aspart, 0-24 Units, Subcutaneous, Q6H  insulin detemir, 23 Units, Subcutaneous, Q12H  levoFLOXacin, 750 mg, Intravenous, Q24H  polyethylene glycol, 17 g, Oral, Daily  sodium chloride, 10 mL, Intravenous, Q12H  sodium chloride, 10 mL, Intravenous, Q12H  sodium chloride, 10 mL, Intravenous, Q12H        dexmedetomidine, 0.2-1.5 mcg/kg/hr, Last Rate: 0.3 mcg/kg/hr (01/01/22 1312)  propofol, 5-50 mcg/kg/min, Last Rate: 10 mcg/kg/min (01/01/22 1006)        ASSESSMENT:   1. Acute hypoxic respiratory failure s/p mechanical ventilation 12/18   2. Ac COVID-19 pneumonia  3. ARDS  4. MSSA / Klebsiella pneumonia  5. Pneumomediastinum  6. Shock  7. Cytokine release syndrome s/p remdesivir  8. Extensive left leg DVT on AC  9. A. fib RVR  10. Uncontrolled diabetes  11. Obesity  12. Mild hepatitis; Covid related  13. Fever      PLAN:  Events noted chart reviewed.  Reviewed all recommendations from previous pulmonologist  Extubated now on nasal cannula oxygen  wean as tolerated.  We will need to mobilize more with physical therapy  Aggressive pulmonary toilet  Antibiotic for bacterial process followed by infectious diseases.  Blood glucose monitoring per ICU protocol  Decadron after 10 days start weaning as tolerated  Full dose anticoagulation for DVT and currently on oral anticoagulation      Luca Hermosillo MD  01/03/22  08:49 EST

## 2022-01-03 NOTE — THERAPY EVALUATION
"Patient Name: Sergio Daniel  : 1955    MRN: [id number]                              Today's Date: 1/3/2022       Admit Date: 2021    Visit Dx:     ICD-10-CM ICD-9-CM   1. Acute respiratory failure with hypoxia (HCC)  J96.01 518.81   2. Pneumonia due to COVID-19 virus  U07.1 480.8    J12.82 079.89   3. Paroxysmal atrial fibrillation (HCC)  I48.0 427.31   4. Pharyngeal dysphagia  R13.13 787.23     Patient Active Problem List   Diagnosis   • Acute respiratory failure with hypoxia (HCC)   • Acute hypoxemic respiratory failure due to COVID-19 (HCC)     Past Medical History:   Diagnosis Date   • Diabetes mellitus (HCC)      History reviewed. No pertinent surgical history.   General Information     Row Name 22          Physical Therapy Time and Intention    Document Type evaluation  -     Mode of Treatment physical therapy  -     Row Name 22          General Information    Patient Profile Reviewed yes  -     Prior Level of Function independent:; all household mobility; community mobility; ADL's  -     Existing Precautions/Restrictions other (see comments)  prolonged ventilator use due to COVID-19  -     Barriers to Rehab cognitive status  -     Row Name 22          Living Environment    Lives With spouse  -     Row Name 22          Stairs Within Home, Primary    Stairs Comment, Within Home, Primary pt confused within conversation, reports he lives in a 2 story house with bedroom/bathroom on 1st floor  -     Row Name 22          Cognition    Orientation Status (Cognition) oriented to; person  reports year as \""  -     Row Name 22          Safety Issues, Functional Mobility    Impairments Affecting Function (Mobility) strength  -           User Key  (r) = Recorded By, (t) = Taken By, (c) = Cosigned By    Initials Name Provider Type    Rosy Arevalo, PT Physical Therapist               Mobility     Row Name 22 " 0900          Bed Mobility    Comment (Bed Mobility) attempted rolling to left, pt dependent with use of draw sheet, unable to grasp bedrail or maintain any position  -     Row Name 01/03/22 0900          Transfers    Comment (Transfers) unable to complete supine to sit transfers safely at this time  -           User Key  (r) = Recorded By, (t) = Taken By, (c) = Cosigned By    Initials Name Provider Type    Rosy Arevalo PT Physical Therapist               Obj/Interventions     Row Name 01/03/22 0900          Range of Motion Comprehensive    Comment, General Range of Motion pt with significant AROM limitations due to weakness.  Patient with full PROM in bilateral LEs in all planes  -[facility] Name 01/03/22 0900          Strength Comprehensive (MMT)    Comment, General Manual Muscle Testing (MMT) Assessment trace movements noted in bilateral quads, unable to abduct/adduct LEs without assistance.  difficult to formally assess strength due to profound weakness throughout and inability to safely sit EOB  -[facility] Name 01/03/22 0900          Balance    Balance Assessment --  unable to safely sit EOB at this time  -           User Key  (r) = Recorded By, (t) = Taken By, (c) = Cosigned By    Initials Name Provider Type     Rosy Harmon, PT Physical Therapist               Goals/Plan     [facility] Name 01/03/22 0900          Bed Mobility Goal 1 (PT)    Activity/Assistive Device (Bed Mobility Goal 1, PT) bed mobility activities, all  -     Roberts Level/Cues Needed (Bed Mobility Goal 1, PT) minimum assist (75% or more patient effort)  -     Time Frame (Bed Mobility Goal 1, PT) 5 days  -     Progress/Outcomes (Bed Mobility Goal 1, PT) goal ongoing  -     Row Name 01/03/22 0900          Transfer Goal 1 (PT)    Activity/Assistive Device (Transfer Goal 1, PT) bed-to-chair/chair-to-bed  -     Roberts Level/Cues Needed (Transfer Goal 1, PT) maximum assist (25-49% patient effort)  -     Time  Frame (Transfer Goal 1, PT) 5 days  -     Progress/Outcome (Transfer Goal 1, PT) goal ongoing  -           User Key  (r) = Recorded By, (t) = Taken By, (c) = Cosigned By    Initials Name Provider Type    Rosy Arevalo, PT Physical Therapist               Clinical Impression     Row Name 01/03/22 0900          Pain Scale: Numbers Pre/Post-Treatment    Pretreatment Pain Rating 0/10 - no pain  -JW     Posttreatment Pain Rating 0/10 - no pain  -     Row Name 01/03/22 0900          Plan of Care Review    Plan of Care Reviewed With patient  -     Outcome Summary Physical therapy evaluation complete.  Patient oriented to name only.  patient displays significant deficits in strength in bilateral LEs, displaying only trace movements in bilateral quads and unable to move LEs against gravity in any plane without assistance.  Patient with full PROM bilaterally. Attempted left side rolling, patient dependent and unable to grasp bedrail or maintain position.  Patient would benefit from out of bed to chair via hteresa lift with nursing staff to allow patient sustained opportunity to adjust to upright positioning.  Recommend short term rehab at discharge, will continue to follow for therapy needs.  -     Row Name 01/03/22 0900          Therapy Assessment/Plan (PT)    Patient/Family Therapy Goals Statement (PT) pt does not state goals  -     Rehab Potential (PT) good, to achieve stated therapy goals  -     Criteria for Skilled Interventions Met (PT) yes; meets criteria  -     Predicted Duration of Therapy Intervention (PT) 5 days  -     Row Name 01/03/22 0900          Positioning and Restraints    Pre-Treatment Position in bed  -     Post Treatment Position bed  -JW     In Bed notified nsg; call light within reach; encouraged to call for assist  -           User Key  (r) = Recorded By, (t) = Taken By, (c) = Cosigned By    Initials Name Provider Type    Rosy Arevalo, PT Physical Therapist                Outcome Measures     Row Name 01/03/22 0900          How much help from another person do you currently need...    Turning from your back to your side while in flat bed without using bedrails? 1  -JW     Moving from lying on back to sitting on the side of a flat bed without bedrails? 1  -JW     Moving to and from a bed to a chair (including a wheelchair)? 1  -JW     Standing up from a chair using your arms (e.g., wheelchair, bedside chair)? 1  -JW     Climbing 3-5 steps with a railing? 1  -JW     To walk in hospital room? 1  -     AM-PAC 6 Clicks Score (PT) 6  -     Row Name 01/03/22 0900          Functional Assessment    Outcome Measure Options AM-PAC 6 Clicks Basic Mobility (PT)  -           User Key  (r) = Recorded By, (t) = Taken By, (c) = Cosigned By    Initials Name Provider Type    Rosy Arevalo, PT Physical Therapist                             Physical Therapy Education                 Title: PT OT SLP Therapies (Done)     Topic: Physical Therapy (Done)     Point: Mobility training (Done)     Learning Progress Summary           Patient Acceptance, E,TB, VU,NR by  at 1/3/2022 1216                               User Key     Initials Effective Dates Name Provider Type Discipline     06/16/21 -  Rosy Harmon, JASMIN Physical Therapist PT              PT Recommendation and Plan  Planned Therapy Interventions (PT): balance training, bed mobility training, gait training, home exercise program, patient/family education, strengthening, transfer training  Plan of Care Reviewed With: patient  Outcome Summary: Physical therapy evaluation complete.  Patient oriented to name only.  patient displays significant deficits in strength in bilateral LEs, displaying only trace movements in bilateral quads and unable to move LEs against gravity in any plane without assistance.  Patient with full PROM bilaterally. Attempted left side rolling, patient dependent and unable to grasp bedrail or maintain position.   Patient would benefit from out of bed to chair via theresa lift with nursing staff to allow patient sustained opportunity to adjust to upright positioning.  Recommend short term rehab at discharge, will continue to follow for therapy needs.     Time Calculation:    PT Charges     Row Name 01/03/22 1224             Time Calculation    Start Time 0900  -MONTEZ      PT Received On 01/03/22  -MONTEZ      PT - Next Appointment 01/04/22  -MONTEZ            User Key  (r) = Recorded By, (t) = Taken By, (c) = Cosigned By    Initials Name Provider Type    Rosy Arevalo, PT Physical Therapist              Therapy Charges for Today     Code Description Service Date Service Provider Modifiers Qty    80577915887 HC PT EVAL MOD COMPLEXITY 2 1/3/2022 Rosy Harmon, PT GP 1          PT G-Codes  Outcome Measure Options: AM-PAC 6 Clicks Basic Mobility (PT)  AM-PAC 6 Clicks Score (PT): 6    Rosy Harmon PT  1/3/2022

## 2022-01-03 NOTE — PLAN OF CARE
Goal Outcome Evaluation:  Plan of Care Reviewed With: patient           Outcome Summary: OT evaluation completed. Patient oriented to name only. Patient unable to actively range shoulders however PROM in shoulders was WFL. Patient demonstrated 3/4 AROM in B elbows/hands. Patient was dependent for rolling and dependent for grooming/ADLs. OT to follow while in the hospital. At this time recommend staff use theresa lift for transfers to chair to allow patient oppurtunity to adjust to sitting upright. Patient will need short term rehab at discharge.

## 2022-01-03 NOTE — PROGRESS NOTES
Adult Nutrition  Assessment/PES    Patient Name:  Sergio Daniel  YOB: 1955  MRN: [id number]  Admit Date:  12/16/2021    Assessment Date:  1/2/2022    Comments:  Noted SLP note.  Pt tolerating tube feedings.  Recommend:  Continue with tube feedings as ordered.     Reason for Assessment     Row Name 01/02/22 1911          Reason for Assessment    Reason For Assessment follow-up protocol     Diagnosis --  acute hypoxic respiratory failure due to COVID-19 pneumonia, now extubated 1.1.22, diabetes in obese with hyperglycemia, dysphagia, severe malnutrition                  Anthropometrics     Row Name 01/02/22 1919          Anthropometrics    Current Weight Method --  no new weight                Labs/Tests/Procedures/Meds     Row Name 01/02/22 1919          Labs/Procedures/Meds    Lab Results Reviewed reviewed            Diagnostic Tests/Procedures    Diagnostic Test/Procedure Reviewed reviewed     Diagnostic Test/Procedures Comments SLP note-suspected phayngeal dysphagia with high risk of aspiration            Medications    Pertinent Medications Reviewed reviewed                    Nutrition Prescription Ordered     Row Name 01/02/22 1919          Nutrition Prescription PO    Current PO Diet NPO                       Problem/Interventions:   Problem 1     Row Name 01/02/22 1920          Nutrition Diagnoses Problem 1    Problem 1 Inadequate Nutrient Intake     Etiology (related to) Factors Affecting Nutrition     Signs/Symptoms (evidenced by) NPO                      Intervention Goal     Row Name 01/02/22 1922          Intervention Goal    TF/PN Maintain TF/PN                Nutrition Intervention     Row Name 01/02/22 1922          Nutrition Intervention    RD/Tech Action Follow Tx progress                  Education/Evaluation     Row Name 01/02/22 1922          Monitor/Evaluation    Monitor I&O; Pertinent labs; TF delivery/tolerance; Weight; Skin status                 Electronically signed  by:  Cristina Jones RD  01/02/22 19:23 EST

## 2022-01-03 NOTE — THERAPY EVALUATION
Patient Name: Sergio Daniel  : 1955    MRN: [id number]                              Today's Date: 1/3/2022   OT Evaluation  Admit Date: 2021    Visit Dx:     ICD-10-CM ICD-9-CM   1. Acute respiratory failure with hypoxia (HCC)  J96.01 518.81   2. Pneumonia due to COVID-19 virus  U07.1 480.8    J12.82 079.89   3. Paroxysmal atrial fibrillation (HCC)  I48.0 427.31   4. Pharyngeal dysphagia  R13.13 787.23     Patient Active Problem List   Diagnosis   • Acute respiratory failure with hypoxia (HCC)   • Acute hypoxemic respiratory failure due to COVID-19 (HCC)     Past Medical History:   Diagnosis Date   • Diabetes mellitus (HCC)      History reviewed. No pertinent surgical history.   General Information     Row Name 22 1007          Document Type evaluation  -EN    Mode of Treatment occupational therapy  -EN    Row Name 22 1145          Patient Profile Reviewed yes    Prior Level of Function --  Patient was independent with ADLs/IADLs at baseline. Patient lives with spouse and children.  -EN    Existing Precautions/Restrictions Prolonged ventilator use due to Covid 19 - EN    Barriers to Rehab Cognitive status (confused) - EN    Row Name 22 1007          Living Environment    Lives With spouse  -EN     Row Name 22 1007          Stairs Within Home, Primary    Stairs Comment, Within Home, Primary Patient confused but stated he lives in a 2 level home with bedroom/bath on first level.  -EN     Row Name 22 1007          Cognition    Orientation Status (Cognition) oriented to; person  Patient unaware of place and stated year as   -EN     Row Name 22 1007          Safety Issues, Functional Mobility    Impairments Affecting Function (Mobility) strength  Pt on 4L high flow O2  -EN     Row Name 22 1007          Relationship/Environment    Name(s) of Who Lives With Patient spouse, Luz Elena  -EN           User Key  (r) = Recorded By, (t) = Taken By, (c) = Cosigned By     Initials Name Provider Type    Isi Wiggins OTR Occupational Therapist                 Mobility/ADL's     Row Name 01/03/22 1146          Bed Mobility    Bed Mobility, Safety Issues decreased use of arms for pushing/pulling; decreased use of legs for bridging/pushing  -EN     Assistive Device (Bed Mobility) bed rails; draw sheet; head of bed elevated  -EN     Comment (Bed Mobility) Attempted rolling to left side, patient unable to assist or grasp bedrail  -EN     Row Name 01/03/22 1146          Transfers    Comment (Transfers) deferred  -EN     Row Name 01/03/22 1146          Activities of Daily Living    BADL Assessment/Intervention grooming; feeding; toileting; bathing; upper body dressing; lower body dressing  -EN     Row Name 01/03/22 1146          Bathing Assessment/Intervention    [place] Level (Bathing) bathing skills; dependent (less than 25% patient effort)  -EN     Row Name 01/03/22 1146          Grooming Assessment/Training    [place] Level (Grooming) grooming skills; dependent (less than 25% patient effort)  -EN     Row Name 01/03/22 1146          Self-Feeding Assessment/Training    Comment (Feeding) NG tube  -EN     Row Name 01/03/22 1146          Toileting Assessment/Training    [place] Level (Toileting) toileting skills; dependent (less than 25% patient effort)  -EN     Row Name 01/03/22 1146          Upper Body Dressing Assessment/Training    [place] Level (Upper Body Dressing) upper body dressing skills; dependent (less than 25% patient effort)  -EN     Row Name 01/03/22 1146          Lower Body Dressing Assessment/Training    [place] Level (Lower Body Dressing) lower body dressing skills; dependent (less than 25% patient effort)  -EN           User Key  (r) = Recorded By, (t) = Taken By, (c) = Cosigned By    Initials Name Provider Type    Isi Wiggins OTR Occupational Therapist               Obj/Interventions     Row Name 01/03/22 1149          Range  of Motion Comprehensive    Comment, General Range of Motion Patient unable to activly flex shoulders, PROM of B shoulders WFL. Bilateral elbow AROM 3/4, hands 3/4  -EN     Row Name 01/03/22 1149          Strength Comprehensive (MMT)    Comment, General Manual Muscle Testing (MMT) Assessment Elbows 3+/5 in pt's available range, hand strength 3+/5 in patients available range  -EN           User Key  (r) = Recorded By, (t) = Taken By, (c) = Cosigned By    Initials Name Provider Type    EN Isi Theodore OTCORY Occupational Therapist               Goals/Plan     Row Name 01/03/22 1348          Bed Mobility Goal 1 (OT)    Activity/Assistive Device (Bed Mobility Goal 1, OT) supine to sit  -EN     [place] Level/Cues Needed (Bed Mobility Goal 1, OT) maximum assist (25-49% patient effort)  -EN     Time Frame (Bed Mobility Goal 1, OT) 1 week  -EN     Progress/Outcomes (Bed Mobility Goal 1, OT) other (see comments)  new goal  -EN     Row Name 01/03/22 1348          Grooming Goal 1 (OT)    Activity/Device (Grooming Goal 1, OT) hair care; oral care; wash face, hands  -EN     [place] (Grooming Goal 1, OT) minimum assist (75% or more patient effort)  -EN     Time Frame (Grooming Goal 1, OT) 1 week  -EN     Progress/Outcome (Grooming Goal 1, OT) other (see comments)  new goal  -EN     Row Name 01/03/22 1348          ROM Goal 1 (OT)    ROM Goal 1 (OT) Patient will perform B UE HEP independently.  -EN     Time Frame (ROM Goal 1, OT) 1 week  -EN     Progress/Outcome (ROM Goal 1, OT) other (see comments)  new goal  -EN     Row Name 01/03/22 1348          Problem Specific Goal 1 (OT)    Problem Specific Goal 1 (OT) Patient to perform static sitting EOB with min assist for improved ind with ADLs.  -EN     Time Frame (Problem Specific Goal 1, OT) 1 week  -EN     Progress/Outcome (Problem Specific Goal 1, OT) other (see comments)  new goal  -EN     Row Name 01/03/22 1348          Therapy Assessment/Plan (OT)    Planned  Therapy Interventions (OT) BADL retraining; functional balance retraining; passive ROM/stretching; ROM/therapeutic exercise; strengthening exercise; transfer/mobility retraining  -EN           User Key  (r) = Recorded By, (t) = Taken By, (c) = Cosigned By    Initials Name Provider Type    Isi Wiggins OTR Occupational Therapist               Clinical Impression     Row Name 01/03/22 1150          Pain Assessment    Additional Documentation Pain Scale: Numbers Pre/Post-Treatment (Group)  -EN     Row Name 01/03/22 1150          Pain Scale: Numbers Pre/Post-Treatment    Pretreatment Pain Rating 0/10 - no pain  -EN     Posttreatment Pain Rating 0/10 - no pain  -EN     Row Name 01/03/22 1150          Plan of Care Review    Plan of Care Reviewed With patient  -EN     Outcome Summary OT evaluation completed. Patient oriented to name only. Patient unable to actively range shoulders however PROM in shoulders was WFL. Patient demonstrated 3/4 AROM in B elbows/hands. Patient was dependent for rolling and dependent for grooming/ADLs. OT to follow while in the hospital. At this time recommend staff use theresa lift for transfers to chair to allow patient oppurtunity to adjust to sitting upright. Patient will need short term rehab at discharge.  -EN     Row Name 01/03/22 1150          Therapy Assessment/Plan (OT)    Rehab Potential (OT) good, to achieve stated therapy goals  -EN     Criteria for Skilled Therapeutic Interventions Met (OT) yes; skilled treatment is necessary  -EN     Therapy Frequency (OT) 5 times/wk  -EN     Predicted Duration of Therapy Intervention (OT) 2 weeks  -EN     Row Name 01/03/22 1150          Therapy Plan Review/Discharge Plan (OT)    Equipment Needs Upon Discharge (OT) other (see comments)  will continue to assess  -EN     Anticipated Discharge Disposition (OT) skilled nursing facility  -EN     Row Name 01/03/22 1150          Vital Signs    Pre SpO2 (%) --  Patient on 4L heated high flow O2.  Sats maintained in 90's during evaluation  -EN     Row Name 01/03/22 1150          Positioning and Restraints    Pre-Treatment Position in bed  -EN     Post Treatment Position bed  -EN     In Bed notified nsg; call light within reach; encouraged to call for assist  -EN           User Key  (r) = Recorded By, (t) = Taken By, (c) = Cosigned By    Initials Name Provider Type    Isi Wiggins OTR Occupational Therapist               Outcome Measures     Row Name 01/03/22 1351          How much help from another is currently needed...    Putting on and taking off regular lower body clothing? 1  -EN     Bathing (including washing, rinsing, and drying) 1  -EN     Toileting (which includes using toilet bed pan or urinal) 1  -EN     Putting on and taking off regular upper body clothing 1  -EN     Taking care of personal grooming (such as brushing teeth) 1  -EN     Eating meals 1  -EN     AM-PAC 6 Clicks Score (OT) 6  -EN     Row Name 01/03/22 0900          How much help from another person do you currently need...    Turning from your back to your side while in flat bed without using bedrails? 1  -JW     Moving from lying on back to sitting on the side of a flat bed without bedrails? 1  -JW     Moving to and from a bed to a chair (including a wheelchair)? 1  -JW     Standing up from a chair using your arms (e.g., wheelchair, bedside chair)? 1  -JW     Climbing 3-5 steps with a railing? 1  -JW     To walk in hospital room? 1  -JW     AM-PAC 6 Clicks Score (PT) 6  -JW     Row Name 01/03/22 1351 01/03/22 0900       Functional Assessment    Outcome Measure Options AM-PAC 6 Clicks Daily Activity (OT)  -EN AM-PAC 6 Clicks Basic Mobility (PT)  -JW          User Key  (r) = Recorded By, (t) = Taken By, (c) = Cosigned By    Initials Name Provider Type    Isi Wiggins OTR Occupational Therapist    Rosy Arevalo, PT Physical Therapist                Occupational Therapy Education                 Title: PT OT  SLP Therapies (Done)     Topic: Occupational Therapy (Done)     Point: ADL training (Done)     Description:   Instruct learner(s) on proper safety adaptation and remediation techniques during self care or transfers.   Instruct in proper use of assistive devices.              Learning Progress Summary           Patient Acceptance, E, VU by EN at 1/3/2022 1352    Comment: Educated on UE HEP                   Point: Home exercise program (Done)     Description:   Instruct learner(s) on appropriate technique for monitoring, assisting and/or progressing therapeutic exercises/activities.              Learning Progress Summary           Patient Acceptance, E, VU by EN at 1/3/2022 1352    Comment: Educated on UE HEP                               User Key     Initials Effective Dates Name Provider Type Discipline    EN 06/16/21 -  Isi Theodore OTR Occupational Therapist OT              OT Recommendation and Plan  Planned Therapy Interventions (OT): BADL retraining, functional balance retraining, passive ROM/stretching, ROM/therapeutic exercise, strengthening exercise, transfer/mobility retraining  Therapy Frequency (OT): 5 times/wk  Plan of Care Review  Plan of Care Reviewed With: patient  Outcome Summary: OT evaluation completed. Patient oriented to name only. Patient unable to actively range shoulders however PROM in shoulders was WFL. Patient demonstrated 3/4 AROM in B elbows/hands. Patient was dependent for rolling and dependent for grooming/ADLs. OT to follow while in the hospital. At this time recommend staff use theresa lift for transfers to chair to allow patient oppurtunity to adjust to sitting upright. Patient will need short term rehab at discharge.     Time Calculation:    Time Calculation- OT     Row Name 01/03/22 1353             Time Calculation- OT    OT Start Time 0900  -EN      OT Stop Time 0928  -EN      OT Time Calculation (min) 28 min  -EN            User Key  (r) = Recorded By, (t) = Taken By,  (c) = Cosigned By    Initials Name Provider Type    EN Isi Theodore OTR Occupational Therapist              Therapy Charges for Today     Code Description Service Date Service Provider Modifiers Qty    [id number] HC OT EVAL MOD COMPLEXITY 2 1/3/2022 Isi Theodore OTR GO 1               LINDA Pepe  1/3/2022

## 2022-01-04 ENCOUNTER — APPOINTMENT (OUTPATIENT)
Dept: GENERAL RADIOLOGY | Facility: HOSPITAL | Age: 67
End: 2022-01-04

## 2022-01-04 LAB
ANION GAP SERPL CALCULATED.3IONS-SCNC: 8.7 MMOL/L (ref 5–15)
BUN SERPL-MCNC: 19 MG/DL (ref 8–23)
BUN/CREAT SERPL: 35.2 (ref 7–25)
CALCIUM SPEC-SCNC: 9 MG/DL (ref 8.6–10.5)
CHLORIDE SERPL-SCNC: 99 MMOL/L (ref 98–107)
CO2 SERPL-SCNC: 29.3 MMOL/L (ref 22–29)
CREAT SERPL-MCNC: 0.54 MG/DL (ref 0.76–1.27)
CRP SERPL-MCNC: 5.36 MG/DL (ref 0–0.5)
D DIMER PPP FEU-MCNC: 1.74 MCGFEU/ML (ref 0–0.46)
DEPRECATED RDW RBC AUTO: 50.8 FL (ref 37–54)
ERYTHROCYTE [DISTWIDTH] IN BLOOD BY AUTOMATED COUNT: 14.4 % (ref 12.3–15.4)
GFR SERPL CREATININE-BSD FRML MDRD: >150 ML/MIN/1.73
GLUCOSE BLDC GLUCOMTR-MCNC: 114 MG/DL (ref 70–130)
GLUCOSE BLDC GLUCOMTR-MCNC: 187 MG/DL (ref 70–130)
GLUCOSE BLDC GLUCOMTR-MCNC: 200 MG/DL (ref 70–130)
GLUCOSE SERPL-MCNC: 144 MG/DL (ref 65–99)
HCT VFR BLD AUTO: 34.2 % (ref 37.5–51)
HGB BLD-MCNC: 10.6 G/DL (ref 13–17.7)
MCH RBC QN AUTO: 30.4 PG (ref 26.6–33)
MCHC RBC AUTO-ENTMCNC: 31 G/DL (ref 31.5–35.7)
MCV RBC AUTO: 98 FL (ref 79–97)
PLATELET # BLD AUTO: 346 10*3/MM3 (ref 140–450)
PMV BLD AUTO: 9.7 FL (ref 6–12)
POTASSIUM SERPL-SCNC: 3.8 MMOL/L (ref 3.5–5.2)
PROCALCITONIN SERPL-MCNC: 0.1 NG/ML (ref 0–0.25)
RBC # BLD AUTO: 3.49 10*6/MM3 (ref 4.14–5.8)
SODIUM SERPL-SCNC: 137 MMOL/L (ref 136–145)
WBC NRBC COR # BLD: 12.78 10*3/MM3 (ref 3.4–10.8)

## 2022-01-04 PROCEDURE — 97110 THERAPEUTIC EXERCISES: CPT

## 2022-01-04 PROCEDURE — 82962 GLUCOSE BLOOD TEST: CPT

## 2022-01-04 PROCEDURE — 94799 UNLISTED PULMONARY SVC/PX: CPT

## 2022-01-04 PROCEDURE — 71045 X-RAY EXAM CHEST 1 VIEW: CPT

## 2022-01-04 PROCEDURE — 80048 BASIC METABOLIC PNL TOTAL CA: CPT | Performed by: INTERNAL MEDICINE

## 2022-01-04 PROCEDURE — 94761 N-INVAS EAR/PLS OXIMETRY MLT: CPT

## 2022-01-04 PROCEDURE — 97530 THERAPEUTIC ACTIVITIES: CPT

## 2022-01-04 PROCEDURE — 74018 RADEX ABDOMEN 1 VIEW: CPT

## 2022-01-04 PROCEDURE — 94669 MECHANICAL CHEST WALL OSCILL: CPT

## 2022-01-04 PROCEDURE — 99232 SBSQ HOSP IP/OBS MODERATE 35: CPT | Performed by: INTERNAL MEDICINE

## 2022-01-04 PROCEDURE — 84145 PROCALCITONIN (PCT): CPT | Performed by: INTERNAL MEDICINE

## 2022-01-04 PROCEDURE — 92526 ORAL FUNCTION THERAPY: CPT

## 2022-01-04 PROCEDURE — 25010000002 LEVOFLOXACIN PER 250 MG: Performed by: INTERNAL MEDICINE

## 2022-01-04 PROCEDURE — 85379 FIBRIN DEGRADATION QUANT: CPT | Performed by: STUDENT IN AN ORGANIZED HEALTH CARE EDUCATION/TRAINING PROGRAM

## 2022-01-04 PROCEDURE — 25010000002 DEXAMETHASONE PER 1 MG: Performed by: INTERNAL MEDICINE

## 2022-01-04 PROCEDURE — 86140 C-REACTIVE PROTEIN: CPT | Performed by: INTERNAL MEDICINE

## 2022-01-04 PROCEDURE — 63710000001 INSULIN DETEMIR PER 5 UNITS: Performed by: HOSPITALIST

## 2022-01-04 PROCEDURE — 85027 COMPLETE CBC AUTOMATED: CPT | Performed by: INTERNAL MEDICINE

## 2022-01-04 PROCEDURE — 63710000001 INSULIN ASPART PER 5 UNITS: Performed by: INTERNAL MEDICINE

## 2022-01-04 RX ORDER — HYDRALAZINE HYDROCHLORIDE 20 MG/ML
10 INJECTION INTRAMUSCULAR; INTRAVENOUS EVERY 6 HOURS PRN
Status: DISCONTINUED | OUTPATIENT
Start: 2022-01-04 | End: 2022-01-13 | Stop reason: HOSPADM

## 2022-01-04 RX ADMIN — DILTIAZEM HYDROCHLORIDE 60 MG: 30 TABLET ORAL at 13:23

## 2022-01-04 RX ADMIN — SODIUM CHLORIDE, PRESERVATIVE FREE 10 ML: 5 INJECTION INTRAVENOUS at 20:10

## 2022-01-04 RX ADMIN — INSULIN DETEMIR 23 UNITS: 100 INJECTION, SOLUTION SUBCUTANEOUS at 20:10

## 2022-01-04 RX ADMIN — DILTIAZEM HYDROCHLORIDE 60 MG: 30 TABLET ORAL at 05:52

## 2022-01-04 RX ADMIN — CHLORHEXIDINE GLUCONATE 0.12% ORAL RINSE 15 ML: 1.2 LIQUID ORAL at 20:10

## 2022-01-04 RX ADMIN — SODIUM CHLORIDE, PRESERVATIVE FREE 10 ML: 5 INJECTION INTRAVENOUS at 20:09

## 2022-01-04 RX ADMIN — APIXABAN 10 MG: 2.5 TABLET, FILM COATED ORAL at 23:30

## 2022-01-04 RX ADMIN — LEVOFLOXACIN 750 MG: 5 INJECTION, SOLUTION INTRAVENOUS at 13:26

## 2022-01-04 RX ADMIN — INSULIN DETEMIR 23 UNITS: 100 INJECTION, SOLUTION SUBCUTANEOUS at 09:17

## 2022-01-04 RX ADMIN — DEXAMETHASONE SODIUM PHOSPHATE 6 MG: 4 INJECTION, SOLUTION INTRAMUSCULAR; INTRAVENOUS at 09:15

## 2022-01-04 RX ADMIN — SODIUM CHLORIDE, PRESERVATIVE FREE 10 ML: 5 INJECTION INTRAVENOUS at 09:16

## 2022-01-04 RX ADMIN — DILTIAZEM HYDROCHLORIDE 60 MG: 30 TABLET ORAL at 23:30

## 2022-01-04 RX ADMIN — INSULIN ASPART 8 UNITS: 100 INJECTION, SOLUTION INTRAVENOUS; SUBCUTANEOUS at 13:23

## 2022-01-04 RX ADMIN — INSULIN ASPART 4 UNITS: 100 INJECTION, SOLUTION INTRAVENOUS; SUBCUTANEOUS at 18:50

## 2022-01-04 RX ADMIN — INSULIN ASPART 4 UNITS: 100 INJECTION, SOLUTION INTRAVENOUS; SUBCUTANEOUS at 02:22

## 2022-01-04 RX ADMIN — FAMOTIDINE 20 MG: 10 INJECTION INTRAVENOUS at 09:15

## 2022-01-04 RX ADMIN — APIXABAN 10 MG: 2.5 TABLET, FILM COATED ORAL at 09:15

## 2022-01-04 NOTE — PROGRESS NOTES
LOS: 19 days   Patient Care Team:  Farhan Jean Jr., MD as PCP - General (Family Medicine)        Subjective     Interval History:     Patient Complaints:   Patient Denies:  weak       Review of Systems:    weak    Objective     Vital Signs  Temp:  [97.5 °F (36.4 °C)-98.1 °F (36.7 °C)] 98.1 °F (36.7 °C)  Heart Rate:  [] 106  Resp:  [16-22] 20  BP: (142-170)/() 142/87    Physical Exam:     General Appearance:    Off vent   Head:     Eyes:            Lids and lashes normal, conjunctivae and sclerae normal, no   icterus, no pallor, corneas clear, PERRLA   Ears:    Ears appear intact with no abnormalities noted   Throat:   No oral lesions, no thrush, oral mucosa moist   Neck:   No adenopathy, supple, trachea midline, no thyromegaly, no   carotid bruit, no JVD   Back:     No kyphosis present, no scoliosis present, no skin lesions,      erythema or scars, no tenderness to percussion or                   palpation,   range of motion normal   Lungs:     Clear to auscultation,respirations regular, even and                  unlabored    Heart:    Regular rhythm and normal rate, normal S1 and S2, no            murmur, no gallop, no rub, no click   Chest Wall:    No abnormalities observed   Abdomen:     Normal bowel sounds, no masses, no organomegaly, soft        non-tender, non-distended, no guarding, no rebound                tenderness   Rectal:     Deferred   Extremities:   Moves all extremities well, no edema, no cyanosis, no             redness   Pulses:   Pulses palpable and equal bilaterally   Skin:   No bleeding, bruising or rash   Lymph nodes:   No palpable adenopathy   Neurologic:   Cranial nerves 2 - 12 grossly intact, sensation intact, DTR       present and equal bilaterally          Results Review:      Lab Results (last 72 hours)     Procedure Component Value Units Date/Time    POC Glucose Once [[id number]]  (Abnormal) Collected: 01/02/22 1654    Specimen: Blood Updated: 01/02/22 7721      Glucose 250 mg/dL      Comment: Meter: [id number] : [id number] Issa Cleary RN Float       C-reactive Protein [[id number]]  (Abnormal) Collected: 01/02/22 0408    Specimen: Blood Updated: 01/02/22 1250     C-Reactive Protein 5.09 mg/dL     POC Glucose Once [[id number]]  (Abnormal) Collected: 01/02/22 1110    Specimen: Blood Updated: 01/02/22 1117     Glucose 170 mg/dL      Comment: Meter: [id number] : [id number] Issa Cleary RN Float       Ferritin [[id number]]  (Abnormal) Collected: 01/02/22 0408    Specimen: Blood Updated: 01/02/22 0515     Ferritin 1,581.00 ng/mL     Narrative:      Results may be falsely decreased if patient taking Biotin.      Comprehensive Metabolic Panel [[id number]]  (Abnormal) Collected: 01/02/22 0408    Specimen: Blood Updated: 01/02/22 0503     Glucose 127 mg/dL      BUN 17 mg/dL      Creatinine 0.54 mg/dL      Sodium 139 mmol/L      Potassium 3.9 mmol/L      Chloride 100 mmol/L      CO2 30.8 mmol/L      Calcium 8.8 mg/dL      Total Protein 5.9 g/dL      Albumin 2.40 g/dL      ALT (SGPT) 51 U/L      AST (SGOT) 37 U/L      Alkaline Phosphatase 126 U/L      Total Bilirubin 0.5 mg/dL      eGFR Non African Amer >150 mL/min/1.73      Globulin 3.5 gm/dL      A/G Ratio 0.7 g/dL      BUN/Creatinine Ratio 31.5     Anion Gap 8.2 mmol/L     Narrative:      GFR Normal >60  Chronic Kidney Disease <60  Kidney Failure <15      Lactate Dehydrogenase [[id number]]  (Abnormal) Collected: 01/02/22 0408    Specimen: Blood Updated: 01/02/22 0503      U/L     Procalcitonin [[id number]]  (Normal) Collected: 01/02/22 0408    Specimen: Blood Updated: 01/02/22 0459     Procalcitonin 0.11 ng/mL     D-dimer, Quantitative [[id number]]  (Abnormal) Collected: 01/02/22 0408    Specimen: Blood Updated: 01/02/22 0449     D-Dimer, Quantitative 2.08 MCGFEU/mL     Narrative:      Can be elevated in, but is not diagnostic for deep vein thrombosis (DVT) or pulmonary embolis (PE).  It is also elevated in other medical  conditions.  Clinical correlation is required.  The negative cut-off value for the D-Dimer is 0.50 mcg FEU/mL for DVT and PE.      CBC (No Diff) [[id number]]  (Abnormal) Collected: 01/02/22 0408    Specimen: Blood Updated: 01/02/22 0430     WBC 13.56 10*3/mm3      RBC 3.27 10*6/mm3      Hemoglobin 10.1 g/dL      Hematocrit 31.8 %      MCV 97.2 fL      MCH 30.9 pg      MCHC 31.8 g/dL      RDW 14.5 %      RDW-SD 49.4 fl      MPV 10.7 fL      Platelets 323 10*3/mm3     POC Glucose Once [[id number]]  (Abnormal) Collected: 01/02/22 0023    Specimen: Blood Updated: 01/02/22 0029     Glucose 133 mg/dL      Comment: Meter: [id number] : [id number] Sindhu Seay RN       POC Glucose Once [[id number]]  (Abnormal) Collected: 01/01/22 2144    Specimen: Blood Updated: 01/01/22 2150     Glucose 141 mg/dL      Comment: Meter: [id number] : [id number] Sindhu Seay RN       POC Glucose Once [[id number]]  (Abnormal) Collected: 01/01/22 1737    Specimen: Blood Updated: 01/01/22 1743     Glucose 148 mg/dL      Comment: Meter: [id number] : [id number] Norris Mari RN       C-reactive Protein [[id number]]  (Abnormal) Collected: 01/01/22 0435    Specimen: Blood Updated: 01/01/22 1248     C-Reactive Protein 5.64 mg/dL     POC Glucose Once [[id number]]  (Normal) Collected: 01/01/22 1147    Specimen: Blood Updated: 01/01/22 1155     Glucose 94 mg/dL      Comment: Meter: [id number] : [id number] Norris Mari RN       Respiratory Culture - Sputum, ET Suction [[id number]]  (Abnormal)  (Susceptibility) Collected: 12/29/21 0804    Specimen: Sputum from ET Suction Updated: 01/01/22 0957     Respiratory Culture Heavy growth (4+) Staphylococcus aureus      Heavy growth (4+) Stenotrophomonas maltophilia      Light growth (2+) Citrobacter freundii      No Normal Respiratory Alida     Gram Stain Moderate (3+) WBCs seen      Rare (1+) Epithelial cells seen      Moderate (3+) Gram positive cocci in pairs, chains and clusters    Susceptibility       Staphylococcus aureus     JANUSZ     Clindamycin Susceptible     Inducible Clindamycin Resistance Negative     Oxacillin Susceptible     Tetracycline Susceptible     Trimethoprim + Sulfamethoxazole Susceptible     Vancomycin Susceptible                  Linear View               Susceptibility      Stenotrophomonas maltophilia     JANUSZ     Levofloxacin Susceptible     Trimethoprim + Sulfamethoxazole Susceptible                  Linear View               Susceptibility      Citrobacter freundii     JANUSZ     Cefepime Susceptible     Ceftazidime Resistant     Ceftriaxone Resistant     Gentamicin Susceptible     Levofloxacin Susceptible     Piperacillin + Tazobactam Intermediate     Tetracycline Susceptible     Trimethoprim + Sulfamethoxazole Susceptible                  Linear View               Susceptibility Comments     Staphylococcus aureus    This isolate does not demonstrate inducible clindamycin resistance in vitro.    This isolate does not demonstrate inducible clindamycin resistance in vitro.      Citrobacter freundii    Cefazolin sensitivity will not be reported for Enterobacteriaceae in non-urine isolates. If cefazolin is preferred, please call the microbiology lab to request an E-test.  With the exception of urinary-sourced infections, aminoglycosides should not be used as monotherapy.             Blood Gas, Arterial - [[id number]]  (Abnormal) Collected: 01/01/22 0942    Specimen: Arterial Blood Updated: 01/01/22 0942     Site Left Radial     Triston's Test Positive     pH, Arterial 7.513 pH units      Comment: 83 Value above reference range        pCO2, Arterial 43.4 mm Hg      pO2, Arterial 64.1 mm Hg      Comment: 84 Value below reference range        HCO3, Arterial 34.9 mmol/L      Comment: 83 Value above reference range        Base Excess, Arterial 10.9 mmol/L      Comment: 83 Value above reference range        O2 Saturation, Arterial 93.7 %      Comment: 84 Value below reference range        Hemoglobin, Blood  Gas 9.4 g/dL      Comment: 84 Value below reference range        Temperature 37.0 C      Barometric Pressure for Blood Gas 731 mmHg      Modality Ventilator     FIO2 30 %      Ventilator Mode CPAP     Set Tidal Volume 574     PEEP 5.0     PSV 10.0 cmH2O      Collected by [id number]     Comment: Meter: [id number]     :  [id number]        pCO2, Temperature Corrected 43.4 mm Hg      pH, Temp Corrected 7.513 pH Units      pO2, Temperature Corrected 64.1 mm Hg     POC Glucose Once [[id number]]  (Abnormal) Collected: 01/01/22 0619    Specimen: Blood Updated: 01/01/22 0626     Glucose 172 mg/dL      Comment: Meter: [id number] : [id number] Sindhu Seay RN       Ferritin [[id number]]  (Abnormal) Collected: 01/01/22 0435    Specimen: Blood Updated: 01/01/22 0541     Ferritin 1,386.00 ng/mL     Narrative:      Results may be falsely decreased if patient taking Biotin.      Comprehensive Metabolic Panel [[id number]]  (Abnormal) Collected: 01/01/22 0435    Specimen: Blood Updated: 01/01/22 0530     Glucose 181 mg/dL      BUN 15 mg/dL      Creatinine 0.54 mg/dL      Sodium 139 mmol/L      Potassium 3.8 mmol/L      Chloride 104 mmol/L      CO2 29.8 mmol/L      Calcium 8.5 mg/dL      Total Protein 5.2 g/dL      Albumin 2.00 g/dL      ALT (SGPT) 31 U/L      AST (SGOT) 22 U/L      Alkaline Phosphatase 97 U/L      Total Bilirubin 0.4 mg/dL      eGFR Non African Amer >150 mL/min/1.73      Globulin 3.2 gm/dL      A/G Ratio 0.6 g/dL      BUN/Creatinine Ratio 27.8     Anion Gap 5.2 mmol/L     Narrative:      GFR Normal >60  Chronic Kidney Disease <60  Kidney Failure <15      Procalcitonin [[id number]]  (Normal) Collected: 01/01/22 0435    Specimen: Blood Updated: 01/01/22 0512     Procalcitonin 0.12 ng/mL     CBC (No Diff) [[id number]]  (Abnormal) Collected: 01/01/22 0435    Specimen: Blood Updated: 01/01/22 0448     WBC 10.72 10*3/mm3      RBC 2.82 10*6/mm3      Hemoglobin 8.9 g/dL      Hematocrit 27.8 %      MCV 98.6 fL      MCH  31.6 pg      MCHC 32.0 g/dL      RDW 14.2 %      RDW-SD 49.0 fl      MPV 10.8 fL      Platelets 246 10*3/mm3     POC Glucose Once [[id number]]  (Abnormal) Collected: 12/31/21 2334    Specimen: Blood Updated: 12/31/21 2340     Glucose 196 mg/dL      Comment: Meter: [id number] : [id number] Sindhu Seay RN       POC Glucose Once [[id number]]  (Abnormal) Collected: 12/31/21 2023    Specimen: Blood Updated: 12/31/21 2030     Glucose 226 mg/dL      Comment: Meter: [id number] : [id number] Sindhu Seay RN       POC Glucose Once [[id number]]  (Abnormal) Collected: 12/31/21 1710    Specimen: Blood Updated: 12/31/21 1716     Glucose 202 mg/dL      Comment: Meter: [id number] : MICHAEL Roland RN       C-reactive Protein [[id number]]  (Abnormal) Collected: 12/31/21 0512    Specimen: Blood Updated: 12/31/21 1340     C-Reactive Protein 11.00 mg/dL     POC Glucose Once [[id number]]  (Abnormal) Collected: 12/31/21 1202    Specimen: Blood Updated: 12/31/21 1208     Glucose 194 mg/dL      Comment: Meter: [id number] : [id number] Dejon Johnson RN       Ferritin [[id number]]  (Abnormal) Collected: 12/31/21 0512    Specimen: Blood Updated: 12/31/21 0640     Ferritin 1,551.00 ng/mL     Narrative:      Results may be falsely decreased if patient taking Biotin.      Lactate Dehydrogenase [[id number]]  (Abnormal) Collected: 12/31/21 0512    Specimen: Blood Updated: 12/31/21 0634      U/L     Procalcitonin [[id number]]  (Normal) Collected: 12/31/21 0512    Specimen: Blood Updated: 12/31/21 0623     Procalcitonin 0.17 ng/mL     POC Glucose Once [[id number]]  (Abnormal) Collected: 12/31/21 0609    Specimen: Blood Updated: 12/31/21 0616     Glucose 183 mg/dL      Comment: Meter: [id number] : [id number] Sindhu Seay RN       D-dimer, Quantitative [[id number]]  (Abnormal) Collected: 12/31/21 0512    Specimen: Blood Updated: 12/31/21 0609     D-Dimer, Quantitative 1.43 MCGFEU/mL     Narrative:      Can be elevated in,  but is not diagnostic for deep vein thrombosis (DVT) or pulmonary embolis (PE).  It is also elevated in other medical conditions.  Clinical correlation is required.  The negative cut-off value for the D-Dimer is 0.50 mcg FEU/mL for DVT and PE.      Blood Gas, Arterial - [[id number]]  (Abnormal) Collected: 12/31/21 0520    Specimen: Arterial Blood Updated: 12/31/21 0526     Site Left Radial     Triston's Test Positive     pH, Arterial 7.480 pH units      Comment: 83 Value above reference range        pCO2, Arterial 44.1 mm Hg      pO2, Arterial 72.2 mm Hg      Comment: 84 Value below reference range        HCO3, Arterial 32.8 mmol/L      Comment: 83 Value above reference range        Base Excess, Arterial 8.4 mmol/L      Comment: 83 Value above reference range        O2 Saturation, Arterial 96.4 %      Hemoglobin, Blood Gas 8.5 g/dL      Comment: 84 Value below reference range        Temperature 37.0 C      Barometric Pressure for Blood Gas 734 mmHg      Modality Ventilator     FIO2 35 %      Ventilator Mode AC     Set Mech Resp Rate 16.0     PEEP 5.0     PIP 14 cmH2O      Comment: Meter: [id number]     :  [id number]        Collected by [id number]     pCO2, Temperature Corrected 44.1 mm Hg      pH, Temp Corrected 7.480 pH Units      pO2, Temperature Corrected 72.2 mm Hg     POC Glucose Once [[id number]]  (Abnormal) Collected: 12/31/21 0007    Specimen: Blood Updated: 12/31/21 0013     Glucose 178 mg/dL      Comment: Meter: [id number] : [id number] Sindhu Seay RN       POC Glucose Once [[id number]]  (Abnormal) Collected: 12/30/21 2104    Specimen: Blood Updated: 12/30/21 2110     Glucose 184 mg/dL      Comment: Meter: [id number] : [id number] Seay RN             Imaging Results (Last 72 Hours)     ** No results found for the last 72 hours. **            Medication Review:     Hospital Medications (active)       Dose Frequency Start End    acetaminophen (TYLENOL) tablet 650 mg 650 mg Every 6 Hours PRN  "12/22/2021     Admin Instructions: Do not exceed 4 grams of acetaminophen in a 24 hr period. Max dose of 2gm for AST/ALT greater than 120 units/L      If given for pain, use the following pain scale:   Mild Pain = Pain Score of 1-3, CPOT 1-2  Moderate Pain = Pain Score of 4-6, CPOT 3-4  Severe Pain = Pain Score of 7-10, CPOT 5-8    Route: Oral    apixaban (ELIQUIS) tablet 10 mg 10 mg Every 12 Hours Scheduled 12/29/2021 1/5/2022    Admin Instructions: Tablet may be crushed and suspended in 60 mL of water or D5W and immediately delivered via NG tube.  Avoid grapefruit juice.    Route: Nasogastric    Linked Group 1: \"Followed by\" Linked Group Details        apixaban (ELIQUIS) tablet 5 mg 5 mg Every 12 Hours Scheduled 1/5/2022     Admin Instructions: Tablet may be crushed and suspended in 60 mL of water or D5W and immediately delivered via NG tube.  Avoid grapefruit juice.    Route: Oral    Linked Group 1: \"Followed by\" Linked Group Details        bisacodyl (DULCOLAX) suppository 10 mg 10 mg Daily 12/27/2021     Route: Rectal    chlorhexidine (PERIDEX) 0.12 % solution 15 mL 15 mL Every 12 Hours Scheduled 12/27/2021     Admin Instructions:  Do not swallow.    Route: Mouth/Throat    dexamethasone (DECADRON) injection 6 mg 6 mg Daily With Breakfast 12/25/2021     Admin Instructions: Switch to IV if unable to take medication by mouth  For IV administration.  May be pushed over a minimum of 1 minute.    Route: Intravenous    Linked Group 2: \"Or\" Linked Group Details        dexamethasone (DECADRON) tablet 6 mg 6 mg Daily With Breakfast 12/25/2021     Admin Instructions: Switch to IV if unable to take medication by mouth  Take with food.    Route: Oral    Linked Group 2: \"Or\" Linked Group Details        dexmedetomidine (PRECEDEX) 400 mcg in 100 mL NS infusion 0.2-1.5 mcg/kg/hr × 115 kg Titrated 12/17/2021     Admin Instructions: Begin infusion at 0.2 mcg/kg/hr and titrate by 0.1 mcg/kg/hr every 15 minutes to maintain RASS " goal.  Maximum rate 1.5 mcg/kg/hr.  Notify MD if not at RASS goal and requiring 1.5 mcg/kg/hr or heart rate is less than 50 beats per minute or MAP is less than 60 mmHg.    Route: Intravenous    dextromethorphan polistirex ER (DELSYM) 30 MG/5ML oral suspension 60 mg 60 mg Every 12 Hours PRN 12/16/2021     Route: Oral    dextrose (D50W) (25 g/50 mL) IV injection 25 g 25 g Every 15 Minutes PRN 12/19/2021     Admin Instructions: Blood sugar less than 70; patient has IV access - Unresponsive, NPO or Unable To Safely Swallow    Route: Intravenous    dextrose (GLUTOSE) oral gel 15 g 15 g Every 15 Minutes PRN 12/19/2021     Admin Instructions: BS<70, Patient Alert, Is not NPO, Can safely swallow.    Route: Oral    dilTIAZem (CARDIZEM) tablet 60 mg 60 mg Every 8 Hours Scheduled 12/24/2021     Admin Instructions: HOLD FOR HR <60 and/or SBP <,= 90mmHg  Caution: Look alike/sound alike drug alert.  Take on empty stomach 1 hr before or 2 hrs after meals. Avoid grapefruit juice. Maximum simvastatin dose 10 mg.    Route: Nasogastric    famotidine (PEPCID) injection 20 mg 20 mg Daily 12/27/2021     Admin Instructions: Dilute to 10 mL total volume and give IV push over 2 minutes.    Route: Intravenous    fentaNYL citrate (PF) (SUBLIMAZE) injection 25 mcg 25 mcg Every 1 Hour PRN 12/27/2021 1/3/2022    Admin Instructions: Use filter needle to withdraw dose from ampule.  If given for pain, use the following pain scale:  Mild Pain = Pain Score of 1-3, CPOT 1-2  Moderate Pain = Pain Score of 4-6, CPOT 3-4  Severe Pain = Pain Score of 7-10, CPOT 5-8    Route: Intravenous    glucagon (GLUCAGEN) injection 1 mg 1 mg Every 15 Minutes PRN 12/19/2021     Admin Instructions: Blood Glucose Less Than 70 - Patient Without IV Access - Unresponsive, NPO or Unable To Safely Swallow    Route: Subcutaneous    insulin aspart (novoLOG) injection 0-24 Units 0-24 Units Every 6 Hours 12/21/2021     Admin Instructions: Correction - High Dose.  Greater than 80  units/day total insulin dose or, on steroids, insulin resistant, infection.    Blood glucose 150-199 mg/dL - 4 units  Blood glucose 200-249 mg/dL - 8 units  Blood glucose 250-299 mg/dL - 12 units  Blood glucose 300-349 mg/dL - 16 units  Blood glucose 350-400 mg/dL - 20 units  Blood glucose greater than 400 mg/dL - 24 units and call provider      Route: Subcutaneous    insulin detemir (LEVEMIR) injection 23 Units 23 Units Every 12 Hours Scheduled 12/31/2021     Admin Instructions:     Route: Subcutaneous    ipratropium-albuterol (DUO-NEB) nebulizer solution 3 mL 3 mL Every 6 Hours PRN 12/16/2021     Route: Nebulization    levoFLOXacin (LEVAQUIN) 750 mg/150 mL D5W (premix) (LEVAQUIN) 750 mg 750 mg Every 24 Hours 1/1/2022 1/8/2022    Admin Instructions: Caution: Look alike/sound alike drug alert. Protect from light. Do NOT refrigerate.    Route: Intravenous    polyethylene glycol (MIRALAX) packet 17 g 17 g Daily 12/26/2021     Admin Instructions: Use 4-8 ounces of water, tea, or juice for each 17 gram dose.    Route: Oral    potassium chloride 10 mEq in 100 mL IVPB 10 mEq Every 1 Hour PRN 12/17/2021     Admin Instructions: Peripheral IV  Potassium 3.1 or Less Give KCl 10 mEq/100 mL NS IV q1h x6 Doses  Potassium 3.2 - 3.6 Give KCl 10 mEq/100 mL NS q1h x4 Doses    Check Potassium 4 Hours After Last Dose Given  Check Magnesium if Potassium Remains Low After Replacement  DO NOT GIVE if CrCl is Less Than 30 mL/minute or Urine Output Less Than 30 mL/hr.     Rates Greater Than 10 mEq/hr Require ECG Monitoring.  OUTPATIENT/NON-MONITORED UNITS: Potassium Chloride standard bolus infusion rate is a maximum of 10 mEq/hr on unmonitored patients    MONITORED UNITS: Potassium Chloride standard bolus infusion rate is a maximum of 20 mEq/hr on ECG monitored patients ONLY      Route: Intravenous    potassium phosphate 15 mmol in sodium chloride 0.9 % 100 mL infusion 15 mmol As Needed 12/17/2021     Admin Instructions: Recheck Phosphorus  "level 6 hours after replacement complete.  Refrigerate.      Doses listed as mmol of phosphate.   4.4 mEq of Potassium = 3 mmol of Phosphate    Route: Intravenous    Linked Group 3: \"Or\" Linked Group Details        potassium phosphate 30 mmol in sodium chloride 0.9 % 250 mL infusion 30 mmol As Needed 12/17/2021     Admin Instructions: Recheck Phosphorus level 6 hours after replacement complete.  Refrigerate.      Doses listed as mmol of phosphate.   4.4 mEq of Potassium = 3 mmol of Phosphate    Route: Intravenous    Linked Group 3: \"Or\" Linked Group Details        potassium phosphate 45 mmol in sodium chloride 0.9 % 500 mL infusion 45 mmol As Needed 12/17/2021     Admin Instructions: Recheck Phosphorus level 6 hours after replacement complete.  Refrigerate.      Doses listed as mmol of phosphate.   4.4 mEq of Potassium = 3 mmol of Phosphate    Route: Intravenous    Linked Group 3: \"Or\" Linked Group Details        propofol (DIPRIVAN) infusion 10 mg/mL 100 mL 5-50 mcg/kg/min × 115 kg Titrated 12/18/2021     Admin Instructions: Do not administer through the same IV catheter with blood or plasma.  Tubing and any unused portions of propofol vials should be discarded after 12 hours.  Begin infusion at 5 mcg/kg/min and titrate by by 5 mcg/kg/min every 5 minutes to maintain RASS goal. Maximum rate 50 mcg/kg/min.  Notify MD if not at goal and requiring more than 50 mcg/kg/min. Infuse into dedicated line, change vial and tube every 12 hours. Patient must be intubated.    Route: Intravenous    sodium chloride 0.9 % flush 10 mL 10 mL As Needed 12/16/2021     Route: Intravenous    Linked Group 4: \"And\" Linked Group Details        sodium chloride 0.9 % flush 10 mL 10 mL As Needed 12/21/2021     Route: Intravenous    sodium chloride 0.9 % flush 10 mL 10 mL Every 12 Hours Scheduled 12/29/2021     Admin Instructions: Lumen #1    Route: Intravenous    sodium chloride 0.9 % flush 10 mL 10 mL Every 12 Hours Scheduled 12/29/2021     " "Admin Instructions: Lumen #2    Route: Intravenous    sodium chloride 0.9 % flush 10 mL 10 mL Every 12 Hours Scheduled 12/29/2021     Admin Instructions: Lumen #3    Route: Intravenous    sodium chloride 0.9 % flush 10 mL 10 mL As Needed 12/29/2021     Route: Intravenous    sodium chloride 0.9 % flush 20 mL 20 mL As Needed 12/21/2021     Route: Intravenous    sodium chloride 0.9 % flush 20 mL 20 mL As Needed 12/29/2021     Route: Intravenous    sodium phosphates 15 mmol in sodium chloride 0.9 % 250 mL IVPB 15 mmol As Needed 12/17/2021     Admin Instructions: Recheck Phosphorus level 6 hours after replacement complete.    Route: Intravenous    Linked Group 3: \"Or\" Linked Group Details        sodium phosphates 30 mmol in sodium chloride 0.9 % 250 mL IVPB 30 mmol As Needed 12/17/2021     Admin Instructions: Recheck Phosphorus level 6 hours after replacement complete.    Route: Intravenous    Linked Group 3: \"Or\" Linked Group Details        sodium phosphates 45 mmol in sodium chloride 0.9 % 500 mL IVPB 45 mmol As Needed 12/17/2021     Admin Instructions: Recheck Phosphorus level 6 hours after replacement complete.    Route: Intravenous    Linked Group 3: \"Or\" Linked Group Details            apixaban, 10 mg, Nasogastric, Q12H   Followed by  [START ON 1/5/2022] apixaban, 5 mg, Oral, Q12H  bisacodyl, 10 mg, Rectal, Daily  chlorhexidine, 15 mL, Mouth/Throat, Q12H  dexamethasone, 6 mg, Oral, Daily With Breakfast   Or  dexamethasone, 6 mg, Intravenous, Daily With Breakfast  dilTIAZem, 60 mg, Nasogastric, Q8H  famotidine, 20 mg, Intravenous, Daily  insulin aspart, 0-24 Units, Subcutaneous, Q6H  insulin detemir, 23 Units, Subcutaneous, Q12H  levoFLOXacin, 750 mg, Intravenous, Q24H  polyethylene glycol, 17 g, Oral, Daily  sodium chloride, 10 mL, Intravenous, Q12H  sodium chloride, 10 mL, Intravenous, Q12H  sodium chloride, 10 mL, Intravenous, Q12H        Assessment/Plan         Acute hypoxemic respiratory failure due to COVID-19 " (HCC)    Acute respiratory failure with hypoxia (HCC)    1. Acute hypoxic respiratory failure s/p mechanical ventilation 12/18   2. Ac COVID-19 pneumonia  3. ARDS  4. MSSA / Klebsiella pneumonia  5. Pneumomediastinum  6. Shock  7. Cytokine release syndrome s/p remdesivir  8. Extensive left leg DVT on AC  9. A. fib RVR  10. Uncontrolled diabetes  11. Obesity  12. Mild hepatitis; Covid related  13. Fever        C&S  noted     Plan :        IV levaquine   supp care  Will follow  Thank you              William Osborne MD  01/04/22  14:29 EST

## 2022-01-04 NOTE — THERAPY TREATMENT NOTE
Acute Care - Occupational Therapy Treatment Note  ARELIS Flaherty     Patient Name: Sergio Daniel  : 1955  MRN: [id number]  Today's Date: 2022             Admit Date: 2021       ICD-10-CM ICD-9-CM   1. Acute respiratory failure with hypoxia (HCC)  J96.01 518.81   2. Pneumonia due to COVID-19 virus  U07.1 480.8    J12.82 079.89   3. Paroxysmal atrial fibrillation (HCC)  I48.0 427.31   4. Pharyngeal dysphagia  R13.13 787.23     Patient Active Problem List   Diagnosis   • Acute respiratory failure with hypoxia (HCC)   • Acute hypoxemic respiratory failure due to COVID-19 (HCC)     Past Medical History:   Diagnosis Date   • Diabetes mellitus (HCC)      History reviewed. No pertinent surgical history.      OT ASSESSMENT FLOWSHEET (last 12 hours)     OT Evaluation and Treatment     Row Name 22 0831                   OT Time and Intention    Subjective Information no complaints  confused  -EN        Document Type therapy note (daily note)  -EN        Mode of Treatment occupational therapy  -EN        Patient Effort adequate  -EN        Comment Patient pleasantly confused, followed approximately 75% of commands  -EN                  General Information    Patient/Family/Caregiver Comments/Observations Patient reclined in bed, agreed to therapy. Continues to hav weak vocal quality.  -EN        Risks Reviewed patient:; increased discomfort  -EN        Benefits Reviewed patient:; improve function; increase independence; increase strength  -EN        Barriers to Rehab medically complex; cognitive status  -EN                  Cognition    Follows Commands (Cognition) 75-90% accuracy  -EN        Personal Safety Interventions nonskid shoes/slippers when out of bed  -EN                  Pain Scale: Numbers Pre/Post-Treatment    Pretreatment Pain Rating 0/10 - no pain  -EN        Posttreatment Pain Rating 0/10 - no pain  -EN                  Range of Motion Comprehensive    Comment, General Range of Motion R  shoulder AROM approximately 45 degrees, no change in L shoulder.  -EN                  Strength Comprehensive (MMT)    Comment, General Manual Muscle Testing (MMT) Assessment L  strength improved to 4/5  -EN                  Bed Mobility    Bed Mobility supine-sit; sit-supine  -EN        Supine-Sit McDonald (Bed Mobility) maximum assist (25% patient effort); dependent (less than 25% patient effort); other (see comments)  3 person assist  -EN        Sit-Supine [place] (Bed Mobility) maximum assist (25% patient effort); dependent (less than 25% patient effort); other (see comments)  3 person assist  -EN        Bed Mobility, Safety Issues cognitive deficits limit understanding; decreased use of arms for pushing/pulling; decreased use of legs for bridging/pushing; impaired trunk control for bed mobility  -EN        Assistive Device (Bed Mobility) draw sheet; head of bed elevated  -EN                  Transfer Assessment/Treatment    Comment (Transfers) Discussed with LUISANA cardenas lift transfer to chair would be beneficial  -EN                  Motor Skills    Therapeutic Exercise shoulder; elbow/forearm; hand  -EN                  Shoulder (Therapeutic Exercise)    Shoulder (Therapeutic Exercise) AROM (active range of motion); AAROM (active assistive range of motion); PROM (passive range of motion)  -EN        Shoulder AROM (Therapeutic Exercise) right; flexion; 3 repetitions; 10 repetitions  able to flex approximately 45 degrees, became fatigued after 3 and required A/AROM  -EN        Shoulder AAROM (Therapeutic Exercise) left  x 10  -EN                  Elbow/Forearm (Therapeutic Exercise)    Elbow/Forearm (Therapeutic Exercise) AROM (active range of motion)  -EN        Elbow/Forearm AROM (Therapeutic Exercise) bilateral; flexion; extension; 10 repetitions  -EN                  Balance    Balance Assessment sitting static balance  -EN        Comment, Balance Patient sat EOB approximately 2 minutes with max  assist X 2 (posterior lean)  -EN                  Activities of Daily Living    BADL Assessment/Intervention grooming  -EN                  Grooming Assessment/Training    [place] Level (Grooming) maximum assist (25% patient effort); hair care, combing/brushing  -EN        Comment (Grooming) supported elbow/shoulder, patient grasped brush and brushed front hair  -EN                  Wound Right hand Blisters    Wound - Properties Group Present on Hospital Admission: N  -LC Side: Right  -LC Location: hand  -LC, includes hand and wrist  Primary Wound Type: Blisters  -LC        Retired Wound - Properties Group Present on Hospital Admission: N  -LC Side: Right  -LC Location: hand  -LC, includes hand and wrist  Primary Wound Type: Blisters  -LC                  Plan of Care Review    Plan of Care Reviewed With patient  -EN        Outcome Summary OT- Patient continues to have confusion and weak voice quality. Patient participated in UE ROM exercises and demonstrated improved AROM in the right shoulder (approximately 45 degrees). No change in L shoulder however left hand strength was better than yesterday. Patient max/dep X 3 for all bed mobility. Pt sat at EOB approximately 2 minutes with max A X 2 to maintain static sitting balance (posterior lean) and maintained  O2 sats in the 90s. Recommend theresa lift to chair to allow pt oppurtunity to continue to adjust to sitting upright.  -EN                  Positioning and Restraints    Pre-Treatment Position in bed  -EN        Post Treatment Position bed  -EN        In Bed notified nsg; call light within reach; encouraged to call for assist  -EN                  Progress Summary (OT)    Progress Toward Functional Goals (OT) progress toward functional goals as expected  -EN        Daily Progress Summary (OT) improving  -EN              User Key  (r) = Recorded By, (t) = Taken By, (c) = Cosigned By    Initials Name Effective Dates    EN Isi Theodore, OTR 06/16/21 -      Chitra Faith RN, [facility] 07/26/21 -                  Occupational Therapy Education                 Title: PT OT SLP Therapies (Done)     Topic: Occupational Therapy (Done)     Point: ADL training (Done)     Description:   Instruct learner(s) on proper safety adaptation and remediation techniques during self care or transfers.   Instruct in proper use of assistive devices.              Learning Progress Summary           Patient Acceptance, E, VU by EN at 1/4/2022 0959    Comment: transfer safety, UE HEP    Acceptance, E, VU by EN at 1/3/2022 1352    Comment: Educated on UE HEP                   Point: Home exercise program (Done)     Description:   Instruct learner(s) on appropriate technique for monitoring, assisting and/or progressing therapeutic exercises/activities.              Learning Progress Summary           Patient Acceptance, E, VU by EN at 1/4/2022 0959    Comment: transfer safety, UE HEP    Acceptance, E, VU by EN at 1/3/2022 1352    Comment: Educated on UE HEP                               User Key     Initials Effective Dates Name Provider Type Discipline    EN 06/16/21 -  Isi Theodore OTR Occupational Therapist OT                  OT Recommendation and Plan  Planned Therapy Interventions (OT): BADL retraining, functional balance retraining, passive ROM/stretching, ROM/therapeutic exercise, strengthening exercise, transfer/mobility retraining  Therapy Frequency (OT): 5 times/wk  Progress Toward Functional Goals (OT): progress toward functional goals as expected  Plan of Care Review  Plan of Care Reviewed With: patient  Outcome Summary: OT- Patient continues to have confusion and weak voice quality. Patient participated in UE ROM exercises and demonstrated improved AROM in the right shoulder (approximately 45 degrees). No change in L shoulder however left hand strength was better than yesterday. Patient max/dep X 3 for all bed mobility. Pt sat at EOB approximately 2 minutes with  max A X 2 to maintain static sitting balance (posterior lean) and maintained  O2 sats in the 90s. Recommend theresa lift to chair to allow pt oppurtunity to continue to adjust to sitting upright.  Plan of Care Reviewed With: patient  Outcome Summary: OT- Patient continues to have confusion and weak voice quality. Patient participated in UE ROM exercises and demonstrated improved AROM in the right shoulder (approximately 45 degrees). No change in L shoulder however left hand strength was better than yesterday. Patient max/dep X 3 for all bed mobility. Pt sat at EOB approximately 2 minutes with max A X 2 to maintain static sitting balance (posterior lean) and maintained  O2 sats in the 90s. Recommend thereas lift to chair to allow pt oppurtunity to continue to adjust to sitting upright.     Outcome Measures     Row Name 01/04/22 1000             How much help from another is currently needed...    Putting on and taking off regular lower body clothing? 1  -EN      Bathing (including washing, rinsing, and drying) 1  -EN      Toileting (which includes using toilet bed pan or urinal) 1  -EN      Putting on and taking off regular upper body clothing 1  -EN      Taking care of personal grooming (such as brushing teeth) 1  -EN      Eating meals 1  -EN      AM-PAC 6 Clicks Score (OT) 6  -EN            User Key  (r) = Recorded By, (t) = Taken By, (c) = Cosigned By    Initials Name Provider Type    Isi Wiggins OTCORY Occupational Therapist                Time Calculation:    Time Calculation- OT     Row Name 01/04/22 1000 01/04/22 0933          Time Calculation- OT    OT Start Time 0830  -EN 0830  -SD     OT Stop Time 0900  -EN 0900  -SD     OT Time Calculation (min) 30 min  -EN 30 min  -SD           User Key  (r) = Recorded By, (t) = Taken By, (c) = Cosigned By    Initials Name Provider Type    Isi Wiggins OTR Occupational Therapist    Feliciano Marcial, OTR Occupational Therapist              Therapy  Charges for Today     Code Description Service Date Service Provider Modifiers Qty    [id number]  OT EVAL MOD COMPLEXITY 2 1/3/2022 Isi Theodore OTR GO 1    [id number]  OT THERAPEUTIC ACT EA 15 MIN 1/4/2022 Isi Theodore OTR GO 2               LINDA Pepe  1/4/2022

## 2022-01-04 NOTE — THERAPY TREATMENT NOTE
Acute Care - Speech Language Pathology   Swallow Treatment Note ARELIS Flaherty     Patient Name: Sergio Daniel  : 1955  MRN: [id number]  Today's Date: 1/3/2022               Admit Date: 2021    Visit Dx:     ICD-10-CM ICD-9-CM   1. Acute respiratory failure with hypoxia (HCC)  J96.01 518.81   2. Pneumonia due to COVID-19 virus  U07.1 480.8    J12.82 079.89   3. Paroxysmal atrial fibrillation (HCC)  I48.0 427.31   4. Pharyngeal dysphagia  R13.13 787.23     Patient Active Problem List   Diagnosis   • Acute respiratory failure with hypoxia (HCC)   • Acute hypoxemic respiratory failure due to COVID-19 (HCC)     Past Medical History:   Diagnosis Date   • Diabetes mellitus (HCC)      History reviewed. No pertinent surgical history.    SLP Recommendation and Plan     SLP Diet Recommendation: NPO, temporary alternate methods of nutrition/hydration, other (see comments) (consider ice chips sparingly for moisture; 2-3 per hour) (22)  Recommended Precautions and Strategies: general aspiration precautions, reflux precautions (22)  SLP Rec. for Method of Medication Administration: meds via alternate route (22)     Monitor for Signs of Aspiration: yes, cough, notify SLP if any concerns (22)  Recommended Diagnostics: reassess via clinical swallow evaluation, other (see comments) (would benefit from MBS prior to starting po diet or meds; will monitor status to determine when ready for MBS.) (22)     Anticipated Discharge Disposition (SLP): unknown (22)     Therapy Frequency (Swallow): daily, at least, 5 days per week (22)  Predicted Duration Therapy Intervention (Days): 2 weeks, until discharge (22)     Daily Summary of Progress (SLP): progress towards functional goals is fair (22)         Patient/Family Concerns, Anticipated Discharge Disposition (SLP): No current concerns per patient. (22)     Treatment  Assessment (SLP): Pt with minimal to no improvement. Slight increase in voicing but shortly maintained and becomes breathy. Pt continues with coughing on ice chips; stronger cough noted today. Tolerates small bites of applesauce by spoon w/mild oral transit delay but no coughing. Continue to recommend MBS to further assess safety of po intake in the next day or two. (22)  Plan for Continued Treatment (SLP): continue treatment per plan of care (22)         Plan of Care Reviewed With: patient, other (see comments) (RN, Elizabeth)  Outcome Summary: SLP: Intermittent, slight improvement in voicing, but unable to maintain. Cont cough with trials of ice chips. Tolerate 4 small bites of applesauce with delayed oral transit but no s/s of aspiration. Continues with significant overall weakness and fatigue. Recommend to proceed with MBS in the next couple of days for possible start of diet. Recommend to continue with oral care and tube feedings. SLP to continue to follow during stay.      SWALLOW EVALUATION (last 72 hours)     SLP Adult Swallow Evaluation     Row Name 22 [id number] 22 [id number] 22 1122             Rehab Evaluation    Document Type therapy note (daily note)  -AD -- evaluation  -AD      Subjective Information no complaints  -AD -- complains of  -AD      Patient Observations alert; cooperative; agree to therapy  -AD -- alert; cooperative; agree to therapy  -AD      Patient/Family/Caregiver Comments/Observations Pt seen at bedside with head of bed elevated to upright position. Alert, but difficult to understand at times due to background noise and weak vocal quality.  -AD -- Pt was seen upright in bed w/heated hi flow O2 on. Pt unable to tolerate fully upright position at this time. Alert, but confused. Knows he is in the hospital and reason why. Thought he was in [facility]. Also stated  1955 and age as 79 y/o. Able to reorient to person, place and time with verbal model from  SLP.  -AD      Patient Effort adequate  -AD -- adequate  -AD      Symptoms Noted During/After Treatment other (see comments)  cough and breathy voice  -AD -- other (see comments)  cough and breathy voice  -AD              General Information    Patient Profile Reviewed -- -- yes  -AD      Pertinent History Of Current Problem -- -- Pt is a 67 y/o male admitted with acute hypoxic respiratory failure due to Covid-19 infection/pneumonia. Also diagnosed with ARDS, MSSA/Klebsiella pneum, shock, cyotkine release syndrome s/p remdesivir, extensive DVT LLE, afib w/RVR, uncontrolled diabetes, obesity, mild hepatitis r/t covid and fever. Pt intubated for greater than two weeks and extubated on 1/1/22 and placed on heated hi flow O2. CXR of 1/1/22 with bilateral pneumonia that is stable to worsening appearance in the left lung. Mid to lower lung zone opacities.  -AD      Current Method of Nutrition -- -- nasogastric feedings; NPO  -AD      Precautions/Limitations, Vision -- -- vision impairment, bilaterally; other (see comments)  no glasses currently present. Unable to see pictures on wall, but can locate therapist and track in room.  -AD      Precautions/Limitations, Hearing -- -- WFL  -AD      Prior Level of Function-Communication -- -- WFL  prior to hospitalization; current suspected cognitive deficits due to prior sedation; monitor for need for further evaluation.  -AD      Prior Level of Function-Swallowing -- -- no diet consistency restrictions; regular textures; thin liquids  -AD      Plans/Goals Discussed with -- spouse/S.O.; agreed upon  via phone  -AD patient; agreed upon  -AD      Barriers to Rehab -- -- medically complex  prolonged intubation  -AD      Patient's Goals for Discharge -- -- patient did not state  -AD              Pain    Additional Documentation --  no current pain reported  -AD -- --  no current pain reported  -AD              Oral Motor Structure and Function    Oral Lesions or Structural  Abnormalities and/or variants -- -- none  -AD      Dentition Assessment -- -- natural, present and adequate  -AD      Secretion Management -- -- requires suctioning to control secretions  weak, nonproductive cough  -AD      Mucosal Quality -- -- moist, healthy  -AD      Gag Response -- -- absent or diminished  -AD      Volitional Swallow -- -- WFL  -AD      Volitional Cough -- -- non-productive; weak; reduced respiratory support  -AD              Oral Musculature and Cranial Nerve Assessment    Oral Motor General Assessment -- -- generalized oral motor weakness  -AD      Mandibular Impairment Detail, Cranial Nerve V (Trigeminal) -- -- reduced strength bilaterally  mild  -AD      Lingual Impairment, Detail. Cranial Nerves IX, XII (Glossopharyngeal and Hypoglossal) -- -- reduced strength; bilaterally  -AD      Vocal Impairment, Detail. Cranial Nerve X (Vagus) -- -- vocal quality abnormality (see comments); impaired throat clear/cough (see comments)  -AD      Oral Motor, Comment -- -- Generalized weakness overall with severely breathy voice and weak cough. Decreased respiratory support and nonproductive cough. Labial ROM WFL and velar elevation WFL.  -AD              General Eating/Swallowing Observations    Respiratory Support Currently in Use nasal cannula  4L humidified  -AD -- high-flow nasal cannula  heated hi flow 40L  -AD      Eating/Swallowing Skills fed by SLP  -AD -- fed by SLP; unaware of safety concerns; respiratory changes during/following eating voice quality changes during/following eating  coughing weak cough  -AD      Positioning During Eating upright in bed  near 90 degrees  -AD -- upright in bed; semi-reclined  up to near 75 degrees; unable to tolerate higher position  -AD      Utensils Used spoon  -AD -- spoon  -AD      Consistencies Trialed ice chips; pureed  -AD -- ice chips  -AD      Pre SpO2 (%) 92  -AD -- --      Post SpO2 (%) 93  -AD -- --              Respiratory    Respiratory Status WFL;  during swallowing/eating  -AD -- during swallowing/eating; other (see comments)  coughing intermittently after trials of ice chips; increases with bigger ice chips.  -AD      Date of Intubation -- -- 12/18/2021 @ 0542 am  extubated 1/1/2022 @ 10:30 am  -AD              Clinical Swallow Eval    Oral Prep Phase -- -- WFL  -AD      Oral Transit -- -- WFL  -AD      Oral Residue -- -- WFL  -AD      Pharyngeal Phase -- -- suspected pharyngeal impairment  -AD      Esophageal Phase -- -- unremarkable  -AD      Clinical Swallow Evaluation Summary -- -- Pt with coughing following trials of ice chips by spoon. Delayed cough at times, multiple swallows noted. Weak cough noted as well with poor clearance of secretions and water. No further consistencies trialed at this time due to concerns for aspiration and heated hi flow O2. Recommend to continue with tube feedings at this time and reassess tomorrow. Continue meds via NG feeding tube.  -AD              Pharyngeal Phase Concerns    Pharyngeal Phase Concerns -- -- multiple swallows; cough; other (see comments)  -AD      Multiple Swallows -- -- thin; other (see comments)  ice chips  -AD      Cough -- -- thin; other (see comments)  ice chips  -AD      Pharyngeal Phase Concerns, Comment -- -- Pt with multiple swallows and coughing intermittently on thin ice chips. Weak, breathy voice and weak, non-productive cough.  -AD              SLP Evaluation Clinical Impression    SLP Swallowing Diagnosis -- -- suspected pharyngeal dysphagia  -AD      Functional Impact -- -- risk of aspiration/pneumonia; risk of malnutrition; risk of dehydration  -AD      Rehab Potential/Prognosis, Swallowing -- -- adequate, monitor progress closely  -AD      Swallow Criteria for Skilled Therapeutic Interventions Met -- -- demonstrates skilled criteria  -AD              SLP Treatment Clinical Impressions    Treatment Assessment (SLP) Pt with minimal to no improvement. Slight increase in voicing but shortly  maintained and becomes breathy. Pt continues with coughing on ice chips; stronger cough noted today. Tolerates small bites of applesauce by spoon w/mild oral transit delay but no coughing. Continue to recommend MBS to further assess safety of po intake in the next day or two.  -AD -- --      Daily Summary of Progress (SLP) progress towards functional goals is fair  -AD -- --      Barriers to Overall Progress (SLP) Fatigue; Medically complex; Other (see comments)  significant weakness  -AD -- --      Plan for Continued Treatment (SLP) continue treatment per plan of care  -AD -- --      Care Plan Review care plan/treatment goals reviewed; risks/benefits reviewed; current/potential barriers reviewed; patient/other agree to care plan  -AD -- --      Care Plan Review, Other Participant(s) other (see comments)  RNElizabeth  -AD -- --              Recommendations    Therapy Frequency (Swallow) daily; at least; 5 days per week  -AD -- daily; at least; 5 days per week  -AD      Predicted Duration Therapy Intervention (Days) 2 weeks; until discharge  -AD -- 2 weeks; until discharge  -AD      SLP Diet Recommendation NPO; temporary alternate methods of nutrition/hydration; other (see comments)  consider ice chips sparingly for moisture; 2-3 per hour  -AD -- NPO; temporary alternate methods of nutrition/hydration; other (see comments)  consider ice chips sparingly for moisture; 2-3 per hour  -AD      Recommended Diagnostics reassess via clinical swallow evaluation; other (see comments)  would benefit from MBS prior to starting po diet or meds; will monitor status to determine when ready for MBS.  -AD -- reassess via clinical swallow evaluation; other (see comments)  would benefit from MBS prior to starting po diet or meds; will monitor status to determine when ready for MBS.  -AD      Recommended Precautions and Strategies general aspiration precautions; reflux precautions  -AD -- general aspiration precautions; reflux precautions   -AD      Oral Care Recommendations Oral Care BID/PRN; Suction toothbrush  -AD -- Oral Care BID/PRN; Suction toothbrush  -AD      SLP Rec. for Method of Medication Administration meds via alternate route  -AD -- meds via alternate route  -AD      Monitor for Signs of Aspiration yes; cough; notify SLP if any concerns  -AD -- yes; cough; notify SLP if any concerns  -AD      Anticipated Discharge Disposition (SLP) unknown  -AD -- unknown  -AD      Patient/Family Concerns, Anticipated Discharge Disposition (SLP) No current concerns per patient.  -AD No current concerns reported per wife, Luz Elena, via telephone call. Results of clinical swallow eval and recommendations reviewed via telephone.  -AD No concerns per patient at this time. He does state when he gets to eat, he would like some chocolate pudding.  -AD              Swallow Goals (SLP)    Oral Nutrition/Hydration Goal Selection (SLP) -- -- oral nutrition/hydration, SLP goal 1; oral nutrition/hydration, SLP goal 2  -AD      Lingual Strengthening Goal Selection (SLP) -- -- lingual strengthening, SLP goal 1  -AD      Pharyngeal Strengthening Exercise Goal Selection (SLP) -- -- pharyngeal strengthening exercise, SLP goal 1  -AD      Additional Documentation -- -- lingual strengthening goal selection (SLP); pharyngeal strengthening exercise goal selection (SLP)  -AD              Oral Nutrition/Hydration Goal 1 (SLP)    Oral Nutrition/Hydration Goal 1, SLP Pt will be able to participate in an instrumental assessment of swallow in order to determine safest, least restrictive diet w/limited risk of aspiration.  -AD -- Pt will be able to participate in an instrumental assessment of swallow in order to determine safest, least restrictive diet w/limited risk of aspiration.  -AD      Time Frame (Oral Nutrition/Hydration Goal 1, SLP) short term goal (STG); 4 days  -AD -- short term goal (STG); 4 days  -AD      Barriers (Oral Nutrition/Hydration Goal 1, SLP) medically complex   -AD -- medically complex; heated hi flow O2  -AD      Progress/Outcomes (Oral Nutrition/Hydration Goal 1, SLP) goal ongoing  -AD -- other (see comments)  new  -AD              Oral Nutrition/Hydration Goal 2 (SLP)    Oral Nutrition/Hydration Goal 2, SLP Pt will demonstrate improved tolerance of thins on therapeutic trials of ice chips with SLP w/use of strategies as necessary and pt able to participate.  -AD -- Pt will demonstrate improved tolerance of thins on therapeutic trials of ice chips with SLP w/use of strategies as necessary and pt able to participate.  -AD      Time Frame (Oral Nutrition/Hydration Goal 2, SLP) short term goal (STG); 4 days  -AD -- short term goal (STG); 4 days  -AD      Barriers (Oral Nutrition/Hydration Goal 2, SLP) medically complex  -AD -- medically complex; heated hi flow O2  -AD      Progress/Outcomes (Oral Nutrition/Hydration Goal 2, SLP) unable to make needed progress; goal ongoing  -AD -- other (see comments)  New  -AD              Lingual Strengthening Goal 1 (SLP)    Activity (Lingual Strengthening Goal 1, SLP) increase lingual tone/sensation/control/coordination/movement  -AD -- increase lingual tone/sensation/control/coordination/movement  -AD      Increase Lingual Tone/Sensation/Control/Coordination/Movement lingual movement exercises; swallow trials  -AD -- lingual movement exercises; swallow trials  -AD      [place]/Accuracy (Lingual Strengthening Goal 1, SLP) with minimal cues (75-90% accuracy)  -AD -- with minimal cues (75-90% accuracy)  -AD      Time Frame (Lingual Strengthening Goal 1, SLP) short term goal (STG); 2 weeks  -AD -- short term goal (STG); 2 weeks  -AD      Barriers (Lingual Strengthening Goal 1, SLP) medically complex  -AD -- medically complex; heated hi flow O2  -AD      Progress/Outcomes (Lingual Strengthening Goal 1, SLP) continuing progress toward goal; goal ongoing  improved w/tolerance of small bites of applesauce w/delayed oral transit  -AD --  other (see comments)  New  -AD              Pharyngeal Strengthening Exercise Goal 1 (SLP)    Activity (Pharyngeal Strengthening Goal 1, SLP) increase closure at entrance to airway/closure of airway at glottis  -AD -- increase closure at entrance to airway/closure of airway at glottis  -AD      Increase Closure at Entrance to Airway/Closure of Airway at Glottis hard effortful swallow; effortful pitch glide (falsetto + pharyngeal squeeze)  -AD -- hard effortful swallow; effortful pitch glide (falsetto + pharyngeal squeeze)  -AD      [place]/Accuracy (Pharyngeal Strengthening Goal 1, SLP) with moderate cues (50-74% accuracy)  -AD -- with moderate cues (50-74% accuracy)  -AD      Time Frame (Pharyngeal Strengthening Goal 1, SLP) short term goal (STG); 2 weeks  -AD -- short term goal (STG); 2 weeks  -AD      Barriers (Pharyngeal Strengthening Goal 1, SLP) mecially complex  -AD -- medically complex; heated hi flow O2  -AD      Progress/Outcomes (Pharyngeal Strengthening Goal 1, SLP) other (see comments)  not targeted during this session  -AD -- other (see comments)  New  -AD            User Key  (r) = Recorded By, (t) = Taken By, (c) = Cosigned By    Initials Name Effective Dates    AD Denisa Chua MS CCC-SLP 06/16/21 -                 EDUCATION  The patient has been educated in the following areas:   Dysphagia (Swallowing Impairment). Pt verbalizes understanding. Reinforcement due to current fluctuations in cognitive status and fatigue/lethargy.         SLP GOALS     Row Name 01/03/22 1728 01/02/22 1122          Oral Nutrition/Hydration Goal 1 (SLP)    Oral Nutrition/Hydration Goal 1, SLP Pt will be able to participate in an instrumental assessment of swallow in order to determine safest, least restrictive diet w/limited risk of aspiration.  -AD Pt will be able to participate in an instrumental assessment of swallow in order to determine safest, least restrictive diet w/limited risk of aspiration.  -AD      Time Frame (Oral Nutrition/Hydration Goal 1, SLP) short term goal (STG); 4 days  -AD short term goal (STG); 4 days  -AD     Barriers (Oral Nutrition/Hydration Goal 1, SLP) medically complex  -AD medically complex; heated hi flow O2  -AD     Progress/Outcomes (Oral Nutrition/Hydration Goal 1, SLP) goal ongoing  -AD other (see comments)  new  -AD            Oral Nutrition/Hydration Goal 2 (SLP)    Oral Nutrition/Hydration Goal 2, SLP Pt will demonstrate improved tolerance of thins on therapeutic trials of ice chips with SLP w/use of strategies as necessary and pt able to participate.  -AD Pt will demonstrate improved tolerance of thins on therapeutic trials of ice chips with SLP w/use of strategies as necessary and pt able to participate.  -AD     Time Frame (Oral Nutrition/Hydration Goal 2, SLP) short term goal (STG); 4 days  -AD short term goal (STG); 4 days  -AD     Barriers (Oral Nutrition/Hydration Goal 2, SLP) medically complex  -AD medically complex; heated hi flow O2  -AD     Progress/Outcomes (Oral Nutrition/Hydration Goal 2, SLP) unable to make needed progress; goal ongoing  -AD other (see comments)  New  -AD            Lingual Strengthening Goal 1 (SLP)    Activity (Lingual Strengthening Goal 1, SLP) increase lingual tone/sensation/control/coordination/movement  -AD increase lingual tone/sensation/control/coordination/movement  -AD     Increase Lingual Tone/Sensation/Control/Coordination/Movement lingual movement exercises; swallow trials  -AD lingual movement exercises; swallow trials  -AD     [place]/Accuracy (Lingual Strengthening Goal 1, SLP) with minimal cues (75-90% accuracy)  -AD with minimal cues (75-90% accuracy)  -AD     Time Frame (Lingual Strengthening Goal 1, SLP) short term goal (STG); 2 weeks  -AD short term goal (STG); 2 weeks  -AD     Barriers (Lingual Strengthening Goal 1, SLP) medically complex  -AD medically complex; heated hi flow O2  -AD     Progress/Outcomes (Lingual  Strengthening Goal 1, SLP) continuing progress toward goal; goal ongoing  improved w/tolerance of small bites of applesauce w/delayed oral transit  -AD other (see comments)  New  -AD            Pharyngeal Strengthening Exercise Goal 1 (SLP)    Activity (Pharyngeal Strengthening Goal 1, SLP) increase closure at entrance to airway/closure of airway at glottis  -AD increase closure at entrance to airway/closure of airway at glottis  -AD     Increase Closure at Entrance to Airway/Closure of Airway at Glottis hard effortful swallow; effortful pitch glide (falsetto + pharyngeal squeeze)  -AD hard effortful swallow; effortful pitch glide (falsetto + pharyngeal squeeze)  -AD     [place]/Accuracy (Pharyngeal Strengthening Goal 1, SLP) with moderate cues (50-74% accuracy)  -AD with moderate cues (50-74% accuracy)  -AD     Time Frame (Pharyngeal Strengthening Goal 1, SLP) short term goal (STG); 2 weeks  -AD short term goal (STG); 2 weeks  -AD     Barriers (Pharyngeal Strengthening Goal 1, SLP) mecially complex  -AD medically complex; heated hi flow O2  -AD     Progress/Outcomes (Pharyngeal Strengthening Goal 1, SLP) other (see comments)  not targeted during this session  -AD other (see comments)  New  -AD           User Key  (r) = Recorded By, (t) = Taken By, (c) = Cosigned By    Initials Name Provider Type    AD Denisa Chua MS CCC-SLP Speech and Language Pathologist                   Time Calculation:    Time Calculation- SLP     Row Name 01/03/22 1906             Time Calculation- SLP    SLP Start Time 1700  -AD      SLP Stop Time 1728  -AD      SLP Time Calculation (min) 28 min  -AD      Total Timed Code Minutes- SLP 0 minute(s)  -AD      SLP Non-Billable Time (min) 0 min  -AD      SLP Received On 01/03/22  -AD              Untimed Charges    75162-EZ Treatment Swallow Minutes 28  -AD              Total Minutes    Untimed Charges Total Minutes 28  -AD       Total Minutes 28  -AD            User Key  (r) =  Recorded By, (t) = Taken By, (c) = Cosigned By    Initials Name Provider Type    AD Denisa Chua, MS CCC-SLP Speech and Language Pathologist                Therapy Charges for Today     Code Description Service Date Service Provider Modifiers Qty    [id number] HC ST EVAL ORAL PHARYNG SWALLOW 4 1/2/2022 Denisa Chua MS CCC-SLP GN 1    [id number] HC ST EVAL ORAL PHARYNG SWALLOW 2 1/3/2022 Denisa Chua MS CCC-SLP GN 1               Denisa Chua MS CCC-SLP  1/3/2022

## 2022-01-04 NOTE — PROGRESS NOTES
"      [facility]         Dr Segovia Sayied   LOS: 19 days   Patient Care Team:  Farhan Jean Jr., MD as PCP - General (Family Medicine)    Chief Complaint: Hypoxemic respiratory failure status post mechanical ventilation extubated COVID-19 pneumonia ARDS Klebsiella pneumonia pneumomediastinum    Interval History: Remains on nasal cannula oxygen more awake and lucid.  Remains weak and tired    REVIEW OF SYSTEMS:   CARDIOVASCULAR: No chest pain, chest pressure or chest discomfort. No palpitations or edema.   RESPIRATORY: Short of breath with activity  GASTROINTESTINAL: No anorexia, nausea, vomiting or diarrhea. No abdominal pain or blood.   HEMATOLOGIC: No bleeding or bruising.     Ventilator/Non-Invasive Ventilation Settings (From admission, onward)           Nasal cannula oxygen      Vital Signs  Temp:  [97.5 °F (36.4 °C)-98.1 °F (36.7 °C)] 98.1 °F (36.7 °C)  Heart Rate:  [] 106  Resp:  [16-22] 20  BP: (142-173)/() 142/87    Intake/Output Summary (Last 24 hours) at 1/4/2022 1130  Last data filed at 1/4/2022 0900  Gross per 24 hour   Intake 3408 ml   Output 4400 ml   Net -992 ml     Flowsheet Rows      First Filed Value   Admission Height 185.4 cm (73\") Documented at 12/16/2021 1515   Admission Weight 120 kg (265 lb) Documented at 12/16/2021 1515          Physical Exam:  Patient is examined using the personal protective equipment as per guidelines from infection control for this particular patient as enacted.  Hand hygiene was performed before and after patient interaction.   General Appearance:    Alert, cooperative, in no acute distress.  Following simple commands  Neck midline trachea, no thyromegaly   Lungs:    Diminished breath sounds rhonchi crackles bilaterally in the bases    Heart:    Regular rhythm and normal rate, normal S1 and S2, no            murmur, no gallop, no rub, no click   Chest Wall:    No abnormalities observed   Abdomen:     Normal bowel sounds, no masses, no " organomegaly, soft        nontender, nondistended, no guarding, no rebound                tenderness   Extremities:   Moves all extremities well, no edema, no cyanosis, no             redness  CNS no focal neurological deficits normal sensory exam  Skin no rashes no nodules  Musculoskeletal no cyanosis no clubbing normal range of motion     Results Review:        Results from last 7 days   Lab Units 01/04/22 0437 01/03/22 0449 01/03/22  0449 01/02/22  0408 01/02/22  0408   SODIUM mmol/L 137  --  135*  --  139   POTASSIUM mmol/L 3.8  --  3.8  --  3.9   CHLORIDE mmol/L 99  --  99  --  100   CO2 mmol/L 29.3*  --  31.4*  --  30.8*   BUN mg/dL 19  --  17  --  17   CREATININE mg/dL 0.54*  --  0.52*  --  0.54*   GLUCOSE mg/dL 144*   < > 202*   < > 127*   CALCIUM mg/dL 9.0  --  8.7  --  8.8    < > = values in this interval not displayed.         Results from last 7 days   Lab Units 01/04/22 0437 01/03/22 0449 01/02/22  0408   WBC 10*3/mm3 12.78* 12.37* 13.56*   HEMOGLOBIN g/dL 10.6* 10.0* 10.1*   HEMATOCRIT % 34.2* 31.0* 31.8*   PLATELETS 10*3/mm3 346 299 323     Results from last 7 days   Lab Units 12/30/21  0356   INR  1.15*                 Results from last 7 days   Lab Units 01/01/22  0942   PH, ARTERIAL pH units 7.513*   PO2 ART mm Hg 64.1*   PCO2, ARTERIAL mm Hg 43.4   HCO3 ART mmol/L 34.9*       I reviewed the patient's new clinical results.  I personally viewed and interpreted the patient's chest x-ray.        Medication Review:   apixaban, 10 mg, Nasogastric, Q12H   Followed by  [START ON 1/5/2022] apixaban, 5 mg, Oral, Q12H  bisacodyl, 10 mg, Rectal, Daily  chlorhexidine, 15 mL, Mouth/Throat, Q12H  dexamethasone, 6 mg, Oral, Daily With Breakfast   Or  dexamethasone, 6 mg, Intravenous, Daily With Breakfast  dilTIAZem, 60 mg, Nasogastric, Q8H  famotidine, 20 mg, Intravenous, Daily  insulin aspart, 0-24 Units, Subcutaneous, Q6H  insulin detemir, 23 Units, Subcutaneous, Q12H  levoFLOXacin, 750 mg, Intravenous,  Q24H  polyethylene glycol, 17 g, Oral, Daily  sodium chloride, 10 mL, Intravenous, Q12H  sodium chloride, 10 mL, Intravenous, Q12H  sodium chloride, 10 mL, Intravenous, Q12H        dexmedetomidine, 0.2-1.5 mcg/kg/hr, Last Rate: 0.3 mcg/kg/hr (01/01/22 1312)  propofol, 5-50 mcg/kg/min, Last Rate: 10 mcg/kg/min (01/01/22 1006)        ASSESSMENT:   1. Acute hypoxic respiratory failure s/p mechanical ventilation 12/18   2. Ac COVID-19 pneumonia  3. ARDS  4. MSSA / Klebsiella pneumonia  5. Pneumomediastinum  6. Shock  7. Cytokine release syndrome s/p remdesivir  8. Extensive left leg DVT on AC  9. A. fib RVR  10. Uncontrolled diabetes  11. Obesity  12. Mild hepatitis; Covid related  13. Fever      PLAN:  Wean down FiO2 to keep sats above 90%  We will need to mobilize more with physical therapy  Aggressive pulmonary toilet  Antibiotic for bacterial process followed by infectious diseases.  Blood glucose monitoring per ICU protocol  Decadron after 10 days start weaning as tolerated  Full dose anticoagulation for DVT and currently on oral anticoagulation  No objections to transfer out of the ICU      Luca Hermosillo MD  01/04/22  11:30 EST

## 2022-01-04 NOTE — PROGRESS NOTES
Pastoral care visit and conversation with patient.  Prayer with patient before leaving.    chaplain Dodie

## 2022-01-04 NOTE — PLAN OF CARE
Goal Outcome Evaluation:  Plan of Care Reviewed With: patient, other (see comments) (RN, Elizabeth)           Outcome Summary: SLP: Intermittent, slight improvement in voicing, but unable to maintain. Cont cough with trials of ice chips. Tolerate 4 small bites of applesauce with delayed oral transit but no s/s of aspiration. Continues with significant overall weakness and fatigue. Recommend to proceed with MBS in the next couple of days for possible start of diet. Recommend to continue with oral care and tube feedings. SLP to continue to follow during stay.

## 2022-01-04 NOTE — SIGNIFICANT NOTE
Pt seems a little agitated and is mumbling, but very difficult to understand, Becoming hypertensive so scheduled Diltiazem given.

## 2022-01-04 NOTE — PLAN OF CARE
Goal Outcome Evaluation:  Plan of Care Reviewed With: patient           Outcome Summary: PT: Patient continues to have confusion within conversation and overall weak voice quality when answering questions.  Patient max/dependent x3 for supine to sit transfer.  Patient able to tolerate sitting EOB x2 minutes with max assist, noted posterior and left lateral lean.  O2 sats remained in the 90s thoughout therapy session.  Continue to recommend theresa lift to recliner with nursing staff to allow for sustained opportunity to adjust to upright positioning.  Continue to recommend SNF at discharge.

## 2022-01-04 NOTE — PLAN OF CARE
Goal Outcome Evaluation:  Plan of Care Reviewed With: patient        Progress: improving  Outcome Summary: Pt conitnues to work with PT/OT/ST but still very weak; pt moved to the chair with the use of a theresa lift for a couples hours this afternoon; pt remains on 3L NC humidified O2; good UOP; will conitnue to monitor.

## 2022-01-04 NOTE — PLAN OF CARE
Goal Outcome Evaluation:  Plan of Care Reviewed With: patient           Outcome Summary: SLP: Pt with continued improvement in voice with less breathiness. Does continue to fatigue quickly/severe overall weakness. Able to participate in exercises for lingual and laryngeal/pharyngeal movement/positive pharyngeal squeeze. Able to tolerate sitting up in a chair for greater than one hour. Recommend to complete MBS in the next couple of days to determine safety of po diet and meds.

## 2022-01-04 NOTE — NURSING NOTE
R hand wound continues to improve. Will dc dressings and recommend leaving dylan. Discussed with LUISANA Johnson.

## 2022-01-04 NOTE — THERAPY TREATMENT NOTE
Acute Care - Physical Therapy Treatment Note   Hien Neely     Patient Name: Sergio Daniel  : 1955  MRN: [id number]  Today's Date: 2022      Visit Dx:     ICD-10-CM ICD-9-CM   1. Acute respiratory failure with hypoxia (HCC)  J96.01 518.81   2. Pneumonia due to COVID-19 virus  U07.1 480.8    J12.82 079.89   3. Paroxysmal atrial fibrillation (HCC)  I48.0 427.31   4. Pharyngeal dysphagia  R13.13 787.23     Patient Active Problem List   Diagnosis   • Acute respiratory failure with hypoxia (HCC)   • Acute hypoxemic respiratory failure due to COVID-19 (HCC)     Past Medical History:   Diagnosis Date   • Diabetes mellitus (HCC)      History reviewed. No pertinent surgical history.  PT Assessment (last 12 hours)     PT Evaluation and Treatment     [facility] Name 22 0830          Physical Therapy Time and Intention    Subjective Information --  pt confused within conversation  -     Document Type therapy note (daily note)  -     Mode of Treatment physical therapy  -     Patient Effort adequate  -     Comment pt pleasantly confused within task, follows approximately 75% of commands  -     Row Name 22 0830          General Information    Patient/Family/Caregiver Comments/Observations pt supine, agrees to therapy.  continues to have weak voice when answering questions  -     Barriers to Rehab medically complex; cognitive status  -[facility] Name 2230          Cognition    Personal Safety Interventions nonskid shoes/slippers when out of bed; supervised activity  -     Row Name 22 0830          Pain Scale: Numbers Pre/Post-Treatment    Pretreatment Pain Rating 0/10 - no pain  -     Posttreatment Pain Rating 0/10 - no pain  -     Row Name 22 0830          Bed Mobility    Supine-Sit Nora (Bed Mobility) maximum assist (25% patient effort); dependent (less than 25% patient effort)  requires 3 people to assist  -     Sit-Supine Nora (Bed Mobility) dependent  (less than 25% patient effort)  3 people to assist  -     Bed Mobility, Safety Issues cognitive deficits limit understanding; decreased use of arms for pushing/pulling; decreased use of legs for bridging/pushing  -     Assistive Device (Bed Mobility) bed rails; draw sheet; head of bed elevated  -     Row Name 01/04/22 0830          Transfers    Comment (Transfers) pt unable to maintain static sitting balance without significant assistance, unable to safely attempt standing  -     Tyree Name 01/04/22 0830          Balance    Comment, Balance pt tolerates sitting EOB x2 minutes with max assist.  increased posterior/left lean  -     Row Name 01/04/22 0830          Motor Skills    Therapeutic Exercise --  performed AAROM x5-10 reps of SLR and heel slides  -     Row Name             Wound Right hand Blisters    Wound - Properties Group Present on Hospital Admission: N  -LC Side: Right  -LC Location: hand  -LC, includes hand and wrist  Primary Wound Type: Blisters  -LC     Retired Wound - Properties Group Present on Hospital Admission: N  -LC Side: Right  -LC Location: hand  -LC, includes hand and wrist  Primary Wound Type: Blisters  -LC     Row Name 01/04/22 0830          Plan of Care Review    Outcome Summary PT: Patient continues to have confusion within conversation and overall weak voice quality when answering questions.  Patient max/dependent x3 for supine to sit transfer.  Patient able to tolerate sitting EOB x2 minutes with max assist, noted posterior and left lateral lean.  O2 sats remained in the 90s thoughout therapy session.  Continue to recommend theresa lift to recliner with nursing staff to allow for sustained opportunity to adjust to upright positioning.  Continue to recommend SNF at discharge.  -     Tyree Name 01/04/22 0830          Positioning and Restraints    In Bed notified nsg; supine; call light within reach; encouraged to call for assist  -     Tyree Name 01/04/22 0830          Progress  Summary (PT)    Progress Toward Functional Goals (PT) progress toward functional goals is fair  -     Barriers to Overall Progress (PT) medical status  -           User Key  (r) = Recorded By, (t) = Taken By, (c) = Cosigned By    Initials Name Provider Type    Chitra Faith, RN, CWON Registered Nurse    Rosy Arevalo PT Physical Therapist                Physical Therapy Education                 Title: PT OT SLP Therapies (Done)     Topic: Physical Therapy (Done)     Point: Mobility training (Done)     Learning Progress Summary           Patient Acceptance, E,TB, VU by  at 1/4/2022 1023    Acceptance, E,TB, VU,NR by MONTEZ at 1/3/2022 1216                               User Key     Initials Effective Dates Name Provider Type Discipline     06/16/21 -  Rosy Harmon PT Physical Therapist PT              PT Recommendation and Plan  Anticipated Discharge Disposition (PT): skilled nursing facility  Planned Therapy Interventions (PT): balance training, bed mobility training, gait training, home exercise program, patient/family education, strengthening, transfer training  Therapy Frequency (PT): daily  Progress Summary (PT)  Progress Toward Functional Goals (PT): progress toward functional goals is fair  Barriers to Overall Progress (PT): medical status  Plan of Care Reviewed With: patient  Outcome Summary: PT: Patient continues to have confusion within conversation and overall weak voice quality when answering questions.  Patient max/dependent x3 for supine to sit transfer.  Patient able to tolerate sitting EOB x2 minutes with max assist, noted posterior and left lateral lean.  O2 sats remained in the 90s thoughout therapy session.  Continue to recommend theresa lift to recliner with nursing staff to allow for sustained opportunity to adjust to upright positioning.  Continue to recommend SNF at discharge.   Outcome Measures     Row Name 01/04/22 1000 01/04/22 0830          How much help from another  person do you currently need...    Turning from your back to your side while in flat bed without using bedrails? -- 1  -JW     Moving from lying on back to sitting on the side of a flat bed without bedrails? -- 1  -JW     Moving to and from a bed to a chair (including a wheelchair)? -- 1  -JW     Standing up from a chair using your arms (e.g., wheelchair, bedside chair)? -- 1  -JW     Climbing 3-5 steps with a railing? -- 1  -JW     To walk in hospital room? -- 1  -JW     AM-PAC 6 Clicks Score (PT) -- 6  -JW            How much help from another is currently needed...    Putting on and taking off regular lower body clothing? 1  -EN --     Bathing (including washing, rinsing, and drying) 1  -EN --     Toileting (which includes using toilet bed pan or urinal) 1  -EN --     Putting on and taking off regular upper body clothing 1  -EN --     Taking care of personal grooming (such as brushing teeth) 1  -EN --     Eating meals 1  -EN --     AM-PAC 6 Clicks Score (OT) 6  -EN --            Functional Assessment    Outcome Measure Options -- AM-PAC 6 Clicks Basic Mobility (PT)  -JW           User Key  (r) = Recorded By, (t) = Taken By, (c) = Cosigned By    Initials Name Provider Type    Isi Wiggins, OTR Occupational Therapist    Rosy Arevalo, JASMIN Physical Therapist                 Time Calculation:    PT Charges     Row Name 01/04/22 1024             Time Calculation    Start Time 0830  -      Stop Time 0900  -      Time Calculation (min) 30 min  -JW      PT Received On 01/04/22  -      PT - Next Appointment 01/05/22  -              Timed Charges    30408 - PT Therapeutic Exercise Minutes 30  -JW              Total Minutes    Timed Charges Total Minutes 30  -JW       Total Minutes 30  -JW            User Key  (r) = Recorded By, (t) = Taken By, (c) = Cosigned By    Initials Name Provider Type    Rosy Arevalo PT Physical Therapist              Therapy Charges for Today     Code Description  Service Date Service Provider Modifiers Qty    51644123178 HC PT EVAL MOD COMPLEXITY 2 1/3/2022 Rosy Harmon, PT GP 1    88932007686 HC PT THER PROC EA 15 MIN 1/4/2022 Rosy Harmon, PT GP 2          PT G-Codes  Outcome Measure Options: AM-PAC 6 Clicks Basic Mobility (PT)  AM-PAC 6 Clicks Score (PT): 6  AM-PAC 6 Clicks Score (OT): 6    Rosy Harmon PT  1/4/2022

## 2022-01-04 NOTE — THERAPY TREATMENT NOTE
Acute Care - Speech Language Pathology   Swallow Treatment Note ARELIS Flaherty     Patient Name: Sergio Daniel  : 1955  MRN: [id number]  Today's Date: 2022               Admit Date: 2021    Visit Dx:     ICD-10-CM ICD-9-CM   1. Acute respiratory failure with hypoxia (HCC)  J96.01 518.81   2. Pneumonia due to COVID-19 virus  U07.1 480.8    J12.82 079.89   3. Paroxysmal atrial fibrillation (HCC)  I48.0 427.31   4. Pharyngeal dysphagia  R13.13 787.23     Patient Active Problem List   Diagnosis   • Acute respiratory failure with hypoxia (HCC)   • Acute hypoxemic respiratory failure due to COVID-19 (HCC)     Past Medical History:   Diagnosis Date   • Diabetes mellitus (HCC)      History reviewed. No pertinent surgical history.    SLP Recommendation and Plan     SLP Diet Recommendation: NPO, temporary alternate methods of nutrition/hydration, other (see comments) (ice chips sparingly for moisture) (22)  Recommended Precautions and Strategies: general aspiration precautions, reflux precautions (22)  SLP Rec. for Method of Medication Administration: meds via alternate route (22)     Monitor for Signs of Aspiration: yes, cough, notify SLP if any concerns (22)  Recommended Diagnostics: VFSS (MBS) (in next two days) (22)     Anticipated Discharge Disposition (SLP): home with home health (per conversation with wife, would like home health if pt able to stand/pivot transfer to bedside commode) (22)     Therapy Frequency (Swallow): daily, at least, 5 days per week (22)  Predicted Duration Therapy Intervention (Days): 2 weeks, until discharge (22)     Daily Summary of Progress (SLP): progress towards functional goals is fair (22)         Patient/Family Concerns, Anticipated Discharge Disposition (SLP): Pt voices no concerns at this time. (22)     Treatment Assessment (SLP): Pt continues with improvement in  voicing but still hoarse with breathiness and intermittent aphonia. Improved effort on tasks and able to complete 10 reps each of 3 exercises to improve lingual control and pharyngeal squeeze/transit. (22)  Plan for Continued Treatment (SLP): continue treatment per plan of care (22)         Plan of Care Reviewed With: patient  Outcome Summary: SLP: Pt with continued improvement in voice with less breathiness. Does continue to fatigue quickly/severe overall weakness. Able to participate in exercises for lingual and laryngeal/pharyngeal movement/positive pharyngeal squeeze. Able to tolerate sitting up in a chair for greater than one hour. Recommend to complete MBS in the next couple of days to determine safety of po diet and meds.      SWALLOW EVALUATION (last 72 hours)     SLP Adult Swallow Evaluation     Row Name 22 1500 [id number] 22 1122          Rehab Evaluation    Document Type therapy note (daily note)  -AD therapy note (daily note)  -AD -- evaluation  -AD     Subjective Information complains of; fatigue  -AD no complaints  -AD -- complains of  -AD     Patient Observations alert; cooperative; agree to therapy  -AD alert; cooperative; agree to therapy  -AD -- alert; cooperative; agree to therapy  -AD     Patient/Family/Caregiver Comments/Observations Pt seen upright in recliner. States he is getting tired and is ready to go back to bed. RN notified. Agreeable to therapy while waiting.  -AD Pt seen at bedside with head of bed elevated to upright position. Alert, but difficult to understand at times due to background noise and weak vocal quality.  -AD -- Pt was seen upright in bed w/heated hi flow O2 on. Pt unable to tolerate fully upright position at this time. Alert, but confused. Knows he is in the hospital and reason why. Thought he was in [facility]. Also stated  1955 and age as 81 y/o. Able to reorient to person, place and time with verbal model  from SLP.  -AD     Patient Effort good  -AD adequate  -AD -- adequate  -AD     Comment Pt appeared to put forth his best effort on therapy tasks.  -AD -- -- --     Symptoms Noted During/After Treatment fatigue; other (see comments)  -AD other (see comments)  cough and breathy voice  -AD -- other (see comments)  cough and breathy voice  -AD     Symptoms Noted, Comment intermittent confusion; hoarse voice  -AD -- -- --            General Information    Patient Profile Reviewed -- -- -- yes  -AD     Pertinent History Of Current Problem -- -- -- Pt is a 67 y/o male admitted with acute hypoxic respiratory failure due to Covid-19 infection/pneumonia. Also diagnosed with ARDS, MSSA/Klebsiella pneum, shock, cyotkine release syndrome s/p remdesivir, extensive DVT LLE, afib w/RVR, uncontrolled diabetes, obesity, mild hepatitis r/t covid and fever. Pt intubated for greater than two weeks and extubated on 1/1/22 and placed on heated hi flow O2. CXR of 1/1/22 with bilateral pneumonia that is stable to worsening appearance in the left lung. Mid to lower lung zone opacities.  -AD     Current Method of Nutrition -- -- -- nasogastric feedings; NPO  -AD     Precautions/Limitations, Vision -- -- -- vision impairment, bilaterally; other (see comments)  no glasses currently present. Unable to see pictures on wall, but can locate therapist and track in room.  -AD     Precautions/Limitations, Hearing -- -- -- WFL  -AD     Prior Level of Function-Communication -- -- -- WFL  prior to hospitalization; current suspected cognitive deficits due to prior sedation; monitor for need for further evaluation.  -AD     Prior Level of Function-Swallowing -- -- -- no diet consistency restrictions; regular textures; thin liquids  -AD     Plans/Goals Discussed with -- -- spouse/S.O.; agreed upon  via phone  -AD patient; agreed upon  -AD     Barriers to Rehab -- -- -- medically complex  prolonged intubation  -AD     Patient's Goals for Discharge -- -- --  patient did not state  -AD            Pain    Additional Documentation --  no pain reported  -AD --  no current pain reported  -AD -- --  no current pain reported  -AD            Oral Motor Structure and Function    Oral Lesions or Structural Abnormalities and/or variants -- -- -- none  -AD     Dentition Assessment -- -- -- natural, present and adequate  -AD     Secretion Management -- -- -- requires suctioning to control secretions  weak, nonproductive cough  -AD     Mucosal Quality -- -- -- moist, healthy  -AD     Gag Response -- -- -- absent or diminished  -AD     Volitional Swallow -- -- -- WFL  -AD     Volitional Cough -- -- -- non-productive; weak; reduced respiratory support  -AD            Oral Musculature and Cranial Nerve Assessment    Oral Motor General Assessment -- -- -- generalized oral motor weakness  -AD     Mandibular Impairment Detail, Cranial Nerve V (Trigeminal) -- -- -- reduced strength bilaterally  mild  -AD     Lingual Impairment, Detail. Cranial Nerves IX, XII (Glossopharyngeal and Hypoglossal) -- -- -- reduced strength; bilaterally  -AD     Vocal Impairment, Detail. Cranial Nerve X (Vagus) -- -- -- vocal quality abnormality (see comments); impaired throat clear/cough (see comments)  -AD     Oral Motor, Comment -- -- -- Generalized weakness overall with severely breathy voice and weak cough. Decreased respiratory support and nonproductive cough. Labial ROM WFL and velar elevation WFL.  -AD            General Eating/Swallowing Observations    Respiratory Support Currently in Use nasal cannula  3L  -AD nasal cannula  4L humidified  -AD -- high-flow nasal cannula  heated hi flow 40L  -AD     Eating/Swallowing Skills -- fed by SLP  -AD -- fed by SLP; unaware of safety concerns; respiratory changes during/following eating voice quality changes during/following eating  coughing weak cough  -AD     Positioning During Eating -- upright in bed  near 90 degrees  -AD -- upright in bed; semi-reclined  up  to near 75 degrees; unable to tolerate higher position  -AD     Utensils Used -- spoon  -AD -- spoon  -AD     Consistencies Trialed -- ice chips; pureed  -AD -- ice chips  -AD     Pre SpO2 (%) -- 92  -AD -- --     Post SpO2 (%) -- 93  -AD -- --            Respiratory    Respiratory Status WFL  -AD WFL; during swallowing/eating  -AD -- during swallowing/eating; other (see comments)  coughing intermittently after trials of ice chips; increases with bigger ice chips.  -AD     Date of Intubation -- -- -- 12/18/2021 @ 0542 am  extubated 1/1/2022 @ 10:30 am  -AD            Clinical Swallow Eval    Oral Prep Phase -- -- -- WFL  -AD     Oral Transit -- -- -- WFL  -AD     Oral Residue -- -- -- WFL  -AD     Pharyngeal Phase -- -- -- suspected pharyngeal impairment  -AD     Esophageal Phase -- -- -- unremarkable  -AD     Clinical Swallow Evaluation Summary -- -- -- Pt with coughing following trials of ice chips by spoon. Delayed cough at times, multiple swallows noted. Weak cough noted as well with poor clearance of secretions and water. No further consistencies trialed at this time due to concerns for aspiration and heated hi flow O2. Recommend to continue with tube feedings at this time and reassess tomorrow. Continue meds via NG feeding tube.  -AD            Pharyngeal Phase Concerns    Pharyngeal Phase Concerns -- -- -- multiple swallows; cough; other (see comments)  -AD     Multiple Swallows -- -- -- thin; other (see comments)  ice chips  -AD     Cough -- -- -- thin; other (see comments)  ice chips  -AD     Pharyngeal Phase Concerns, Comment -- -- -- Pt with multiple swallows and coughing intermittently on thin ice chips. Weak, breathy voice and weak, non-productive cough.  -AD            SLP Evaluation Clinical Impression    SLP Swallowing Diagnosis -- -- -- suspected pharyngeal dysphagia  -AD     Functional Impact -- -- -- risk of aspiration/pneumonia; risk of malnutrition; risk of dehydration  -AD     Rehab  Potential/Prognosis, Swallowing -- -- -- adequate, monitor progress closely  -AD     Swallow Criteria for Skilled Therapeutic Interventions Met -- -- -- demonstrates skilled criteria  -AD            SLP Treatment Clinical Impressions    Treatment Assessment (SLP) Pt continues with improvement in voicing but still hoarse with breathiness and intermittent aphonia. Improved effort on tasks and able to complete 10 reps each of 3 exercises to improve lingual control and pharyngeal squeeze/transit.  -AD Pt with minimal to no improvement. Slight increase in voicing but shortly maintained and becomes breathy. Pt continues with coughing on ice chips; stronger cough noted today. Tolerates small bites of applesauce by spoon w/mild oral transit delay but no coughing. Continue to recommend MBS to further assess safety of po intake in the next day or two.  -AD -- --     Daily Summary of Progress (SLP) progress towards functional goals is fair  -AD progress towards functional goals is fair  -AD -- --     Barriers to Overall Progress (SLP) Fatigue; Medically complex  -AD Fatigue; Medically complex; Other (see comments)  significant weakness  -AD -- --     Plan for Continued Treatment (SLP) continue treatment per plan of care  -AD continue treatment per plan of care  -AD -- --     Care Plan Review care plan/treatment goals reviewed; risks/benefits reviewed; current/potential barriers reviewed; patient/other agree to care plan  -AD care plan/treatment goals reviewed; risks/benefits reviewed; current/potential barriers reviewed; patient/other agree to care plan  -AD -- --     Care Plan Review, Other Participant(s) -- other (see comments)  Elizabeth LIEBERMAN  -AD -- --            Recommendations    Therapy Frequency (Swallow) daily; at least; 5 days per week  -AD daily; at least; 5 days per week  -AD -- daily; at least; 5 days per week  -AD     Predicted Duration Therapy Intervention (Days) 2 weeks; until discharge  -AD 2 weeks; until discharge   -AD -- 2 weeks; until discharge  -AD     SLP Diet Recommendation NPO; temporary alternate methods of nutrition/hydration; other (see comments)  ice chips sparingly for moisture  -AD NPO; temporary alternate methods of nutrition/hydration; other (see comments)  consider ice chips sparingly for moisture; 2-3 per hour  -AD -- NPO; temporary alternate methods of nutrition/hydration; other (see comments)  consider ice chips sparingly for moisture; 2-3 per hour  -AD     Recommended Diagnostics VFSS (MBS)  in next two days  -AD reassess via clinical swallow evaluation; other (see comments)  would benefit from MBS prior to starting po diet or meds; will monitor status to determine when ready for MBS.  -AD -- reassess via clinical swallow evaluation; other (see comments)  would benefit from MBS prior to starting po diet or meds; will monitor status to determine when ready for MBS.  -AD     Recommended Precautions and Strategies general aspiration precautions; reflux precautions  -AD general aspiration precautions; reflux precautions  -AD -- general aspiration precautions; reflux precautions  -AD     Oral Care Recommendations Oral Care BID/PRN; Suction toothbrush  -AD Oral Care BID/PRN; Suction toothbrush  -AD -- Oral Care BID/PRN; Suction toothbrush  -AD     SLP Rec. for Method of Medication Administration meds via alternate route  -AD meds via alternate route  -AD -- meds via alternate route  -AD     Monitor for Signs of Aspiration yes; cough; notify SLP if any concerns  -AD yes; cough; notify SLP if any concerns  -AD -- yes; cough; notify SLP if any concerns  -AD     Anticipated Discharge Disposition (SLP) home with home health  per conversation with wife, would like home health if pt able to stand/pivot transfer to bedside commode  -AD unknown  -AD -- unknown  -AD     Patient/Family Concerns, Anticipated Discharge Disposition (SLP) Pt voices no concerns at this time.  -AD No current concerns per patient.  -AD No current  concerns reported per wife, Luz Elena, via telephone call. Results of clinical swallow eval and recommendations reviewed via telephone.  -AD No concerns per patient at this time. He does state when he gets to eat, he would like some chocolate pudding.  -AD            Swallow Goals (SLP)    Oral Nutrition/Hydration Goal Selection (SLP) -- -- -- oral nutrition/hydration, SLP goal 1; oral nutrition/hydration, SLP goal 2  -AD     Lingual Strengthening Goal Selection (SLP) -- -- -- lingual strengthening, SLP goal 1  -AD     Pharyngeal Strengthening Exercise Goal Selection (SLP) -- -- -- pharyngeal strengthening exercise, SLP goal 1  -AD     Additional Documentation -- -- -- lingual strengthening goal selection (SLP); pharyngeal strengthening exercise goal selection (SLP)  -AD            Oral Nutrition/Hydration Goal 1 (SLP)    Oral Nutrition/Hydration Goal 1, SLP Pt will be able to participate in an instrumental assessment of swallow in order to determine safest, least restrictive diet w/limited risk of aspiration.  -AD Pt will be able to participate in an instrumental assessment of swallow in order to determine safest, least restrictive diet w/limited risk of aspiration.  -AD -- Pt will be able to participate in an instrumental assessment of swallow in order to determine safest, least restrictive diet w/limited risk of aspiration.  -AD     Time Frame (Oral Nutrition/Hydration Goal 1, SLP) short term goal (STG); 4 days  -AD short term goal (STG); 4 days  -AD -- short term goal (STG); 4 days  -AD     Barriers (Oral Nutrition/Hydration Goal 1, SLP) medically complex  -AD medically complex  -AD -- medically complex; heated hi flow O2  -AD     Progress/Outcomes (Oral Nutrition/Hydration Goal 1, SLP) goal ongoing  tolerated sitting up in a chair greater than one hour today; improved voicing  -AD goal ongoing  -AD -- other (see comments)  new  -AD            Oral Nutrition/Hydration Goal 2 (SLP)    Oral Nutrition/Hydration Goal  2, SLP Pt will demonstrate improved tolerance of thins on therapeutic trials of ice chips with SLP w/use of strategies as necessary and pt able to participate.  -AD Pt will demonstrate improved tolerance of thins on therapeutic trials of ice chips with SLP w/use of strategies as necessary and pt able to participate.  -AD -- Pt will demonstrate improved tolerance of thins on therapeutic trials of ice chips with SLP w/use of strategies as necessary and pt able to participate.  -AD     Time Frame (Oral Nutrition/Hydration Goal 2, SLP) short term goal (STG); 4 days  -AD short term goal (STG); 4 days  -AD -- short term goal (STG); 4 days  -AD     Barriers (Oral Nutrition/Hydration Goal 2, SLP) medically complex  -AD medically complex  -AD -- medically complex; heated hi flow O2  -AD     Progress/Outcomes (Oral Nutrition/Hydration Goal 2, SLP) other (see comments)  not targeted during this session  -AD unable to make needed progress; goal ongoing  -AD -- other (see comments)  New  -AD            Lingual Strengthening Goal 1 (SLP)    Activity (Lingual Strengthening Goal 1, SLP) increase lingual tone/sensation/control/coordination/movement  -AD increase lingual tone/sensation/control/coordination/movement  -AD -- increase lingual tone/sensation/control/coordination/movement  -AD     Increase Lingual Tone/Sensation/Control/Coordination/Movement lingual movement exercises; swallow trials  -AD lingual movement exercises; swallow trials  -AD -- lingual movement exercises; swallow trials  -AD     [place]/Accuracy (Lingual Strengthening Goal 1, SLP) with minimal cues (75-90% accuracy)  -AD with minimal cues (75-90% accuracy)  -AD -- with minimal cues (75-90% accuracy)  -AD     Time Frame (Lingual Strengthening Goal 1, SLP) short term goal (STG); 2 weeks  -AD short term goal (STG); 2 weeks  -AD -- short term goal (STG); 2 weeks  -AD     Barriers (Lingual Strengthening Goal 1, SLP) medically complex  -AD medically complex  -AD  -- medically complex; heated hi flow O2  -AD     Progress/Outcomes (Lingual Strengthening Goal 1, SLP) good progress toward goal; goal ongoing  able to complete 10 reps lingual protrusion and hold for 3 sec w/minimal prompts  -AD continuing progress toward goal; goal ongoing  improved w/tolerance of small bites of applesauce w/delayed oral transit  -AD -- other (see comments)  New  -AD            Pharyngeal Strengthening Exercise Goal 1 (SLP)    Activity (Pharyngeal Strengthening Goal 1, SLP) increase closure at entrance to airway/closure of airway at glottis  -AD increase closure at entrance to airway/closure of airway at glottis  -AD -- increase closure at entrance to airway/closure of airway at glottis  -AD     Increase Closure at Entrance to Airway/Closure of Airway at Glottis hard effortful swallow; effortful pitch glide (falsetto + pharyngeal squeeze)  -AD hard effortful swallow; effortful pitch glide (falsetto + pharyngeal squeeze)  -AD -- hard effortful swallow; effortful pitch glide (falsetto + pharyngeal squeeze)  -AD     [place]/Accuracy (Pharyngeal Strengthening Goal 1, SLP) with moderate cues (50-74% accuracy)  -AD with moderate cues (50-74% accuracy)  -AD -- with moderate cues (50-74% accuracy)  -AD     Time Frame (Pharyngeal Strengthening Goal 1, SLP) short term goal (STG); 2 weeks  -AD short term goal (STG); 2 weeks  -AD -- short term goal (STG); 2 weeks  -AD     Barriers (Pharyngeal Strengthening Goal 1, SLP) medically complex  -AD mecially complex  -AD -- medically complex; heated hi flow O2  -AD     Progress/Outcomes (Pharyngeal Strengthening Goal 1, SLP) continuing progress toward goal; goal ongoing; other (see comments)  eff pitch glide 10 reps with mod cues; effortful vocal cord closure 10 reps with mod cues.  -AD other (see comments)  not targeted during this session  -AD -- other (see comments)  New  -AD           User Key  (r) = Recorded By, (t) = Taken By, (c) = Cosigned By    Initials  Name Effective Dates    AD Denisa Chua, MS CCC-SLP 06/16/21 -                 EDUCATION  The patient has been educated in the following areas:   Dysphagia (Swallowing Impairment). Pt verbalizes understanding and able to perform exercise with cues. Reinforcement needed in order to perform on his own. Agreeable to MBS in next couple of days.       SLP GOALS     Row Name 01/04/22 1500 01/03/22 1728 01/02/22 1122       Oral Nutrition/Hydration Goal 1 (SLP)    Oral Nutrition/Hydration Goal 1, SLP Pt will be able to participate in an instrumental assessment of swallow in order to determine safest, least restrictive diet w/limited risk of aspiration.  -AD Pt will be able to participate in an instrumental assessment of swallow in order to determine safest, least restrictive diet w/limited risk of aspiration.  -AD Pt will be able to participate in an instrumental assessment of swallow in order to determine safest, least restrictive diet w/limited risk of aspiration.  -AD    Time Frame (Oral Nutrition/Hydration Goal 1, SLP) short term goal (STG); 4 days  -AD short term goal (STG); 4 days  -AD short term goal (STG); 4 days  -AD    Barriers (Oral Nutrition/Hydration Goal 1, SLP) medically complex  -AD medically complex  -AD medically complex; heated hi flow O2  -AD    Progress/Outcomes (Oral Nutrition/Hydration Goal 1, SLP) goal ongoing  tolerated sitting up in a chair greater than one hour today; improved voicing  -AD goal ongoing  -AD other (see comments)  new  -AD       Oral Nutrition/Hydration Goal 2 (SLP)    Oral Nutrition/Hydration Goal 2, SLP Pt will demonstrate improved tolerance of thins on therapeutic trials of ice chips with SLP w/use of strategies as necessary and pt able to participate.  -AD Pt will demonstrate improved tolerance of thins on therapeutic trials of ice chips with SLP w/use of strategies as necessary and pt able to participate.  -AD Pt will demonstrate improved tolerance of thins on therapeutic  trials of ice chips with SLP w/use of strategies as necessary and pt able to participate.  -AD    Time Frame (Oral Nutrition/Hydration Goal 2, SLP) short term goal (STG); 4 days  -AD short term goal (STG); 4 days  -AD short term goal (STG); 4 days  -AD    Barriers (Oral Nutrition/Hydration Goal 2, SLP) medically complex  -AD medically complex  -AD medically complex; heated hi flow O2  -AD    Progress/Outcomes (Oral Nutrition/Hydration Goal 2, SLP) other (see comments)  not targeted during this session  -AD unable to make needed progress; goal ongoing  -AD other (see comments)  New  -AD       Lingual Strengthening Goal 1 (SLP)    Activity (Lingual Strengthening Goal 1, SLP) increase lingual tone/sensation/control/coordination/movement  -AD increase lingual tone/sensation/control/coordination/movement  -AD increase lingual tone/sensation/control/coordination/movement  -AD    Increase Lingual Tone/Sensation/Control/Coordination/Movement lingual movement exercises; swallow trials  -AD lingual movement exercises; swallow trials  -AD lingual movement exercises; swallow trials  -AD    [place]/Accuracy (Lingual Strengthening Goal 1, SLP) with minimal cues (75-90% accuracy)  -AD with minimal cues (75-90% accuracy)  -AD with minimal cues (75-90% accuracy)  -AD    Time Frame (Lingual Strengthening Goal 1, SLP) short term goal (STG); 2 weeks  -AD short term goal (STG); 2 weeks  -AD short term goal (STG); 2 weeks  -AD    Barriers (Lingual Strengthening Goal 1, SLP) medically complex  -AD medically complex  -AD medically complex; heated hi flow O2  -AD    Progress/Outcomes (Lingual Strengthening Goal 1, SLP) good progress toward goal; goal ongoing  able to complete 10 reps lingual protrusion and hold for 3 sec w/minimal prompts  -AD continuing progress toward goal; goal ongoing  improved w/tolerance of small bites of applesauce w/delayed oral transit  -AD other (see comments)  New  -AD       Pharyngeal Strengthening Exercise  Goal 1 (SLP)    Activity (Pharyngeal Strengthening Goal 1, SLP) increase closure at entrance to airway/closure of airway at glottis  -AD increase closure at entrance to airway/closure of airway at glottis  -AD increase closure at entrance to airway/closure of airway at glottis  -AD    Increase Closure at Entrance to Airway/Closure of Airway at Glottis hard effortful swallow; effortful pitch glide (falsetto + pharyngeal squeeze)  -AD hard effortful swallow; effortful pitch glide (falsetto + pharyngeal squeeze)  -AD hard effortful swallow; effortful pitch glide (falsetto + pharyngeal squeeze)  -AD    [place]/Accuracy (Pharyngeal Strengthening Goal 1, SLP) with moderate cues (50-74% accuracy)  -AD with moderate cues (50-74% accuracy)  -AD with moderate cues (50-74% accuracy)  -AD    Time Frame (Pharyngeal Strengthening Goal 1, SLP) short term goal (STG); 2 weeks  -AD short term goal (STG); 2 weeks  -AD short term goal (STG); 2 weeks  -AD    Barriers (Pharyngeal Strengthening Goal 1, SLP) medically complex  -AD mecially complex  -AD medically complex; heated hi flow O2  -AD    Progress/Outcomes (Pharyngeal Strengthening Goal 1, SLP) continuing progress toward goal; goal ongoing; other (see comments)  eff pitch glide 10 reps with mod cues; effortful vocal cord closure 10 reps with mod cues.  -AD other (see comments)  not targeted during this session  -AD other (see comments)  New  -AD          User Key  (r) = Recorded By, (t) = Taken By, (c) = Cosigned By    Initials Name Provider Type    Denisa Mcconnell MS CCC-SLP Speech and Language Pathologist                   Time Calculation:    Time Calculation- SLP     Row Name 01/04/22 0219             Time Calculation- SLP    SLP Start Time 1429  -AD      SLP Stop Time 1500  -AD      SLP Time Calculation (min) 31 min  -AD      Total Timed Code Minutes- SLP 0 minute(s)  -AD      SLP Non-Billable Time (min) 0 min  -AD      SLP Received On 01/04/22  -AD               Untimed Charges    26595-DI Treatment Swallow Minutes 31  -AD              Total Minutes    Untimed Charges Total Minutes 31  -AD       Total Minutes 31  -AD            User Key  (r) = Recorded By, (t) = Taken By, (c) = Cosigned By    Initials Name Provider Type    AD Denisa Chua, MS CCC-SLP Speech and Language Pathologist                Therapy Charges for Today     Code Description Service Date Service Provider Modifiers Qty    26501982749 HC ST EVAL ORAL PHARYNG SWALLOW 2 1/3/2022 Denisa Chua, MS CCC-SLP GN 1    90736001507 HC ST TREATMENT SWALLOW 2 1/4/2022 Denisa Chua, MS CCC-SLP GN 1               Denisa Chua MS CCC-SLP  1/4/2022

## 2022-01-04 NOTE — PROGRESS NOTES
"Hospitalist Team      Patient Care Team:  Farhan Jean Jr., MD as PCP - General (Family Medicine)      Chief Complaint:  Follow-up Acute Hypoxic Respiratory Failure due COVID-19    Subjective    Remains on nasal cannula and breathing is stable.  He still remains very weak.  No new issues per nurse    Objective    Vital Signs  Temp:  [97.5 °F (36.4 °C)-98.1 °F (36.7 °C)] 98.1 °F (36.7 °C)  Heart Rate:  [] 106  Resp:  [16-22] 20  BP: (142-170)/() 142/87  Oxygen Therapy  SpO2: 91 %  Pulse Oximetry Type: Continuous  Device (Oxygen Therapy): ventilator  Device (Oxygen Therapy): humidified, nasal cannula  $ High Flow Nasal Cannula Set-Up: yes  Flow (L/min): 3  Oxygen Concentration (%): 35  ETCO2 (mmHg): 35 mmHg  Probe Placed On (Pulse Ox): Left:, finger  Probe Removed From (Pulse Ox): Left:, forehead}  Flowsheet Rows      First Filed Value   Admission Height 185.4 cm (73\") Documented at 12/16/2021 1515   Admission Weight 120 kg (265 lb) Documented at 12/16/2021 1515          Physical Exam:  Vitals reviewed 1/4/2022  General: Acute distress  HEENT:  Tube in place  Lungs: Manage breath sounds in all fields  CV: Irregularly, irregular  no significant peripheral edema  Abdomen: Soft with active bowel sounds.  MSK: Moves extremities spontaneously  Skin: Warm dry  Psych:  Following commands    Results Review:     I reviewed the patient's new clinical results.    Lab Results (last 24 hours)     Procedure Component Value Units Date/Time    POC Glucose Once [[id number]]  (Abnormal) Collected: 01/04/22 1244    Specimen: Blood Updated: 01/04/22 1251     Glucose 200 mg/dL      Comment: Meter: [id number] : [id number] Dejon Johnson RN       C-reactive Protein [[id number]]  (Abnormal) Collected: 01/04/22 0437    Specimen: Blood Updated: 01/04/22 1142     C-Reactive Protein 5.36 mg/dL     Procalcitonin [[id number]]  (Normal) Collected: 01/04/22 0437    Specimen: Blood Updated: 01/04/22 0523     Procalcitonin 0.10 " ng/mL     Basic Metabolic Panel [[id number]]  (Abnormal) Collected: 01/04/22 0437    Specimen: Blood Updated: 01/04/22 0523     Glucose 144 mg/dL      BUN 19 mg/dL      Creatinine 0.54 mg/dL      Sodium 137 mmol/L      Potassium 3.8 mmol/L      Chloride 99 mmol/L      CO2 29.3 mmol/L      Calcium 9.0 mg/dL      eGFR Non African Amer >150 mL/min/1.73      BUN/Creatinine Ratio 35.2     Anion Gap 8.7 mmol/L     Narrative:      GFR Normal >60  Chronic Kidney Disease <60  Kidney Failure <15      D-dimer, Quantitative [[id number]]  (Abnormal) Collected: 01/04/22 0437    Specimen: Blood Updated: 01/04/22 0511     D-Dimer, Quantitative 1.74 MCGFEU/mL     Narrative:      Can be elevated in, but is not diagnostic for deep vein thrombosis (DVT) or pulmonary embolis (PE).  It is also elevated in other medical conditions.  Clinical correlation is required.  The negative cut-off value for the D-Dimer is 0.50 mcg FEU/mL for DVT and PE.      CBC (No Diff) [[id number]]  (Abnormal) Collected: 01/04/22 0437    Specimen: Blood Updated: 01/04/22 0459     WBC 12.78 10*3/mm3      RBC 3.49 10*6/mm3      Hemoglobin 10.6 g/dL      Hematocrit 34.2 %      MCV 98.0 fL      MCH 30.4 pg      MCHC 31.0 g/dL      RDW 14.4 %      RDW-SD 50.8 fl      MPV 9.7 fL      Platelets 346 10*3/mm3     POC Glucose Once [[id number]]  (Abnormal) Collected: 01/03/22 2330    Specimen: Blood Updated: 01/03/22 2336     Glucose 179 mg/dL      Comment: Meter: [id number] : [id number] Carlos Mattson RN       POC Glucose Once [[id number]]  (Abnormal) Collected: 01/03/22 2015    Specimen: Blood Updated: 01/03/22 2021     Glucose 181 mg/dL      Comment: Meter: [id number] : [id number] Carlos Mattson RN       POC Glucose Once [[id number]]  (Abnormal) Collected: 01/03/22 1815    Specimen: Blood Updated: 01/03/22 1824     Glucose 201 mg/dL      Comment: Meter: [id number] : [id number] Dejon Johnson RN             Imaging Results (Last 24 Hours)     ** No results found  for the last 24 hours. **          Xray reviewed personally by physician.      Medication Review:   I have reviewed the patient's current medication list    Current Facility-Administered Medications:   •  acetaminophen (TYLENOL) tablet 650 mg, 650 mg, Oral, Q6H PRN, Jayden Hyatt, , 650 mg at 12/26/21 1153  •  apixaban (ELIQUIS) tablet 10 mg, 10 mg, Nasogastric, Q12H, 10 mg at 01/04/22 0915 **FOLLOWED BY** [START ON 1/5/2022] apixaban (ELIQUIS) tablet 5 mg, 5 mg, Oral, Q12H, Dakota Moore MD  •  bisacodyl (DULCOLAX) suppository 10 mg, 10 mg, Rectal, Daily, Dakota Moore MD, 10 mg at 01/02/22 0826  •  chlorhexidine (PERIDEX) 0.12 % solution 15 mL, 15 mL, Mouth/Throat, Q12H, Spencer Carmichael MD, 15 mL at 01/02/22 2001  •  dexamethasone (DECADRON) tablet 6 mg, 6 mg, Oral, Daily With Breakfast **OR** dexamethasone (DECADRON) injection 6 mg, 6 mg, Intravenous, Daily With Breakfast, Charli Ayala MD, 6 mg at 01/04/22 0915  •  dexmedetomidine (PRECEDEX) 400 mcg in 100 mL NS infusion, 0.2-1.5 mcg/kg/hr, Intravenous, Titrated, Jaleesa Cárdenas MD, Last Rate: 8.6 mL/hr at 01/01/22 1312, 0.3 mcg/kg/hr at 01/01/22 1312  •  dextromethorphan polistirex ER (DELSYM) 30 MG/5ML oral suspension 60 mg, 60 mg, Oral, Q12H PRN, Jaleesa Cárdenas MD, 60 mg at 01/02/22 2306  •  dextrose (D50W) (25 g/50 mL) IV injection 25 g, 25 g, Intravenous, Q15 Min PRN, Dakota Moore MD, 25 g at 12/29/21 0804  •  dextrose (GLUTOSE) oral gel 15 g, 15 g, Oral, Q15 Min PRN, Dakota Moore MD  •  dilTIAZem (CARDIZEM) tablet 60 mg, 60 mg, Nasogastric, Q8H, Dakota Moore MD, 60 mg at 01/04/22 0552  •  famotidine (PEPCID) injection 20 mg, 20 mg, Intravenous, Daily, Spencer Carmichael MD, 20 mg at 01/04/22 0915  •  glucagon (GLUCAGEN) injection 1 mg, 1 mg, Subcutaneous, Q15 Min PRN, Dakota Moore MD  •  hydrALAZINE (APRESOLINE) injection 10 mg, 10 mg, Intravenous, Q6H PRN, Jayden Hyatt, DO  •  insulin  aspart (novoLOG) injection 0-24 Units, 0-24 Units, Subcutaneous, Q6H, Jayden Hyatt, , 4 Units at 01/04/22 0222  •  insulin detemir (LEVEMIR) injection 23 Units, 23 Units, Subcutaneous, Q12H, Dakota Moore MD, 23 Units at 01/04/22 0917  •  ipratropium-albuterol (DUO-NEB) nebulizer solution 3 mL, 3 mL, Nebulization, Q6H PRN, Jaleesa Cárdenas MD  •  levoFLOXacin (LEVAQUIN) 750 mg/150 mL D5W (premix) (LEVAQUIN) 750 mg, 750 mg, Intravenous, Q24H, Jayden Hyatt DO, 750 mg at 01/03/22 1211  •  polyethylene glycol (MIRALAX) packet 17 g, 17 g, Oral, Daily, Luca Hermosillo MD, 17 g at 01/02/22 0827  •  potassium chloride 10 mEq in 100 mL IVPB, 10 mEq, Intravenous, Q1H PRN, Jaleesa Cárdenas MD, Stopped at 12/28/21 0940  •  potassium phosphate 45 mmol in sodium chloride 0.9 % 500 mL infusion, 45 mmol, Intravenous, PRN **OR** potassium phosphate 30 mmol in sodium chloride 0.9 % 250 mL infusion, 30 mmol, Intravenous, PRN **OR** potassium phosphate 15 mmol in sodium chloride 0.9 % 100 mL infusion, 15 mmol, Intravenous, PRN **OR** sodium phosphates 45 mmol in sodium chloride 0.9 % 500 mL IVPB, 45 mmol, Intravenous, PRN **OR** sodium phosphates 30 mmol in sodium chloride 0.9 % 250 mL IVPB, 30 mmol, Intravenous, PRN **OR** sodium phosphates 15 mmol in sodium chloride 0.9 % 250 mL IVPB, 15 mmol, Intravenous, PRN, Jaleesa Cárdenas MD, Last Rate: 62.5 mL/hr at 12/18/21 0429, 15 mmol at 12/18/21 0429  •  propofol (DIPRIVAN) infusion 10 mg/mL 100 mL, 5-50 mcg/kg/min, Intravenous, Titrated, Jaleesa Cárdenas MD, Last Rate: 6.9 mL/hr at 01/01/22 1006, 10 mcg/kg/min at 01/01/22 1006  •  [COMPLETED] Insert peripheral IV, , , Once **AND** sodium chloride 0.9 % flush 10 mL, 10 mL, Intravenous, PRN, Jaleesa Cárdenas MD, 10 mL at 12/28/21 0732  •  sodium chloride 0.9 % flush 10 mL, 10 mL, Intravenous, PRN, Jeana Grubbs MD  •  sodium chloride 0.9 % flush 10 mL, 10 mL,  Intravenous, Q12H, Dakota Moore MD, 10 mL at 01/04/22 0916  •  sodium chloride 0.9 % flush 10 mL, 10 mL, Intravenous, Q12H, Dakota Moore MD, 10 mL at 01/04/22 0916  •  sodium chloride 0.9 % flush 10 mL, 10 mL, Intravenous, Q12H, Dakota Moore MD, 10 mL at 01/04/22 0916  •  sodium chloride 0.9 % flush 10 mL, 10 mL, Intravenous, PRN, Dakota Moore MD  •  sodium chloride 0.9 % flush 20 mL, 20 mL, Intravenous, PRN, Jeana Grubbs MD  •  sodium chloride 0.9 % flush 20 mL, 20 mL, Intravenous, PRN, Dakota Moore MD      Assessment/Plan      1.  Acute Hypoxic Respiratory Failure due to COVID-19 Pneumonia w/ Superimposed MSSA and Klebsiella pneumonia:    -Extubated 1/1/22.  Now on 3 L nasal cannula  -Sputum culture 12/29/2021: MSSA, Citrobacter resistant to ceftriaxone and stenotrophomonas sensitive to Levaquin  -completed the Rocephin course  -continue Levaquin to cover stenotrophomonas and Citrobacter  -Pulmonary following appreciate assistance     2.  Diabetes Mellitus, Type 2 in Obese w/ Hyperglycemia:  - Blood glucose stable.    - Continue basal and SSI   - follow and adjust prn    3.  Right Hand Wound:   -Continue local wound care     4.  Atrial Fibrillation: Remains in NSR.    -On Diltiazem and Eliquis.     5.   LLE DVT:   -On Eliquis.     6. Generalized weakness  -Continue PT OT    7. Dysphagia, severe malnutrition  -Continue tube feed cleared for diet  -SLP following, and MBS soon to see if diet can be advanced    Will transfer from ICU today    Plan for disposition: PT OT will need SNF at discharge  following    Jayden Hyatt DO  01/04/22  12:55 EST

## 2022-01-04 NOTE — PLAN OF CARE
Goal Outcome Evaluation:  Plan of Care Reviewed With: patient           Outcome Summary: OT- Patient continues to have confusion and weak voice quality. Patient participated in UE ROM exercises and demonstrated improved AROM in the right shoulder (approximately 45 degrees). No change in L shoulder however left hand strength was better than yesterday. Patient max/dep X 3 for all bed mobility. Pt sat at EOB approximately 2 minutes with max A X 2 to maintain static sitting balance (posterior lean) and maintained  O2 sats in the 90s. Recommend theresa lift to chair to allow pt oppurtunity to continue to adjust to sitting upright.

## 2022-01-04 NOTE — PLAN OF CARE
Goal Outcome Evaluation: Patient remains in the ICU post extubation d/t COVID.  He is alert and oriented and follows commands when asking questions, but seems to be very confused in conversation.  Just as an FYI, he likes to whistle. He is maintaining his O2 sats in the low to mid 90's on 3L NC, exhibits controlled breathing.  He is having quite a productive cough with thick creamy sputum.  His HR is WDL with some periods of tachycardia.  He denies any pain.  He did not have a BM overnight, but is making great urine.  He is very pleasant to talk to, but when you do, you have to be attentive to what he is saying because he whisper while talking.

## 2022-01-05 ENCOUNTER — APPOINTMENT (OUTPATIENT)
Dept: GENERAL RADIOLOGY | Facility: HOSPITAL | Age: 67
End: 2022-01-05

## 2022-01-05 LAB
CRP SERPL-MCNC: 5.29 MG/DL (ref 0–0.5)
DEPRECATED RDW RBC AUTO: 51.6 FL (ref 37–54)
ERYTHROCYTE [DISTWIDTH] IN BLOOD BY AUTOMATED COUNT: 14.6 % (ref 12.3–15.4)
GLUCOSE BLDC GLUCOMTR-MCNC: 140 MG/DL (ref 70–130)
GLUCOSE BLDC GLUCOMTR-MCNC: 148 MG/DL (ref 70–130)
GLUCOSE BLDC GLUCOMTR-MCNC: 169 MG/DL (ref 70–130)
GLUCOSE BLDC GLUCOMTR-MCNC: 93 MG/DL (ref 70–130)
HCT VFR BLD AUTO: 33.2 % (ref 37.5–51)
HGB BLD-MCNC: 10.6 G/DL (ref 13–17.7)
MCH RBC QN AUTO: 31.3 PG (ref 26.6–33)
MCHC RBC AUTO-ENTMCNC: 31.9 G/DL (ref 31.5–35.7)
MCV RBC AUTO: 97.9 FL (ref 79–97)
PLATELET # BLD AUTO: 300 10*3/MM3 (ref 140–450)
PMV BLD AUTO: 10.1 FL (ref 6–12)
PROCALCITONIN SERPL-MCNC: 0.1 NG/ML (ref 0–0.25)
RBC # BLD AUTO: 3.39 10*6/MM3 (ref 4.14–5.8)
WBC NRBC COR # BLD: 12.88 10*3/MM3 (ref 3.4–10.8)

## 2022-01-05 PROCEDURE — 94761 N-INVAS EAR/PLS OXIMETRY MLT: CPT

## 2022-01-05 PROCEDURE — 25010000002 DEXAMETHASONE PER 1 MG: Performed by: INTERNAL MEDICINE

## 2022-01-05 PROCEDURE — 63710000001 INSULIN DETEMIR PER 5 UNITS: Performed by: HOSPITALIST

## 2022-01-05 PROCEDURE — 92611 MOTION FLUOROSCOPY/SWALLOW: CPT

## 2022-01-05 PROCEDURE — 82962 GLUCOSE BLOOD TEST: CPT

## 2022-01-05 PROCEDURE — 97530 THERAPEUTIC ACTIVITIES: CPT

## 2022-01-05 PROCEDURE — 97110 THERAPEUTIC EXERCISES: CPT

## 2022-01-05 PROCEDURE — 74018 RADEX ABDOMEN 1 VIEW: CPT

## 2022-01-05 PROCEDURE — 84145 PROCALCITONIN (PCT): CPT | Performed by: INTERNAL MEDICINE

## 2022-01-05 PROCEDURE — 99232 SBSQ HOSP IP/OBS MODERATE 35: CPT | Performed by: INTERNAL MEDICINE

## 2022-01-05 PROCEDURE — 85027 COMPLETE CBC AUTOMATED: CPT | Performed by: INTERNAL MEDICINE

## 2022-01-05 PROCEDURE — 74230 X-RAY XM SWLNG FUNCJ C+: CPT

## 2022-01-05 PROCEDURE — 25010000002 LEVOFLOXACIN PER 250 MG: Performed by: INTERNAL MEDICINE

## 2022-01-05 PROCEDURE — 86140 C-REACTIVE PROTEIN: CPT | Performed by: INTERNAL MEDICINE

## 2022-01-05 RX ADMIN — SODIUM CHLORIDE, PRESERVATIVE FREE 10 ML: 5 INJECTION INTRAVENOUS at 21:22

## 2022-01-05 RX ADMIN — DILTIAZEM HYDROCHLORIDE 60 MG: 30 TABLET ORAL at 05:37

## 2022-01-05 RX ADMIN — APIXABAN 10 MG: 2.5 TABLET, FILM COATED ORAL at 15:28

## 2022-01-05 RX ADMIN — INSULIN DETEMIR 23 UNITS: 100 INJECTION, SOLUTION SUBCUTANEOUS at 09:00

## 2022-01-05 RX ADMIN — APIXABAN 5 MG: 2.5 TABLET, FILM COATED ORAL at 21:20

## 2022-01-05 RX ADMIN — DILTIAZEM HYDROCHLORIDE 60 MG: 30 TABLET ORAL at 15:00

## 2022-01-05 RX ADMIN — FAMOTIDINE 20 MG: 10 INJECTION INTRAVENOUS at 09:00

## 2022-01-05 RX ADMIN — WHITE PETROLATUM 41 % TOPICAL OINTMENT 1 APPLICATION: OINTMENT at 13:11

## 2022-01-05 RX ADMIN — DEXAMETHASONE SODIUM PHOSPHATE 6 MG: 4 INJECTION, SOLUTION INTRAMUSCULAR; INTRAVENOUS at 09:00

## 2022-01-05 RX ADMIN — SODIUM CHLORIDE, PRESERVATIVE FREE 10 ML: 5 INJECTION INTRAVENOUS at 09:00

## 2022-01-05 RX ADMIN — SODIUM CHLORIDE, PRESERVATIVE FREE 10 ML: 5 INJECTION INTRAVENOUS at 21:21

## 2022-01-05 RX ADMIN — SODIUM CHLORIDE, PRESERVATIVE FREE 10 ML: 5 INJECTION INTRAVENOUS at 21:27

## 2022-01-05 RX ADMIN — LEVOFLOXACIN 750 MG: 5 INJECTION, SOLUTION INTRAVENOUS at 13:07

## 2022-01-05 RX ADMIN — CHLORHEXIDINE GLUCONATE 0.12% ORAL RINSE 15 ML: 1.2 LIQUID ORAL at 09:00

## 2022-01-05 RX ADMIN — CHLORHEXIDINE GLUCONATE 0.12% ORAL RINSE 15 ML: 1.2 LIQUID ORAL at 21:27

## 2022-01-05 RX ADMIN — INSULIN DETEMIR 23 UNITS: 100 INJECTION, SOLUTION SUBCUTANEOUS at 21:20

## 2022-01-05 RX ADMIN — DILTIAZEM HYDROCHLORIDE 60 MG: 30 TABLET ORAL at 21:20

## 2022-01-05 RX ADMIN — WHITE PETROLATUM 41 % TOPICAL OINTMENT 1 APPLICATION: OINTMENT at 21:21

## 2022-01-05 RX ADMIN — BISACODYL 10 MG: 10 SUPPOSITORY RECTAL at 09:00

## 2022-01-05 NOTE — THERAPY TREATMENT NOTE
Acute Care - Physical Therapy Treatment Note   Hien Neely     Patient Name: Sergio Daniel  : 1955  MRN: [id number]  Today's Date: 2022      Visit Dx:     ICD-10-CM ICD-9-CM   1. Acute respiratory failure with hypoxia (HCC)  J96.01 518.81   2. Pneumonia due to COVID-19 virus  U07.1 480.8    J12.82 079.89   3. Paroxysmal atrial fibrillation (HCC)  I48.0 427.31   4. Pharyngeal dysphagia  R13.13 787.23     Patient Active Problem List   Diagnosis   • Acute respiratory failure with hypoxia (HCC)   • Acute hypoxemic respiratory failure due to COVID-19 (HCC)     Past Medical History:   Diagnosis Date   • Diabetes mellitus (HCC)      History reviewed. No pertinent surgical history.  PT Assessment (last 12 hours)     PT Evaluation and Treatment     Row Name 22          Physical Therapy Time and Intention    Subjective Information complains of; fatigue  pt continues to have weak voice when responding to questions  -     Document Type therapy note (daily note)  -     Mode of Treatment physical therapy  -     Patient Effort good  -     Comment pt with increased fatigue with minimal activity  -     Row Name 22          General Information    Patient Observations alert; cooperative; agree to therapy  -     Patient/Family/Caregiver Comments/Observations pt supine, agrees to therapy  -     Barriers to Rehab medically complex  -     Row Name 22          Cognition    Personal Safety Interventions gait belt; nonskid shoes/slippers when out of bed  -     Row Name 22          Pain Scale: Numbers Pre/Post-Treatment    Pretreatment Pain Rating 0/10 - no pain  -     Posttreatment Pain Rating 0/10 - no pain  -     Row Name 22          Bed Mobility    Bed Mobility supine-sit; sit-supine  -     Supine-Sit [place] (Bed Mobility) maximum assist (25% patient effort); dependent (less than 25% patient effort)  requires 3 people to assist  -      Sit-Supine [place] (Bed Mobility) maximum assist (25% patient effort); dependent (less than 25% patient effort)  requires 3 people to assist  -     Bed Mobility, Safety Issues cognitive deficits limit understanding; decreased use of arms for pushing/pulling; decreased use of legs for bridging/pushing  -     Assistive Device (Bed Mobility) bed rails; draw sheet; head of bed elevated  -     Row Name 01/05/22 0830          Transfers    Comment (Transfers) unable to attempt standing at this time  -     Row Name 01/05/22 0830          Balance    Comment, Balance pt able to tolerate sitting x4 minutes with mod/max assist.  increased posterior/left lateral lean  -     Row Name             Wound Right hand Blisters    Wound - Properties Group Present on Hospital Admission: N  -LC Side: Right  -LC Location: hand  -LC, includes hand and wrist  Primary Wound Type: Blisters  -LC     Retired Wound - Properties Group Present on Hospital Admission: N  -LC Side: Right  -LC Location: hand  -LC, includes hand and wrist  Primary Wound Type: Blisters  -LC     Row Name 01/05/22 0830          Plan of Care Review    Outcome Summary PT: Patient performs supine to sit with assist x3.  Patient able to tolerate sitting EOB x4 minutes with mod/max assist.  Patient with increased posterior and left lateral lean.  Patient continues to fatigue with minimal activity.  Continue to recommend SNF at discharge.  -     Row Name 01/05/22 0830          Vital Signs    Pre SpO2 (%) 92  -JW     Intra SpO2 (%) 90  -     Post SpO2 (%) 93  -     Row Name 01/05/22 0830          Positioning and Restraints    Pre-Treatment Position in bed  -     Post Treatment Position bed  -JW     In Bed notified nsg; supine; call light within reach; encouraged to call for assist  -     Row Name 01/05/22 0830          Progress Summary (PT)    Progress Toward Functional Goals (PT) progress toward functional goals is fair  -     Barriers to Overall  Progress (PT) medical status  -           User Key  (r) = Recorded By, (t) = Taken By, (c) = Cosigned By    Initials Name Provider Type    Chitra Faith, RN, CWON Registered Nurse    Rosy Arevalo PT Physical Therapist                Physical Therapy Education                 Title: PT OT SLP Therapies (Done)     Topic: Physical Therapy (Done)     Point: Mobility training (Done)     Learning Progress Summary           Patient Acceptance, E,TB, VU by  at 1/5/2022 1018    Acceptance, E,TB, VU by  at 1/4/2022 1023    Acceptance, E,TB, VU,NR by MONTEZ at 1/3/2022 1216                               User Key     Initials Effective Dates Name Provider Type Discipline     06/16/21 -  Rosy Harmon PT Physical Therapist PT              PT Recommendation and Plan  Anticipated Discharge Disposition (PT): skilled nursing facility  Planned Therapy Interventions (PT): balance training, bed mobility training, gait training, home exercise program, patient/family education, strengthening, transfer training  Therapy Frequency (PT): daily  Progress Summary (PT)  Progress Toward Functional Goals (PT): progress toward functional goals is fair  Barriers to Overall Progress (PT): medical status  Plan of Care Reviewed With: patient  Outcome Summary: PT: Patient performs supine to sit with assist x3.  Patient able to tolerate sitting EOB x4 minutes with mod/max assist.  Patient with increased posterior and left lateral lean.  Patient continues to fatigue with minimal activity.  Continue to recommend SNF at discharge.   Outcome Measures     Row Name 01/05/22 0830 01/04/22 1000 01/04/22 0830       How much help from another person do you currently need...    Turning from your back to your side while in flat bed without using bedrails? 1  -JW -- 1  -JW    Moving from lying on back to sitting on the side of a flat bed without bedrails? 1  -JW -- 1  -JW    Moving to and from a bed to a chair (including a wheelchair)? 1  -JW  -- 1  -JW    Standing up from a chair using your arms (e.g., wheelchair, bedside chair)? 1  -JW -- 1  -JW    Climbing 3-5 steps with a railing? 1  -JW -- 1  -JW    To walk in hospital room? 1  -JW -- 1  -JW    AM-PAC 6 Clicks Score (PT) 6  -JW -- 6  -JW       How much help from another is currently needed...    Putting on and taking off regular lower body clothing? -- 1  -EN --    Bathing (including washing, rinsing, and drying) -- 1  -EN --    Toileting (which includes using toilet bed pan or urinal) -- 1  -EN --    Putting on and taking off regular upper body clothing -- 1  -EN --    Taking care of personal grooming (such as brushing teeth) -- 1  -EN --    Eating meals -- 1  -EN --    AM-PAC 6 Clicks Score (OT) -- 6  -EN --       Functional Assessment    Outcome Measure Options AM-PAC 6 Clicks Basic Mobility (PT)  -JW -- AM-PAC 6 Clicks Basic Mobility (PT)  -          User Key  (r) = Recorded By, (t) = Taken By, (c) = Cosigned By    Initials Name Provider Type    Isi Wiggins, OTR Occupational Therapist    Rosy Arevalo PT Physical Therapist                 Time Calculation:    PT Charges     Row Name 01/05/22 0830             Time Calculation    Start Time 0830  -      Stop Time 0908  -      Time Calculation (min) 38 min  -JW      PT Received On 01/05/22  -JW      PT - Next Appointment 01/06/22  -              Timed Charges    59404 - PT Therapeutic Exercise Minutes 38  -JW              Total Minutes    Timed Charges Total Minutes 38  -JW       Total Minutes 38  -JW            User Key  (r) = Recorded By, (t) = Taken By, (c) = Cosigned By    Initials Name Provider Type    Rosy Arevalo PT Physical Therapist              Therapy Charges for Today     Code Description Service Date Service Provider Modifiers Qty    10756967960 HC PT THER PROC EA 15 MIN 1/4/2022 Rosy Harmon, PT GP 2    16275788748 HC PT THER PROC EA 15 MIN 1/5/2022 Rosy Harmon, PT GP 3          PT  G-Codes  Outcome Measure Options: AM-PAC 6 Clicks Basic Mobility (PT)  AM-PAC 6 Clicks Score (PT): 6  AM-PAC 6 Clicks Score (OT): 6    Rosy Harmon, PT  1/5/2022

## 2022-01-05 NOTE — PLAN OF CARE
Problem: Adult Inpatient Plan of Care  Goal: Plan of Care Review  Outcome: Ongoing, Progressing  Flowsheets (Taken 1/4/2022 2155)  Progress: improving  Plan of Care Reviewed With: patient  Outcome Summary: o2 decreased to 2L NC. Still coughing up alot of secretions. Sounds better.     Problem: Respiratory Compromise (Pneumonia)  Goal: Effective Oxygenation and Ventilation  Intervention: Optimize Oxygenation and Ventilation  Recent Flowsheet Documentation  Taken 1/4/2022 2002 by Maikel Owens, RRT  Airway/Ventilation Management: pulmonary hygiene promoted     Problem: Device-Related Complication Risk (Mechanical Ventilation, Invasive)  Goal: Optimal Device Function  Intervention: Optimize Device Care and Function  Recent Flowsheet Documentation  Taken 1/4/2022 1939 by Maikel Owens, RRT  Airway Safety Measures:   manual resuscitator/mask/valve in room   suction at bedside   Goal Outcome Evaluation:  Plan of Care Reviewed With: patient        Progress: improving  Outcome Summary: o2 decreased to 2L NC. Still coughing up alot of secretions. Sounds better.

## 2022-01-05 NOTE — PROGRESS NOTES
LOS: 20 days   Patient Care Team:  Farhan Jean Jr., MD as PCP - General (Family Medicine)        Subjective     Interval History:     Patient Complaints:   Patient Denies:  weak       Review of Systems:    weak    Objective     Vital Signs  Temp:  [96.7 °F (35.9 °C)-98.7 °F (37.1 °C)] 98.7 °F (37.1 °C)  Heart Rate:  [] 89  Resp:  [18-24] 20  BP: (101-160)/(69-99) 101/74    Physical Exam:     General Appearance:    Off vent   Head:     Eyes:            Lids and lashes normal, conjunctivae and sclerae normal, no   icterus, no pallor, corneas clear, PERRLA   Ears:    Ears appear intact with no abnormalities noted   Throat:   No oral lesions, no thrush, oral mucosa moist   Neck:   No adenopathy, supple, trachea midline, no thyromegaly, no   carotid bruit, no JVD   Back:     No kyphosis present, no scoliosis present, no skin lesions,      erythema or scars, no tenderness to percussion or                   palpation,   range of motion normal   Lungs:     Clear to auscultation,respirations regular, even and                  unlabored    Heart:    Regular rhythm and normal rate, normal S1 and S2, no            murmur, no gallop, no rub, no click   Chest Wall:    No abnormalities observed   Abdomen:     Normal bowel sounds, no masses, no organomegaly, soft        non-tender, non-distended, no guarding, no rebound                tenderness   Rectal:     Deferred   Extremities:   Moves all extremities well, no edema, no cyanosis, no             redness   Pulses:   Pulses palpable and equal bilaterally   Skin:   No bleeding, bruising or rash   Lymph nodes:   No palpable adenopathy   Neurologic:   Cranial nerves 2 - 12 grossly intact, sensation intact, DTR       present and equal bilaterally          Results Review:      Lab Results (last 72 hours)     Procedure Component Value Units Date/Time    POC Glucose Once [[id number]]  (Abnormal) Collected: 01/02/22 1654    Specimen: Blood Updated: 01/02/22 1701      Glucose 250 mg/dL      Comment: Meter: [id number] : [id number] Issa Cleary RN Float       C-reactive Protein [[id number]]  (Abnormal) Collected: 01/02/22 0408    Specimen: Blood Updated: 01/02/22 1250     C-Reactive Protein 5.09 mg/dL     POC Glucose Once [[id number]]  (Abnormal) Collected: 01/02/22 1110    Specimen: Blood Updated: 01/02/22 1117     Glucose 170 mg/dL      Comment: Meter: [id number] : [id number] Issa Cleary RN Float       Ferritin [[id number]]  (Abnormal) Collected: 01/02/22 0408    Specimen: Blood Updated: 01/02/22 0515     Ferritin 1,581.00 ng/mL     Narrative:      Results may be falsely decreased if patient taking Biotin.      Comprehensive Metabolic Panel [[id number]]  (Abnormal) Collected: 01/02/22 0408    Specimen: Blood Updated: 01/02/22 0503     Glucose 127 mg/dL      BUN 17 mg/dL      Creatinine 0.54 mg/dL      Sodium 139 mmol/L      Potassium 3.9 mmol/L      Chloride 100 mmol/L      CO2 30.8 mmol/L      Calcium 8.8 mg/dL      Total Protein 5.9 g/dL      Albumin 2.40 g/dL      ALT (SGPT) 51 U/L      AST (SGOT) 37 U/L      Alkaline Phosphatase 126 U/L      Total Bilirubin 0.5 mg/dL      eGFR Non African Amer >150 mL/min/1.73      Globulin 3.5 gm/dL      A/G Ratio 0.7 g/dL      BUN/Creatinine Ratio 31.5     Anion Gap 8.2 mmol/L     Narrative:      GFR Normal >60  Chronic Kidney Disease <60  Kidney Failure <15      Lactate Dehydrogenase [[id number]]  (Abnormal) Collected: 01/02/22 0408    Specimen: Blood Updated: 01/02/22 0503      U/L     Procalcitonin [[id number]]  (Normal) Collected: 01/02/22 0408    Specimen: Blood Updated: 01/02/22 0459     Procalcitonin 0.11 ng/mL     D-dimer, Quantitative [[id number]]  (Abnormal) Collected: 01/02/22 0408    Specimen: Blood Updated: 01/02/22 0449     D-Dimer, Quantitative 2.08 MCGFEU/mL     Narrative:      Can be elevated in, but is not diagnostic for deep vein thrombosis (DVT) or pulmonary embolis (PE).  It is also elevated in other medical  conditions.  Clinical correlation is required.  The negative cut-off value for the D-Dimer is 0.50 mcg FEU/mL for DVT and PE.      CBC (No Diff) [[id number]]  (Abnormal) Collected: 01/02/22 0408    Specimen: Blood Updated: 01/02/22 0430     WBC 13.56 10*3/mm3      RBC 3.27 10*6/mm3      Hemoglobin 10.1 g/dL      Hematocrit 31.8 %      MCV 97.2 fL      MCH 30.9 pg      MCHC 31.8 g/dL      RDW 14.5 %      RDW-SD 49.4 fl      MPV 10.7 fL      Platelets 323 10*3/mm3     POC Glucose Once [[id number]]  (Abnormal) Collected: 01/02/22 0023    Specimen: Blood Updated: 01/02/22 0029     Glucose 133 mg/dL      Comment: Meter: [id number] : [id number] Sindhu Seay RN       POC Glucose Once [[id number]]  (Abnormal) Collected: 01/01/22 2144    Specimen: Blood Updated: 01/01/22 2150     Glucose 141 mg/dL      Comment: Meter: [id number] : [id number] Sindhu Seay RN       POC Glucose Once [[id number]]  (Abnormal) Collected: 01/01/22 1737    Specimen: Blood Updated: 01/01/22 1743     Glucose 148 mg/dL      Comment: Meter: [id number] : [id number] Norris Mari RN       C-reactive Protein [[id number]]  (Abnormal) Collected: 01/01/22 0435    Specimen: Blood Updated: 01/01/22 1248     C-Reactive Protein 5.64 mg/dL     POC Glucose Once [[id number]]  (Normal) Collected: 01/01/22 1147    Specimen: Blood Updated: 01/01/22 1155     Glucose 94 mg/dL      Comment: Meter: [id number] : [id number] Norris Mari RN       Respiratory Culture - Sputum, ET Suction [[id number]]  (Abnormal)  (Susceptibility) Collected: 12/29/21 0804    Specimen: Sputum from ET Suction Updated: 01/01/22 0957     Respiratory Culture Heavy growth (4+) Staphylococcus aureus      Heavy growth (4+) Stenotrophomonas maltophilia      Light growth (2+) Citrobacter freundii      No Normal Respiratory Alida     Gram Stain Moderate (3+) WBCs seen      Rare (1+) Epithelial cells seen      Moderate (3+) Gram positive cocci in pairs, chains and clusters    Susceptibility       Staphylococcus aureus     JANUSZ     Clindamycin Susceptible     Inducible Clindamycin Resistance Negative     Oxacillin Susceptible     Tetracycline Susceptible     Trimethoprim + Sulfamethoxazole Susceptible     Vancomycin Susceptible                  Linear View               Susceptibility      Stenotrophomonas maltophilia     JANUSZ     Levofloxacin Susceptible     Trimethoprim + Sulfamethoxazole Susceptible                  Linear View               Susceptibility      Citrobacter freundii     JANUSZ     Cefepime Susceptible     Ceftazidime Resistant     Ceftriaxone Resistant     Gentamicin Susceptible     Levofloxacin Susceptible     Piperacillin + Tazobactam Intermediate     Tetracycline Susceptible     Trimethoprim + Sulfamethoxazole Susceptible                  Linear View               Susceptibility Comments     Staphylococcus aureus    This isolate does not demonstrate inducible clindamycin resistance in vitro.    This isolate does not demonstrate inducible clindamycin resistance in vitro.      Citrobacter freundii    Cefazolin sensitivity will not be reported for Enterobacteriaceae in non-urine isolates. If cefazolin is preferred, please call the microbiology lab to request an E-test.  With the exception of urinary-sourced infections, aminoglycosides should not be used as monotherapy.             Blood Gas, Arterial - [[id number]]  (Abnormal) Collected: 01/01/22 0942    Specimen: Arterial Blood Updated: 01/01/22 0942     Site Left Radial     Triston's Test Positive     pH, Arterial 7.513 pH units      Comment: 83 Value above reference range        pCO2, Arterial 43.4 mm Hg      pO2, Arterial 64.1 mm Hg      Comment: 84 Value below reference range        HCO3, Arterial 34.9 mmol/L      Comment: 83 Value above reference range        Base Excess, Arterial 10.9 mmol/L      Comment: 83 Value above reference range        O2 Saturation, Arterial 93.7 %      Comment: 84 Value below reference range        Hemoglobin, Blood  Gas 9.4 g/dL      Comment: 84 Value below reference range        Temperature 37.0 C      Barometric Pressure for Blood Gas 731 mmHg      Modality Ventilator     FIO2 30 %      Ventilator Mode CPAP     Set Tidal Volume 574     PEEP 5.0     PSV 10.0 cmH2O      Collected by [id number]     Comment: Meter: [id number]     :  [id number]        pCO2, Temperature Corrected 43.4 mm Hg      pH, Temp Corrected 7.513 pH Units      pO2, Temperature Corrected 64.1 mm Hg     POC Glucose Once [[id number]]  (Abnormal) Collected: 01/01/22 0619    Specimen: Blood Updated: 01/01/22 0626     Glucose 172 mg/dL      Comment: Meter: [id number] : [id number] Sindhu Seay RN       Ferritin [[id number]]  (Abnormal) Collected: 01/01/22 0435    Specimen: Blood Updated: 01/01/22 0541     Ferritin 1,386.00 ng/mL     Narrative:      Results may be falsely decreased if patient taking Biotin.      Comprehensive Metabolic Panel [[id number]]  (Abnormal) Collected: 01/01/22 0435    Specimen: Blood Updated: 01/01/22 0530     Glucose 181 mg/dL      BUN 15 mg/dL      Creatinine 0.54 mg/dL      Sodium 139 mmol/L      Potassium 3.8 mmol/L      Chloride 104 mmol/L      CO2 29.8 mmol/L      Calcium 8.5 mg/dL      Total Protein 5.2 g/dL      Albumin 2.00 g/dL      ALT (SGPT) 31 U/L      AST (SGOT) 22 U/L      Alkaline Phosphatase 97 U/L      Total Bilirubin 0.4 mg/dL      eGFR Non African Amer >150 mL/min/1.73      Globulin 3.2 gm/dL      A/G Ratio 0.6 g/dL      BUN/Creatinine Ratio 27.8     Anion Gap 5.2 mmol/L     Narrative:      GFR Normal >60  Chronic Kidney Disease <60  Kidney Failure <15      Procalcitonin [[id number]]  (Normal) Collected: 01/01/22 0435    Specimen: Blood Updated: 01/01/22 0512     Procalcitonin 0.12 ng/mL     CBC (No Diff) [[id number]]  (Abnormal) Collected: 01/01/22 0435    Specimen: Blood Updated: 01/01/22 0448     WBC 10.72 10*3/mm3      RBC 2.82 10*6/mm3      Hemoglobin 8.9 g/dL      Hematocrit 27.8 %      MCV 98.6 fL      MCH  31.6 pg      MCHC 32.0 g/dL      RDW 14.2 %      RDW-SD 49.0 fl      MPV 10.8 fL      Platelets 246 10*3/mm3     POC Glucose Once [[id number]]  (Abnormal) Collected: 12/31/21 2334    Specimen: Blood Updated: 12/31/21 2340     Glucose 196 mg/dL      Comment: Meter: [id number] : [id number] Sindhu Seay RN       POC Glucose Once [[id number]]  (Abnormal) Collected: 12/31/21 2023    Specimen: Blood Updated: 12/31/21 2030     Glucose 226 mg/dL      Comment: Meter: [id number] : [id number] Sindhu Seay RN       POC Glucose Once [[id number]]  (Abnormal) Collected: 12/31/21 1710    Specimen: Blood Updated: 12/31/21 1716     Glucose 202 mg/dL      Comment: Meter: [id number] : MICHAEL Roland RN       C-reactive Protein [[id number]]  (Abnormal) Collected: 12/31/21 0512    Specimen: Blood Updated: 12/31/21 1340     C-Reactive Protein 11.00 mg/dL     POC Glucose Once [[id number]]  (Abnormal) Collected: 12/31/21 1202    Specimen: Blood Updated: 12/31/21 1208     Glucose 194 mg/dL      Comment: Meter: [id number] : [id number] Dejon Johnson RN       Ferritin [[id number]]  (Abnormal) Collected: 12/31/21 0512    Specimen: Blood Updated: 12/31/21 0640     Ferritin 1,551.00 ng/mL     Narrative:      Results may be falsely decreased if patient taking Biotin.      Lactate Dehydrogenase [[id number]]  (Abnormal) Collected: 12/31/21 0512    Specimen: Blood Updated: 12/31/21 0634      U/L     Procalcitonin [[id number]]  (Normal) Collected: 12/31/21 0512    Specimen: Blood Updated: 12/31/21 0623     Procalcitonin 0.17 ng/mL     POC Glucose Once [[id number]]  (Abnormal) Collected: 12/31/21 0609    Specimen: Blood Updated: 12/31/21 0616     Glucose 183 mg/dL      Comment: Meter: [id number] : [id number] Sindhu Seay RN       D-dimer, Quantitative [[id number]]  (Abnormal) Collected: 12/31/21 0512    Specimen: Blood Updated: 12/31/21 0609     D-Dimer, Quantitative 1.43 MCGFEU/mL     Narrative:      Can be elevated in,  but is not diagnostic for deep vein thrombosis (DVT) or pulmonary embolis (PE).  It is also elevated in other medical conditions.  Clinical correlation is required.  The negative cut-off value for the D-Dimer is 0.50 mcg FEU/mL for DVT and PE.      Blood Gas, Arterial - [[id number]]  (Abnormal) Collected: 12/31/21 0520    Specimen: Arterial Blood Updated: 12/31/21 0526     Site Left Radial     Triston's Test Positive     pH, Arterial 7.480 pH units      Comment: 83 Value above reference range        pCO2, Arterial 44.1 mm Hg      pO2, Arterial 72.2 mm Hg      Comment: 84 Value below reference range        HCO3, Arterial 32.8 mmol/L      Comment: 83 Value above reference range        Base Excess, Arterial 8.4 mmol/L      Comment: 83 Value above reference range        O2 Saturation, Arterial 96.4 %      Hemoglobin, Blood Gas 8.5 g/dL      Comment: 84 Value below reference range        Temperature 37.0 C      Barometric Pressure for Blood Gas 734 mmHg      Modality Ventilator     FIO2 35 %      Ventilator Mode AC     Set Mech Resp Rate 16.0     PEEP 5.0     PIP 14 cmH2O      Comment: Meter: [id number]     :  [id number]        Collected by [id number]     pCO2, Temperature Corrected 44.1 mm Hg      pH, Temp Corrected 7.480 pH Units      pO2, Temperature Corrected 72.2 mm Hg     POC Glucose Once [[id number]]  (Abnormal) Collected: 12/31/21 0007    Specimen: Blood Updated: 12/31/21 0013     Glucose 178 mg/dL      Comment: Meter: [id number] : [id number] Seay RN       POC Glucose Once [[id number]]  (Abnormal) Collected: 12/30/21 2104    Specimen: Blood Updated: 12/30/21 2110     Glucose 184 mg/dL      Comment: Meter: [id number] : [id number] Seay RN             Imaging Results (Last 72 Hours)     Procedure Component Value Units Date/Time    XR Abdomen KUB [[id number]] Collected: 01/05/22 0028     Updated: 01/05/22 0030    Narrative:      CR Abdomen 1 Vw    INDICATION:   Dobbhoff tube  placement    COMPARISON:   None available    FINDINGS:  AP radiograph(s) of the abdomen. The renal shadows are symmetric. No renal calculi. No bladder calculi.    The bowel gas pattern is nonobstructive. No acute osseous abnormalities. Dobbhoff tube is coiled in the stomach      Impression:      Dobbhoff tube is coiled in the stomach    Signer Name: Van Carpenter MD   Signed: 1/5/2022 12:28 AM   Workstation Name: [facility]    Radiology [facility]    XR Abdomen KUB [[id number]] Collected: 01/04/22 2253     Updated: 01/04/22 2255    Narrative:      CR Abdomen 1 Vw    INDICATION:   Dobbhoff tube placement    COMPARISON:   12/27/2021    FINDINGS:  AP radiograph(s) of the abdomen. The renal shadows are symmetric. No renal calculi. No bladder calculi.    The bowel gas pattern is nonobstructive. No acute osseous abnormalities. The Dobbhoff tube tip is in the body the stomach      Impression:      Dobbhoff tube is in good position    Signer Name: Van Carpenter MD   Signed: 1/4/2022 10:53 PM   [facility]    [facility]    XR Chest 1 View [[id number]] Collected: 01/04/22 2200     Updated: 01/04/22 2203    Narrative:      CR Chest 1 Vw    INDICATION:   Dobbhoff tube placement.     COMPARISON:    Chest 1/1/2022    FINDINGS:  Portable AP view(s) of the chest.    Weighted enteric feeding tube tip projects over the distal esophagus. Advancement recommended. Interval removal of the endotracheal tube. Left PICC line is again noted to be directed cephalad in the mid SVC. No pneumothorax. No change in patchy bilateral  pulmonary opacities. Heart size is stable.        Impression:        1. Weighted enteric feeding tube tip projects over the distal esophagus. Advancement recommended.  2. Left PICC line tip is again noted to be directed cephalad in the mid SVC.  3. No pneumothorax.  4. Removal of the endotracheal tube.  5. Persistent patchy bilateral pulmonary opacities.    Signer  "Name: Saran Roach MD   Signed: 1/4/2022 10:00 PM   [facility]    [facility]            Medication Review:     Hospital Medications (active)       Dose Frequency Start End    acetaminophen (TYLENOL) tablet 650 mg 650 mg Every 6 Hours PRN 12/22/2021     Admin Instructions: Do not exceed 4 grams of acetaminophen in a 24 hr period. Max dose of 2gm for AST/ALT greater than 120 units/L      If given for pain, use the following pain scale:   Mild Pain = Pain Score of 1-3, CPOT 1-2  Moderate Pain = Pain Score of 4-6, CPOT 3-4  Severe Pain = Pain Score of 7-10, CPOT 5-8    Route: Oral    apixaban (ELIQUIS) tablet 10 mg 10 mg Every 12 Hours Scheduled 12/29/2021 1/5/2022    Admin Instructions: Tablet may be crushed and suspended in 60 mL of water or D5W and immediately delivered via NG tube.  Avoid grapefruit juice.    Route: Nasogastric    Linked Group 1: \"Followed by\" Linked Group Details        apixaban (ELIQUIS) tablet 5 mg 5 mg Every 12 Hours Scheduled 1/5/2022     Admin Instructions: Tablet may be crushed and suspended in 60 mL of water or D5W and immediately delivered via NG tube.  Avoid grapefruit juice.    Route: Oral    Linked Group 1: \"Followed by\" Linked Group Details        bisacodyl (DULCOLAX) suppository 10 mg 10 mg Daily 12/27/2021     Route: Rectal    chlorhexidine (PERIDEX) 0.12 % solution 15 mL 15 mL Every 12 Hours Scheduled 12/27/2021     Admin Instructions:  Do not swallow.    Route: Mouth/Throat    dexamethasone (DECADRON) injection 6 mg 6 mg Daily With Breakfast 12/25/2021     Admin Instructions: Switch to IV if unable to take medication by mouth  For IV administration.  May be pushed over a minimum of 1 minute.    Route: Intravenous    Linked Group 2: \"Or\" Linked Group Details        dexamethasone (DECADRON) tablet 6 mg 6 mg Daily With Breakfast 12/25/2021     Admin Instructions: Switch to IV if unable to take medication by mouth  Take with food.    Route: " "Oral    Linked Group 2: \"Or\" Linked Group Details        dexmedetomidine (PRECEDEX) 400 mcg in 100 mL NS infusion 0.2-1.5 mcg/kg/hr × 115 kg Titrated 12/17/2021     Admin Instructions: Begin infusion at 0.2 mcg/kg/hr and titrate by 0.1 mcg/kg/hr every 15 minutes to maintain RASS goal.  Maximum rate 1.5 mcg/kg/hr.  Notify MD if not at RASS goal and requiring 1.5 mcg/kg/hr or heart rate is less than 50 beats per minute or MAP is less than 60 mmHg.    Route: Intravenous    dextromethorphan polistirex ER (DELSYM) 30 MG/5ML oral suspension 60 mg 60 mg Every 12 Hours PRN 12/16/2021     Route: Oral    dextrose (D50W) (25 g/50 mL) IV injection 25 g 25 g Every 15 Minutes PRN 12/19/2021     Admin Instructions: Blood sugar less than 70; patient has IV access - Unresponsive, NPO or Unable To Safely Swallow    Route: Intravenous    dextrose (GLUTOSE) oral gel 15 g 15 g Every 15 Minutes PRN 12/19/2021     Admin Instructions: BS<70, Patient Alert, Is not NPO, Can safely swallow.    Route: Oral    dilTIAZem (CARDIZEM) tablet 60 mg 60 mg Every 8 Hours Scheduled 12/24/2021     Admin Instructions: HOLD FOR HR <60 and/or SBP <,= 90mmHg  Caution: Look alike/sound alike drug alert.  Take on empty stomach 1 hr before or 2 hrs after meals. Avoid grapefruit juice. Maximum simvastatin dose 10 mg.    Route: Nasogastric    famotidine (PEPCID) injection 20 mg 20 mg Daily 12/27/2021     Admin Instructions: Dilute to 10 mL total volume and give IV push over 2 minutes.    Route: Intravenous    fentaNYL citrate (PF) (SUBLIMAZE) injection 25 mcg 25 mcg Every 1 Hour PRN 12/27/2021 1/3/2022    Admin Instructions: Use filter needle to withdraw dose from ampule.  If given for pain, use the following pain scale:  Mild Pain = Pain Score of 1-3, CPOT 1-2  Moderate Pain = Pain Score of 4-6, CPOT 3-4  Severe Pain = Pain Score of 7-10, CPOT 5-8    Route: Intravenous    glucagon (GLUCAGEN) injection 1 mg 1 mg Every 15 Minutes PRN 12/19/2021     Admin " Instructions: Blood Glucose Less Than 70 - Patient Without IV Access - Unresponsive, NPO or Unable To Safely Swallow    Route: Subcutaneous    insulin aspart (novoLOG) injection 0-24 Units 0-24 Units Every 6 Hours 12/21/2021     Admin Instructions: Correction - High Dose.  Greater than 80 units/day total insulin dose or, on steroids, insulin resistant, infection.    Blood glucose 150-199 mg/dL - 4 units  Blood glucose 200-249 mg/dL - 8 units  Blood glucose 250-299 mg/dL - 12 units  Blood glucose 300-349 mg/dL - 16 units  Blood glucose 350-400 mg/dL - 20 units  Blood glucose greater than 400 mg/dL - 24 units and call provider      Route: Subcutaneous    insulin detemir (LEVEMIR) injection 23 Units 23 Units Every 12 Hours Scheduled 12/31/2021     Admin Instructions:     Route: Subcutaneous    ipratropium-albuterol (DUO-NEB) nebulizer solution 3 mL 3 mL Every 6 Hours PRN 12/16/2021     Route: Nebulization    levoFLOXacin (LEVAQUIN) 750 mg/150 mL D5W (premix) (LEVAQUIN) 750 mg 750 mg Every 24 Hours 1/1/2022 1/8/2022    Admin Instructions: Caution: Look alike/sound alike drug alert. Protect from light. Do NOT refrigerate.    Route: Intravenous    polyethylene glycol (MIRALAX) packet 17 g 17 g Daily 12/26/2021     Admin Instructions: Use 4-8 ounces of water, tea, or juice for each 17 gram dose.    Route: Oral    potassium chloride 10 mEq in 100 mL IVPB 10 mEq Every 1 Hour PRN 12/17/2021     Admin Instructions: Peripheral IV  Potassium 3.1 or Less Give KCl 10 mEq/100 mL NS IV q1h x6 Doses  Potassium 3.2 - 3.6 Give KCl 10 mEq/100 mL NS q1h x4 Doses    Check Potassium 4 Hours After Last Dose Given  Check Magnesium if Potassium Remains Low After Replacement  DO NOT GIVE if CrCl is Less Than 30 mL/minute or Urine Output Less Than 30 mL/hr.     Rates Greater Than 10 mEq/hr Require ECG Monitoring.  OUTPATIENT/NON-MONITORED UNITS: Potassium Chloride standard bolus infusion rate is a maximum of 10 mEq/hr on unmonitored  "patients    MONITORED UNITS: Potassium Chloride standard bolus infusion rate is a maximum of 20 mEq/hr on ECG monitored patients ONLY      Route: Intravenous    potassium phosphate 15 mmol in sodium chloride 0.9 % 100 mL infusion 15 mmol As Needed 12/17/2021     Admin Instructions: Recheck Phosphorus level 6 hours after replacement complete.  Refrigerate.      Doses listed as mmol of phosphate.   4.4 mEq of Potassium = 3 mmol of Phosphate    Route: Intravenous    Linked Group 3: \"Or\" Linked Group Details        potassium phosphate 30 mmol in sodium chloride 0.9 % 250 mL infusion 30 mmol As Needed 12/17/2021     Admin Instructions: Recheck Phosphorus level 6 hours after replacement complete.  Refrigerate.      Doses listed as mmol of phosphate.   4.4 mEq of Potassium = 3 mmol of Phosphate    Route: Intravenous    Linked Group 3: \"Or\" Linked Group Details        potassium phosphate 45 mmol in sodium chloride 0.9 % 500 mL infusion 45 mmol As Needed 12/17/2021     Admin Instructions: Recheck Phosphorus level 6 hours after replacement complete.  Refrigerate.      Doses listed as mmol of phosphate.   4.4 mEq of Potassium = 3 mmol of Phosphate    Route: Intravenous    Linked Group 3: \"Or\" Linked Group Details        propofol (DIPRIVAN) infusion 10 mg/mL 100 mL 5-50 mcg/kg/min × 115 kg Titrated 12/18/2021     Admin Instructions: Do not administer through the same IV catheter with blood or plasma.  Tubing and any unused portions of propofol vials should be discarded after 12 hours.  Begin infusion at 5 mcg/kg/min and titrate by by 5 mcg/kg/min every 5 minutes to maintain RASS goal. Maximum rate 50 mcg/kg/min.  Notify MD if not at goal and requiring more than 50 mcg/kg/min. Infuse into dedicated line, change vial and tube every 12 hours. Patient must be intubated.    Route: Intravenous    sodium chloride 0.9 % flush 10 mL 10 mL As Needed 12/16/2021     Route: Intravenous    Linked Group 4: \"And\" Linked Group Details        " "sodium chloride 0.9 % flush 10 mL 10 mL As Needed 12/21/2021     Route: Intravenous    sodium chloride 0.9 % flush 10 mL 10 mL Every 12 Hours Scheduled 12/29/2021     Admin Instructions: Lumen #1    Route: Intravenous    sodium chloride 0.9 % flush 10 mL 10 mL Every 12 Hours Scheduled 12/29/2021     Admin Instructions: Lumen #2    Route: Intravenous    sodium chloride 0.9 % flush 10 mL 10 mL Every 12 Hours Scheduled 12/29/2021     Admin Instructions: Lumen #3    Route: Intravenous    sodium chloride 0.9 % flush 10 mL 10 mL As Needed 12/29/2021     Route: Intravenous    sodium chloride 0.9 % flush 20 mL 20 mL As Needed 12/21/2021     Route: Intravenous    sodium chloride 0.9 % flush 20 mL 20 mL As Needed 12/29/2021     Route: Intravenous    sodium phosphates 15 mmol in sodium chloride 0.9 % 250 mL IVPB 15 mmol As Needed 12/17/2021     Admin Instructions: Recheck Phosphorus level 6 hours after replacement complete.    Route: Intravenous    Linked Group 3: \"Or\" Linked Group Details        sodium phosphates 30 mmol in sodium chloride 0.9 % 250 mL IVPB 30 mmol As Needed 12/17/2021     Admin Instructions: Recheck Phosphorus level 6 hours after replacement complete.    Route: Intravenous    Linked Group 3: \"Or\" Linked Group Details        sodium phosphates 45 mmol in sodium chloride 0.9 % 500 mL IVPB 45 mmol As Needed 12/17/2021     Admin Instructions: Recheck Phosphorus level 6 hours after replacement complete.    Route: Intravenous    Linked Group 3: \"Or\" Linked Group Details            apixaban, 10 mg, Nasogastric, Q12H   Followed by  apixaban, 5 mg, Oral, Q12H  Aquaphor Advanced Therapy, 1 application, Topical, Q12H  bisacodyl, 10 mg, Rectal, Daily  chlorhexidine, 15 mL, Mouth/Throat, Q12H  dexamethasone, 6 mg, Oral, Daily With Breakfast   Or  dexamethasone, 6 mg, Intravenous, Daily With Breakfast  dilTIAZem, 60 mg, Nasogastric, Q8H  famotidine, 20 mg, Intravenous, Daily  insulin aspart, 0-24 Units, Subcutaneous, " Q6H  insulin detemir, 23 Units, Subcutaneous, Q12H  levoFLOXacin, 750 mg, Intravenous, Q24H  polyethylene glycol, 17 g, Oral, Daily  sodium chloride, 10 mL, Intravenous, Q12H  sodium chloride, 10 mL, Intravenous, Q12H  sodium chloride, 10 mL, Intravenous, Q12H        Assessment/Plan         Acute hypoxemic respiratory failure due to COVID-19 (HCC)    Acute respiratory failure with hypoxia (HCC)    1. Acute hypoxic respiratory failure s/p mechanical ventilation 12/18   2. Ac COVID-19 pneumonia  3. ARDS  4. MSSA / Klebsiella pneumonia  5. Pneumomediastinum  6. Shock  7. Cytokine release syndrome s/p remdesivir  8. Extensive left leg DVT on AC  9. A. fib RVR  10. Uncontrolled diabetes  11. Obesity  12. Mild hepatitis; Covid related  13. Fever        C&S  noted     Plan :        IV levaquine   Change to PO soon  supp care  Will follow  Thank you              William Osborne MD  01/05/22  12:33 EST

## 2022-01-05 NOTE — THERAPY TREATMENT NOTE
Acute Care - Occupational Therapy Treatment Note  ARELIS Flaherty     Patient Name: Sergio Daniel  : 1955  MRN: [id number]  Today's Date: 2022             Admit Date: 2021       ICD-10-CM ICD-9-CM   1. Acute respiratory failure with hypoxia (HCC)  J96.01 518.81   2. Pneumonia due to COVID-19 virus  U07.1 480.8    J12.82 079.89   3. Paroxysmal atrial fibrillation (HCC)  I48.0 427.31   4. Pharyngeal dysphagia  R13.13 787.23     Patient Active Problem List   Diagnosis   • Acute respiratory failure with hypoxia (HCC)   • Acute hypoxemic respiratory failure due to COVID-19 (HCC)     Past Medical History:   Diagnosis Date   • Diabetes mellitus (HCC)      History reviewed. No pertinent surgical history.      OT ASSESSMENT FLOWSHEET (last 12 hours)     OT Evaluation and Treatment     Row Name 22 0831          Subjective Information complains of; fatigue  -EN    Document Type therapy note (daily note)  -EN    Mode of Treatment occupational therapy  -EN    Patient Effort good  -EN    Symptoms Noted During/After Treatment fatigue  -EN               Patient/Family/Caregiver Comments/Observations Patient reclined in bed, agreed to OT  -EN    Risks Reviewed patient:; LOB  -EN    Benefits Reviewed patient:; improve function; increase independence; increase strength; increase balance  -EN    Barriers to Rehab medically complex; cognitive status  -EN               Personal Safety Interventions nonskid shoes/slippers when out of bed  -EN               Pretreatment Pain Rating 0/10 - no pain  -EN    Posttreatment Pain Rating 0/10 - no pain  -EN               Bed Mobility supine-sit; sit-supine; rolling left  -EN    Rolling Left [place] (Bed Mobility) maximum assist (25% patient effort); 2 person assist; verbal cues  -EN    Supine-Sit [place] (Bed Mobility) maximum assist (25% patient effort); other (see comments)  3 person assist  -EN    Sit-Supine [place] (Bed Mobility) maximum assist (25% patient  effort); other (see comments)  3 person assist  -EN    Bed Mobility, Safety Issues cognitive deficits limit understanding; decreased use of arms for pushing/pulling; decreased use of legs for bridging/pushing; impaired trunk control for bed mobility  -EN    Assistive Device (Bed Mobility) bed rails; draw sheet  -EN               Comment (Transfers) deferred due to weakness  -EN               Therapeutic Exercise shoulder; elbow/forearm; hand  -EN               Shoulder (Therapeutic Exercise) AAROM (active assistive range of motion); AROM (active range of motion)  -EN    Shoulder AROM (Therapeutic Exercise) right; flexion; 5 repetitions  -EN    Shoulder AAROM (Therapeutic Exercise) bilateral; flexion; extension  -EN               Elbow/Forearm (Therapeutic Exercise) AROM (active range of motion)  -EN    Elbow/Forearm AROM (Therapeutic Exercise) bilateral; flexion; extension; 10 repetitions  -EN               Hand (Therapeutic Exercise) AROM (active range of motion)  -EN    Hand AROM/AAROM (Therapeutic Exercise) bilateral; AROM (active range of motion); 10 repetitions  -EN               Static Sitting Balance severe impairment; sitting, edge of bed  -EN    Comment, Balance Patient sat EOB 4 minutes with mod/max A X 4 minutes. Continues to demonstrate posterior lean  -EN               Wound - Properties Group    Retired Wound - Properties Group               Plan of Care Reviewed With patient  -EN    Outcome Summary OT- Patient continues to demonstrate confusion and weak voice quality. Patient required max A X 3 for bed mobility and sat EOB up to 4 minutes with mod/max (posterior lean). Patient participated in UE ROM exercises X 10. Continue to recommend short term rehab at discharge.  -EN               Pre SpO2 (%) 92  -EN    O2 Delivery Pre Treatment supplemental O2  -EN    Intra SpO2 (%) 90  -EN    O2 Delivery Intra Treatment supplemental O2  -EN    Post SpO2 (%) 93  -EN    O2 Delivery Post Treatment supplemental O2   -EN    Pre Patient Position Supine  -EN    Intra Patient Position Sitting  -EN    Post Patient Position Supine  -EN               Pre-Treatment Position in bed  -EN    Post Treatment Position bed  -EN    In Bed notified nsg; supine; call light within reach; encouraged to call for assist  -EN               Progress Toward Functional Goals (OT) progress toward functional goals as expected  -EN    Daily Progress Summary (OT) improving  -EN          User Key  (r) = Recorded By, (t) = Taken By, (c) = Cosigned By    Initials Name Effective Dates    Isi Wiggins, OTR 06/16/21 -     LC Chitra Hale RN, CWON 07/26/21 -                  Occupational Therapy Education                 Title: PT OT SLP Therapies (Done)     Topic: Occupational Therapy (Done)     Point: ADL training (Done)     Description:   Instruct learner(s) on proper safety adaptation and remediation techniques during self care or transfers.   Instruct in proper use of assistive devices.              Learning Progress Summary           Patient Acceptance, E, VU by EN at 1/5/2022 1115    Comment: UE HEP, benefits of activity    Acceptance, E, VU by EN at 1/5/2022 1026    Comment: Patient educated on UE HEP, benefits of activity.    Acceptance, E, VU by EN at 1/4/2022 0959    Comment: transfer safety, UE HEP    Acceptance, E, VU by EN at 1/3/2022 1352    Comment: Educated on UE HEP                   Point: Home exercise program (Done)     Description:   Instruct learner(s) on appropriate technique for monitoring, assisting and/or progressing therapeutic exercises/activities.              Learning Progress Summary           Patient Acceptance, E, VU by EN at 1/5/2022 1115    Comment: UE HEP, benefits of activity    Acceptance, E, VU by EN at 1/5/2022 1026    Comment: Patient educated on UE HEP, benefits of activity.    Acceptance, E, VU by EN at 1/4/2022 0959    Comment: transfer safety, UE HEP    Acceptance, E, VU by EN at 1/3/2022 1352     Comment: Educated on UE HEP                               User Key     Initials Effective Dates Name Provider Type Discipline    EN 06/16/21 -  Isi Theodore OTR Occupational Therapist OT                  OT Recommendation and Plan  Planned Therapy Interventions (OT): BADL retraining, functional balance retraining, passive ROM/stretching, ROM/therapeutic exercise, strengthening exercise, transfer/mobility retraining  Therapy Frequency (OT): 5 times/wk  Progress Toward Functional Goals (OT): progress toward functional goals as expected  Plan of Care Review  Plan of Care Reviewed With: patient  Outcome Summary: OT- Patient continues to demonstrate confusion and weak voice quality. Patient required max A X 3 for bed mobility and sat EOB up to 4 minutes with mod/max (posterior lean). Patient participated in UE ROM exercises X 10. Continue to recommend short term rehab at discharge.  Plan of Care Reviewed With: patient  Outcome Summary: OT- Patient continues to demonstrate confusion and weak voice quality. Patient required max A X 3 for bed mobility and sat EOB up to 4 minutes with mod/max (posterior lean). Patient participated in UE ROM exercises X 10. Continue to recommend short term rehab at discharge.     Outcome Measures     Row Name 01/05/22 1100 01/05/22 0830 01/04/22 1000       How much help from another person do you currently need...    Turning from your back to your side while in flat bed without using bedrails? -- 1  -JW --    Moving from lying on back to sitting on the side of a flat bed without bedrails? -- 1  -JW --    Moving to and from a bed to a chair (including a wheelchair)? -- 1  -JW --    Standing up from a chair using your arms (e.g., wheelchair, bedside chair)? -- 1  -JW --    Climbing 3-5 steps with a railing? -- 1  -JW --    To walk in hospital room? -- 1  -JW --    AM-PAC 6 Clicks Score (PT) -- 6  -JW --       How much help from another is currently needed...    Putting on and taking  off regular lower body clothing? 1  -EN -- 1  -EN    Bathing (including washing, rinsing, and drying) 1  -EN -- 1  -EN    Toileting (which includes using toilet bed pan or urinal) 1  -EN -- 1  -EN    Putting on and taking off regular upper body clothing 1  -EN -- 1  -EN    Taking care of personal grooming (such as brushing teeth) 2  -EN -- 1  -EN    Eating meals 1  -EN -- 1  -EN    AM-PAC 6 Clicks Score (OT) 7  -EN -- 6  -EN       Functional Assessment    Outcome Measure Options -- AM-PAC 6 Clicks Basic Mobility (PT)  - --    Row Name 01/04/22 0830             How much help from another person do you currently need...    Turning from your back to your side while in flat bed without using bedrails? 1  -JW      Moving from lying on back to sitting on the side of a flat bed without bedrails? 1  -JW      Moving to and from a bed to a chair (including a wheelchair)? 1  -JW      Standing up from a chair using your arms (e.g., wheelchair, bedside chair)? 1  -JW      Climbing 3-5 steps with a railing? 1  -JW      To walk in hospital room? 1  -JW      AM-PAC 6 Clicks Score (PT) 6  -              Functional Assessment    [facility]  -            User Key  (r) = Recorded By, (t) = Taken By, (c) = Cosigned By    Initials Name Provider Type    Isi Wiggins OTR Occupational Therapist    Rosy Arevalo, PT Physical Therapist                Time Calculation:    Time Calculation- OT     Row Name 01/05/22 1119 01/05/22 1115          Time Calculation- OT    OT Start Time 0830  -EN 0830  -SD     OT Stop Time 0908  -EN 0854  theraputic support for bed mobility/sitting balance activity  -SD     OT Time Calculation (min) 38 min  -EN 24 min  -SD            Timed Charges    96821 - OT Therapeutic Activity Minutes 38  -EN --            Total Minutes    Timed Charges Total Minutes 38  -EN --      Total Minutes 38  -EN --           User Key  (r) = Recorded By, (t) = Taken  By, (c) = Cosigned By    Initials Name Provider Type    EN Isi Theodore OTCORY Occupational Therapist    Feliciano Marcial OTCORY Occupational Therapist              Therapy Charges for Today     Code Description Service Date Service Provider Modifiers Qty    16322068682 HC OT THERAPEUTIC ACT EA 15 MIN 1/4/2022 Isi Theodore OTR GO 2    72336250030 HC OT THERAPEUTIC ACT EA 15 MIN 1/5/2022 Isi Theodore OTR GO 3               LINDA Pepe  1/5/2022

## 2022-01-05 NOTE — PLAN OF CARE
Goal Outcome Evaluation:  Plan of Care Reviewed With: patient           Outcome Summary: OT- Patient continues to demonstrate confusion and weak voice quality. Patient required max A X 3 for bed mobility and sat EOB up to 4 minutes with mod/max assist (posterior lean). Patient participated in UE ROM exercises X 10. Continue to recommend short term rehab at discharge.

## 2022-01-05 NOTE — PLAN OF CARE
Goal Outcome Evaluation:  Plan of Care Reviewed With: patient           Outcome Summary: PT: Patient performs supine to sit with assist x3.  Patient able to tolerate sitting EOB x4 minutes with mod/max assist.  Patient with increased posterior and left lateral lean.  Patient continues to fatigue with minimal activity.  Continue to recommend SNF at discharge.

## 2022-01-05 NOTE — PROGRESS NOTES
"Hospitalist Team      Patient Care Team:  Farhan Jean Jr., MD as PCP - General (Family Medicine)      Chief Complaint:  Follow-up Acute Hypoxic Respiratory Failure due COVID-19    Subjective    Weak but oxygen requirement continues to decrease.  Breathing appears stable.  Asking about diet today.    Objective    Vital Signs  Temp:  [96.7 °F (35.9 °C)-98.7 °F (37.1 °C)] 98.7 °F (37.1 °C)  Heart Rate:  [] 89  Resp:  [18-24] 20  BP: (101-160)/(69-99) 101/74  Oxygen Therapy  SpO2: 94 %  Pulse Oximetry Type: Continuous  Device (Oxygen Therapy): ventilator  Device (Oxygen Therapy): nasal cannula, humidified  $ High Flow Nasal Cannula Set-Up: yes  Flow (L/min): 2  Oxygen Concentration (%): 35  ETCO2 (mmHg): 35 mmHg  Probe Placed On (Pulse Ox): Left:, finger  Probe Removed From (Pulse Ox): Left:, forehead}  Flowsheet Rows      First Filed Value   Admission Height 185.4 cm (73\") Documented at 12/16/2021 1515   Admission Weight 120 kg (265 lb) Documented at 12/16/2021 1515          Physical Exam:  Vitals reviewed  General: Acute distress  HEENT:  Tube in place  Lungs: Manage breath sounds in all fields  CV: Rate stable  Abdomen: Soft with active bowel sounds.  MSK: Moves extremities spontaneously  Skin: Warm dry  Psych:  Following commands    Results Review:     I reviewed the patient's new clinical results.    Lab Results (last 24 hours)     Procedure Component Value Units Date/Time    C-reactive Protein [[id number]]  (Abnormal) Collected: 01/05/22 0420    Specimen: Blood Updated: 01/05/22 1053     C-Reactive Protein 5.29 mg/dL     POC Glucose Once [[id number]]  (Abnormal) Collected: 01/05/22 0539    Specimen: Blood Updated: 01/05/22 0546     Glucose 148 mg/dL      Comment: Meter: [id number] : [id number] Raheem Olivares RN Hse Mgr       Procalcitonin [[id number]]  (Normal) Collected: 01/05/22 0420    Specimen: Blood Updated: 01/05/22 0520     Procalcitonin 0.10 ng/mL     CBC (No Diff) [[id number]]  (Abnormal) " Collected: 01/05/22 0420    Specimen: Blood Updated: 01/05/22 0453     WBC 12.88 10*3/mm3      RBC 3.39 10*6/mm3      Hemoglobin 10.6 g/dL      Hematocrit 33.2 %      MCV 97.9 fL      MCH 31.3 pg      MCHC 31.9 g/dL      RDW 14.6 %      RDW-SD 51.6 fl      MPV 10.1 fL      Platelets 300 10*3/mm3     POC Glucose Once [[id number]]  (Normal) Collected: 01/04/22 2351    Specimen: Blood Updated: 01/04/22 2357     Glucose 114 mg/dL      Comment: Meter: [id number] : [id number] Raheem Olivares RN Hse Mgr       POC Glucose Once [[id number]]  (Abnormal) Collected: 01/04/22 1843    Specimen: Blood Updated: 01/04/22 1850     Glucose 187 mg/dL      Comment: Meter: [id number] : [id number] Dejon Johnson RN       POC Glucose Once [[id number]]  (Abnormal) Collected: 01/04/22 1244    Specimen: Blood Updated: 01/04/22 1251     Glucose 200 mg/dL      Comment: Meter: [id number] : [id number] Dejon Johnson RN       C-reactive Protein [[id number]]  (Abnormal) Collected: 01/04/22 0437    Specimen: Blood Updated: 01/04/22 1142     C-Reactive Protein 5.36 mg/dL           Imaging Results (Last 24 Hours)     Procedure Component Value Units Date/Time    XR Abdomen KUB [[id number]] Collected: 01/05/22 0028     Updated: 01/05/22 0030    Narrative:      CR Abdomen 1 Vw    INDICATION:   Dobbhoff tube placement    COMPARISON:   None available    FINDINGS:  AP radiograph(s) of the abdomen. The renal shadows are symmetric. No renal calculi. No bladder calculi.    The bowel gas pattern is nonobstructive. No acute osseous abnormalities. Dobbhoff tube is coiled in the stomach      Impression:      Dobbhoff tube is coiled in the stomach    Signer Name: Van Carpenter MD   Signed: 1/5/2022 12:28 AM   Workstation Name: [facility]    [facility]    XR Abdomen KUB [[id number]] Collected: 01/04/22 2253     Updated: 01/04/22 2257    Narrative:      CR Abdomen 1 Vw    INDICATION:   Dobbhoff tube placement    COMPARISON:    12/27/2021    FINDINGS:  AP radiograph(s) of the abdomen. The renal shadows are symmetric. No renal calculi. No bladder calculi.    The bowel gas pattern is nonobstructive. No acute osseous abnormalities. The Dobbhoff tube tip is in the body the stomach      Impression:      Dobbhoff tube is in good position    Signer Name: Van Carpenter MD   Signed: 1/4/2022 10:53 PM   [facility]: [facility]    [facility]    XR Chest 1 View [[id number]] Collected: 01/04/22 2200     Updated: 01/04/22 2203    Narrative:      CR Chest 1 Vw    INDICATION:   Dobbhoff tube placement.     COMPARISON:    Chest 1/1/2022    FINDINGS:  Portable AP view(s) of the chest.    Weighted enteric feeding tube tip projects over the distal esophagus. Advancement recommended. Interval removal of the endotracheal tube. Left PICC line is again noted to be directed cephalad in the mid SVC. No pneumothorax. No change in patchy bilateral  pulmonary opacities. Heart size is stable.        Impression:        1. Weighted enteric feeding tube tip projects over the distal esophagus. Advancement recommended.  2. Left PICC line tip is again noted to be directed cephalad in the mid SVC.  3. No pneumothorax.  4. Removal of the endotracheal tube.  5. Persistent patchy bilateral pulmonary opacities.    Signer Name: Saran Roach MD   Signed: 1/4/2022 10:00 PM   [facility]: [facility]    [facility]          Xray reviewed personally by physician.      Medication Review:   I have reviewed the patient's current medication list    Current Facility-Administered Medications:   •  acetaminophen (TYLENOL) tablet 650 mg, 650 mg, Oral, Q6H PRN, Jayden Hyatt, DO, 650 mg at 12/26/21 1153  •  apixaban (ELIQUIS) tablet 10 mg, 10 mg, Nasogastric, Q12H, 10 mg at 01/04/22 2330 **FOLLOWED BY** apixaban (ELIQUIS) tablet 5 mg, 5 mg, Oral, Q12H, Dakota Moore MD  •  Aquaphor Advanced Therapy ointment 1 application, 1  application, Topical, Q12H, Dakota Moore MD  •  bisacodyl (DULCOLAX) suppository 10 mg, 10 mg, Rectal, Daily, Dakota Moore MD, 10 mg at 01/05/22 0900  •  chlorhexidine (PERIDEX) 0.12 % solution 15 mL, 15 mL, Mouth/Throat, Q12H, Spencer Carmichael MD, 15 mL at 01/05/22 0900  •  dexamethasone (DECADRON) tablet 6 mg, 6 mg, Oral, Daily With Breakfast **OR** dexamethasone (DECADRON) injection 6 mg, 6 mg, Intravenous, Daily With Breakfast, Charli Ayala MD, 6 mg at 01/05/22 0900  •  dextromethorphan polistirex ER (DELSYM) 30 MG/5ML oral suspension 60 mg, 60 mg, Oral, Q12H PRN, Jaleesa Cárdenas MD, 60 mg at 01/02/22 2306  •  dextrose (D50W) (25 g/50 mL) IV injection 25 g, 25 g, Intravenous, Q15 Min PRN, Dakota Moore MD, 25 g at 12/29/21 0804  •  dextrose (GLUTOSE) oral gel 15 g, 15 g, Oral, Q15 Min PRN, Dakota Moore MD  •  dilTIAZem (CARDIZEM) tablet 60 mg, 60 mg, Nasogastric, Q8H, Dakota Moore MD, 60 mg at 01/05/22 0537  •  famotidine (PEPCID) injection 20 mg, 20 mg, Intravenous, Daily, Spencre Carmichael MD, 20 mg at 01/05/22 0900  •  glucagon (GLUCAGEN) injection 1 mg, 1 mg, Subcutaneous, Q15 Min PRN, Dakota Moore MD  •  hydrALAZINE (APRESOLINE) injection 10 mg, 10 mg, Intravenous, Q6H PRN, Jayden Hyatt DO  •  insulin aspart (novoLOG) injection 0-24 Units, 0-24 Units, Subcutaneous, Q6H, Jayden Hyatt, , 4 Units at 01/04/22 1850  •  insulin detemir (LEVEMIR) injection 23 Units, 23 Units, Subcutaneous, Q12H, Dakota Moore MD, 23 Units at 01/05/22 0900  •  ipratropium-albuterol (DUO-NEB) nebulizer solution 3 mL, 3 mL, Nebulization, Q6H PRN, Jaleesa Cárdenas MD  •  levoFLOXacin (LEVAQUIN) 750 mg/150 mL D5W (premix) (LEVAQUIN) 750 mg, 750 mg, Intravenous, Q24H, Jayden Hyatt, DO, 750 mg at 01/04/22 1326  •  polyethylene glycol (MIRALAX) packet 17 g, 17 g, Oral, Daily, Luca Hermosillo MD, 17 g at 01/02/22 0827  •  potassium chloride 10 mEq in 100 mL IVPB, 10  mEq, Intravenous, Q1H PRN, Jaleesa Cárdenas MD, Stopped at 12/28/21 0940  •  potassium phosphate 45 mmol in sodium chloride 0.9 % 500 mL infusion, 45 mmol, Intravenous, PRN **OR** potassium phosphate 30 mmol in sodium chloride 0.9 % 250 mL infusion, 30 mmol, Intravenous, PRN **OR** potassium phosphate 15 mmol in sodium chloride 0.9 % 100 mL infusion, 15 mmol, Intravenous, PRN **OR** sodium phosphates 45 mmol in sodium chloride 0.9 % 500 mL IVPB, 45 mmol, Intravenous, PRN **OR** sodium phosphates 30 mmol in sodium chloride 0.9 % 250 mL IVPB, 30 mmol, Intravenous, PRN **OR** sodium phosphates 15 mmol in sodium chloride 0.9 % 250 mL IVPB, 15 mmol, Intravenous, PRN, Jaleesa Cárdenas MD, Last Rate: 62.5 mL/hr at 12/18/21 0429, 15 mmol at 12/18/21 0429  •  [COMPLETED] Insert peripheral IV, , , Once **AND** sodium chloride 0.9 % flush 10 mL, 10 mL, Intravenous, PRN, Jaleesa Cárdenas MD, 10 mL at 12/28/21 0732  •  sodium chloride 0.9 % flush 10 mL, 10 mL, Intravenous, PRNRomie Rami, MD  •  sodium chloride 0.9 % flush 10 mL, 10 mL, Intravenous, Q12H, Dakota Moore MD, 10 mL at 01/05/22 0900  •  sodium chloride 0.9 % flush 10 mL, 10 mL, Intravenous, Q12H, Dakota Moore MD, 10 mL at 01/05/22 0900  •  sodium chloride 0.9 % flush 10 mL, 10 mL, Intravenous, Q12H, Dakota Moore MD, 10 mL at 01/05/22 0900  •  sodium chloride 0.9 % flush 10 mL, 10 mL, Intravenous, PRNOscar Rusty T, MD  •  sodium chloride 0.9 % flush 20 mL, 20 mL, Intravenous, PRRomie SINCLAIR Rami, MD  •  sodium chloride 0.9 % flush 20 mL, 20 mL, Intravenous, PRN, Dakota Moore MD      Assessment/Plan      1.  Acute Hypoxic Respiratory Failure due to COVID-19 Pneumonia w/ Superimposed MSSA and Klebsiella pneumonia:    -Extubated 1/1/22.  Now on 2 L nasal cannula  -Sputum culture 12/29/2021: MSSA, Citrobacter resistant to ceftriaxone and stenotrophomonas sensitive to Levaquin  -completed the Rocephin  course  -continue Levaquin to cover stenotrophomonas and Citrobacter  -Pulmonary now signed off     2.  Diabetes Mellitus, Type 2 in Obese w/ Hyperglycemia:  - Blood glucose stable.    - Continue basal and SSI   - follow and adjust prn    3.  Right Hand Wound:   -Continue local wound care     4.  Atrial Fibrillation: Remains in NSR.    -On Diltiazem and Eliquis.     5.   LLE DVT:   -On Eliquis.     6. Generalized weakness  -Continue PT OT    7. Dysphagia, severe malnutrition  -SLP following, to have MBS today and hopefully diet can be advanced      Plan for disposition: PT OT will need SNF at discharge  following    Jayden Hyatt DO  01/05/22  11:41 EST

## 2022-01-05 NOTE — CASE MANAGEMENT/SOCIAL WORK
Continued Stay Note  ARELIS Flaherty     Patient Name: Sergio Daniel  MRN: [id number]  Today's Date: 1/5/2022    Admit Date: 12/16/2021     Discharge Plan     Row Name 01/05/22 3683       Plan    Plan Comments I spoke with the patient's wife via phone.  We discussed the patients discharge and she agreed that the patient will need SNF placement for STR.  We reviewed options for SNF and she stated that [facility] is her preference followed by [facility] and then [facility].  Referrals were placed with all three. CM will continue to follow.    Row Name 01/05/22 1254       Plan    Plan Discharge to SNF for STR               Discharge Codes    No documentation.                     Maris Thomas RN

## 2022-01-05 NOTE — CASE MANAGEMENT/SOCIAL WORK
Continued Stay Note  ARELIS Flaherty     Patient Name: Sergio Daniel  MRN: [id number]  Today's Date: 1/5/2022    Admit Date: 12/16/2021     Discharge Plan     Row Name 01/05/22 1606       Plan    Plan Comments The patients wife called me back and advised that she changed her mind and her first preference is [place] because she lives in [place] and he would be closer to her.  I then called Jeanne and advised her of the referral.  CM will continue to follow.        Plan Discharge to SNF for STR               Discharge Codes    No documentation.                     Maris Thomas RN

## 2022-01-05 NOTE — DISCHARGE PLACEMENT REQUEST
"Christianne Daniel (66 y.o. Male)             Date of Birth Social Security Number Address Home Phone MRN    1955  [address] [facility] [address] [phone] [id number]    Oriental orthodox Marital Status             Non-Episcopalian        Admission Date Admission Type Admitting Provider Attending Provider Department, Room/Bed    12/16/21 Emergency Dakota Moore MD Ellis, Rusty T, MD [facility] MED SURG, 1410/1    Discharge Date Discharge Disposition Discharge Destination                         Attending Provider: Dakota Moore MD    Allergies: No Known Allergies    Isolation: Contact   Infection: MDRO (01/01/22), COVID (History) (01/05/22)   Code Status: CPR   Advance Care Planning Activity    Ht: 188 cm (74\")   Wt: 130 kg (286 lb 2.5 oz)    Admission Cmt: None   Principal Problem: Acute hypoxemic respiratory failure due to COVID-19 ([facility]) [U07.1,J96.01]                 Active Insurance as of 12/16/2021     Primary Coverage     Payor Plan Insurance Group Employer/Plan Group    HUMANA MEDICARE REPLACEMENT HUMANA MEDICARE REPLACEMENT [id number]     Payor Plan Address Payor Plan Phone Number Payor Plan Fax Number Effective Dates    [address] [address] [phone]  3/1/2021 - None Entered    [facility] [address]       Subscriber Name Subscriber Birth Date Member ID       CHRISTIANNE DANIEL 1955 [id number]           Secondary Coverage     Payor Plan Insurance Group Employer/Plan Group    [facility] DE[id number]      Payor Plan Address Payor Plan Phone Number Payor Plan Fax Number Effective Dates    [facility] [phone]  12/16/2021 - None Entered    [address] [address]       [facility] [address]       Subscriber Name Subscriber Birth Date Member ID       CHRISTIANNE DANIEL 1955 [id number]                 Emergency Contacts      (Rel.) Home Phone Work Phone Mobile Phone    MANUEL DANIEL (Spouse) -- -- [phone]    Peña Correia (Daughter) -- -- " [phone]

## 2022-01-05 NOTE — PROGRESS NOTES
Adult Nutrition  Assessment/PES    Patient Name:  Sergio Daniel  YOB: 1955  MRN: [id number]  Admit Date:  12/16/2021    Assessment Date:  1/5/2022    Recommend: 1) diet as safe/indicated per SLP, also add Boost GLucose Control as feasible with diet advancement for oral supplement     2) consider nocturnal feeds until po established to help meet needs if safe for diet ( Diabetasource AC 84 ml/hr x 10 hrs per night to provide 50% of estimated needs)      This would provide 840 ml 1008 kcal, 50 gm pro, 84 gm CHO, 689 ml free water, 67% DRI , will need to drink or receive 1250 ml free water       Will cont to follow and monitor plan for nutrition      This note completed with aid of nursing and review of medical record.      Reason for Assessment     Row Name 01/05/22 1131          Reason for Assessment    Reason For Assessment follow-up protocol     Diagnosis pulmonary disease; infection/sepsis  AHRF COVID  DVT , off vent, Hand wound, DM                Nutrition/Diet History     Row Name 01/05/22 1132          Nutrition/Diet History    Typical Food/Fluid Intake per RN plan MBSS this afternoon with goal of po, noted pt remains weak per notes                Anthropometrics     Row Name 01/05/22 1132          Anthropometrics    Weight --  286.1#            Body Mass Index (BMI)    BMI Assessment BMI 35-39.9: obesity grade II                Labs/Tests/Procedures/Meds     Row Name 01/05/22 1133          Labs/Procedures/Meds    Lab Results Reviewed reviewed     Lab Results Comments glu 148, 114            Diagnostic Tests/Procedures    Diagnostic Test/Procedure Reviewed reviewed     Diagnostic Test/Procedures Comments SLP MBSS pending            Medications    Pertinent Medications Reviewed reviewed     Pertinent Medications Comments decadron levemir, novolog                Physical Findings     Row Name 01/05/22 1134          Physical Findings    Skin other (see comments)  hand wound healing per WOCN note                   Nutrition Prescription Ordered     Row Name 01/05/22 1134          Nutrition Prescription PO    Current PO Diet NPO            Nutrition Prescription EN    Product Diabetisource AC (Glucerna 1.2)     TF Delivery Method Continuous     Continuous TF Goal Rate (mL/hr) 70 mL/hr     Water flush (mL)  60 mL     Water Flush Frequency Per hour                Evaluation of Received Nutrient/Fluid Intake     Row Name 01/05/22 1135          Fluid Intake Evaluation    Free Water Flush Fluid (mL) 1107  55%            PO Evaluation    Number of Days PO Intake Evaluated Insufficient Data            EN Evaluation    Number of Days EN Intake Evaluated 2 days     EN Average Volume Delivered (mL/day) 1432 mL/day     % Goal Volume  85 %                     Problem/Interventions:   Problem 1     Row Name 01/05/22 1137          Nutrition Diagnoses Problem 1    Problem 1 Inadequate Nutrient Intake     Etiology (related to) Medical Diagnosis; Factors Affecting Nutrition     Signs/Symptoms (evidenced by) Report/Observation                      Intervention Goal     Row Name 01/05/22 1138          Intervention Goal    PO Establish PO     TF/PN Maintain TF/PN                Nutrition Intervention     Row Name 01/05/22 1138          Nutrition Intervention    RD/Tech Action Follow Tx progress                  Education/Evaluation     Row Name 01/05/22 1138          Education    Education Education not appropriate at this time            Monitor/Evaluation    Monitor Per protocol; I&O; PO intake; Pertinent labs; TF delivery/tolerance; Weight; Skin status; Symptoms                 Electronically signed by:  Le Fregoso RD  01/05/22 11:39 EST

## 2022-01-05 NOTE — PLAN OF CARE
Goal Outcome Evaluation:  Plan of Care Reviewed With: patient        Progress: improving  Outcome Summary: Patient transferred from ICU this am, on 2liter nasal canula oxygen. Turns every 2-3 hours, on speciality bed. Modified swallow today, can have honey thick liquids, no straws & food that is ground. Patient states he can feed himself but we will assist with meals. Wife spoke with patient on the phone & states he has odd converstations with her. Looking for short term rehab until he is strong enough to go home.

## 2022-01-06 LAB
ANION GAP SERPL CALCULATED.3IONS-SCNC: 8 MMOL/L (ref 5–15)
BUN SERPL-MCNC: 19 MG/DL (ref 8–23)
BUN/CREAT SERPL: 39.6 (ref 7–25)
CALCIUM SPEC-SCNC: 8.7 MG/DL (ref 8.6–10.5)
CHLORIDE SERPL-SCNC: 101 MMOL/L (ref 98–107)
CO2 SERPL-SCNC: 26 MMOL/L (ref 22–29)
CREAT SERPL-MCNC: 0.48 MG/DL (ref 0.76–1.27)
D DIMER PPP FEU-MCNC: 1.61 MCGFEU/ML (ref 0–0.46)
DEPRECATED RDW RBC AUTO: 50.7 FL (ref 37–54)
ERYTHROCYTE [DISTWIDTH] IN BLOOD BY AUTOMATED COUNT: 14.4 % (ref 12.3–15.4)
GFR SERPL CREATININE-BSD FRML MDRD: >150 ML/MIN/1.73
GLUCOSE BLDC GLUCOMTR-MCNC: 133 MG/DL (ref 70–130)
GLUCOSE BLDC GLUCOMTR-MCNC: 290 MG/DL (ref 70–130)
GLUCOSE BLDC GLUCOMTR-MCNC: 295 MG/DL (ref 70–130)
GLUCOSE BLDC GLUCOMTR-MCNC: 54 MG/DL (ref 70–130)
GLUCOSE BLDC GLUCOMTR-MCNC: 54 MG/DL (ref 70–130)
GLUCOSE SERPL-MCNC: 75 MG/DL (ref 65–99)
HCT VFR BLD AUTO: 35.2 % (ref 37.5–51)
HGB BLD-MCNC: 10.8 G/DL (ref 13–17.7)
MCH RBC QN AUTO: 29.9 PG (ref 26.6–33)
MCHC RBC AUTO-ENTMCNC: 30.7 G/DL (ref 31.5–35.7)
MCV RBC AUTO: 97.5 FL (ref 79–97)
PLATELET # BLD AUTO: 343 10*3/MM3 (ref 140–450)
PMV BLD AUTO: 9.4 FL (ref 6–12)
POTASSIUM SERPL-SCNC: 3.5 MMOL/L (ref 3.5–5.2)
PROCALCITONIN SERPL-MCNC: 0.07 NG/ML (ref 0–0.25)
RBC # BLD AUTO: 3.61 10*6/MM3 (ref 4.14–5.8)
SODIUM SERPL-SCNC: 135 MMOL/L (ref 136–145)
WBC NRBC COR # BLD: 11.09 10*3/MM3 (ref 3.4–10.8)

## 2022-01-06 PROCEDURE — 63710000001 INSULIN DETEMIR PER 5 UNITS: Performed by: HOSPITALIST

## 2022-01-06 PROCEDURE — 85027 COMPLETE CBC AUTOMATED: CPT | Performed by: INTERNAL MEDICINE

## 2022-01-06 PROCEDURE — 85379 FIBRIN DEGRADATION QUANT: CPT | Performed by: STUDENT IN AN ORGANIZED HEALTH CARE EDUCATION/TRAINING PROGRAM

## 2022-01-06 PROCEDURE — 97110 THERAPEUTIC EXERCISES: CPT

## 2022-01-06 PROCEDURE — 84145 PROCALCITONIN (PCT): CPT | Performed by: INTERNAL MEDICINE

## 2022-01-06 PROCEDURE — 63710000001 DEXAMETHASONE PER 0.25 MG: Performed by: INTERNAL MEDICINE

## 2022-01-06 PROCEDURE — 82962 GLUCOSE BLOOD TEST: CPT

## 2022-01-06 PROCEDURE — 99232 SBSQ HOSP IP/OBS MODERATE 35: CPT | Performed by: HOSPITALIST

## 2022-01-06 PROCEDURE — 25010000002 LEVOFLOXACIN PER 250 MG: Performed by: INTERNAL MEDICINE

## 2022-01-06 PROCEDURE — 92526 ORAL FUNCTION THERAPY: CPT

## 2022-01-06 PROCEDURE — 80048 BASIC METABOLIC PNL TOTAL CA: CPT | Performed by: INTERNAL MEDICINE

## 2022-01-06 PROCEDURE — 94761 N-INVAS EAR/PLS OXIMETRY MLT: CPT

## 2022-01-06 PROCEDURE — 97530 THERAPEUTIC ACTIVITIES: CPT

## 2022-01-06 PROCEDURE — 94669 MECHANICAL CHEST WALL OSCILL: CPT

## 2022-01-06 PROCEDURE — 63710000001 INSULIN ASPART PER 5 UNITS: Performed by: INTERNAL MEDICINE

## 2022-01-06 RX ADMIN — POLYETHYLENE GLYCOL 3350 17 G: 17 POWDER, FOR SOLUTION ORAL at 08:20

## 2022-01-06 RX ADMIN — SODIUM CHLORIDE, PRESERVATIVE FREE 10 ML: 5 INJECTION INTRAVENOUS at 23:19

## 2022-01-06 RX ADMIN — WHITE PETROLATUM 41 % TOPICAL OINTMENT 1 APPLICATION: OINTMENT at 08:21

## 2022-01-06 RX ADMIN — LEVOFLOXACIN 750 MG: 5 INJECTION, SOLUTION INTRAVENOUS at 13:04

## 2022-01-06 RX ADMIN — APIXABAN 5 MG: 2.5 TABLET, FILM COATED ORAL at 08:20

## 2022-01-06 RX ADMIN — WHITE PETROLATUM 41 % TOPICAL OINTMENT 1 APPLICATION: OINTMENT at 23:09

## 2022-01-06 RX ADMIN — APIXABAN 5 MG: 2.5 TABLET, FILM COATED ORAL at 23:05

## 2022-01-06 RX ADMIN — FAMOTIDINE 20 MG: 10 INJECTION INTRAVENOUS at 08:38

## 2022-01-06 RX ADMIN — DEXAMETHASONE 6 MG: 4 TABLET ORAL at 08:20

## 2022-01-06 RX ADMIN — DILTIAZEM HYDROCHLORIDE 60 MG: 30 TABLET ORAL at 23:05

## 2022-01-06 RX ADMIN — CHLORHEXIDINE GLUCONATE 0.12% ORAL RINSE 15 ML: 1.2 LIQUID ORAL at 23:06

## 2022-01-06 RX ADMIN — DEXTROMETHORPHAN 60 MG: 30 SUSPENSION, EXTENDED RELEASE ORAL at 09:22

## 2022-01-06 RX ADMIN — DILTIAZEM HYDROCHLORIDE 60 MG: 30 TABLET ORAL at 05:59

## 2022-01-06 RX ADMIN — INSULIN DETEMIR 20 UNITS: 100 INJECTION, SOLUTION SUBCUTANEOUS at 23:40

## 2022-01-06 RX ADMIN — SODIUM CHLORIDE, PRESERVATIVE FREE 10 ML: 5 INJECTION INTRAVENOUS at 08:20

## 2022-01-06 RX ADMIN — INSULIN ASPART 12 UNITS: 100 INJECTION, SOLUTION INTRAVENOUS; SUBCUTANEOUS at 17:28

## 2022-01-06 RX ADMIN — INSULIN DETEMIR 23 UNITS: 100 INJECTION, SOLUTION SUBCUTANEOUS at 08:20

## 2022-01-06 RX ADMIN — DILTIAZEM HYDROCHLORIDE 60 MG: 30 TABLET ORAL at 13:03

## 2022-01-06 NOTE — PROGRESS NOTES
Clinical Pharmacy Services: Anticoagulation Discharge Counseling    Sergio Daniel is a 66 y.o. male who is being discharged on apixaban as a new medication for DVT/PE.     I counseled the patient and significant other about this medication, including the following information:  Indication  Dose and schedule  Importance of adherence  Administration instructions  Potential drug and food interactions  Potential side effects and adverse drug reactions  Monitoring for signs and symptoms of bleeding  Importance of follow-up appointments and lab testing    Teaching was provided via teach back.  The learner expressed acceptance. The learner verbalizes understanding.             Denisa Aggarwal, PharmD  01/06/2213:32 EST

## 2022-01-06 NOTE — THERAPY TREATMENT NOTE
Acute Care - Physical Therapy Treatment Note   Hien Neely     Patient Name: Sergio Daniel  : 1955  MRN: [id number]  Today's Date: 2022      Visit Dx:     ICD-10-CM ICD-9-CM   1. Acute respiratory failure with hypoxia (HCC)  J96.01 518.81   2. Pneumonia due to COVID-19 virus  U07.1 480.8    J12.82 079.89   3. Paroxysmal atrial fibrillation (HCC)  I48.0 427.31   4. Pharyngeal dysphagia  R13.13 787.23   5. Oropharyngeal dysphagia  R13.12 787.22     Patient Active Problem List   Diagnosis   • Acute respiratory failure with hypoxia (HCC)   • Acute hypoxemic respiratory failure due to COVID-19 (HCC)     Past Medical History:   Diagnosis Date   • Diabetes mellitus (HCC)      History reviewed. No pertinent surgical history.  PT Assessment (last 12 hours)     PT Evaluation and Treatment     Row Name 22 0814          Physical Therapy Time and Intention    Document Type therapy note (daily note)  -     Mode of Treatment physical therapy  -     Comment improved voice quality today compared to previous session  -     Row Name 22 0814          General Information    Patient/Family/Caregiver Comments/Observations pt supine, agrees to therapy  -     Barriers to Rehab medically complex  -     Row Name 22 0814          Cognition    Personal Safety Interventions nonskid shoes/slippers when out of bed; supervised activity  -     Row Name 22 0814          Pain Scale: Numbers Pre/Post-Treatment    Pretreatment Pain Rating 0/10 - no pain  -     Posttreatment Pain Rating 0/10 - no pain  -     Row Name 22 0814          Bed Mobility    Bed Mobility supine-sit; sit-supine  -     Supine-Sit [place] (Bed Mobility) maximum assist (25% patient effort); 2 person assist; verbal cues  -     Sit-Supine [place] (Bed Mobility) maximum assist (25% patient effort); 2 person assist; verbal cues  -     Bed Mobility, Safety Issues decreased use of arms for pushing/pulling; decreased  use of legs for bridging/pushing; cognitive deficits limit understanding  -     Assistive Device (Bed Mobility) bed rails; draw sheet; head of bed elevated  -     Comment (Bed Mobility) pt tolerates sitting EOB x10 minutes to participate in balance activities  -     Row Name 01/06/22 0814          Transfers    Comment (Transfers) pt unable to safely perform at this time  -     Row Name 01/06/22 0814          Balance    Comment, Balance pt able to sit EOB x10 minutes, varying levels of assistance.  initially max assist, however able to maintain with SBA/CGA for 10-20 seconds.  -     Row Name             Wound Right hand Blisters    Wound - Properties Group Present on Hospital Admission: N  -LC Side: Right  -LC Location: hand  -LC, includes hand and wrist  Primary Wound Type: Blisters  -LC     Retired Wound - Properties Group Present on Hospital Admission: N  -LC Side: Right  -LC Location: hand  -LC, includes hand and wrist  Primary Wound Type: Blisters  -LC     Row Name 01/06/22 0814          Plan of Care Review    Outcome Summary PT: Patient performs supine to sit with max assist x2.  Patient able to tolerate sitting EOB x10 minutes with varying level of assistance.  Patient initially requires max assist for static sitting balance, however progresses to brief periods of SBA/CGA x10-20 seconds.  Overall, patient continues to improve with mobility/balance activities, continue to recommend theresa lift with nursing staff for out of bed transfers.  Patient would benefit short term rehab at discharge.  -     Row Name 01/06/22 0814          Vital Signs    Pre SpO2 (%) --  O2 sats remain in the 90s on 2 liters throughout activity  -     Row Name 01/06/22 0814          Progress Summary (PT)    Progress Toward Functional Goals (PT) progress toward functional goals is good  -     Barriers to Overall Progress (PT) medical status  -           User Key  (r) = Recorded By, (t) = Taken By, (c) = Cosigned By     Initials Name Provider Type     Chitra Hale, RN, CWON Registered Nurse    Rosy Arevalo, PT Physical Therapist                Physical Therapy Education                 Title: PT OT SLP Therapies (Done)     Topic: Physical Therapy (Done)     Point: Mobility training (Done)     Learning Progress Summary           Patient Acceptance, E,TB, VU by  at 1/6/2022 1036    Acceptance, E,TB, VU by MONTEZ at 1/5/2022 1018    Acceptance, E,TB, VU by MONTEZ at 1/4/2022 1023    Acceptance, E,TB, VU,NR by MONTEZ at 1/3/2022 1216                               User Key     Initials Effective Dates Name Provider Type Discipline     06/16/21 -  Rosy Harmon, PT Physical Therapist PT              PT Recommendation and Plan  Anticipated Discharge Disposition (PT): skilled nursing facility  Planned Therapy Interventions (PT): balance training, bed mobility training, gait training, home exercise program, patient/family education, strengthening, transfer training  Therapy Frequency (PT): daily  Progress Summary (PT)  Progress Toward Functional Goals (PT): progress toward functional goals is good  Barriers to Overall Progress (PT): medical status  Plan of Care Reviewed With: patient  Outcome Summary: PT: Patient performs supine to sit with max assist x2.  Patient able to tolerate sitting EOB x10 minutes with varying level of assistance.  Patient initially requires max assist for static sitting balance, however progresses to brief periods of SBA/CGA x10-20 seconds.  Overall, patient continues to improve with mobility/balance activities, continue to recommend theresa lift with nursing staff for out of bed transfers.  Patient would benefit short term rehab at discharge.   Outcome Measures     Row Name 01/06/22 1000 01/06/22 0814 01/05/22 1100       How much help from another person do you currently need...    Turning from your back to your side while in flat bed without using bedrails? -- 1  -JW --    Moving from lying on back to  sitting on the side of a flat bed without bedrails? -- 1  -JW --    Moving to and from a bed to a chair (including a wheelchair)? -- 1  -JW --    Standing up from a chair using your arms (e.g., wheelchair, bedside chair)? -- 1  -JW --    Climbing 3-5 steps with a railing? -- 1  -JW --    To walk in hospital room? -- 1  -JW --    AM-PAC 6 Clicks Score (PT) -- 6  -JW --       How much help from another is currently needed...    Putting on and taking off regular lower body clothing? 1  -EN -- 1  -EN    Bathing (including washing, rinsing, and drying) 2  -EN -- 1  -EN    Toileting (which includes using toilet bed pan or urinal) 1  -EN -- 1  -EN    Putting on and taking off regular upper body clothing 1  -EN -- 1  -EN    Taking care of personal grooming (such as brushing teeth) 2  -EN -- 2  -EN    Eating meals 2  -EN -- 1  -EN    AM-PAC 6 Clicks Score (OT) 9  -EN -- 7  -EN       Functional Assessment    Outcome Measure Options -- AM-PAC 6 Clicks Basic Mobility (PT)  -JW --    Row Name 01/05/22 0830 01/04/22 1000 01/04/22 0830       How much help from another person do you currently need...    Turning from your back to your side while in flat bed without using bedrails? 1  -JW -- 1  -JW    Moving from lying on back to sitting on the side of a flat bed without bedrails? 1  -JW -- 1  -JW    Moving to and from a bed to a chair (including a wheelchair)? 1  -JW -- 1  -JW    Standing up from a chair using your arms (e.g., wheelchair, bedside chair)? 1  -JW -- 1  -JW    Climbing 3-5 steps with a railing? 1  -JW -- 1  -JW    To walk in hospital room? 1  -JW -- 1  -JW    AM-PAC 6 Clicks Score (PT) 6  -JW -- 6  -JW       How much help from another is currently needed...    Putting on and taking off regular lower body clothing? -- 1  -EN --    Bathing (including washing, rinsing, and drying) -- 1  -EN --    Toileting (which includes using toilet bed pan or urinal) -- 1  -EN --    Putting on and taking off regular upper body clothing  -- 1  -EN --    Taking care of personal grooming (such as brushing teeth) -- 1  -EN --    Eating meals -- 1  -EN --    AM-PAC 6 Clicks Score (OT) -- 6  -EN --       Functional Assessment    Outcome Measure Options AM-PAC 6 Clicks Basic Mobility (PT)  -JW -- AM-PAC 6 Clicks Basic Mobility (PT)  -          User Key  (r) = Recorded By, (t) = Taken By, (c) = Cosigned By    Initials Name Provider Type    Isi Wiggins, OTR Occupational Therapist    Rosy Arevalo, PT Physical Therapist                 Time Calculation:    PT Charges     Row Name 01/06/22 1036             Time Calculation    Start Time 0814  -      Stop Time 0837  -      Time Calculation (min) 23 min  -      PT Received On 01/06/22  -MONTEZ      PT - Next Appointment 01/07/22  -MONTEZ              Timed Charges    54799 - PT Therapeutic Exercise Minutes 23  -              Total Minutes    Timed Charges Total Minutes 23  -       Total Minutes 23  -            User Key  (r) = Recorded By, (t) = Taken By, (c) = Cosigned By    Initials Name Provider Type    Rosy Arevalo, PT Physical Therapist              Therapy Charges for Today     Code Description Service Date Service Provider Modifiers Qty    43009926563 HC PT THER PROC EA 15 MIN 1/5/2022 Rosy Harmon, PT GP 3    12862249968 HC PT THER PROC EA 15 MIN 1/6/2022 Rosy Harmon, JASMIN GP 2          PT G-Codes  Outcome Measure Options: AM-PAC 6 Clicks Basic Mobility (PT)  AM-PAC 6 Clicks Score (PT): 6  AM-PAC 6 Clicks Score (OT): 9    Rosy Harmon PT  1/6/2022

## 2022-01-06 NOTE — PLAN OF CARE
Goal Outcome Evaluation:  Plan of Care Reviewed With: patient           Outcome Summary: PT: Patient performs supine to sit with max assist x2.  Patient able to tolerate sitting EOB x10 minutes with varying level of assistance.  Patient initially requires max assist for static sitting balance, however progresses to brief periods of SBA/CGA x10-20 seconds.  Overall, patient continues to improve with mobility/balance activities, continue to recommend theresa lift with nursing staff for out of bed transfers.  Patient would benefit short term rehab at discharge.

## 2022-01-06 NOTE — PLAN OF CARE
Goal Outcome Evaluation:  Plan of Care Reviewed With: patient           Outcome Summary: SLP: Pt demonstrating tolerance of current diet with feeding by staff due to inability to self feed at this time. Good po intake of meal. Continues to need verbal cues for subsequent swallows, oral clearance and small bites with use of whole banana. SLP to continue to follow. Consider cognitive eval if mentation does not return to baseline in next few days.

## 2022-01-06 NOTE — THERAPY TREATMENT NOTE
Acute Care - Occupational Therapy Treatment Note  ARELIS Flaherty     Patient Name: Sergio Daniel  : 1955  MRN: [id number]  Today's Date: 2022             Admit Date: 2021       ICD-10-CM ICD-9-CM   1. Acute respiratory failure with hypoxia (HCC)  J96.01 518.81   2. Pneumonia due to COVID-19 virus  U07.1 480.8    J12.82 079.89   3. Paroxysmal atrial fibrillation (HCC)  I48.0 427.31   4. Pharyngeal dysphagia  R13.13 787.23   5. Oropharyngeal dysphagia  R13.12 787.22     Patient Active Problem List   Diagnosis   • Acute respiratory failure with hypoxia (HCC)   • Acute hypoxemic respiratory failure due to COVID-19 (HCC)     Past Medical History:   Diagnosis Date   • Diabetes mellitus (HCC)      History reviewed. No pertinent surgical history.      OT ASSESSMENT FLOWSHEET (last 12 hours)     OT Evaluation and Treatment     Row Name 22 0815                   OT Time and Intention    Subjective Information no complaints  -EN        Document Type therapy note (daily note)  -EN        Mode of Treatment occupational therapy  -EN        Patient Effort good  -EN        Symptoms Noted During/After Treatment fatigue  -EN        Comment improved voice quality  -EN                  General Information    Patient/Family/Caregiver Comments/Observations Patient reclined in bed, agreed to therapy.  -EN        Risks Reviewed patient:; LOB  -EN        Benefits Reviewed patient:; improve function; increase independence; increase strength; increase balance  -EN        Barriers to Rehab medically complex; cognitive status  -EN                  Cognition    Personal Safety Interventions nonskid shoes/slippers when out of bed  -EN                  Pain Scale: Numbers Pre/Post-Treatment    Pretreatment Pain Rating 0/10 - no pain  -EN        Posttreatment Pain Rating 0/10 - no pain  -EN                  Range of Motion Comprehensive    Comment, General Range of Motion continues to have approximately 45 degrees active R  shoulder flexion, slight improvement in L el yvon 15 degrees active flexion  -EN                  Bed Mobility    Rolling Left [place] (Bed Mobility) maximum assist (25% patient effort); 2 person assist; verbal cues  -EN        Supine-Sit [place] (Bed Mobility) maximum assist (25% patient effort); 2 person assist; verbal cues  -EN        Sit-Supine [place] (Bed Mobility) maximum assist (25% patient effort); 2 person assist; verbal cues  -EN        Bed Mobility, Safety Issues decreased use of arms for pushing/pulling; impaired trunk control for bed mobility; decreased use of legs for bridging/pushing; cognitive deficits limit understanding  -EN        Assistive Device (Bed Mobility) bed rails; draw sheet; head of bed elevated  -EN                  Motor Skills    Therapeutic Exercise shoulder; elbow/forearm; hand; wrist  -EN                  Shoulder (Therapeutic Exercise)    Shoulder (Therapeutic Exercise) AAROM (active assistive range of motion); AROM (active range of motion)  -EN        Shoulder AROM (Therapeutic Exercise) right; left; flexion; 3 repetitions  -EN        Shoulder AAROM (Therapeutic Exercise) bilateral; flexion; 10 repetitions  -EN                  Elbow/Forearm (Therapeutic Exercise)    Elbow/Forearm (Therapeutic Exercise) AROM (active range of motion)  -EN        Elbow/Forearm AROM (Therapeutic Exercise) bilateral; extension; flexion  -EN                  Wrist (Therapeutic Exercise)    Wrist (Therapeutic Exercise) AROM (active range of motion)  -EN                  Hand (Therapeutic Exercise)    Hand (Therapeutic Exercise) AROM (active range of motion)  -EN        Hand AROM/AAROM (Therapeutic Exercise) bilateral; AROM (active range of motion)  -EN                  Balance    Comment, Balance Patient sat EOB up to 10 minutes with varying amounts of assist max to brieft periods (10-20 seconds) of CGA  -EN                  Wound Right hand Blisters    Wound - Properties  Group Present on Hospital Admission: N  -LC Side: Right  -LC Location: hand  -LC, includes hand and wrist  Primary Wound Type: Blisters  -LC        Retired Wound - Properties Group Present on Hospital Admission: N  -LC Side: Right  -LC Location: hand  -LC, includes hand and wrist  Primary Wound Type: Blisters  -LC                  Plan of Care Review    Plan of Care Reviewed With patient  -EN        Progress improving  -EN        Outcome Summary OT- Patient with improved voice quality and following commands better. Patient required max A X 2 for bed mobility. Patient sat EOB for up to 10 minutes today with varying amounts of assist from max A to brief periods of CGA (10-20 second intervals of CGA). Patient participated in UE ROM exercises and demonstrated slight improvement with active L shoulder flexion (approximately 15 degrees). Overall patient improving and would benefit from short term rehab at discharge.  -EN                  Vital Signs    Pre SpO2 (%) 92  2L O2  -EN        O2 Delivery Pre Treatment supplemental O2  -EN        O2 Delivery Intra Treatment supplemental O2  -EN        Post SpO2 (%) 93  -EN        O2 Delivery Post Treatment supplemental O2  -EN                  Progress Summary (OT)    Progress Toward Functional Goals (OT) progress toward functional goals is good  -EN        Daily Progress Summary (OT) improving  -EN                  Therapy Plan Review/Discharge Plan (OT)    Anticipated Discharge Disposition (OT) skilled nursing facility  -EN              User Key  (r) = Recorded By, (t) = Taken By, (c) = Cosigned By    Initials Name Effective Dates    Isi Wiggins, OTR 06/16/21 -     LC Chitra Hale RN, CWON 07/26/21 -                  Occupational Therapy Education                 Title: PT OT SLP Therapies (Done)     Topic: Occupational Therapy (Done)     Point: ADL training (Done)     Description:   Instruct learner(s) on proper safety adaptation and remediation techniques  during self care or transfers.   Instruct in proper use of assistive devices.              Learning Progress Summary           Patient Acceptance, E, VU by EN at 1/6/2022 1007    Comment: UE HEP    Acceptance, E, VU by EN at 1/5/2022 1115    Comment: UE HEP, benefits of activity    Acceptance, E, VU by EN at 1/5/2022 1026    Comment: Patient educated on UE HEP, benefits of activity.    Acceptance, E, VU by EN at 1/4/2022 0959    Comment: transfer safety, UE HEP    Acceptance, E, VU by EN at 1/3/2022 1352    Comment: Educated on UE HEP                   Point: Home exercise program (Done)     Description:   Instruct learner(s) on appropriate technique for monitoring, assisting and/or progressing therapeutic exercises/activities.              Learning Progress Summary           Patient Acceptance, E, VU by EN at 1/6/2022 1007    Comment: UE HEP    Acceptance, E, VU by EN at 1/5/2022 1115    Comment: UE HEP, benefits of activity    Acceptance, E, VU by EN at 1/5/2022 1026    Comment: Patient educated on UE HEP, benefits of activity.    Acceptance, E, VU by EN at 1/4/2022 0959    Comment: transfer safety, UE HEP    Acceptance, E, VU by EN at 1/3/2022 1352    Comment: Educated on UE HEP                               User Key     Initials Effective Dates Name Provider Type Discipline    EN 06/16/21 -  Isi Theodore OTR Occupational Therapist OT                  OT Recommendation and Plan  Planned Therapy Interventions (OT): BADL retraining, functional balance retraining, passive ROM/stretching, ROM/therapeutic exercise, strengthening exercise, transfer/mobility retraining  Therapy Frequency (OT): 5 times/wk  Progress Toward Functional Goals (OT): progress toward functional goals is good  Plan of Care Review  Plan of Care Reviewed With: patient  Progress: improving  Outcome Summary: OT- Patient with improved voice quality and following commands better. Patient required max A X 2 for bed mobility. Patient sat EOB  for up to 10 minutes today with varying amounts of assist from max A to brief periods of CGA (10-20 second intervals of CGA). Patient participated in UE ROM exercises and demonstrated slight improvement with active L shoulder flexion (approximately 15 degrees). Overall patient improving and would benefit from short term rehab at discharge.  Plan of Care Reviewed With: patient  Outcome Summary: OT- Patient with improved voice quality and following commands better. Patient required max A X 2 for bed mobility. Patient sat EOB for up to 10 minutes today with varying amounts of assist from max A to brief periods of CGA (10-20 second intervals of CGA). Patient participated in UE ROM exercises and demonstrated slight improvement with active L shoulder flexion (approximately 15 degrees). Overall patient improving and would benefit from short term rehab at discharge.     Outcome Measures     Row Name 01/06/22 1000 01/05/22 1100 01/05/22 0830       How much help from another person do you currently need...    Turning from your back to your side while in flat bed without using bedrails? -- -- 1  -JW    Moving from lying on back to sitting on the side of a flat bed without bedrails? -- -- 1  -JW    Moving to and from a bed to a chair (including a wheelchair)? -- -- 1  -JW    Standing up from a chair using your arms (e.g., wheelchair, bedside chair)? -- -- 1  -JW    Climbing 3-5 steps with a railing? -- -- 1  -JW    To walk in hospital room? -- -- 1  -JW    AM-PAC 6 Clicks Score (PT) -- -- 6  -JW       How much help from another is currently needed...    Putting on and taking off regular lower body clothing? 1  -EN 1  -EN --    Bathing (including washing, rinsing, and drying) 2  -EN 1  -EN --    Toileting (which includes using toilet bed pan or urinal) 1  -EN 1  -EN --    Putting on and taking off regular upper body clothing 1  -EN 1  -EN --    Taking care of personal grooming (such as brushing teeth) 2  -EN 2  -EN --    Eating  meals 2  -EN 1  -EN --    AM-PAC 6 Clicks Score (OT) 9  -EN 7  -EN --       Functional Assessment    Outcome Measure Options -- -- AM-PAC 6 Clicks Basic Mobility (PT)  -JW    Row Name 01/04/22 1000 01/04/22 0830          How much help from another person do you currently need...    Turning from your back to your side while in flat bed without using bedrails? -- 1  -JW     Moving from lying on back to sitting on the side of a flat bed without bedrails? -- 1  -JW     Moving to and from a bed to a chair (including a wheelchair)? -- 1  -JW     Standing up from a chair using your arms (e.g., wheelchair, bedside chair)? -- 1  -JW     Climbing 3-5 steps with a railing? -- 1  -JW     To walk in hospital room? -- 1  -JW     AM-PAC 6 Clicks Score (PT) -- 6  -JW            How much help from another is currently needed...    Putting on and taking off regular lower body clothing? 1  -EN --     Bathing (including washing, rinsing, and drying) 1  -EN --     Toileting (which includes using toilet bed pan or urinal) 1  -EN --     Putting on and taking off regular upper body clothing 1  -EN --     Taking care of personal grooming (such as brushing teeth) 1  -EN --     Eating meals 1  -EN --     AM-PAC 6 Clicks Score (OT) 6  -EN --            Functional Assessment    Outcome Measure Options -- AM-PAC 6 Clicks Basic Mobility (PT)  -           User Key  (r) = Recorded By, (t) = Taken By, (c) = Cosigned By    Initials Name Provider Type    Isi Wiggins OTCORY Occupational Therapist    Rosy Arevalo, PT Physical Therapist                Time Calculation:    Time Calculation- OT     Row Name 01/06/22 1008             Time Calculation- OT    OT Start Time 0815  -EN      OT Stop Time 0843  -EN      OT Time Calculation (min) 28 min  -EN              Timed Charges    18368 - OT Therapeutic Activity Minutes 28  -EN              Total Minutes    Timed Charges Total Minutes 28  -EN       Total Minutes 28  -EN            User Key   (r) = Recorded By, (t) = Taken By, (c) = Cosigned By    Initials Name Provider Type    EN Isi Theodore OTR Occupational Therapist              Therapy Charges for Today     Code Description Service Date Service Provider Modifiers Qty    46791307460 HC OT THERAPEUTIC ACT EA 15 MIN 1/5/2022 Isi Theodore OTR GO 3    81662038684 HC OT THERAPEUTIC ACT EA 15 MIN 1/6/2022 Isi Theodore OTR GO 2               LINDA Pepe  1/6/2022

## 2022-01-06 NOTE — PROGRESS NOTES
"Hospitalist Team      Patient Care Team:  Farhan Jean Jr., MD as PCP - General (Family Medicine)      Chief Complaint: Follow-up Acute Hypoxic Respiratory due COVID Pneumonia    Subjective    Feels well.  He has been extubated since my last visit.  He is participating w/ PT.  PO intake is improved.  He denies chest pain.    Objective    Vital Signs  Temp:  [97.3 °F (36.3 °C)-98.4 °F (36.9 °C)] 97.8 °F (36.6 °C)  Heart Rate:  [79-97] 79  Resp:  [20] 20  BP: (106-146)/(71-79) 117/76  Oxygen Therapy  SpO2: 95 %  Pulse Oximetry Type: Continuous  Device (Oxygen Therapy): ventilator  Device (Oxygen Therapy): nasal cannula  $ High Flow Nasal Cannula Set-Up: yes  Flow (L/min): 2  Oxygen Concentration (%): 35  ETCO2 (mmHg): 35 mmHg  Probe Placed On (Pulse Ox): Left:, finger  Probe Removed From (Pulse Ox): Right:, finger}    Flowsheet Rows      First Filed Value   Admission Height 185.4 cm (73\") Documented at 12/16/2021 1515   Admission Weight 120 kg (265 lb) Documented at 12/16/2021 1515            Physical Exam:    General: Appears stated age in no acute distress.  Lungs: Breath sounds are diminished throughout all fields.  Respirations are non-labored.  CV: Regular rate and rhythm.  No murmur appreciated.  Radial pulses are 2+ and symmetric.  Abdomen: Obese, soft and non-tender w/ active bowel sounds.  MSK: No clubbing or cyanosis.  Skin: No evidence of embolic phenomena.  Right hand healing.  Neuro: CN II-XII grossly intact.  Psych: Normal affect.    Results Review:     I reviewed the patient's new clinical results.    Lab Results (last 24 hours)     Procedure Component Value Units Date/Time    POC Glucose Once [[id number]]  (Abnormal) Collected: 01/06/22 1159    Specimen: Blood Updated: 01/06/22 1218     Glucose 133 mg/dL      Comment: Meter: [id number] : [id number] Kennedi Jason NURSING ASSISTANT       POC Glucose Once [[id number]]  (Abnormal) Collected: 01/06/22 0732    Specimen: Blood Updated: 01/06/22 0748     " Glucose 54 mg/dL      Comment: Meter: [id number] : [id number] Mings Casie NURSING ASSISTANT       POC Glucose Once [[id number]]  (Abnormal) Collected: 01/06/22 0716    Specimen: Blood Updated: 01/06/22 0748     Glucose 54 mg/dL      Comment: Meter: [id number] : [id number] Mings Casie NURSING ASSISTANT       Basic Metabolic Panel [[id number]]  (Abnormal) Collected: 01/06/22 0654    Specimen: Blood Updated: 01/06/22 0726     Glucose 75 mg/dL      BUN 19 mg/dL      Creatinine 0.48 mg/dL      Sodium 135 mmol/L      Potassium 3.5 mmol/L      Chloride 101 mmol/L      CO2 26.0 mmol/L      Calcium 8.7 mg/dL      eGFR Non African Amer >150 mL/min/1.73      BUN/Creatinine Ratio 39.6     Anion Gap 8.0 mmol/L     Narrative:      GFR Normal >60  Chronic Kidney Disease <60  Kidney Failure <15      Procalcitonin [[id number]]  (Normal) Collected: 01/06/22 0654    Specimen: Blood Updated: 01/06/22 0721     Procalcitonin 0.07 ng/mL     D-dimer, Quantitative [[id number]]  (Abnormal) Collected: 01/06/22 0654    Specimen: Blood Updated: 01/06/22 0708     D-Dimer, Quantitative 1.61 MCGFEU/mL     Narrative:      Can be elevated in, but is not diagnostic for deep vein thrombosis (DVT) or pulmonary embolis (PE).  It is also elevated in other medical conditions.  Clinical correlation is required.  The negative cut-off value for the D-Dimer is 0.50 mcg FEU/mL for DVT and PE.      CBC (No Diff) [[id number]]  (Abnormal) Collected: 01/06/22 0654    Specimen: Blood Updated: 01/06/22 0700     WBC 11.09 10*3/mm3      RBC 3.61 10*6/mm3      Hemoglobin 10.8 g/dL      Hematocrit 35.2 %      MCV 97.5 fL      MCH 29.9 pg      MCHC 30.7 g/dL      RDW 14.4 %      RDW-SD 50.7 fl      MPV 9.4 fL      Platelets 343 10*3/mm3     POC Glucose Once [[id number]]  (Abnormal) Collected: 01/05/22 1947    Specimen: Blood Updated: 01/05/22 1954     Glucose 169 mg/dL      Comment: Meter: [id number] : [id number] Fco Llanos CNA       POC Glucose Once  [[id number]]  (Abnormal) Collected: 01/05/22 1645    Specimen: Blood Updated: 01/05/22 1650     Glucose 140 mg/dL      Comment: Meter: [id number] : [id number] Jaiden Arceo CNA             Imaging Results (Last 24 Hours)     Procedure Component Value Units Date/Time    FL Video Swallow With Speech Single Contrast [[id number]] Collected: 01/05/22 1750     Updated: 01/05/22 1754    Narrative:      VIDEO SWALLOWING STUDY, 01/05/2022     HISTORY:  66-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 Intermittent  aphonia.     TECHNIQUE:  Video swallowing study was conducted by the speech pathologist in my  presence and recorded on digital video disc.     Fluoroscopy time 3.4 minutes. A single spot image was recorded for  documentation purposes.     FINDINGS:  The patient was administered various barium coated consistencies.  Penetration and aspiration of nectar thick consistency on at least one  or 2 swallows. Vallecular residue with multiple consistencies and some  spillage into the piriform sinuses with multiple consistencies as well.  Mild delay of oral pharyngeal phase. Please see the full report of the  speech pathologist for further details and dietary recommendations.       Impression:      Penetration and aspiration as described. Please see the full  report of the speech pathologist for further details.     This report was finalized on 1/5/2022 5:52 PM by Dr. Te Kulkarni MD.             Medication Review:   I have reviewed the patient's current medication list    Current Facility-Administered Medications:   •  acetaminophen (TYLENOL) tablet 650 mg, 650 mg, Oral, Q6H PRN, Jayden Hyatt M, DO, 650 mg at 12/26/21 1153  •  [COMPLETED] apixaban (ELIQUIS) tablet 10 mg, 10 mg, Nasogastric, Q12H, 10 mg at 01/05/22 1528 **FOLLOWED BY** apixaban (ELIQUIS) tablet 5 mg, 5 mg, Oral, Q12H, Dakota Moore MD, 5 mg at 01/06/22 0820  •  Aquaphor Advanced Therapy ointment 1 application, 1 application, Topical, Q12H,  Dakota Moore MD, 1 application at 01/06/22 0821  •  bisacodyl (DULCOLAX) suppository 10 mg, 10 mg, Rectal, Daily, Dakota Moore MD, 10 mg at 01/05/22 0900  •  chlorhexidine (PERIDEX) 0.12 % solution 15 mL, 15 mL, Mouth/Throat, Q12H, Spencer Carmichael MD, 15 mL at 01/05/22 2127  •  dexamethasone (DECADRON) tablet 6 mg, 6 mg, Oral, Daily With Breakfast, 6 mg at 01/06/22 0820 **OR** dexamethasone (DECADRON) injection 6 mg, 6 mg, Intravenous, Daily With Breakfast, Charli Ayala MD, 6 mg at 01/05/22 0900  •  dextromethorphan polistirex ER (DELSYM) 30 MG/5ML oral suspension 60 mg, 60 mg, Oral, Q12H PRN, Jaleesa Cárdenas MD, 60 mg at 01/06/22 0922  •  dextrose (D50W) (25 g/50 mL) IV injection 25 g, 25 g, Intravenous, Q15 Min PRN, Dakota Moore MD, 25 g at 12/29/21 0804  •  dextrose (GLUTOSE) oral gel 15 g, 15 g, Oral, Q15 Min PRN, Dakota Moore MD  •  dilTIAZem (CARDIZEM) tablet 60 mg, 60 mg, Nasogastric, Q8H, Dakota Moore MD, 60 mg at 01/06/22 1303  •  famotidine (PEPCID) injection 20 mg, 20 mg, Intravenous, Daily, Spencer Carmichael MD, 20 mg at 01/06/22 0838  •  glucagon (GLUCAGEN) injection 1 mg, 1 mg, Subcutaneous, Q15 Min PRN, Dakota Moore MD  •  hydrALAZINE (APRESOLINE) injection 10 mg, 10 mg, Intravenous, Q6H PRN, Jayden Hyatt, DO  •  insulin aspart (novoLOG) injection 0-24 Units, 0-24 Units, Subcutaneous, TID With Meals, Jayden Hyatt, DO  •  insulin detemir (LEVEMIR) injection 23 Units, 23 Units, Subcutaneous, Q12H, Dakota Moore MD, 23 Units at 01/06/22 0820  •  ipratropium-albuterol (DUO-NEB) nebulizer solution 3 mL, 3 mL, Nebulization, Q6H PRN, Jaleesa Cárdenas MD  •  levoFLOXacin (LEVAQUIN) 750 mg/150 mL D5W (premix) (LEVAQUIN) 750 mg, 750 mg, Intravenous, Q24H, Jayden Hyatt, DO, 750 mg at 01/06/22 1304  •  polyethylene glycol (MIRALAX) packet 17 g, 17 g, Oral, Daily, Luca Hermosillo MD, 17 g at 01/06/22 0820  •  potassium chloride 10 mEq in 100 mL  IVPB, 10 mEq, Intravenous, Q1H PRNTyree Cristina D, MD, Stopped at 12/28/21 0940  •  potassium phosphate 45 mmol in sodium chloride 0.9 % 500 mL infusion, 45 mmol, Intravenous, PRN **OR** potassium phosphate 30 mmol in sodium chloride 0.9 % 250 mL infusion, 30 mmol, Intravenous, PRN **OR** potassium phosphate 15 mmol in sodium chloride 0.9 % 100 mL infusion, 15 mmol, Intravenous, PRN **OR** sodium phosphates 45 mmol in sodium chloride 0.9 % 500 mL IVPB, 45 mmol, Intravenous, PRN **OR** sodium phosphates 30 mmol in sodium chloride 0.9 % 250 mL IVPB, 30 mmol, Intravenous, PRN **OR** sodium phosphates 15 mmol in sodium chloride 0.9 % 250 mL IVPB, 15 mmol, Intravenous, PRN, Jaleesa Cárdenas MD, Last Rate: 62.5 mL/hr at 12/18/21 0429, 15 mmol at 12/18/21 0429  •  [COMPLETED] Insert peripheral IV, , , Once **AND** sodium chloride 0.9 % flush 10 mL, 10 mL, Intravenous, PRN, Jaleesa Cárdenas MD, 10 mL at 12/28/21 0732  •  sodium chloride 0.9 % flush 10 mL, 10 mL, Intravenous, PRNRomie Rami, MD  •  sodium chloride 0.9 % flush 10 mL, 10 mL, Intravenous, Q12H, Dakota Moore MD, 10 mL at 01/06/22 0820  •  sodium chloride 0.9 % flush 10 mL, 10 mL, Intravenous, Q12H, Dakota Moore MD, 10 mL at 01/06/22 0820  •  sodium chloride 0.9 % flush 10 mL, 10 mL, Intravenous, Q12H, Dakota Moore MD, 10 mL at 01/06/22 0820  •  sodium chloride 0.9 % flush 10 mL, 10 mL, Intravenous, PRN, Dakota Moore MD  •  sodium chloride 0.9 % flush 20 mL, 20 mL, Intravenous, PRNRomie Rami, MD  •  sodium chloride 0.9 % flush 20 mL, 20 mL, Intravenous, PRN, Dakota Moore MD      Assessment/Plan     1.  Acute Hypoxic Respiratory Failure due to COVID-19 Pneumonia w/ Superimposed MSSA and Klebsiella pneumonia:  Doing well!  Continue Levaquin for MSSA and Klebsiella.     2.  Diabetes Mellitus, Type 2 in Obese w/ Hyperglycemia: Hypoglycemia noted.  Will decrease basal insulin dose and monitor.     3.   Right Hand Wound: Continues to heal.  Continue current care.     4.  Atrial Fibrillation: Continue Diltiazem and Eliquis.     5.   LLE DVT: Continues on Eliquis.    Plan for disposition: Likely will need rehab.    Dakota Moore MD  01/06/22  14:22 EST

## 2022-01-06 NOTE — PLAN OF CARE
Goal Outcome Evaluation:  Plan of Care Reviewed With: patient        Progress: improving  Outcome Summary: OT- Patient with improved voice quality and following commands better. Patient required max A X 2 for bed mobility. Patient sat EOB for up to 10 minutes today with varying amounts of assist from max A to brief periods of CGA (10-20 second intervals of CGA). Patient participated in UE ROM exercises and demonstrated slight improvement with active L shoulder flexion (approximately 15 degrees). Overall patient improving and would benefit from short term rehab at discharge.

## 2022-01-06 NOTE — PROGRESS NOTES
LOS: 21 days   Patient Care Team:  Farhan Jean Jr., MD as PCP - General (Family Medicine)        Subjective     Interval History:     Patient Complaints:   Patient Denies:  weak       Review of Systems:    weak    Objective     Vital Signs  Temp:  [97.3 °F (36.3 °C)-98 °F (36.7 °C)] 97.8 °F (36.6 °C)  Heart Rate:  [79-96] 79  Resp:  [20] 20  BP: (106-146)/(71-76) 117/76    Physical Exam:     General Appearance:    Off vent   Head:     Eyes:            Lids and lashes normal, conjunctivae and sclerae normal, no   icterus, no pallor, corneas clear, PERRLA   Ears:    Ears appear intact with no abnormalities noted   Throat:   No oral lesions, no thrush, oral mucosa moist   Neck:   No adenopathy, supple, trachea midline, no thyromegaly, no   carotid bruit, no JVD   Back:     No kyphosis present, no scoliosis present, no skin lesions,      erythema or scars, no tenderness to percussion or                   palpation,   range of motion normal   Lungs:     Clear to auscultation,respirations regular, even and                  unlabored    Heart:    Regular rhythm and normal rate, normal S1 and S2, no            murmur, no gallop, no rub, no click   Chest Wall:    No abnormalities observed   Abdomen:     Normal bowel sounds, no masses, no organomegaly, soft        non-tender, non-distended, no guarding, no rebound                tenderness   Rectal:     Deferred   Extremities:   Moves all extremities well, no edema, no cyanosis, no             redness   Pulses:   Pulses palpable and equal bilaterally   Skin:   No bleeding, bruising or rash   Lymph nodes:   No palpable adenopathy   Neurologic:   Cranial nerves 2 - 12 grossly intact, sensation intact, DTR       present and equal bilaterally          Results Review:      Lab Results (last 72 hours)     Procedure Component Value Units Date/Time    POC Glucose Once [[id number]]  (Abnormal) Collected: 01/02/22 1654    Specimen: Blood Updated: 01/02/22 1701     Glucose  250 mg/dL      Comment: Meter: [id number] : [id number] Issa Cleary RN Float       C-reactive Protein [[id number]]  (Abnormal) Collected: 01/02/22 0408    Specimen: Blood Updated: 01/02/22 1250     C-Reactive Protein 5.09 mg/dL     POC Glucose Once [[id number]]  (Abnormal) Collected: 01/02/22 1110    Specimen: Blood Updated: 01/02/22 1117     Glucose 170 mg/dL      Comment: Meter: [id number] : [id number] Issa Cleary RN Float       Ferritin [[id number]]  (Abnormal) Collected: 01/02/22 0408    Specimen: Blood Updated: 01/02/22 0515     Ferritin 1,581.00 ng/mL     Narrative:      Results may be falsely decreased if patient taking Biotin.      Comprehensive Metabolic Panel [[id number]]  (Abnormal) Collected: 01/02/22 0408    Specimen: Blood Updated: 01/02/22 0503     Glucose 127 mg/dL      BUN 17 mg/dL      Creatinine 0.54 mg/dL      Sodium 139 mmol/L      Potassium 3.9 mmol/L      Chloride 100 mmol/L      CO2 30.8 mmol/L      Calcium 8.8 mg/dL      Total Protein 5.9 g/dL      Albumin 2.40 g/dL      ALT (SGPT) 51 U/L      AST (SGOT) 37 U/L      Alkaline Phosphatase 126 U/L      Total Bilirubin 0.5 mg/dL      eGFR Non African Amer >150 mL/min/1.73      Globulin 3.5 gm/dL      A/G Ratio 0.7 g/dL      BUN/Creatinine Ratio 31.5     Anion Gap 8.2 mmol/L     Narrative:      GFR Normal >60  Chronic Kidney Disease <60  Kidney Failure <15      Lactate Dehydrogenase [[id number]]  (Abnormal) Collected: 01/02/22 0408    Specimen: Blood Updated: 01/02/22 0503      U/L     Procalcitonin [[id number]]  (Normal) Collected: 01/02/22 0408    Specimen: Blood Updated: 01/02/22 0459     Procalcitonin 0.11 ng/mL     D-dimer, Quantitative [[id number]]  (Abnormal) Collected: 01/02/22 0408    Specimen: Blood Updated: 01/02/22 0449     D-Dimer, Quantitative 2.08 MCGFEU/mL     Narrative:      Can be elevated in, but is not diagnostic for deep vein thrombosis (DVT) or pulmonary embolis (PE).  It is also elevated in other medical  conditions.  Clinical correlation is required.  The negative cut-off value for the D-Dimer is 0.50 mcg FEU/mL for DVT and PE.      CBC (No Diff) [[id number]]  (Abnormal) Collected: 01/02/22 0408    Specimen: Blood Updated: 01/02/22 0430     WBC 13.56 10*3/mm3      RBC 3.27 10*6/mm3      Hemoglobin 10.1 g/dL      Hematocrit 31.8 %      MCV 97.2 fL      MCH 30.9 pg      MCHC 31.8 g/dL      RDW 14.5 %      RDW-SD 49.4 fl      MPV 10.7 fL      Platelets 323 10*3/mm3     POC Glucose Once [[id number]]  (Abnormal) Collected: 01/02/22 0023    Specimen: Blood Updated: 01/02/22 0029     Glucose 133 mg/dL      Comment: Meter: [id number] : [id number] Sindhu Seay RN       POC Glucose Once [[id number]]  (Abnormal) Collected: 01/01/22 2144    Specimen: Blood Updated: 01/01/22 2150     Glucose 141 mg/dL      Comment: Meter: [id number] : [id number] Sindhu Seay RN       POC Glucose Once [[id number]]  (Abnormal) Collected: 01/01/22 1737    Specimen: Blood Updated: 01/01/22 1743     Glucose 148 mg/dL      Comment: Meter: [id number] : [id number] Norris Mari RN       C-reactive Protein [[id number]]  (Abnormal) Collected: 01/01/22 0435    Specimen: Blood Updated: 01/01/22 1248     C-Reactive Protein 5.64 mg/dL     POC Glucose Once [[id number]]  (Normal) Collected: 01/01/22 1147    Specimen: Blood Updated: 01/01/22 1155     Glucose 94 mg/dL      Comment: Meter: [id number] : [id number] Norris Mari RN       Respiratory Culture - Sputum, ET Suction [[id number]]  (Abnormal)  (Susceptibility) Collected: 12/29/21 0804    Specimen: Sputum from ET Suction Updated: 01/01/22 0957     Respiratory Culture Heavy growth (4+) Staphylococcus aureus      Heavy growth (4+) Stenotrophomonas maltophilia      Light growth (2+) Citrobacter freundii      No Normal Respiratory Alida     Gram Stain Moderate (3+) WBCs seen      Rare (1+) Epithelial cells seen      Moderate (3+) Gram positive cocci in pairs, chains and clusters    Susceptibility       Staphylococcus aureus     JANUSZ     Clindamycin Susceptible     Inducible Clindamycin Resistance Negative     Oxacillin Susceptible     Tetracycline Susceptible     Trimethoprim + Sulfamethoxazole Susceptible     Vancomycin Susceptible                  Linear View               Susceptibility      Stenotrophomonas maltophilia     JANUSZ     Levofloxacin Susceptible     Trimethoprim + Sulfamethoxazole Susceptible                  Linear View               Susceptibility      Citrobacter freundii     JANUSZ     Cefepime Susceptible     Ceftazidime Resistant     Ceftriaxone Resistant     Gentamicin Susceptible     Levofloxacin Susceptible     Piperacillin + Tazobactam Intermediate     Tetracycline Susceptible     Trimethoprim + Sulfamethoxazole Susceptible                  Linear View               Susceptibility Comments     Staphylococcus aureus    This isolate does not demonstrate inducible clindamycin resistance in vitro.    This isolate does not demonstrate inducible clindamycin resistance in vitro.      Citrobacter freundii    Cefazolin sensitivity will not be reported for Enterobacteriaceae in non-urine isolates. If cefazolin is preferred, please call the microbiology lab to request an E-test.  With the exception of urinary-sourced infections, aminoglycosides should not be used as monotherapy.             Blood Gas, Arterial - [[id number]]  (Abnormal) Collected: 01/01/22 0942    Specimen: Arterial Blood Updated: 01/01/22 0942     Site Left Radial     Triston's Test Positive     pH, Arterial 7.513 pH units      Comment: 83 Value above reference range        pCO2, Arterial 43.4 mm Hg      pO2, Arterial 64.1 mm Hg      Comment: 84 Value below reference range        HCO3, Arterial 34.9 mmol/L      Comment: 83 Value above reference range        Base Excess, Arterial 10.9 mmol/L      Comment: 83 Value above reference range        O2 Saturation, Arterial 93.7 %      Comment: 84 Value below reference range        Hemoglobin, Blood  Gas 9.4 g/dL      Comment: 84 Value below reference range        Temperature 37.0 C      Barometric Pressure for Blood Gas 731 mmHg      Modality Ventilator     FIO2 30 %      Ventilator Mode CPAP     Set Tidal Volume 574     PEEP 5.0     PSV 10.0 cmH2O      Collected by [id number]     Comment: Meter: [id number]     :  [id number]        pCO2, Temperature Corrected 43.4 mm Hg      pH, Temp Corrected 7.513 pH Units      pO2, Temperature Corrected 64.1 mm Hg     POC Glucose Once [[id number]]  (Abnormal) Collected: 01/01/22 0619    Specimen: Blood Updated: 01/01/22 0626     Glucose 172 mg/dL      Comment: Meter: [id number] : [id number] Sindhu Seay RN       Ferritin [[id number]]  (Abnormal) Collected: 01/01/22 0435    Specimen: Blood Updated: 01/01/22 0541     Ferritin 1,386.00 ng/mL     Narrative:      Results may be falsely decreased if patient taking Biotin.      Comprehensive Metabolic Panel [[id number]]  (Abnormal) Collected: 01/01/22 0435    Specimen: Blood Updated: 01/01/22 0530     Glucose 181 mg/dL      BUN 15 mg/dL      Creatinine 0.54 mg/dL      Sodium 139 mmol/L      Potassium 3.8 mmol/L      Chloride 104 mmol/L      CO2 29.8 mmol/L      Calcium 8.5 mg/dL      Total Protein 5.2 g/dL      Albumin 2.00 g/dL      ALT (SGPT) 31 U/L      AST (SGOT) 22 U/L      Alkaline Phosphatase 97 U/L      Total Bilirubin 0.4 mg/dL      eGFR Non African Amer >150 mL/min/1.73      Globulin 3.2 gm/dL      A/G Ratio 0.6 g/dL      BUN/Creatinine Ratio 27.8     Anion Gap 5.2 mmol/L     Narrative:      GFR Normal >60  Chronic Kidney Disease <60  Kidney Failure <15      Procalcitonin [[id number]]  (Normal) Collected: 01/01/22 0435    Specimen: Blood Updated: 01/01/22 0512     Procalcitonin 0.12 ng/mL     CBC (No Diff) [[id number]]  (Abnormal) Collected: 01/01/22 0435    Specimen: Blood Updated: 01/01/22 0448     WBC 10.72 10*3/mm3      RBC 2.82 10*6/mm3      Hemoglobin 8.9 g/dL      Hematocrit 27.8 %      MCV 98.6 fL      MCH  31.6 pg      MCHC 32.0 g/dL      RDW 14.2 %      RDW-SD 49.0 fl      MPV 10.8 fL      Platelets 246 10*3/mm3     POC Glucose Once [[id number]]  (Abnormal) Collected: 12/31/21 2334    Specimen: Blood Updated: 12/31/21 2340     Glucose 196 mg/dL      Comment: Meter: [id number] : [id number] Sindhu Seay RN       POC Glucose Once [[id number]]  (Abnormal) Collected: 12/31/21 2023    Specimen: Blood Updated: 12/31/21 2030     Glucose 226 mg/dL      Comment: Meter: [id number] : [id number] Sindhu Seay RN       POC Glucose Once [[id number]]  (Abnormal) Collected: 12/31/21 1710    Specimen: Blood Updated: 12/31/21 1716     Glucose 202 mg/dL      Comment: Meter: [id number] : MICHAEL Roland RN       C-reactive Protein [[id number]]  (Abnormal) Collected: 12/31/21 0512    Specimen: Blood Updated: 12/31/21 1340     C-Reactive Protein 11.00 mg/dL     POC Glucose Once [[id number]]  (Abnormal) Collected: 12/31/21 1202    Specimen: Blood Updated: 12/31/21 1208     Glucose 194 mg/dL      Comment: Meter: [id number] : [id number] Dejon Johnson RN       Ferritin [[id number]]  (Abnormal) Collected: 12/31/21 0512    Specimen: Blood Updated: 12/31/21 0640     Ferritin 1,551.00 ng/mL     Narrative:      Results may be falsely decreased if patient taking Biotin.      Lactate Dehydrogenase [[id number]]  (Abnormal) Collected: 12/31/21 0512    Specimen: Blood Updated: 12/31/21 0634      U/L     Procalcitonin [[id number]]  (Normal) Collected: 12/31/21 0512    Specimen: Blood Updated: 12/31/21 0623     Procalcitonin 0.17 ng/mL     POC Glucose Once [[id number]]  (Abnormal) Collected: 12/31/21 0609    Specimen: Blood Updated: 12/31/21 0616     Glucose 183 mg/dL      Comment: Meter: [id number] : [id number] Sindhu Seay RN       D-dimer, Quantitative [[id number]]  (Abnormal) Collected: 12/31/21 0512    Specimen: Blood Updated: 12/31/21 0609     D-Dimer, Quantitative 1.43 MCGFEU/mL     Narrative:      Can be elevated in,  but is not diagnostic for deep vein thrombosis (DVT) or pulmonary embolis (PE).  It is also elevated in other medical conditions.  Clinical correlation is required.  The negative cut-off value for the D-Dimer is 0.50 mcg FEU/mL for DVT and PE.      Blood Gas, Arterial - [[id number]]  (Abnormal) Collected: 12/31/21 0520    Specimen: Arterial Blood Updated: 12/31/21 0526     Site Left Radial     Triston's Test Positive     pH, Arterial 7.480 pH units      Comment: 83 Value above reference range        pCO2, Arterial 44.1 mm Hg      pO2, Arterial 72.2 mm Hg      Comment: 84 Value below reference range        HCO3, Arterial 32.8 mmol/L      Comment: 83 Value above reference range        Base Excess, Arterial 8.4 mmol/L      Comment: 83 Value above reference range        O2 Saturation, Arterial 96.4 %      Hemoglobin, Blood Gas 8.5 g/dL      Comment: 84 Value below reference range        Temperature 37.0 C      Barometric Pressure for Blood Gas 734 mmHg      Modality Ventilator     FIO2 35 %      Ventilator Mode AC     Set Mech Resp Rate 16.0     PEEP 5.0     PIP 14 cmH2O      Comment: Meter: [id number]     :  [id number]        Collected by [id number]     pCO2, Temperature Corrected 44.1 mm Hg      pH, Temp Corrected 7.480 pH Units      pO2, Temperature Corrected 72.2 mm Hg     POC Glucose Once [[id number]]  (Abnormal) Collected: 12/31/21 0007    Specimen: Blood Updated: 12/31/21 0013     Glucose 178 mg/dL      Comment: Meter: [id number] : [id number] Seay RN       POC Glucose Once [[id number]]  (Abnormal) Collected: 12/30/21 2104    Specimen: Blood Updated: 12/30/21 2110     Glucose 184 mg/dL      Comment: Meter: [id number] : [id number] Seay RN             Imaging Results (Last 72 Hours)     Procedure Component Value Units Date/Time    FL Video Swallow With Speech Single Contrast [[id number]] Collected: 01/05/22 1750     Updated: 01/05/22 1754    Narrative:      VIDEO SWALLOWING STUDY,  01/05/2022     HISTORY:  66-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 Intermittent  aphonia.     TECHNIQUE:  Video swallowing study was conducted by the speech pathologist in my  presence and recorded on digital video disc.     Fluoroscopy time 3.4 minutes. A single spot image was recorded for  documentation purposes.     FINDINGS:  The patient was administered various barium coated consistencies.  Penetration and aspiration of nectar thick consistency on at least one  or 2 swallows. Vallecular residue with multiple consistencies and some  spillage into the piriform sinuses with multiple consistencies as well.  Mild delay of oral pharyngeal phase. Please see the full report of the  speech pathologist for further details and dietary recommendations.       Impression:      Penetration and aspiration as described. Please see the full  report of the speech pathologist for further details.     This report was finalized on 1/5/2022 5:52 PM by Dr. Te Kulkarni MD.       XR Abdomen KUB [[id number]] Collected: 01/05/22 0028     Updated: 01/05/22 0030    Narrative:      CR Abdomen 1 Vw    INDICATION:   Dobbhoff tube placement    COMPARISON:   None available    FINDINGS:  AP radiograph(s) of the abdomen. The renal shadows are symmetric. No renal calculi. No bladder calculi.    The bowel gas pattern is nonobstructive. No acute osseous abnormalities. Dobbhoff tube is coiled in the stomach      Impression:      Dobbhoff tube is coiled in the stomach    Signer Name: aVn Carpenter MD   Signed: 1/5/2022 12:28 AM   Workstation Name: [facility]    [facility]    XR Abdomen KUB [[id number]] Collected: 01/04/22 2253     Updated: 01/04/22 2255    Narrative:      CR Abdomen 1 Vw    INDICATION:   Dobbhoff tube placement    COMPARISON:   12/27/2021    FINDINGS:  AP radiograph(s) of the abdomen. The renal shadows are symmetric. No renal calculi. No bladder calculi.    The bowel gas pattern is  nonobstructive. No acute osseous abnormalities. The Dobbhoff tube tip is in the body the stomach      Impression:      Dobbhoff tube is in good position    Signer Name: Van Carpenter MD   Signed: 1/4/2022 10:53 PM   Workstation Name: [email or web address]    [facility]    XR Chest 1 View [[id number]] Collected: 01/04/22 2200     Updated: 01/04/22 2203    Narrative:      CR Chest 1 Vw    INDICATION:   Dobbhoff tube placement.     COMPARISON:    Chest 1/1/2022    FINDINGS:  Portable AP view(s) of the chest.    Weighted enteric feeding tube tip projects over the distal esophagus. Advancement recommended. Interval removal of the endotracheal tube. Left PICC line is again noted to be directed cephalad in the mid SVC. No pneumothorax. No change in patchy bilateral  pulmonary opacities. Heart size is stable.        Impression:        1. Weighted enteric feeding tube tip projects over the distal esophagus. Advancement recommended.  2. Left PICC line tip is again noted to be directed cephalad in the mid SVC.  3. No pneumothorax.  4. Removal of the endotracheal tube.  5. Persistent patchy bilateral pulmonary opacities.    Signer Name: Saran Roach MD   Signed: 1/4/2022 10:00 PM   [email or web address]    [facility]            Medication Review:     Hospital Medications (active)       Dose Frequency Start End    acetaminophen (TYLENOL) tablet 650 mg 650 mg Every 6 Hours PRN 12/22/2021     Admin Instructions: Do not exceed 4 grams of acetaminophen in a 24 hr period. Max dose of 2gm for AST/ALT greater than 120 units/L      If given for pain, use the following pain scale:   Mild Pain = Pain Score of 1-3, CPOT 1-2  Moderate Pain = Pain Score of 4-6, CPOT 3-4  Severe Pain = Pain Score of 7-10, CPOT 5-8    Route: Oral    apixaban (ELIQUIS) tablet 10 mg 10 mg Every 12 Hours Scheduled 12/29/2021 1/5/2022    Admin Instructions: Tablet may be crushed and suspended in 60 mL of water or D5W  "and immediately delivered via NG tube.  Avoid grapefruit juice.    Route: Nasogastric    Linked Group 1: \"Followed by\" Linked Group Details        apixaban (ELIQUIS) tablet 5 mg 5 mg Every 12 Hours Scheduled 1/5/2022     Admin Instructions: Tablet may be crushed and suspended in 60 mL of water or D5W and immediately delivered via NG tube.  Avoid grapefruit juice.    Route: Oral    Linked Group 1: \"Followed by\" Linked Group Details        bisacodyl (DULCOLAX) suppository 10 mg 10 mg Daily 12/27/2021     Route: Rectal    chlorhexidine (PERIDEX) 0.12 % solution 15 mL 15 mL Every 12 Hours Scheduled 12/27/2021     Admin Instructions:  Do not swallow.    Route: Mouth/Throat    dexamethasone (DECADRON) injection 6 mg 6 mg Daily With Breakfast 12/25/2021     Admin Instructions: Switch to IV if unable to take medication by mouth  For IV administration.  May be pushed over a minimum of 1 minute.    Route: Intravenous    Linked Group 2: \"Or\" Linked Group Details        dexamethasone (DECADRON) tablet 6 mg 6 mg Daily With Breakfast 12/25/2021     Admin Instructions: Switch to IV if unable to take medication by mouth  Take with food.    Route: Oral    Linked Group 2: \"Or\" Linked Group Details        dexmedetomidine (PRECEDEX) 400 mcg in 100 mL NS infusion 0.2-1.5 mcg/kg/hr × 115 kg Titrated 12/17/2021     Admin Instructions: Begin infusion at 0.2 mcg/kg/hr and titrate by 0.1 mcg/kg/hr every 15 minutes to maintain RASS goal.  Maximum rate 1.5 mcg/kg/hr.  Notify MD if not at RASS goal and requiring 1.5 mcg/kg/hr or heart rate is less than 50 beats per minute or MAP is less than 60 mmHg.    Route: Intravenous    dextromethorphan polistirex ER (DELSYM) 30 MG/5ML oral suspension 60 mg 60 mg Every 12 Hours PRN 12/16/2021     Route: Oral    dextrose (D50W) (25 g/50 mL) IV injection 25 g 25 g Every 15 Minutes PRN 12/19/2021     Admin Instructions: Blood sugar less than 70; patient has IV access - Unresponsive, NPO or Unable To Safely " Swallow    Route: Intravenous    dextrose (GLUTOSE) oral gel 15 g 15 g Every 15 Minutes PRN 12/19/2021     Admin Instructions: BS<70, Patient Alert, Is not NPO, Can safely swallow.    Route: Oral    dilTIAZem (CARDIZEM) tablet 60 mg 60 mg Every 8 Hours Scheduled 12/24/2021     Admin Instructions: HOLD FOR HR <60 and/or SBP <,= 90mmHg  Caution: Look alike/sound alike drug alert.  Take on empty stomach 1 hr before or 2 hrs after meals. Avoid grapefruit juice. Maximum simvastatin dose 10 mg.    Route: Nasogastric    famotidine (PEPCID) injection 20 mg 20 mg Daily 12/27/2021     Admin Instructions: Dilute to 10 mL total volume and give IV push over 2 minutes.    Route: Intravenous    fentaNYL citrate (PF) (SUBLIMAZE) injection 25 mcg 25 mcg Every 1 Hour PRN 12/27/2021 1/3/2022    Admin Instructions: Use filter needle to withdraw dose from ampule.  If given for pain, use the following pain scale:  Mild Pain = Pain Score of 1-3, CPOT 1-2  Moderate Pain = Pain Score of 4-6, CPOT 3-4  Severe Pain = Pain Score of 7-10, CPOT 5-8    Route: Intravenous    glucagon (GLUCAGEN) injection 1 mg 1 mg Every 15 Minutes PRN 12/19/2021     Admin Instructions: Blood Glucose Less Than 70 - Patient Without IV Access - Unresponsive, NPO or Unable To Safely Swallow    Route: Subcutaneous    insulin aspart (novoLOG) injection 0-24 Units 0-24 Units Every 6 Hours 12/21/2021     Admin Instructions: Correction - High Dose.  Greater than 80 units/day total insulin dose or, on steroids, insulin resistant, infection.    Blood glucose 150-199 mg/dL - 4 units  Blood glucose 200-249 mg/dL - 8 units  Blood glucose 250-299 mg/dL - 12 units  Blood glucose 300-349 mg/dL - 16 units  Blood glucose 350-400 mg/dL - 20 units  Blood glucose greater than 400 mg/dL - 24 units and call provider      Route: Subcutaneous    insulin detemir (LEVEMIR) injection 23 Units 23 Units Every 12 Hours Scheduled 12/31/2021     Admin Instructions:     Route: Subcutaneous     "ipratropium-albuterol (DUO-NEB) nebulizer solution 3 mL 3 mL Every 6 Hours PRN 12/16/2021     Route: Nebulization    levoFLOXacin (LEVAQUIN) 750 mg/150 mL D5W (premix) (LEVAQUIN) 750 mg 750 mg Every 24 Hours 1/1/2022 1/8/2022    Admin Instructions: Caution: Look alike/sound alike drug alert. Protect from light. Do NOT refrigerate.    Route: Intravenous    polyethylene glycol (MIRALAX) packet 17 g 17 g Daily 12/26/2021     Admin Instructions: Use 4-8 ounces of water, tea, or juice for each 17 gram dose.    Route: Oral    potassium chloride 10 mEq in 100 mL IVPB 10 mEq Every 1 Hour PRN 12/17/2021     Admin Instructions: Peripheral IV  Potassium 3.1 or Less Give KCl 10 mEq/100 mL NS IV q1h x6 Doses  Potassium 3.2 - 3.6 Give KCl 10 mEq/100 mL NS q1h x4 Doses    Check Potassium 4 Hours After Last Dose Given  Check Magnesium if Potassium Remains Low After Replacement  DO NOT GIVE if CrCl is Less Than 30 mL/minute or Urine Output Less Than 30 mL/hr.     Rates Greater Than 10 mEq/hr Require ECG Monitoring.  OUTPATIENT/NON-MONITORED UNITS: Potassium Chloride standard bolus infusion rate is a maximum of 10 mEq/hr on unmonitored patients    MONITORED UNITS: Potassium Chloride standard bolus infusion rate is a maximum of 20 mEq/hr on ECG monitored patients ONLY      Route: Intravenous    potassium phosphate 15 mmol in sodium chloride 0.9 % 100 mL infusion 15 mmol As Needed 12/17/2021     Admin Instructions: Recheck Phosphorus level 6 hours after replacement complete.  Refrigerate.      Doses listed as mmol of phosphate.   4.4 mEq of Potassium = 3 mmol of Phosphate    Route: Intravenous    Linked Group 3: \"Or\" Linked Group Details        potassium phosphate 30 mmol in sodium chloride 0.9 % 250 mL infusion 30 mmol As Needed 12/17/2021     Admin Instructions: Recheck Phosphorus level 6 hours after replacement complete.  Refrigerate.      Doses listed as mmol of phosphate.   4.4 mEq of Potassium = 3 mmol of Phosphate    Route: " "Intravenous    Linked Group 3: \"Or\" Linked Group Details        potassium phosphate 45 mmol in sodium chloride 0.9 % 500 mL infusion 45 mmol As Needed 12/17/2021     Admin Instructions: Recheck Phosphorus level 6 hours after replacement complete.  Refrigerate.      Doses listed as mmol of phosphate.   4.4 mEq of Potassium = 3 mmol of Phosphate    Route: Intravenous    Linked Group 3: \"Or\" Linked Group Details        propofol (DIPRIVAN) infusion 10 mg/mL 100 mL 5-50 mcg/kg/min × 115 kg Titrated 12/18/2021     Admin Instructions: Do not administer through the same IV catheter with blood or plasma.  Tubing and any unused portions of propofol vials should be discarded after 12 hours.  Begin infusion at 5 mcg/kg/min and titrate by by 5 mcg/kg/min every 5 minutes to maintain RASS goal. Maximum rate 50 mcg/kg/min.  Notify MD if not at goal and requiring more than 50 mcg/kg/min. Infuse into dedicated line, change vial and tube every 12 hours. Patient must be intubated.    Route: Intravenous    sodium chloride 0.9 % flush 10 mL 10 mL As Needed 12/16/2021     Route: Intravenous    Linked Group 4: \"And\" Linked Group Details        sodium chloride 0.9 % flush 10 mL 10 mL As Needed 12/21/2021     Route: Intravenous    sodium chloride 0.9 % flush 10 mL 10 mL Every 12 Hours Scheduled 12/29/2021     Admin Instructions: Lumen #1    Route: Intravenous    sodium chloride 0.9 % flush 10 mL 10 mL Every 12 Hours Scheduled 12/29/2021     Admin Instructions: Lumen #2    Route: Intravenous    sodium chloride 0.9 % flush 10 mL 10 mL Every 12 Hours Scheduled 12/29/2021     Admin Instructions: Lumen #3    Route: Intravenous    sodium chloride 0.9 % flush 10 mL 10 mL As Needed 12/29/2021     Route: Intravenous    sodium chloride 0.9 % flush 20 mL 20 mL As Needed 12/21/2021     Route: Intravenous    sodium chloride 0.9 % flush 20 mL 20 mL As Needed 12/29/2021     Route: Intravenous    sodium phosphates 15 mmol in sodium chloride 0.9 % 250 mL " "IVPB 15 mmol As Needed 12/17/2021     Admin Instructions: Recheck Phosphorus level 6 hours after replacement complete.    Route: Intravenous    Linked Group 3: \"Or\" Linked Group Details        sodium phosphates 30 mmol in sodium chloride 0.9 % 250 mL IVPB 30 mmol As Needed 12/17/2021     Admin Instructions: Recheck Phosphorus level 6 hours after replacement complete.    Route: Intravenous    Linked Group 3: \"Or\" Linked Group Details        sodium phosphates 45 mmol in sodium chloride 0.9 % 500 mL IVPB 45 mmol As Needed 12/17/2021     Admin Instructions: Recheck Phosphorus level 6 hours after replacement complete.    Route: Intravenous    Linked Group 3: \"Or\" Linked Group Details            apixaban, 5 mg, Oral, Q12H  Aquaphor Advanced Therapy, 1 application, Topical, Q12H  bisacodyl, 10 mg, Rectal, Daily  chlorhexidine, 15 mL, Mouth/Throat, Q12H  dexamethasone, 6 mg, Oral, Daily With Breakfast   Or  dexamethasone, 6 mg, Intravenous, Daily With Breakfast  dilTIAZem, 60 mg, Nasogastric, Q8H  famotidine, 20 mg, Intravenous, Daily  insulin aspart, 0-24 Units, Subcutaneous, TID With Meals  insulin detemir, 23 Units, Subcutaneous, Q12H  levoFLOXacin, 750 mg, Intravenous, Q24H  polyethylene glycol, 17 g, Oral, Daily  sodium chloride, 10 mL, Intravenous, Q12H  sodium chloride, 10 mL, Intravenous, Q12H  sodium chloride, 10 mL, Intravenous, Q12H        Assessment/Plan         Acute hypoxemic respiratory failure due to COVID-19 (HCC)    Acute respiratory failure with hypoxia (HCC)    1. Acute hypoxic respiratory failure s/p mechanical ventilation 12/18   2. Ac COVID-19 pneumonia  3. ARDS  4. MSSA / Klebsiella pneumonia  5. Pneumomediastinum  6. Shock  7. Cytokine release syndrome s/p remdesivir  8. Extensive left leg DVT on AC  9. A. fib RVR  10. Uncontrolled diabetes  11. Obesity  12. Mild hepatitis; Covid related  13. Fever        C&S  noted     Plan :        IV levaquine till 1/8/22    supp care  Will follow  Thank " peyton Osborne MD  01/06/22  15:23 EST

## 2022-01-06 NOTE — MBS/VFSS/FEES
Acute Care - Speech Language Pathology   Swallow Initial Evaluation  Tell     Patient Name: Sergio Daniel  : 1955  MRN: [id number]  Today's Date: 2022               Admit Date: 2021    Visit Dx:     ICD-10-CM ICD-9-CM   1. Acute respiratory failure with hypoxia (HCC)  J96.01 518.81   2. Pneumonia due to COVID-19 virus  U07.1 480.8    J12.82 079.89   3. Paroxysmal atrial fibrillation (HCC)  I48.0 427.31   4. Pharyngeal dysphagia  R13.13 787.23   5. Oropharyngeal dysphagia  R13.12 787.22     Patient Active Problem List   Diagnosis   • Acute respiratory failure with hypoxia (HCC)   • Acute hypoxemic respiratory failure due to COVID-19 (HCC)     Past Medical History:   Diagnosis Date   • Diabetes mellitus (HCC)      History reviewed. No pertinent surgical history.    SLP Recommendation and Plan  SLP Swallowing Diagnosis: mild-moderate, oral dysphagia, pharyngeal dysphagia (22)  SLP Diet Recommendation: honey thick liquids, soft textures, ground (22)  Recommended Precautions and Strategies: upright posture during/after eating, small bites of food and sips of liquid, no straw, multiple swallows per bite of food, multiple swallows per sip of liquid, check mouth frequently for oral residue/pocketing, general aspiration precautions, reflux precautions, fatigue precautions, 1:1 supervision (22)  SLP Rec. for Method of Medication Administration: meds whole, meds crushed, with pudding or applesauce, as tolerated (22)     Monitor for Signs of Aspiration: no, none - silent aspiration present, fever, upper respiratory, pneumonia (22)  Recommended Diagnostics: reassess via VFSS (MBS) (22)  Swallow Criteria for Skilled Therapeutic Interventions Met: demonstrates skilled criteria (22)  Anticipated Discharge Disposition (SLP): skilled nursing facility, anticipate therapy at next level of care (22)  Rehab Potential/Prognosis,  Swallowing: good, to achieve stated therapy goals (01/05/22 1330)  Therapy Frequency (Swallow): daily, at least, 5 days per week (01/05/22 1330)  Predicted Duration Therapy Intervention (Days): 2 weeks, until discharge (01/05/22 1330)                                  Plan of Care Reviewed With: patient, other (see comments) (LUISANA, Denisa; MD, Dr. Hyatt)  Outcome Summary: MBS/SLP: Mild to moderate oropharyngeal dysphagia. SILENT Aspiration of nectar by straw from pt controlled bolus. Recommend to start a soft, ground diet with honey thick liquids in fully upright position. Drinks by spoon only and small bites. Encourage subsequent swallows to clear residue. No straws. Meds whole or crushed in applesauce. Supervision for all po intake and assist. Oral care before breakfast and after po intake with toothbrush. SLP to continue to follow for pt independent use of strategies and advancement of diet. Recommend f/u MBS to advance diet/liquids. See report.      SWALLOW EVALUATION (last 72 hours)     SLP Adult Swallow Evaluation     Row Name 01/05/22 1330 01/04/22 1500 01/03/22 1368             Rehab Evaluation    Document Type evaluation  MBS  -AD therapy note (daily note)  -AD therapy note (daily note)  -AD      Subjective Information no complaints  -AD complains of; fatigue  -AD no complaints  -AD      Patient Observations alert; cooperative; agree to therapy  -AD alert; cooperative; agree to therapy  -AD alert; cooperative; agree to therapy  -AD      Patient/Family/Caregiver Comments/Observations Pt seen upright in videoimaging chair.  -AD Pt seen upright in recliner. States he is getting tired and is ready to go back to bed. RN notified. Agreeable to therapy while waiting.  -AD Pt seen at bedside with head of bed elevated to upright position. Alert, but difficult to understand at times due to background noise and weak vocal quality.  -AD      Patient Effort good  -AD good  -AD adequate  -AD      Comment Pt highly  motivated to perform well on MBS.  -AD Pt appeared to put forth his best effort on therapy tasks.  -AD --      Symptoms Noted During/After Treatment other (see comments)  generalized weakness  -AD fatigue; other (see comments)  -AD other (see comments)  cough and breathy voice  -AD      Symptoms Noted, Comment -- intermittent confusion; hoarse voice  -AD --              General Information    Patient Profile Reviewed yes  -AD -- --      Pertinent History Of Current Problem No changes in history. Pt currently on 2L NC. Dobhoff pulled out accidently by pt today. Hx from initial evaluation as follows:Pt is a 65 y/o male admitted with acute hypoxic respiratory failure due to Covid-19 infection/pneumonia. Also diagnosed with ARDS, MSSA/Klebsiella pneum, shock, cyotkine release syndrome s/p remdesivir, extensive DVT LLE, afib w/RVR, uncontrolled diabetes, obesity, mild hepatitis r/t covid and fever. Pt intubated for greater than two weeks and extubated on 1/1/22 and placed on heated hi flow O2. CXR of 1/1/22 with bilateral pneumonia that is stable to worsening appearance in the left lung. Mid to lower lung zone opacities.  -AD -- --      Current Method of Nutrition NPO; no current diet order  -AD -- --      Precautions/Limitations, Vision WFL with corrective lenses  -AD -- --      Precautions/Limitations, Hearing WFL  -AD -- --      Prior Level of Function-Communication WFL  prior to hospitalization; current suspected cognitive deficits due to prior sedation; monitor for need for further evaluation.  -AD -- --      Prior Level of Function-Swallowing no diet consistency restrictions; regular textures; thin liquids  -AD -- --      Plans/Goals Discussed with patient; agreed upon  -AD -- --      Barriers to Rehab medically complex  -AD -- --      Patient's Goals for Discharge return to regular diet; return to PO diet  -AD -- --      Family Goals for Discharge patient able to return to PO diet; patient able to return to  regular diet  prior conversation  -AD -- --              Pain    Additional Documentation --  no current pain reported  -AD --  no pain reported  -AD --  no current pain reported  -AD              General Eating/Swallowing Observations    Respiratory Support Currently in Use nasal cannula  2L  -AD nasal cannula  3L  -AD nasal cannula  4L humidified  -AD      Eating/Swallowing Skills -- -- fed by SLP  -AD      Positioning During Eating upright 90 degree; upright in chair  video imaging chair  -AD -- upright in bed  near 90 degrees  -AD      Utensils Used -- -- spoon  -AD      Consistencies Trialed -- -- ice chips; pureed  -AD      Pre SpO2 (%) -- -- 92  -AD      Post SpO2 (%) -- -- 93  -AD              Respiratory    Respiratory Status WFL; during swallowing/eating  -AD WFL  -AD WFL; during swallowing/eating  -AD              MBS/VFSS    Utensils Used spoon; straw  -AD -- --      Consistencies Trialed regular textures; pureed; thin liquids; nectar/syrup-thick liquids; honey-thick liquids  -AD -- --              MBS/VFSS Interpretation    Oral Prep Phase impaired oral phase of swallowing  -AD -- --      Oral Transit Phase impaired  -AD -- --      Oral Residue impaired  -AD -- --      VFSS Summary Pt presents with a mild to moderate oropharyngeal dysphagia with increased oral prep of solids, tongue pumping on all trials and oral residue due to lingual weakness and decreased bolus control. Pt able to clear oral residue spontaneously. Pharyngeal phase with delay of swallow on all trials and consistencies. Bolus pools in valleculae on spoon size trials of nectar, honey, puree and solid. Pooling to pyriforms on thins and nectar by cup. Delay of swallow also results in decrease vestibular closure during the swallow. This results in transient and non transient penetration of thins and nectar thick liquids and aspiration of nectar thick liquids from straw w/pt controlled bolus taken. Silent aspiration noted. Pt able to  perform a delayed cued cough but unable to view adequately below the vocal folds to see if subglottic material cleared. No significant residue noted in the vestibule after cued cough. Pharyngeal residue noted on the base of tongue, valleculae, posterior pharyngeal wall and pyriform sinuses. Decreased BOT retraction, postive pharyngeal squeeze and pharyngeal stripping wave noted. Pt able to clear all residue with initial cues to perform a subsequent swallow, then demonstrates spontaneous subsequent swallows. No aspiration viewed on thins by spoon, nectar thick by spoon, honey thick by spoon, puree and solids. No penetration noted on trials of solids.  -AD -- --      Oral Phase, Comment Mild to moderate oral phase dysphagia with decreased bolus control and lingual weakness resulting in tongue pumping, increased mastication time of solids and oral residue. Pt able to spontaneously clear oral residue.  -AD -- --              Oral Preparatory Phase    Oral Preparatory Phase prolonged manipulation  -AD -- --      Prolonged Manipulation regular textures; secondary to reduced lingual strength  -AD -- --              Oral Transit Phase    Impaired Oral Transit Phase tongue pumping; piecemeal oral transit  -AD -- --      Tongue Pumping all consistencies tested; secondary to reduced lingual control  -AD -- --      Piecemeal Oral Transit nectar-thick liquids; secondary to reduced lingual control; other (see comments)  from straw only  -AD -- --              Oral Residue    Impaired Oral Residue lingual residue  -AD -- --      Lingual Residue pudding/puree; secondary to reduced lingual strength  -AD -- --      Response to Oral Residue cleared residue; with spontaneous subsequent swallow  -AD -- --              Initiation of Pharyngeal Swallow    Initiation of Pharyngeal Swallow bolus in valleculae; bolus in pyriform sinuses  -AD -- --      Pharyngeal Phase impaired pharyngeal phase of swallowing  -AD -- --      Penetration  Before the Swallow nectar-thick liquids; secondary to reduced back of tongue control; secondary to delayed swallow initiation or mistiming; other (see comments)  from pt controlled straw drinks  -AD -- --      Penetration During the Swallow thin liquids; nectar-thick liquids; honey-thick liquids; secondary to delayed swallow initiation or mistiming; secondary to reduced vestibular closure  -AD -- --      Aspiration During the Swallow nectar-thick liquids; secondary to delayed swallow initiation or mistiming; secondary to reduced vestibular closure; secondary to reduced laryngeal (VF) closure  silent on pt controlled straw drink  -AD -- --      Penetration After the Swallow honey-thick liquids; secondary to residue; in valleculae; in pyriform sinuses  1/3 trials  -AD -- --      Response to Penetration shallow; transient; deep; no response; response with cue only; weak cough/throat clear  -AD -- --      Response to Aspiration no response, silent aspiration; response with cue only; weak cough/throat clear; other (see comments)  unable to view subglottic area adequately due to shoulder/positioning  -AD -- --      Rosenbek's Scale thin:; 3--->level 3; nectar:; 8--->level 8; honey:; 2--->level 2; pudding/puree:; regular textures:; 1--->level 1  -AD -- --      Pharyngeal Residue all consistencies tested; base of tongue; valleculae; thin liquids; nectar-thick liquids; laryngeal vestibule; pyriform sinuses; secondary to reduced base of tongue retraction; secondary to reduced posterior pharyngeal wall stripping; secondary to reduced hyolaryngeal excursion; other (see comments)  reduced pharyngeal contraction/squeeze  -AD -- --      Response to Residue cleared residue with spontaneous subsequent swallow; cleared residue with cued swallow; other (see comments)  initial swallows required cues, but able to clear spont for most trials/consistencies  -AD -- --      Attempted Compensatory Maneuvers bolus size; additional subsequent  swallow; throat clear after swallow  -AD -- --      Response to Attempted Compensatory Maneuvers reduced residue; prevented aspiration  bolus size eliminated aspiration; cued and subsequent swallows clears pharyngeal residue  -AD -- --      Pharyngeal Phase, Comment Mild to moderate pharyngeal dysphagia with delay of swallow on all trials and consistencies. Bolus pools in valleculae on spoon size trials of nectar, honey, puree and solid. Pooling to pyriforms on thins and nectar by cup. Delay of swallow also results in decrease vestibular closure during the swallow. This results in transient and non transient penetration of thins and nectar thick liquids and aspiration of nectar thick liquids from straw w/pt controlled bolus taken. Silent aspiration noted. Pt able to perform a delayed cued cough but unable to view adequately below the vocal folds to see if subglottic material cleared. No significant residue noted in the vestibule after cued cough. Pharyngeal residue noted on the base of tongue, valleculae, posterior pharyngeal wall and pyriform sinuses. Decreased BOT retraction, postive pharyngeal squeeze and pharyngeal stripping wave noted. Pt able to clear all residue with initial cues to perform a subsequent swallow, then demonstrates spontaneous subsequent swallows. No aspiration viewed on thins by spoon, nectar thick by spoon, honey thick by spoon, puree and solids. No penetration noted on trials of solids.  -AD -- --              Esophageal Phase    Esophageal Phase, Comment Unable to view at this time due to limitations in position.  -AD -- --              SLP Evaluation Clinical Impression    SLP Swallowing Diagnosis mild-moderate; oral dysphagia; pharyngeal dysphagia  -AD -- --      Functional Impact risk of aspiration/pneumonia; risk of malnutrition  -AD -- --      Rehab Potential/Prognosis, Swallowing good, to achieve stated therapy goals  -AD -- --      Swallow Criteria for Skilled Therapeutic  Interventions Met demonstrates skilled criteria  -AD -- --              SLP Treatment Clinical Impressions    Treatment Assessment (SLP) -- Pt continues with improvement in voicing but still hoarse with breathiness and intermittent aphonia. Improved effort on tasks and able to complete 10 reps each of 3 exercises to improve lingual control and pharyngeal squeeze/transit.  -AD Pt with minimal to no improvement. Slight increase in voicing but shortly maintained and becomes breathy. Pt continues with coughing on ice chips; stronger cough noted today. Tolerates small bites of applesauce by spoon w/mild oral transit delay but no coughing. Continue to recommend MBS to further assess safety of po intake in the next day or two.  -AD      Daily Summary of Progress (SLP) -- progress towards functional goals is fair  -AD progress towards functional goals is fair  -AD      Barriers to Overall Progress (SLP) -- Fatigue; Medically complex  -AD Fatigue; Medically complex; Other (see comments)  significant weakness  -AD      Plan for Continued Treatment (SLP) -- continue treatment per plan of care  -AD continue treatment per plan of care  -AD      Care Plan Review -- care plan/treatment goals reviewed; risks/benefits reviewed; current/potential barriers reviewed; patient/other agree to care plan  -AD care plan/treatment goals reviewed; risks/benefits reviewed; current/potential barriers reviewed; patient/other agree to care plan  -AD      Care Plan Review, Other Participant(s) -- -- other (see comments)  Elizabeth LIEBERMAN  -AD              Recommendations    Therapy Frequency (Swallow) daily; at least; 5 days per week  -AD daily; at least; 5 days per week  -AD daily; at least; 5 days per week  -AD      Predicted Duration Therapy Intervention (Days) 2 weeks; until discharge  -AD 2 weeks; until discharge  -AD 2 weeks; until discharge  -AD      SLP Diet Recommendation honey thick liquids; soft textures; ground  -AD NPO; temporary alternate  methods of nutrition/hydration; other (see comments)  ice chips sparingly for moisture  -AD NPO; temporary alternate methods of nutrition/hydration; other (see comments)  consider ice chips sparingly for moisture; 2-3 per hour  -AD      Recommended Diagnostics reassess via VFSS (MBS)  -AD VFSS (MBS)  in next two days  -AD reassess via clinical swallow evaluation; other (see comments)  would benefit from MBS prior to starting po diet or meds; will monitor status to determine when ready for MBS.  -AD      Recommended Precautions and Strategies upright posture during/after eating; small bites of food and sips of liquid; no straw; multiple swallows per bite of food; multiple swallows per sip of liquid; check mouth frequently for oral residue/pocketing; general aspiration precautions; reflux precautions; fatigue precautions; 1:1 supervision  -AD general aspiration precautions; reflux precautions  -AD general aspiration precautions; reflux precautions  -AD      Oral Care Recommendations Oral Care before breakfast, after meals and PRN; Toothbrush  -AD Oral Care BID/PRN; Suction toothbrush  -AD Oral Care BID/PRN; Suction toothbrush  -AD      SLP Rec. for Method of Medication Administration meds whole; meds crushed; with pudding or applesauce; as tolerated  -AD meds via alternate route  -AD meds via alternate route  -AD      Monitor for Signs of Aspiration no; none - silent aspiration present; fever; upper respiratory; pneumonia  -AD yes; cough; notify SLP if any concerns  -AD yes; cough; notify SLP if any concerns  -AD      Anticipated Discharge Disposition (SLP) skilled nursing facility; anticipate therapy at next level of care  -AD home with home health  per conversation with wife, would like home health if pt able to stand/pivot transfer to bedside commode  -AD unknown  -AD      Patient/Family Concerns, Anticipated Discharge Disposition (SLP) -- Pt voices no concerns at this time.  -AD No current concerns per patient.   -AD              Swallow Goals (SLP)    Swallow Compensatory Strategies Goal Selection (SLP) swallow compensatory strategies, SLP goal 1  -AD -- --      Additional Documentation swallow compensatory strategies goal selection (SLP)  -AD -- --              Oral Nutrition/Hydration Goal 1 (SLP)    Oral Nutrition/Hydration Goal 1, SLP Pt will be able to participate in an instrumental assessment of swallow in order to determine safest, least restrictive diet w/limited risk of aspiration.  -AD Pt will be able to participate in an instrumental assessment of swallow in order to determine safest, least restrictive diet w/limited risk of aspiration.  -AD Pt will be able to participate in an instrumental assessment of swallow in order to determine safest, least restrictive diet w/limited risk of aspiration.  -AD      Time Frame (Oral Nutrition/Hydration Goal 1, SLP) short term goal (STG); 4 days  -AD short term goal (STG); 4 days  -AD short term goal (STG); 4 days  -AD      Barriers (Oral Nutrition/Hydration Goal 1, SLP) medically complex  -AD medically complex  -AD medically complex  -AD      Progress/Outcomes (Oral Nutrition/Hydration Goal 1, SLP) goal met  -AD goal ongoing  tolerated sitting up in a chair greater than one hour today; improved voicing  -AD goal ongoing  -AD              Oral Nutrition/Hydration Goal 2 (SLP)    Oral Nutrition/Hydration Goal 2, SLP Pt will demonstrate tolerance of a least restrictive diet w/o clinical s/s of aspiration or complications such as aspiration pneumonia  -AD Pt will demonstrate improved tolerance of thins on therapeutic trials of ice chips with SLP w/use of strategies as necessary and pt able to participate.  -AD Pt will demonstrate improved tolerance of thins on therapeutic trials of ice chips with SLP w/use of strategies as necessary and pt able to participate.  -AD      Time Frame (Oral Nutrition/Hydration Goal 2, SLP) short term goal (STG); 2 weeks  -AD short term goal (STG); 4  days  -AD short term goal (STG); 4 days  -AD      Barriers (Oral Nutrition/Hydration Goal 2, SLP) medically complex  -AD medically complex  -AD medically complex  -AD      Progress/Outcomes (Oral Nutrition/Hydration Goal 2, SLP) other (see comments); good progress toward goal; goal revised this date  Revised goal  -AD other (see comments)  not targeted during this session  -AD unable to make needed progress; goal ongoing  -AD              Lingual Strengthening Goal 1 (SLP)    Activity (Lingual Strengthening Goal 1, SLP) increase lingual tone/sensation/control/coordination/movement; increase tongue back strength  -AD increase lingual tone/sensation/control/coordination/movement  -AD increase lingual tone/sensation/control/coordination/movement  -AD      Increase Lingual Tone/Sensation/Control/Coordination/Movement lingual movement exercises; swallow trials  -AD lingual movement exercises; swallow trials  -AD lingual movement exercises; swallow trials  -AD      Increase Tongue Back Strength lingual movement exercises; lingual resistance exercises  -AD -- --      [place]/Accuracy (Lingual Strengthening Goal 1, SLP) with minimal cues (75-90% accuracy)  -AD with minimal cues (75-90% accuracy)  -AD with minimal cues (75-90% accuracy)  -AD      Time Frame (Lingual Strengthening Goal 1, SLP) short term goal (STG); 2 weeks  -AD short term goal (STG); 2 weeks  -AD short term goal (STG); 2 weeks  -AD      Barriers (Lingual Strengthening Goal 1, SLP) medically complex  -AD medically complex  -AD medically complex  -AD      Progress/Outcomes (Lingual Strengthening Goal 1, SLP) good progress toward goal; goal ongoing  -AD good progress toward goal; goal ongoing  able to complete 10 reps lingual protrusion and hold for 3 sec w/minimal prompts  -AD continuing progress toward goal; goal ongoing  improved w/tolerance of small bites of applesauce w/delayed oral transit  -AD              Pharyngeal Strengthening Exercise Goal 1  (SLP)    Activity (Pharyngeal Strengthening Goal 1, SLP) increase closure at entrance to airway/closure of airway at glottis  -AD increase closure at entrance to airway/closure of airway at glottis  -AD increase closure at entrance to airway/closure of airway at glottis  -AD      Increase Closure at Entrance to Airway/Closure of Airway at Glottis hard effortful swallow; effortful pitch glide (falsetto + pharyngeal squeeze)  -AD hard effortful swallow; effortful pitch glide (falsetto + pharyngeal squeeze)  -AD hard effortful swallow; effortful pitch glide (falsetto + pharyngeal squeeze)  -AD      [place]/Accuracy (Pharyngeal Strengthening Goal 1, SLP) with moderate cues (50-74% accuracy)  -AD with moderate cues (50-74% accuracy)  -AD with moderate cues (50-74% accuracy)  -AD      Time Frame (Pharyngeal Strengthening Goal 1, SLP) short term goal (STG); 2 weeks  -AD short term goal (STG); 2 weeks  -AD short term goal (STG); 2 weeks  -AD      Barriers (Pharyngeal Strengthening Goal 1, SLP) medically complex  -AD medically complex  -AD mecially complex  -AD      Progress/Outcomes (Pharyngeal Strengthening Goal 1, SLP) goal ongoing  -AD continuing progress toward goal; goal ongoing; other (see comments)  eff pitch glide 10 reps with mod cues; effortful vocal cord closure 10 reps with mod cues.  -AD other (see comments)  not targeted during this session  -AD              Swallow Compensatory Strategies Goal 1 (SLP)    Activity (Swallow Compensatory Strategies/Techniques Goal 1, SLP) compensatory strategies; small bites; liquids by teaspoon only; effortful swallow; extra swallow per bolus; throat clear/extra swallow  -AD -- --      [place]/Accuracy (Swallow Compensatory Strategies/Techniques Goal 1, SLP) with moderate cues (50-74% accuracy)  -AD -- --      Time Frame (Swallow Compensatory Strategies/Techniques Goal 1, SLP) short term goal (STG); 1 week  -AD -- --      Barriers (Swallow Compensatory  Strategies/Techniques Goal 1, SLP) medically complex  -AD -- --      Progress/Outcomes (Swallow Compensatory Strategies/Techniques Goal 1, SLP) other (see comments)  New  -AD -- --            User Key  (r) = Recorded By, (t) = Taken By, (c) = Cosigned By    Initials Name Effective Dates    Denisa Mcconnell, MS CCC-SLP 06/16/21 -                 EDUCATION  The patient has been educated in the following areas:   Dysphagia (Swallowing Impairment) Modified Diet Instruction. Pt verbalizes understanding of results and recommendations. Reinforcement and cues for use of compensatory strategies needed.        SLP GOALS     Row Name 01/05/22 1330 01/04/22 1500 01/03/22 1728       Oral Nutrition/Hydration Goal 1 (SLP)    Oral Nutrition/Hydration Goal 1, SLP Pt will be able to participate in an instrumental assessment of swallow in order to determine safest, least restrictive diet w/limited risk of aspiration.  -AD Pt will be able to participate in an instrumental assessment of swallow in order to determine safest, least restrictive diet w/limited risk of aspiration.  -AD Pt will be able to participate in an instrumental assessment of swallow in order to determine safest, least restrictive diet w/limited risk of aspiration.  -AD    Time Frame (Oral Nutrition/Hydration Goal 1, SLP) short term goal (STG); 4 days  -AD short term goal (STG); 4 days  -AD short term goal (STG); 4 days  -AD    Barriers (Oral Nutrition/Hydration Goal 1, SLP) medically complex  -AD medically complex  -AD medically complex  -AD    Progress/Outcomes (Oral Nutrition/Hydration Goal 1, SLP) goal met  -AD goal ongoing  tolerated sitting up in a chair greater than one hour today; improved voicing  -AD goal ongoing  -AD       Oral Nutrition/Hydration Goal 2 (SLP)    Oral Nutrition/Hydration Goal 2, SLP Pt will demonstrate tolerance of a least restrictive diet w/o clinical s/s of aspiration or complications such as aspiration pneumonia  -AD Pt will  demonstrate improved tolerance of thins on therapeutic trials of ice chips with SLP w/use of strategies as necessary and pt able to participate.  -AD Pt will demonstrate improved tolerance of thins on therapeutic trials of ice chips with SLP w/use of strategies as necessary and pt able to participate.  -AD    Time Frame (Oral Nutrition/Hydration Goal 2, SLP) short term goal (STG); 2 weeks  -AD short term goal (STG); 4 days  -AD short term goal (STG); 4 days  -AD    Barriers (Oral Nutrition/Hydration Goal 2, SLP) medically complex  -AD medically complex  -AD medically complex  -AD    Progress/Outcomes (Oral Nutrition/Hydration Goal 2, SLP) other (see comments); good progress toward goal; goal revised this date  Revised goal  -AD other (see comments)  not targeted during this session  -AD unable to make needed progress; goal ongoing  -AD       Lingual Strengthening Goal 1 (SLP)    Activity (Lingual Strengthening Goal 1, SLP) increase lingual tone/sensation/control/coordination/movement; increase tongue back strength  -AD increase lingual tone/sensation/control/coordination/movement  -AD increase lingual tone/sensation/control/coordination/movement  -AD    Increase Lingual Tone/Sensation/Control/Coordination/Movement lingual movement exercises; swallow trials  -AD lingual movement exercises; swallow trials  -AD lingual movement exercises; swallow trials  -AD    Increase Tongue Back Strength lingual movement exercises; lingual resistance exercises  -AD -- --    [place]/Accuracy (Lingual Strengthening Goal 1, SLP) with minimal cues (75-90% accuracy)  -AD with minimal cues (75-90% accuracy)  -AD with minimal cues (75-90% accuracy)  -AD    Time Frame (Lingual Strengthening Goal 1, SLP) short term goal (STG); 2 weeks  -AD short term goal (STG); 2 weeks  -AD short term goal (STG); 2 weeks  -AD    Barriers (Lingual Strengthening Goal 1, SLP) medically complex  -AD medically complex  -AD medically complex  -AD     Progress/Outcomes (Lingual Strengthening Goal 1, SLP) good progress toward goal; goal ongoing  -AD good progress toward goal; goal ongoing  able to complete 10 reps lingual protrusion and hold for 3 sec w/minimal prompts  -AD continuing progress toward goal; goal ongoing  improved w/tolerance of small bites of applesauce w/delayed oral transit  -AD       Pharyngeal Strengthening Exercise Goal 1 (SLP)    Activity (Pharyngeal Strengthening Goal 1, SLP) increase closure at entrance to airway/closure of airway at glottis  -AD increase closure at entrance to airway/closure of airway at glottis  -AD increase closure at entrance to airway/closure of airway at glottis  -AD    Increase Closure at Entrance to Airway/Closure of Airway at Glottis hard effortful swallow; effortful pitch glide (falsetto + pharyngeal squeeze)  -AD hard effortful swallow; effortful pitch glide (falsetto + pharyngeal squeeze)  -AD hard effortful swallow; effortful pitch glide (falsetto + pharyngeal squeeze)  -AD    [place]/Accuracy (Pharyngeal Strengthening Goal 1, SLP) with moderate cues (50-74% accuracy)  -AD with moderate cues (50-74% accuracy)  -AD with moderate cues (50-74% accuracy)  -AD    Time Frame (Pharyngeal Strengthening Goal 1, SLP) short term goal (STG); 2 weeks  -AD short term goal (STG); 2 weeks  -AD short term goal (STG); 2 weeks  -AD    Barriers (Pharyngeal Strengthening Goal 1, SLP) medically complex  -AD medically complex  -AD mecially complex  -AD    Progress/Outcomes (Pharyngeal Strengthening Goal 1, SLP) goal ongoing  -AD continuing progress toward goal; goal ongoing; other (see comments)  eff pitch glide 10 reps with mod cues; effortful vocal cord closure 10 reps with mod cues.  -AD other (see comments)  not targeted during this session  -AD       Swallow Compensatory Strategies Goal 1 (SLP)    Activity (Swallow Compensatory Strategies/Techniques Goal 1, SLP) compensatory strategies; small bites; liquids by teaspoon  only; effortful swallow; extra swallow per bolus; throat clear/extra swallow  -AD -- --    [place]/Accuracy (Swallow Compensatory Strategies/Techniques Goal 1, SLP) with moderate cues (50-74% accuracy)  -AD -- --    Time Frame (Swallow Compensatory Strategies/Techniques Goal 1, SLP) short term goal (STG); 1 week  -AD -- --    Barriers (Swallow Compensatory Strategies/Techniques Goal 1, SLP) medically complex  -AD -- --    Progress/Outcomes (Swallow Compensatory Strategies/Techniques Goal 1, SLP) other (see comments)  New  -AD -- --          User Key  (r) = Recorded By, (t) = Taken By, (c) = Cosigned By    Initials Name Provider Type    Denisa Mcconnell MS CCC-SLP Speech and Language Pathologist                   Time Calculation:    Time Calculation- SLP     Row Name 01/05/22 1927             Time Calculation- SLP    SLP Start Time 1330  -AD      SLP Stop Time 1515  -AD      SLP Time Calculation (min) 105 min  -AD      Total Timed Code Minutes- SLP 0 minute(s)  -AD      SLP Non-Billable Time (min) 0 min  -AD      SLP Received On 01/05/22  -AD      SLP - Next Appointment 01/06/22  -AD              Untimed Charges    SLP Eval/Re-eval  [facility] Fluoro Eval Swallow - 60507  -AD      03749-BQ Motion Fluoro Eval Swallow Minutes 105  -AD              Total Minutes    Untimed Charges Total Minutes 105  -AD       Total Minutes 105  -AD            User Key  (r) = Recorded By, (t) = Taken By, (c) = Cosigned By    Initials Name Provider Type    Denisa Mcconnell MS CCC-SLP Speech and Language Pathologist                Therapy Charges for Today     Code Description Service Date Service Provider Modifiers Qty    64655014561 HC ST TREATMENT SWALLOW 2 1/4/2022 Denisa Chua MS CCC-SLP GN 1    05853080139 HC ST MOTION FLUORO EVAL SWALLOW 7 1/5/2022 Denisa Chua MS CCC-SLP GN 1               Denisa Chua MS CCC-SLP  1/5/2022

## 2022-01-06 NOTE — PLAN OF CARE
Problem: Fall Injury Risk  Goal: Absence of Fall and Fall-Related Injury  Outcome: Ongoing, Progressing  Intervention: Identify and Manage Contributors to Fall Injury Risk  Recent Flowsheet Documentation  Taken 1/6/2022 1600 by Sergio Raman RN  Self-Care Promotion:   independence encouraged   BADL personal objects within reach   BADL personal routines maintained  Taken 1/6/2022 1400 by Sergio Raman RN  Self-Care Promotion:   independence encouraged   BADL personal objects within reach   BADL personal routines maintained  Taken 1/6/2022 1200 by Sergio Raman RN  Medication Review/Management: medications reviewed  Self-Care Promotion:   independence encouraged   BADL personal objects within reach   BADL personal routines maintained  Taken 1/6/2022 1000 by Sergio Raman RN  Self-Care Promotion:   independence encouraged   BADL personal objects within reach   BADL personal routines maintained  Taken 1/6/2022 0800 by Sergio Raman RN  Self-Care Promotion:   independence encouraged   BADL personal objects within reach   BADL personal routines maintained  Intervention: Promote Injury-Free Environment  Recent Flowsheet Documentation  Taken 1/6/2022 1600 by Sergio Raman RN  Safety Promotion/Fall Prevention:   activity supervised   assistive device/personal items within reach   clutter free environment maintained   fall prevention program maintained   nonskid shoes/slippers when out of bed   safety round/check completed  Taken 1/6/2022 1400 by Sergio Raman RN  Safety Promotion/Fall Prevention:   activity supervised   assistive device/personal items within reach   clutter free environment maintained   fall prevention program maintained   nonskid shoes/slippers when out of bed   safety round/check completed  Taken 1/6/2022 1200 by Sergio Raman RN  Safety Promotion/Fall Prevention:   activity supervised   assistive device/personal items within reach   clutter free environment maintained   fall prevention  program maintained   nonskid shoes/slippers when out of bed   safety round/check completed  Taken 1/6/2022 1000 by Sergio Raman RN  Safety Promotion/Fall Prevention:   activity supervised   assistive device/personal items within reach   clutter free environment maintained   fall prevention program maintained   nonskid shoes/slippers when out of bed   safety round/check completed  Taken 1/6/2022 0800 by Sergio Raman, RN  Safety Promotion/Fall Prevention:   activity supervised   assistive device/personal items within reach   clutter free environment maintained   fall prevention program maintained   nonskid shoes/slippers when out of bed   safety round/check completed     Problem: Adult Inpatient Plan of Care  Goal: Plan of Care Review  Outcome: Ongoing, Progressing  Flowsheets (Taken 1/6/2022 1737)  Outcome Summary: Patient had increased appetite tody an din good spirits  Goal: Patient-Specific Goal (Individualized)  Outcome: Ongoing, Progressing  Goal: Absence of Hospital-Acquired Illness or Injury  Outcome: Ongoing, Progressing  Intervention: Identify and Manage Fall Risk  Recent Flowsheet Documentation  Taken 1/6/2022 1600 by Sergio Raman RN  Safety Promotion/Fall Prevention:   activity supervised   assistive device/personal items within reach   clutter free environment maintained   fall prevention program maintained   nonskid shoes/slippers when out of bed   safety round/check completed  Taken 1/6/2022 1400 by Sergio Raman RN  Safety Promotion/Fall Prevention:   activity supervised   assistive device/personal items within reach   clutter free environment maintained   fall prevention program maintained   nonskid shoes/slippers when out of bed   safety round/check completed  Taken 1/6/2022 1200 by Sergio Raman RN  Safety Promotion/Fall Prevention:   activity supervised   assistive device/personal items within reach   clutter free environment maintained   fall prevention program maintained   nonskid  shoes/slippers when out of bed   safety round/check completed  Taken 1/6/2022 1000 by Sergio Raman RN  Safety Promotion/Fall Prevention:   activity supervised   assistive device/personal items within reach   clutter free environment maintained   fall prevention program maintained   nonskid shoes/slippers when out of bed   safety round/check completed  Taken 1/6/2022 0800 by Sergio Raman RN  Safety Promotion/Fall Prevention:   activity supervised   assistive device/personal items within reach   clutter free environment maintained   fall prevention program maintained   nonskid shoes/slippers when out of bed   safety round/check completed  Intervention: Prevent Skin Injury  Recent Flowsheet Documentation  Taken 1/6/2022 1600 by Sergio Raman RN  Body Position: position changed independently  Taken 1/6/2022 1400 by Sergio Raman RN  Body Position: position changed independently  Taken 1/6/2022 1200 by Sergio Raman RN  Body Position:   position changed independently   weight shift assistance provided  Taken 1/6/2022 1000 by Sergio Raman RN  Body Position:   position changed independently   weight shift assistance provided  Taken 1/6/2022 0800 by Sergio Raman RN  Body Position:   position changed independently   weight shift assistance provided  Intervention: Prevent Infection  Recent Flowsheet Documentation  Taken 1/6/2022 1600 by Sergio Raman RN  Infection Prevention: single patient room provided  Goal: Optimal Comfort and Wellbeing  Outcome: Ongoing, Progressing  Goal: Readiness for Transition of Care  Outcome: Ongoing, Progressing     Problem: Fluid Imbalance (Pneumonia)  Goal: Fluid Balance  Outcome: Ongoing, Progressing     Problem: Infection (Pneumonia)  Goal: Resolution of Infection Signs and Symptoms  Outcome: Ongoing, Progressing     Problem: Respiratory Compromise (Pneumonia)  Goal: Effective Oxygenation and Ventilation  Outcome: Ongoing, Progressing  Intervention: Optimize Oxygenation and  Ventilation  Recent Flowsheet Documentation  Taken 1/6/2022 1600 by Sergio Raman RN  Head of Bed (HOB): HOB at 30-45 degrees  Taken 1/6/2022 1400 by Sergio Raman RN  Head of Bed (HOB): HOB at 30-45 degrees  Taken 1/6/2022 1200 by Sergio Raman RN  Head of Bed (HOB): HOB at 30-45 degrees  Taken 1/6/2022 1000 by Sergio Raman RN  Head of Bed (HOB): HOB at 30-45 degrees  Taken 1/6/2022 0800 by Sergio Raman RN  Head of Bed (HOB): HOB at 45 degrees     Problem: Arrhythmia/Dysrhythmia  Goal: Normalized Cardiac Rhythm  Outcome: Ongoing, Progressing     Problem: Skin Injury Risk Increased  Goal: Skin Health and Integrity  Outcome: Ongoing, Progressing  Intervention: Optimize Skin Protection  Recent Flowsheet Documentation  Taken 1/6/2022 1600 by Sergio Raman RN  Head of Bed (HOB): HOB at 30-45 degrees  Taken 1/6/2022 1400 by Sergio Raman RN  Head of Bed (HOB): HOB at 30-45 degrees  Taken 1/6/2022 1200 by Sergio Raman RN  Head of Bed (HOB): HOB at 30-45 degrees  Taken 1/6/2022 1000 by Sergio Raman RN  Head of Bed (HOB): HOB at 30-45 degrees  Taken 1/6/2022 0800 by Sergio Raman RN  Pressure Reduction Techniques:   weight shift assistance provided   frequent weight shift encouraged  Head of Bed (HOB): HOB at 45 degrees  Pressure Reduction Devices: pressure-redistributing mattress utilized     Problem: Communication Impairment (Mechanical Ventilation, Invasive)  Goal: Effective Communication  Outcome: Ongoing, Progressing     Problem: Device-Related Complication Risk (Mechanical Ventilation, Invasive)  Goal: Optimal Device Function  Outcome: Ongoing, Progressing  Intervention: Optimize Device Care and Function  Recent Flowsheet Documentation  Taken 1/6/2022 1400 by Sergio Raman RN  Aspiration Precautions: awake/alert before oral intake  Taken 1/6/2022 1200 by Sergio Raman RN  Aspiration Precautions: awake/alert before oral intake  Taken 1/6/2022 0800 by Sergio Raman RN  Aspiration  Precautions: awake/alert before oral intake     Problem: Inability to Wean (Mechanical Ventilation, Invasive)  Goal: Mechanical Ventilation Liberation  Outcome: Ongoing, Progressing  Intervention: Promote Extubation and Mechanical Ventilation Liberation  Recent Flowsheet Documentation  Taken 1/6/2022 1200 by Sergio Raman RN  Medication Review/Management: medications reviewed     Problem: Nutrition Impairment (Mechanical Ventilation, Invasive)  Goal: Optimal Nutrition Delivery  Outcome: Ongoing, Progressing     Problem: Skin and Tissue Injury (Mechanical Ventilation, Invasive)  Goal: Absence of Device-Related Skin and Tissue Injury  Outcome: Ongoing, Progressing  Intervention: Maintain Skin and Tissue Health  Recent Flowsheet Documentation  Taken 1/6/2022 0800 by Sergio Raman RN  Device Skin Pressure Protection: absorbent pad utilized/changed     Problem: Ventilator-Induced Lung Injury (Mechanical Ventilation, Invasive)  Goal: Absence of Ventilator-Induced Lung Injury  Outcome: Ongoing, Progressing  Intervention: Prevent Ventilator-Associated Pneumonia  Recent Flowsheet Documentation  Taken 1/6/2022 1600 by Sergio Raman RN  Head of Bed (HOB): HOB at 30-45 degrees  Taken 1/6/2022 1400 by Sergio Raman RN  Head of Bed (HOB): HOB at 30-45 degrees  Taken 1/6/2022 1200 by Sergio Rmaan RN  Head of Bed (HOB): HOB at 30-45 degrees  Taken 1/6/2022 1000 by Sergio Raman RN  Head of Bed (HOB): HOB at 30-45 degrees  Taken 1/6/2022 0800 by Sergio Raman RN  Head of Bed (HOB): HOB at 45 degrees     Problem: Restraint, Nonbehavioral (Nonviolent)  Goal: Discontinuation Criteria Achieved  Outcome: Ongoing, Progressing  Goal: Personal Dignity and Safety Maintained  Outcome: Ongoing, Progressing  Intervention: Protect Skin and Joint Integrity  Recent Flowsheet Documentation  Taken 1/6/2022 1600 by Sergio Raman RN  Body Position: position changed independently  Taken 1/6/2022 1400 by Sergio Raman RN  Body  Position: position changed independently  Taken 1/6/2022 1200 by Sergio Raman, RN  Body Position:   position changed independently   weight shift assistance provided  Taken 1/6/2022 1000 by Sergio Raman RN  Body Position:   position changed independently   weight shift assistance provided  Taken 1/6/2022 0800 by Sergio Raman, RN  Body Position:   position changed independently   weight shift assistance provided   Goal Outcome Evaluation:              Outcome Summary: Patient had increased appetite tody an din good spirits

## 2022-01-06 NOTE — THERAPY TREATMENT NOTE
Acute Care - Speech Language Pathology   Swallow Treatment Note ARELIS Flaherty     Patient Name: Sergio Daniel  : 1955  MRN: [id number]  Today's Date: 2022               Admit Date: 2021    Visit Dx:     ICD-10-CM ICD-9-CM   1. Acute respiratory failure with hypoxia (HCC)  J96.01 518.81   2. Pneumonia due to COVID-19 virus  U07.1 480.8    J12.82 079.89   3. Paroxysmal atrial fibrillation (HCC)  I48.0 427.31   4. Pharyngeal dysphagia  R13.13 787.23   5. Oropharyngeal dysphagia  R13.12 787.22     Patient Active Problem List   Diagnosis   • Acute respiratory failure with hypoxia (HCC)   • Acute hypoxemic respiratory failure due to COVID-19 (HCC)     Past Medical History:   Diagnosis Date   • Diabetes mellitus (HCC)      History reviewed. No pertinent surgical history.    SLP Recommendation and Plan  SLP Swallowing Diagnosis: mild-moderate, oral dysphagia, pharyngeal dysphagia (22)  SLP Diet Recommendation: honey thick liquids, soft textures, ground (22)  Recommended Precautions and Strategies: upright posture during/after eating, small bites of food and sips of liquid, no straw, multiple swallows per bite of food, multiple swallows per sip of liquid, check mouth frequently for oral residue/pocketing, general aspiration precautions, reflux precautions, fatigue precautions, 1:1 supervision (22)  SLP Rec. for Method of Medication Administration: meds whole, meds crushed, with pudding or applesauce, as tolerated (22)     Monitor for Signs of Aspiration: no, none - silent aspiration present, fever, upper respiratory, pneumonia (22)  Recommended Diagnostics: reassess via VFSS (MBS) (22)  Swallow Criteria for Skilled Therapeutic Interventions Met: demonstrates skilled criteria (22)  Anticipated Discharge Disposition (SLP): skilled nursing facility, anticipate therapy at next level of care (22)  Rehab Potential/Prognosis,  Swallowing: good, to achieve stated therapy goals (01/05/22 1330)  Therapy Frequency (Swallow): daily, at least, 5 days per week (01/06/22 0910)  Predicted Duration Therapy Intervention (Days): 2 weeks, until discharge (01/06/22 0910)     Daily Summary of Progress (SLP): progress toward functional goals as expected (01/06/22 0910)         Patient/Family Concerns, Anticipated Discharge Disposition (SLP): No concerns reported per patient at this time. (01/06/22 0910)12:46 Updated patient's wife, Luz Elena Daniel, via phone. Provided MBS results and progress with therapy today. All questions answered. No further concerns reported per wife.      Treatment Assessment (SLP): Pt continues to demonstrate excellent progress towards his functional goals. He demonstrate tolerance of his current diet with SLP feeding and small bites, drinks by spoon only. Pt able to follow compensatory strategies but requires cues to perform consistently. No overt s/s and no respiratory changes during meal. Good po intake overall as well. Mild oral residue on trials of mechanical soft and pt requires cues to clear residue on tongue and lateral sulci. Continues with breathy vocal quality, but improved at times with increased effort by patient. (01/06/22 0910)  Plan for Continued Treatment (SLP): continue treatment per plan of care (01/06/22 0910)         Plan of Care Reviewed With: patient  Outcome Summary: SLP: Pt demonstrating tolerance of current diet with feeding by staff due to inability to self feed at this time. Good po intake of meal. Continues to need verbal cues for subsequent swallows, oral clearance and small bites with use of whole banana. SLP to continue to follow. Consider cognitive eval if mentation does not return to baseline in next few days.      SWALLOW EVALUATION (last 72 hours)     SLP Adult Swallow Evaluation     Row Name 01/06/22 0910 01/05/22 1330 01/04/22 1500 01/03/22 0010          Rehab Evaluation    Document Type  therapy note (daily note)  -AD evaluation  MBS  -AD therapy note (daily note)  -AD therapy note (daily note)  -AD     Subjective Information no complaints  -AD no complaints  -AD complains of; fatigue  -AD no complaints  -AD     Patient Observations alert; cooperative; agree to therapy  -AD alert; cooperative; agree to therapy  -AD alert; cooperative; agree to therapy  -AD alert; cooperative; agree to therapy  -AD     Patient/Family/Caregiver Comments/Observations Pt seen upright in bed with assistance from OT to get fully upright. Just finished therapy with OT/PT>  -AD Pt seen upright in videoimaging chair.  -AD Pt seen upright in recliner. States he is getting tired and is ready to go back to bed. RN notified. Agreeable to therapy while waiting.  -AD Pt seen at bedside with head of bed elevated to upright position. Alert, but difficult to understand at times due to background noise and weak vocal quality.  -AD     Patient Effort good  -AD good  -AD good  -AD adequate  -AD     Comment Pt putting forth his best effort on all tasks.  -AD Pt highly motivated to perform well on MBS.  -AD Pt appeared to put forth his best effort on therapy tasks.  -AD --     Symptoms Noted During/After Treatment none  -AD other (see comments)  generalized weakness  -AD fatigue; other (see comments)  -AD other (see comments)  cough and breathy voice  -AD     Symptoms Noted, Comment -- -- intermittent confusion; hoarse voice  -AD --            General Information    Patient Profile Reviewed -- yes  -AD -- --     Pertinent History Of Current Problem -- No changes in history. Pt currently on 2L NC. Dobhoff pulled out accidently by pt today. Hx from initial evaluation as follows:Pt is a 65 y/o male admitted with acute hypoxic respiratory failure due to Covid-19 infection/pneumonia. Also diagnosed with ARDS, MSSA/Klebsiella pneum, shock, cyotkine release syndrome s/p remdesivir, extensive DVT LLE, afib w/RVR, uncontrolled diabetes, obesity,  mild hepatitis r/t covid and fever. Pt intubated for greater than two weeks and extubated on 1/1/22 and placed on heated hi flow O2. CXR of 1/1/22 with bilateral pneumonia that is stable to worsening appearance in the left lung. Mid to lower lung zone opacities.  -AD -- --     Current Method of Nutrition -- NPO; no current diet order  -AD -- --     Precautions/Limitations, Vision -- WFL with corrective lenses  -AD -- --     Precautions/Limitations, Hearing -- WFL  -AD -- --     Prior Level of Function-Communication -- WFL  prior to hospitalization; current suspected cognitive deficits due to prior sedation; monitor for need for further evaluation.  -AD -- --     Prior Level of Function-Swallowing -- no diet consistency restrictions; regular textures; thin liquids  -AD -- --     Plans/Goals Discussed with -- patient; agreed upon  -AD -- --     Barriers to Rehab -- medically complex  -AD -- --     Patient's Goals for Discharge -- return to regular diet; return to PO diet  -AD -- --     Family Goals for Discharge -- patient able to return to PO diet; patient able to return to regular diet  prior conversation  -AD -- --            Pain    Additional Documentation --  no pain reported  -AD --  no current pain reported  -AD --  no pain reported  -AD --  no current pain reported  -AD            General Eating/Swallowing Observations    Respiratory Support Currently in Use nasal cannula  2L  -AD nasal cannula  2L  -AD nasal cannula  3L  -AD nasal cannula  4L humidified  -AD     Eating/Swallowing Skills fed by SLP; other (see comments)  Pt w/assist for self feeding a banana  -AD -- -- fed by SLP  -AD     Positioning During Eating upright 90 degree; upright in bed; other (see comments)  w/pillows to position fully upright  -AD upright 90 degree; upright in chair  video imaging chair  -AD -- upright in bed  near 90 degrees  -AD     Utensils Used spoon  -AD -- -- spoon  -AD     Consistencies Trialed -- -- -- ice chips; pureed   -AD     Pre SpO2 (%) -- -- -- 92  -AD     Post SpO2 (%) -- -- -- 93  -AD            Respiratory    Respiratory Status WFL; during swallowing/eating  -AD WFL; during swallowing/eating  -AD WFL  -AD WFL; during swallowing/eating  -AD     Respiratory Pattern exhale-exhale pattern  -AD -- -- --            MBS/VFSS    Utensils Used -- spoon; straw  -AD -- --     Consistencies Trialed -- regular textures; pureed; thin liquids; nectar/syrup-thick liquids; honey-thick liquids  -AD -- --            MBS/VFSS Interpretation    Oral Prep Phase -- impaired oral phase of swallowing  -AD -- --     Oral Transit Phase -- impaired  -AD -- --     Oral Residue -- impaired  -AD -- --     VFSS Summary -- Pt presents with a mild to moderate oropharyngeal dysphagia with increased oral prep of solids, tongue pumping on all trials and oral residue due to lingual weakness and decreased bolus control. Pt able to clear oral residue spontaneously. Pharyngeal phase with delay of swallow on all trials and consistencies. Bolus pools in valleculae on spoon size trials of nectar, honey, puree and solid. Pooling to pyriforms on thins and nectar by cup. Delay of swallow also results in decrease vestibular closure during the swallow. This results in transient and non transient penetration of thins and nectar thick liquids and aspiration of nectar thick liquids from straw w/pt controlled bolus taken. Silent aspiration noted. Pt able to perform a delayed cued cough but unable to view adequately below the vocal folds to see if subglottic material cleared. No significant residue noted in the vestibule after cued cough. Pharyngeal residue noted on the base of tongue, valleculae, posterior pharyngeal wall and pyriform sinuses. Decreased BOT retraction, postive pharyngeal squeeze and pharyngeal stripping wave noted. Pt able to clear all residue with initial cues to perform a subsequent swallow, then demonstrates spontaneous subsequent swallows. No aspiration  viewed on thins by spoon, nectar thick by spoon, honey thick by spoon, puree and solids. No penetration noted on trials of solids.  -AD -- --     Oral Phase, Comment -- Mild to moderate oral phase dysphagia with decreased bolus control and lingual weakness resulting in tongue pumping, increased mastication time of solids and oral residue. Pt able to spontaneously clear oral residue.  -AD -- --            Oral Preparatory Phase    Oral Preparatory Phase -- prolonged manipulation  -AD -- --     Prolonged Manipulation -- regular textures; secondary to reduced lingual strength  -AD -- --            Oral Transit Phase    Impaired Oral Transit Phase -- tongue pumping; piecemeal oral transit  -AD -- --     Tongue Pumping -- all consistencies tested; secondary to reduced lingual control  -AD -- --     Piecemeal Oral Transit -- nectar-thick liquids; secondary to reduced lingual control; other (see comments)  from straw only  -AD -- --            Oral Residue    Impaired Oral Residue -- lingual residue  -AD -- --     Lingual Residue -- pudding/puree; secondary to reduced lingual strength  -AD -- --     Response to Oral Residue -- cleared residue; with spontaneous subsequent swallow  -AD -- --            Initiation of Pharyngeal Swallow    Initiation of Pharyngeal Swallow -- bolus in valleculae; bolus in pyriform sinuses  -AD -- --     Pharyngeal Phase -- impaired pharyngeal phase of swallowing  -AD -- --     Penetration Before the Swallow -- nectar-thick liquids; secondary to reduced back of tongue control; secondary to delayed swallow initiation or mistiming; other (see comments)  from pt controlled straw drinks  -AD -- --     Penetration During the Swallow -- thin liquids; nectar-thick liquids; honey-thick liquids; secondary to delayed swallow initiation or mistiming; secondary to reduced vestibular closure  -AD -- --     Aspiration During the Swallow -- nectar-thick liquids; secondary to delayed swallow initiation or  mistiming; secondary to reduced vestibular closure; secondary to reduced laryngeal (VF) closure  silent on pt controlled straw drink  -AD -- --     Penetration After the Swallow -- honey-thick liquids; secondary to residue; in valleculae; in pyriform sinuses  1/3 trials  -AD -- --     Response to Penetration -- shallow; transient; deep; no response; response with cue only; weak cough/throat clear  -AD -- --     Response to Aspiration -- no response, silent aspiration; response with cue only; weak cough/throat clear; other (see comments)  unable to view subglottic area adequately due to shoulder/positioning  -AD -- --     Rosenbek's Scale -- thin:; 3--->level 3; nectar:; 8--->level 8; honey:; 2--->level 2; pudding/puree:; regular textures:; 1--->level 1  -AD -- --     Pharyngeal Residue -- all consistencies tested; base of tongue; valleculae; thin liquids; nectar-thick liquids; laryngeal vestibule; pyriform sinuses; secondary to reduced base of tongue retraction; secondary to reduced posterior pharyngeal wall stripping; secondary to reduced hyolaryngeal excursion; other (see comments)  reduced pharyngeal contraction/squeeze  -AD -- --     Response to Residue -- cleared residue with spontaneous subsequent swallow; cleared residue with cued swallow; other (see comments)  initial swallows required cues, but able to clear spont for most trials/consistencies  -AD -- --     Attempted Compensatory Maneuvers -- bolus size; additional subsequent swallow; throat clear after swallow  -AD -- --     Response to Attempted Compensatory Maneuvers -- reduced residue; prevented aspiration  bolus size eliminated aspiration; cued and subsequent swallows clears pharyngeal residue  -AD -- --     Pharyngeal Phase, Comment -- Mild to moderate pharyngeal dysphagia with delay of swallow on all trials and consistencies. Bolus pools in valleculae on spoon size trials of nectar, honey, puree and solid. Pooling to pyriforms on thins and nectar by  cup. Delay of swallow also results in decrease vestibular closure during the swallow. This results in transient and non transient penetration of thins and nectar thick liquids and aspiration of nectar thick liquids from straw w/pt controlled bolus taken. Silent aspiration noted. Pt able to perform a delayed cued cough but unable to view adequately below the vocal folds to see if subglottic material cleared. No significant residue noted in the vestibule after cued cough. Pharyngeal residue noted on the base of tongue, valleculae, posterior pharyngeal wall and pyriform sinuses. Decreased BOT retraction, postive pharyngeal squeeze and pharyngeal stripping wave noted. Pt able to clear all residue with initial cues to perform a subsequent swallow, then demonstrates spontaneous subsequent swallows. No aspiration viewed on thins by spoon, nectar thick by spoon, honey thick by spoon, puree and solids. No penetration noted on trials of solids.  -AD -- --            Esophageal Phase    Esophageal Phase, Comment -- Unable to view at this time due to limitations in position.  -AD -- --            SLP Evaluation Clinical Impression    SLP Swallowing Diagnosis -- mild-moderate; oral dysphagia; pharyngeal dysphagia  -AD -- --     Functional Impact -- risk of aspiration/pneumonia; risk of malnutrition  -AD -- --     Rehab Potential/Prognosis, Swallowing -- good, to achieve stated therapy goals  -AD -- --     Swallow Criteria for Skilled Therapeutic Interventions Met -- demonstrates skilled criteria  -AD -- --            SLP Treatment Clinical Impressions    Treatment Assessment (SLP) Pt continues to demonstrate excellent progress towards his functional goals. He demonstrate tolerance of his current diet with SLP feeding and small bites, drinks by spoon only. Pt able to follow compensatory strategies but requires cues to perform consistently. No overt s/s and no respiratory changes during meal. Good po intake overall as well. Mild  oral residue on trials of mechanical soft and pt requires cues to clear residue on tongue and lateral sulci. Continues with breathy vocal quality, but improved at times with increased effort by patient.  -AD -- Pt continues with improvement in voicing but still hoarse with breathiness and intermittent aphonia. Improved effort on tasks and able to complete 10 reps each of 3 exercises to improve lingual control and pharyngeal squeeze/transit.  -AD Pt with minimal to no improvement. Slight increase in voicing but shortly maintained and becomes breathy. Pt continues with coughing on ice chips; stronger cough noted today. Tolerates small bites of applesauce by spoon w/mild oral transit delay but no coughing. Continue to recommend MBS to further assess safety of po intake in the next day or two.  -AD     Daily Summary of Progress (SLP) progress toward functional goals as expected  -AD -- progress towards functional goals is fair  -AD progress towards functional goals is fair  -AD     Barriers to Overall Progress (SLP) -- -- Fatigue; Medically complex  -AD Fatigue; Medically complex; Other (see comments)  significant weakness  -AD     Plan for Continued Treatment (SLP) continue treatment per plan of care  -AD -- continue treatment per plan of care  -AD continue treatment per plan of care  -AD     Care Plan Review care plan/treatment goals reviewed; risks/benefits reviewed; current/potential barriers reviewed; patient/other agree to care plan  -AD -- care plan/treatment goals reviewed; risks/benefits reviewed; current/potential barriers reviewed; patient/other agree to care plan  -AD care plan/treatment goals reviewed; risks/benefits reviewed; current/potential barriers reviewed; patient/other agree to care plan  -AD     Care Plan Review, Other Participant(s) -- -- -- other (see comments)  Elizabeth LIEBERMAN  -AD            Recommendations    Therapy Frequency (Swallow) daily; at least; 5 days per week  -AD daily; at least; 5 days  per week  -AD daily; at least; 5 days per week  -AD daily; at least; 5 days per week  -AD     Predicted Duration Therapy Intervention (Days) 2 weeks; until discharge  -AD 2 weeks; until discharge  -AD 2 weeks; until discharge  -AD 2 weeks; until discharge  -AD     SLP Diet Recommendation honey thick liquids; soft textures; ground  -AD honey thick liquids; soft textures; ground  -AD NPO; temporary alternate methods of nutrition/hydration; other (see comments)  ice chips sparingly for moisture  -AD NPO; temporary alternate methods of nutrition/hydration; other (see comments)  consider ice chips sparingly for moisture; 2-3 per hour  -AD     Recommended Diagnostics reassess via VFSS (MBS)  -AD reassess via VFSS (MBS)  -AD VFSS (MBS)  in next two days  -AD reassess via clinical swallow evaluation; other (see comments)  would benefit from MBS prior to starting po diet or meds; will monitor status to determine when ready for MBS.  -AD     Recommended Precautions and Strategies upright posture during/after eating; small bites of food and sips of liquid; no straw; multiple swallows per bite of food; multiple swallows per sip of liquid; check mouth frequently for oral residue/pocketing; general aspiration precautions; reflux precautions; fatigue precautions; 1:1 supervision  -AD upright posture during/after eating; small bites of food and sips of liquid; no straw; multiple swallows per bite of food; multiple swallows per sip of liquid; check mouth frequently for oral residue/pocketing; general aspiration precautions; reflux precautions; fatigue precautions; 1:1 supervision  -AD general aspiration precautions; reflux precautions  -AD general aspiration precautions; reflux precautions  -AD     Oral Care Recommendations Oral Care before breakfast, after meals and PRN; Toothbrush  -AD Oral Care before breakfast, after meals and PRN; Toothbrush  -AD Oral Care BID/PRN; Suction toothbrush  -AD Oral Care BID/PRN; Suction toothbrush   -AD     SLP Rec. for Method of Medication Administration meds whole; meds crushed; with pudding or applesauce; as tolerated  -AD meds whole; meds crushed; with pudding or applesauce; as tolerated  -AD meds via alternate route  -AD meds via alternate route  -AD     Monitor for Signs of Aspiration no; none - silent aspiration present; fever; upper respiratory; pneumonia  -AD no; none - silent aspiration present; fever; upper respiratory; pneumonia  -AD yes; cough; notify SLP if any concerns  -AD yes; cough; notify SLP if any concerns  -AD     Anticipated Discharge Disposition (SLP) skilled nursing facility; anticipate therapy at next level of care  -AD skilled nursing facility; anticipate therapy at next level of care  -AD home with home health  per conversation with wife, would like home health if pt able to stand/pivot transfer to bedside commode  -AD unknown  -AD     Patient/Family Concerns, Anticipated Discharge Disposition (SLP) No concerns reported per patient at this time.  -AD -- Pt voices no concerns at this time.  -AD No current concerns per patient.  -AD            Swallow Goals (SLP)    Swallow Compensatory Strategies Goal Selection (SLP) -- swallow compensatory strategies, SLP goal 1  -AD -- --     Additional Documentation -- swallow compensatory strategies goal selection (SLP)  -AD -- --            Oral Nutrition/Hydration Goal 1 (SLP)    Oral Nutrition/Hydration Goal 1, SLP -- Pt will be able to participate in an instrumental assessment of swallow in order to determine safest, least restrictive diet w/limited risk of aspiration.  -AD Pt will be able to participate in an instrumental assessment of swallow in order to determine safest, least restrictive diet w/limited risk of aspiration.  -AD Pt will be able to participate in an instrumental assessment of swallow in order to determine safest, least restrictive diet w/limited risk of aspiration.  -AD     Time Frame (Oral Nutrition/Hydration Goal 1, SLP) --  short term goal (STG); 4 days  -AD short term goal (STG); 4 days  -AD short term goal (STG); 4 days  -AD     Barriers (Oral Nutrition/Hydration Goal 1, SLP) -- medically complex  -AD medically complex  -AD medically complex  -AD     Progress/Outcomes (Oral Nutrition/Hydration Goal 1, SLP) -- goal met  -AD goal ongoing  tolerated sitting up in a chair greater than one hour today; improved voicing  -AD goal ongoing  -AD            Oral Nutrition/Hydration Goal 2 (SLP)    Oral Nutrition/Hydration Goal 2, SLP Pt will demonstrate tolerance of a least restrictive diet w/o clinical s/s of aspiration or complications such as aspiration pneumonia  -AD Pt will demonstrate tolerance of a least restrictive diet w/o clinical s/s of aspiration or complications such as aspiration pneumonia  -AD Pt will demonstrate improved tolerance of thins on therapeutic trials of ice chips with SLP w/use of strategies as necessary and pt able to participate.  -AD Pt will demonstrate improved tolerance of thins on therapeutic trials of ice chips with SLP w/use of strategies as necessary and pt able to participate.  -AD     Time Frame (Oral Nutrition/Hydration Goal 2, SLP) short term goal (STG); 2 weeks  -AD short term goal (STG); 2 weeks  -AD short term goal (STG); 4 days  -AD short term goal (STG); 4 days  -AD     Barriers (Oral Nutrition/Hydration Goal 2, SLP) medically complex  -AD medically complex  -AD medically complex  -AD medically complex  -AD     Progress/Outcomes (Oral Nutrition/Hydration Goal 2, SLP) good progress toward goal; goal ongoing  -AD other (see comments); good progress toward goal; goal revised this date  Revised goal  -AD other (see comments)  not targeted during this session  -AD unable to make needed progress; goal ongoing  -AD            Lingual Strengthening Goal 1 (SLP)    Activity (Lingual Strengthening Goal 1, SLP) increase lingual tone/sensation/control/coordination/movement; increase tongue back strength  -AD  increase lingual tone/sensation/control/coordination/movement; increase tongue back strength  -AD increase lingual tone/sensation/control/coordination/movement  -AD increase lingual tone/sensation/control/coordination/movement  -AD     Increase Lingual Tone/Sensation/Control/Coordination/Movement lingual movement exercises; swallow trials  -AD lingual movement exercises; swallow trials  -AD lingual movement exercises; swallow trials  -AD lingual movement exercises; swallow trials  -AD     Increase Tongue Back Strength lingual movement exercises; lingual resistance exercises  -AD lingual movement exercises; lingual resistance exercises  -AD -- --     [place]/Accuracy (Lingual Strengthening Goal 1, SLP) with minimal cues (75-90% accuracy)  -AD with minimal cues (75-90% accuracy)  -AD with minimal cues (75-90% accuracy)  -AD with minimal cues (75-90% accuracy)  -AD     Time Frame (Lingual Strengthening Goal 1, SLP) short term goal (STG); 2 weeks  -AD short term goal (STG); 2 weeks  -AD short term goal (STG); 2 weeks  -AD short term goal (STG); 2 weeks  -AD     Barriers (Lingual Strengthening Goal 1, SLP) medically complex  -AD medically complex  -AD medically complex  -AD medically complex  -AD     Progress/Outcomes (Lingual Strengthening Goal 1, SLP) continuing progress toward goal; goal ongoing  improved lateralization of tongue w/verbal cues during swallow trials.  -AD good progress toward goal; goal ongoing  -AD good progress toward goal; goal ongoing  able to complete 10 reps lingual protrusion and hold for 3 sec w/minimal prompts  -AD continuing progress toward goal; goal ongoing  improved w/tolerance of small bites of applesauce w/delayed oral transit  -AD            Pharyngeal Strengthening Exercise Goal 1 (SLP)    Activity (Pharyngeal Strengthening Goal 1, SLP) increase closure at entrance to airway/closure of airway at glottis  -AD increase closure at entrance to airway/closure of airway at glottis  -AD  increase closure at entrance to airway/closure of airway at glottis  -AD increase closure at entrance to airway/closure of airway at glottis  -AD     Increase Closure at Entrance to Airway/Closure of Airway at Glottis hard effortful swallow; effortful pitch glide (falsetto + pharyngeal squeeze)  -AD hard effortful swallow; effortful pitch glide (falsetto + pharyngeal squeeze)  -AD hard effortful swallow; effortful pitch glide (falsetto + pharyngeal squeeze)  -AD hard effortful swallow; effortful pitch glide (falsetto + pharyngeal squeeze)  -AD     [place]/Accuracy (Pharyngeal Strengthening Goal 1, SLP) with moderate cues (50-74% accuracy)  -AD with moderate cues (50-74% accuracy)  -AD with moderate cues (50-74% accuracy)  -AD with moderate cues (50-74% accuracy)  -AD     Time Frame (Pharyngeal Strengthening Goal 1, SLP) short term goal (STG); 2 weeks  -AD short term goal (STG); 2 weeks  -AD short term goal (STG); 2 weeks  -AD short term goal (STG); 2 weeks  -AD     Barriers (Pharyngeal Strengthening Goal 1, SLP) medically complex  -AD medically complex  -AD medically complex  -AD mecially complex  -AD     Progress/Outcomes (Pharyngeal Strengthening Goal 1, SLP) other (see comments)  goal not targeted during this session  -AD goal ongoing  -AD continuing progress toward goal; goal ongoing; other (see comments)  eff pitch glide 10 reps with mod cues; effortful vocal cord closure 10 reps with mod cues.  -AD other (see comments)  not targeted during this session  -AD            Swallow Compensatory Strategies Goal 1 (SLP)    Activity (Swallow Compensatory Strategies/Techniques Goal 1, SLP) compensatory strategies; small bites; liquids by teaspoon only; effortful swallow; extra swallow per bolus; throat clear/extra swallow  -AD compensatory strategies; small bites; liquids by teaspoon only; effortful swallow; extra swallow per bolus; throat clear/extra swallow  -AD -- --     [place]/Accuracy (Swallow  Compensatory Strategies/Techniques Goal 1, SLP) with moderate cues (50-74% accuracy)  -AD with moderate cues (50-74% accuracy)  -AD -- --     Time Frame (Swallow Compensatory Strategies/Techniques Goal 1, SLP) short term goal (STG); 1 week  -AD short term goal (STG); 1 week  -AD -- --     Barriers (Swallow Compensatory Strategies/Techniques Goal 1, SLP) medically complex  -AD medically complex  -AD -- --     Progress/Outcomes (Swallow Compensatory Strategies/Techniques Goal 1, SLP) continuing progress toward goal; goal ongoing; other (see comments)  requires verbal prompts for oral clearance, subsequent swallows and small bites with use of banana. Bite/drink size otherwise controlled by SLP due to pt wth difficulty self feeding.  -AD other (see comments)  New  -AD -- --           User Key  (r) = Recorded By, (t) = Taken By, (c) = Cosigned By    Initials Name Effective Dates    AD Denisa Chua MS CCC-SLP 06/16/21 -                 EDUCATION  The patient has been educated in the following areas:   Dysphagia (Swallowing Impairment) Modified Diet Instruction compensatory strategies. Pt verbalizes understanding, reinforcement needed.       SLP GOALS     Row Name 01/06/22 0910 01/05/22 1330 01/04/22 1500       Oral Nutrition/Hydration Goal 1 (SLP)    Oral Nutrition/Hydration Goal 1, SLP -- Pt will be able to participate in an instrumental assessment of swallow in order to determine safest, least restrictive diet w/limited risk of aspiration.  -AD Pt will be able to participate in an instrumental assessment of swallow in order to determine safest, least restrictive diet w/limited risk of aspiration.  -AD    Time Frame (Oral Nutrition/Hydration Goal 1, SLP) -- short term goal (STG); 4 days  -AD short term goal (STG); 4 days  -AD    Barriers (Oral Nutrition/Hydration Goal 1, SLP) -- medically complex  -AD medically complex  -AD    Progress/Outcomes (Oral Nutrition/Hydration Goal 1, SLP) -- goal met  -AD goal ongoing   tolerated sitting up in a chair greater than one hour today; improved voicing  -AD       Oral Nutrition/Hydration Goal 2 (SLP)    Oral Nutrition/Hydration Goal 2, SLP Pt will demonstrate tolerance of a least restrictive diet w/o clinical s/s of aspiration or complications such as aspiration pneumonia  -AD Pt will demonstrate tolerance of a least restrictive diet w/o clinical s/s of aspiration or complications such as aspiration pneumonia  -AD Pt will demonstrate improved tolerance of thins on therapeutic trials of ice chips with SLP w/use of strategies as necessary and pt able to participate.  -AD    Time Frame (Oral Nutrition/Hydration Goal 2, SLP) short term goal (STG); 2 weeks  -AD short term goal (STG); 2 weeks  -AD short term goal (STG); 4 days  -AD    Barriers (Oral Nutrition/Hydration Goal 2, SLP) medically complex  -AD medically complex  -AD medically complex  -AD    Progress/Outcomes (Oral Nutrition/Hydration Goal 2, SLP) good progress toward goal; goal ongoing  -AD other (see comments); good progress toward goal; goal revised this date  Revised goal  -AD other (see comments)  not targeted during this session  -AD       Lingual Strengthening Goal 1 (SLP)    Activity (Lingual Strengthening Goal 1, SLP) increase lingual tone/sensation/control/coordination/movement; increase tongue back strength  -AD increase lingual tone/sensation/control/coordination/movement; increase tongue back strength  -AD increase lingual tone/sensation/control/coordination/movement  -AD    Increase Lingual Tone/Sensation/Control/Coordination/Movement lingual movement exercises; swallow trials  -AD lingual movement exercises; swallow trials  -AD lingual movement exercises; swallow trials  -AD    Increase Tongue Back Strength lingual movement exercises; lingual resistance exercises  -AD lingual movement exercises; lingual resistance exercises  -AD --    [place]/Accuracy (Lingual Strengthening Goal 1, SLP) with minimal cues (75-90%  accuracy)  -AD with minimal cues (75-90% accuracy)  -AD with minimal cues (75-90% accuracy)  -AD    Time Frame (Lingual Strengthening Goal 1, SLP) short term goal (STG); 2 weeks  -AD short term goal (STG); 2 weeks  -AD short term goal (STG); 2 weeks  -AD    Barriers (Lingual Strengthening Goal 1, SLP) medically complex  -AD medically complex  -AD medically complex  -AD    Progress/Outcomes (Lingual Strengthening Goal 1, SLP) continuing progress toward goal; goal ongoing  improved lateralization of tongue w/verbal cues during swallow trials.  -AD good progress toward goal; goal ongoing  -AD good progress toward goal; goal ongoing  able to complete 10 reps lingual protrusion and hold for 3 sec w/minimal prompts  -AD       Pharyngeal Strengthening Exercise Goal 1 (SLP)    Activity (Pharyngeal Strengthening Goal 1, SLP) increase closure at entrance to airway/closure of airway at glottis  -AD increase closure at entrance to airway/closure of airway at glottis  -AD increase closure at entrance to airway/closure of airway at glottis  -AD    Increase Closure at Entrance to Airway/Closure of Airway at Glottis hard effortful swallow; effortful pitch glide (falsetto + pharyngeal squeeze)  -AD hard effortful swallow; effortful pitch glide (falsetto + pharyngeal squeeze)  -AD hard effortful swallow; effortful pitch glide (falsetto + pharyngeal squeeze)  -AD    [place]/Accuracy (Pharyngeal Strengthening Goal 1, SLP) with moderate cues (50-74% accuracy)  -AD with moderate cues (50-74% accuracy)  -AD with moderate cues (50-74% accuracy)  -AD    Time Frame (Pharyngeal Strengthening Goal 1, SLP) short term goal (STG); 2 weeks  -AD short term goal (STG); 2 weeks  -AD short term goal (STG); 2 weeks  -AD    Barriers (Pharyngeal Strengthening Goal 1, SLP) medically complex  -AD medically complex  -AD medically complex  -AD    Progress/Outcomes (Pharyngeal Strengthening Goal 1, SLP) other (see comments)  goal not targeted during this  session  -AD goal ongoing  -AD continuing progress toward goal; goal ongoing; other (see comments)  eff pitch glide 10 reps with mod cues; effortful vocal cord closure 10 reps with mod cues.  -AD       Swallow Compensatory Strategies Goal 1 (SLP)    Activity (Swallow Compensatory Strategies/Techniques Goal 1, SLP) compensatory strategies; small bites; liquids by teaspoon only; effortful swallow; extra swallow per bolus; throat clear/extra swallow  -AD compensatory strategies; small bites; liquids by teaspoon only; effortful swallow; extra swallow per bolus; throat clear/extra swallow  -AD --    [place]/Accuracy (Swallow Compensatory Strategies/Techniques Goal 1, SLP) with moderate cues (50-74% accuracy)  -AD with moderate cues (50-74% accuracy)  -AD --    Time Frame (Swallow Compensatory Strategies/Techniques Goal 1, SLP) short term goal (STG); 1 week  -AD short term goal (STG); 1 week  -AD --    Barriers (Swallow Compensatory Strategies/Techniques Goal 1, SLP) medically complex  -AD medically complex  -AD --    Progress/Outcomes (Swallow Compensatory Strategies/Techniques Goal 1, SLP) continuing progress toward goal; goal ongoing; other (see comments)  requires verbal prompts for oral clearance, subsequent swallows and small bites with use of banana. Bite/drink size otherwise controlled by SLP due to pt wth difficulty self feeding.  -AD other (see comments)  New  -AD --    Row Name 01/03/22 5517             Oral Nutrition/Hydration Goal 1 (SLP)    Oral Nutrition/Hydration Goal 1, SLP Pt will be able to participate in an instrumental assessment of swallow in order to determine safest, least restrictive diet w/limited risk of aspiration.  -AD      Time Frame (Oral Nutrition/Hydration Goal 1, SLP) short term goal (STG); 4 days  -AD      Barriers (Oral Nutrition/Hydration Goal 1, SLP) medically complex  -AD      Progress/Outcomes (Oral Nutrition/Hydration Goal 1, SLP) goal ongoing  -AD              Oral  Nutrition/Hydration Goal 2 (SLP)    Oral Nutrition/Hydration Goal 2, SLP Pt will demonstrate improved tolerance of thins on therapeutic trials of ice chips with SLP w/use of strategies as necessary and pt able to participate.  -AD      Time Frame (Oral Nutrition/Hydration Goal 2, SLP) short term goal (STG); 4 days  -AD      Barriers (Oral Nutrition/Hydration Goal 2, SLP) medically complex  -AD      Progress/Outcomes (Oral Nutrition/Hydration Goal 2, SLP) unable to make needed progress; goal ongoing  -AD              Lingual Strengthening Goal 1 (SLP)    Activity (Lingual Strengthening Goal 1, SLP) increase lingual tone/sensation/control/coordination/movement  -AD      Increase Lingual Tone/Sensation/Control/Coordination/Movement lingual movement exercises; swallow trials  -AD      [place]/Accuracy (Lingual Strengthening Goal 1, SLP) with minimal cues (75-90% accuracy)  -AD      Time Frame (Lingual Strengthening Goal 1, SLP) short term goal (STG); 2 weeks  -AD      Barriers (Lingual Strengthening Goal 1, SLP) medically complex  -AD      Progress/Outcomes (Lingual Strengthening Goal 1, SLP) continuing progress toward goal; goal ongoing  improved w/tolerance of small bites of applesauce w/delayed oral transit  -AD              Pharyngeal Strengthening Exercise Goal 1 (SLP)    Activity (Pharyngeal Strengthening Goal 1, SLP) increase closure at entrance to airway/closure of airway at glottis  -AD      Increase Closure at Entrance to Airway/Closure of Airway at Glottis hard effortful swallow; effortful pitch glide (falsetto + pharyngeal squeeze)  -AD      [place]/Accuracy (Pharyngeal Strengthening Goal 1, SLP) with moderate cues (50-74% accuracy)  -AD      Time Frame (Pharyngeal Strengthening Goal 1, SLP) short term goal (STG); 2 weeks  -AD      Barriers (Pharyngeal Strengthening Goal 1, SLP) mecially complex  -AD      Progress/Outcomes (Pharyngeal Strengthening Goal 1, SLP) other (see comments)  not targeted  during this session  -AD            User Key  (r) = Recorded By, (t) = Taken By, (c) = Cosigned By    Initials Name Provider Type    Denisa Mcconnell MS CCC-SLP Speech and Language Pathologist                   Time Calculation:    Time Calculation- SLP     Row Name 01/06/22 1209             Time Calculation- SLP    SLP Start Time 0840  -AD      SLP Stop Time 0910  -AD      SLP Time Calculation (min) 30 min  -AD      Total Timed Code Minutes- SLP 0 minute(s)  -AD      SLP Non-Billable Time (min) 0 min  -AD      SLP Received On 01/06/22  -AD      SLP - Next Appointment 01/07/22  -AD              Untimed Charges    58012-LD Treatment Swallow Minutes 30  -AD              Total Minutes    Untimed Charges Total Minutes 30  -AD       Total Minutes 30  -AD            User Key  (r) = Recorded By, (t) = Taken By, (c) = Cosigned By    Initials Name Provider Type    Denisa Mcconnell MS CCC-SLP Speech and Language Pathologist                Therapy Charges for Today     Code Description Service Date Service Provider Modifiers Qty    81264310850 HC ST MOTION FLUORO EVAL SWALLOW 7 1/5/2022 Denisa Chua MS CCC-SLP GN 1    [id number] HC ST TREATMENT SWALLOW 2 1/6/2022 Denisa Chua MS CCC-SLP GN 1               Denisa Chua MS CCC-SLP  1/6/2022

## 2022-01-06 NOTE — PLAN OF CARE
Goal Outcome Evaluation:  Plan of Care Reviewed With: patient        Progress: improving  Outcome Summary: pt is resting in bed with eyes closed at this time, SLP orders acknowledged soft ground CC diet with honey thick liquids needed for pt. No straws for pt. pt to get small bits, meds given in applesauce whole. no coughing noted or moist sounding speech noted from pt. will cont to monitor pt

## 2022-01-06 NOTE — PLAN OF CARE
Goal Outcome Evaluation:  Plan of Care Reviewed With: patient, other (see comments) (RN, Denisa; MD, Dr. Hyatt)           Outcome Summary: MBS/SLP: Mild to moderate oropharyngeal dysphagia. SILENT Aspiration of nectar by straw from pt controlled bolus. Recommend to start a soft, ground diet with honey thick liquids in fully upright position. Drinks by spoon only and small bites. Encourage subsequent swallows to clear residue. No straws. Meds whole or crushed in applesauce. Supervision for all po intake and assist. Oral care before breakfast and after po intake with toothbrush. SLP to continue to follow for pt independent use of strategies and advancement of diet. Recommend f/u MBS to advance diet/liquids. See report.

## 2022-01-07 LAB
ALBUMIN SERPL-MCNC: 2.9 G/DL (ref 3.5–5.2)
ANION GAP SERPL CALCULATED.3IONS-SCNC: 9 MMOL/L (ref 5–15)
BUN SERPL-MCNC: 21 MG/DL (ref 8–23)
BUN/CREAT SERPL: 38.9 (ref 7–25)
CALCIUM SPEC-SCNC: 8.9 MG/DL (ref 8.6–10.5)
CHLORIDE SERPL-SCNC: 101 MMOL/L (ref 98–107)
CO2 SERPL-SCNC: 27 MMOL/L (ref 22–29)
CREAT SERPL-MCNC: 0.54 MG/DL (ref 0.76–1.27)
DEPRECATED RDW RBC AUTO: 51.7 FL (ref 37–54)
ERYTHROCYTE [DISTWIDTH] IN BLOOD BY AUTOMATED COUNT: 14.4 % (ref 12.3–15.4)
GFR SERPL CREATININE-BSD FRML MDRD: >150 ML/MIN/1.73
GLUCOSE BLDC GLUCOMTR-MCNC: 158 MG/DL (ref 70–130)
GLUCOSE BLDC GLUCOMTR-MCNC: 178 MG/DL (ref 70–130)
GLUCOSE BLDC GLUCOMTR-MCNC: 227 MG/DL (ref 70–130)
GLUCOSE BLDC GLUCOMTR-MCNC: 311 MG/DL (ref 70–130)
GLUCOSE SERPL-MCNC: 195 MG/DL (ref 65–99)
HCT VFR BLD AUTO: 34.6 % (ref 37.5–51)
HGB BLD-MCNC: 10.8 G/DL (ref 13–17.7)
MCH RBC QN AUTO: 30.7 PG (ref 26.6–33)
MCHC RBC AUTO-ENTMCNC: 31.2 G/DL (ref 31.5–35.7)
MCV RBC AUTO: 98.3 FL (ref 79–97)
PHOSPHATE SERPL-MCNC: 2.8 MG/DL (ref 2.5–4.5)
PLATELET # BLD AUTO: 295 10*3/MM3 (ref 140–450)
PMV BLD AUTO: 10.2 FL (ref 6–12)
POTASSIUM SERPL-SCNC: 4 MMOL/L (ref 3.5–5.2)
PROCALCITONIN SERPL-MCNC: 0.06 NG/ML (ref 0–0.25)
RBC # BLD AUTO: 3.52 10*6/MM3 (ref 4.14–5.8)
SODIUM SERPL-SCNC: 137 MMOL/L (ref 136–145)
WBC NRBC COR # BLD: 10.32 10*3/MM3 (ref 3.4–10.8)

## 2022-01-07 PROCEDURE — 63710000001 INSULIN ASPART PER 5 UNITS: Performed by: INTERNAL MEDICINE

## 2022-01-07 PROCEDURE — 92526 ORAL FUNCTION THERAPY: CPT

## 2022-01-07 PROCEDURE — 94761 N-INVAS EAR/PLS OXIMETRY MLT: CPT

## 2022-01-07 PROCEDURE — 99232 SBSQ HOSP IP/OBS MODERATE 35: CPT | Performed by: HOSPITALIST

## 2022-01-07 PROCEDURE — 84145 PROCALCITONIN (PCT): CPT | Performed by: INTERNAL MEDICINE

## 2022-01-07 PROCEDURE — 63710000001 INSULIN DETEMIR PER 5 UNITS: Performed by: HOSPITALIST

## 2022-01-07 PROCEDURE — 97110 THERAPEUTIC EXERCISES: CPT

## 2022-01-07 PROCEDURE — 85027 COMPLETE CBC AUTOMATED: CPT | Performed by: INTERNAL MEDICINE

## 2022-01-07 PROCEDURE — 80069 RENAL FUNCTION PANEL: CPT | Performed by: HOSPITALIST

## 2022-01-07 PROCEDURE — 94669 MECHANICAL CHEST WALL OSCILL: CPT

## 2022-01-07 PROCEDURE — 25010000002 LEVOFLOXACIN PER 250 MG: Performed by: INTERNAL MEDICINE

## 2022-01-07 PROCEDURE — 97530 THERAPEUTIC ACTIVITIES: CPT

## 2022-01-07 PROCEDURE — 82962 GLUCOSE BLOOD TEST: CPT

## 2022-01-07 PROCEDURE — 63710000001 DEXAMETHASONE PER 0.25 MG: Performed by: INTERNAL MEDICINE

## 2022-01-07 RX ORDER — DILTIAZEM HYDROCHLORIDE 120 MG/1
240 CAPSULE, COATED, EXTENDED RELEASE ORAL
Status: DISCONTINUED | OUTPATIENT
Start: 2022-01-08 | End: 2022-01-13 | Stop reason: HOSPADM

## 2022-01-07 RX ADMIN — SODIUM CHLORIDE, PRESERVATIVE FREE 10 ML: 5 INJECTION INTRAVENOUS at 23:20

## 2022-01-07 RX ADMIN — APIXABAN 5 MG: 2.5 TABLET, FILM COATED ORAL at 08:31

## 2022-01-07 RX ADMIN — CHLORHEXIDINE GLUCONATE 0.12% ORAL RINSE 15 ML: 1.2 LIQUID ORAL at 23:19

## 2022-01-07 RX ADMIN — METFORMIN HYDROCHLORIDE 500 MG: 500 TABLET ORAL at 17:49

## 2022-01-07 RX ADMIN — WHITE PETROLATUM 41 % TOPICAL OINTMENT 1 APPLICATION: OINTMENT at 08:32

## 2022-01-07 RX ADMIN — FAMOTIDINE 20 MG: 10 INJECTION INTRAVENOUS at 08:45

## 2022-01-07 RX ADMIN — INSULIN ASPART 4 UNITS: 100 INJECTION, SOLUTION INTRAVENOUS; SUBCUTANEOUS at 17:49

## 2022-01-07 RX ADMIN — SODIUM CHLORIDE, PRESERVATIVE FREE 10 ML: 5 INJECTION INTRAVENOUS at 08:32

## 2022-01-07 RX ADMIN — SODIUM CHLORIDE, PRESERVATIVE FREE 10 ML: 5 INJECTION INTRAVENOUS at 08:33

## 2022-01-07 RX ADMIN — LEVOFLOXACIN 750 MG: 5 INJECTION, SOLUTION INTRAVENOUS at 12:39

## 2022-01-07 RX ADMIN — DEXAMETHASONE 6 MG: 4 TABLET ORAL at 08:31

## 2022-01-07 RX ADMIN — CHLORHEXIDINE GLUCONATE 0.12% ORAL RINSE 15 ML: 1.2 LIQUID ORAL at 08:31

## 2022-01-07 RX ADMIN — WHITE PETROLATUM 41 % TOPICAL OINTMENT 1 APPLICATION: OINTMENT at 23:19

## 2022-01-07 RX ADMIN — DILTIAZEM HYDROCHLORIDE 60 MG: 30 TABLET ORAL at 15:06

## 2022-01-07 RX ADMIN — INSULIN ASPART 4 UNITS: 100 INJECTION, SOLUTION INTRAVENOUS; SUBCUTANEOUS at 08:31

## 2022-01-07 RX ADMIN — APIXABAN 5 MG: 2.5 TABLET, FILM COATED ORAL at 23:16

## 2022-01-07 RX ADMIN — INSULIN DETEMIR 20 UNITS: 100 INJECTION, SOLUTION SUBCUTANEOUS at 08:31

## 2022-01-07 RX ADMIN — INSULIN ASPART 8 UNITS: 100 INJECTION, SOLUTION INTRAVENOUS; SUBCUTANEOUS at 12:39

## 2022-01-07 RX ADMIN — DILTIAZEM HYDROCHLORIDE 60 MG: 30 TABLET ORAL at 23:19

## 2022-01-07 RX ADMIN — DILTIAZEM HYDROCHLORIDE 60 MG: 30 TABLET ORAL at 06:56

## 2022-01-07 NOTE — THERAPY TREATMENT NOTE
Acute Care - Physical Therapy Treatment Note   Hien Neely     Patient Name: Sergio Daniel  : 1955  MRN: [id number]  Today's Date: 2022      Visit Dx:     ICD-10-CM ICD-9-CM   1. Acute respiratory failure with hypoxia (HCC)  J96.01 518.81   2. Pneumonia due to COVID-19 virus  U07.1 480.8    J12.82 079.89   3. Paroxysmal atrial fibrillation (HCC)  I48.0 427.31   4. Pharyngeal dysphagia  R13.13 787.23   5. Oropharyngeal dysphagia  R13.12 787.22     Patient Active Problem List   Diagnosis   • Acute respiratory failure with hypoxia (HCC)   • Acute hypoxemic respiratory failure due to COVID-19 (HCC)     Past Medical History:   Diagnosis Date   • Diabetes mellitus (HCC)      History reviewed. No pertinent surgical history.  PT Assessment (last 12 hours)     PT Evaluation and Treatment     Row Name 22          Physical Therapy Time and Intention    Document Type therapy note (daily note)  -     Mode of Treatment physical therapy  -     Patient Effort good  -     Symptoms Noted During/After Treatment fatigue  -     Row Name 22 09          General Information    Patient/Family/Caregiver Comments/Observations pt supine, agrees to therapy  -     Barriers to Rehab medically complex  -     Row Name 22 09          Cognition    Personal Safety Interventions gait belt; nonskid shoes/slippers when out of bed  -     Row Name 22          Pain Scale: Numbers Pre/Post-Treatment    Pretreatment Pain Rating 0/10 - no pain  -     Posttreatment Pain Rating 0/10 - no pain  -     Row Name 22 09          Bed Mobility    Bed Mobility supine-sit; sit-supine  -     Supine-Sit [place] (Bed Mobility) moderate assist (50% patient effort); maximum assist (25% patient effort); 2 person assist; verbal cues  -     Sit-Supine [place] (Bed Mobility) maximum assist (25% patient effort); 2 person assist; verbal cues  -     Assistive Device (Bed Mobility) bed rails;  draw sheet; head of bed elevated  -     Row Name 01/07/22 0906          Transfers    Comment (Transfers) pt attempted sit to stand with max assist x2, able to achieve 75% upright posture.  pt tolerates 5 seconds standing before requiring seated rest break  -     Sit-Stand [place] (Transfers) maximum assist (25% patient effort); 2 person assist  -     Stand-Sit [place] (Transfers) maximum assist (25% patient effort); 2 person assist; verbal cues  -     Row Name 01/07/22 0906          Sit-Stand Transfer    Assistive Device (Sit-Stand Transfers) walker, front-wheeled  -     Row Name 01/07/22 0906          Stand-Sit Transfer    Assistive Device (Stand-Sit Transfers) walker, front-wheeled  -     Row Name 01/07/22 0906          Gait/Stairs (Locomotion)    Comment (Gait/Stairs) gait not attemped at this time  -     Row Name             Wound Right hand Blisters    Wound - Properties Group Present on Hospital Admission: N  -LC Side: Right  -LC Location: hand  -LC, includes hand and wrist  Primary Wound Type: Blisters  -LC     Retired Wound - Properties Group Present on Hospital Admission: N  -LC Side: Right  -LC Location: hand  -LC, includes hand and wrist  Primary Wound Type: Blisters  -LC     Row Name 01/07/22 0955 01/07/22 0906       Plan of Care Review    Outcome Summary PT: Patient performs supine to sit with mod/max assist x2.  Patient able to tolerate sitting with SBA/CGA.  Patient attempts sit to stand from elevated bed surface with max assist x2, able to achieve approximately 75% upright posture.  Patient tolerates standing for approximately 5 seconds before returning to sitting.  Patient continues to progress with therapy activities, will continue to follow.  Recommend short term rehab at discharge.  - PT:  -    Row Name 01/07/22 0955          Positioning and Restraints    Pre-Treatment Position in bed  -     Post Treatment Position bed  -JW     In Bed notified nsg; call light within  reach; encouraged to call for assist; supine; with OT  -     Row Name 01/07/22 0955          Progress Summary (PT)    Progress Toward Functional Goals (PT) progress toward functional goals is good  -     Barriers to Overall Progress (PT) medical status  -           User Key  (r) = Recorded By, (t) = Taken By, (c) = Cosigned By    Initials Name Provider Type    Chitra Faith, RN, CWON Registered Nurse    Rosy Arevalo PT Physical Therapist                Physical Therapy Education                 Title: PT OT SLP Therapies (Done)     Topic: Physical Therapy (Done)     Point: Mobility training (Done)     Learning Progress Summary           Patient Acceptance, E,TB, VU by  at 1/7/2022 1140    Acceptance, E,TB, VU by  at 1/6/2022 1036    Acceptance, E,TB, VU by MONTEZ at 1/5/2022 1018    Acceptance, E,TB, VU by MONTEZ at 1/4/2022 1023    Acceptance, E,TB, VU,NR by  at 1/3/2022 1216                               User Key     Initials Effective Dates Name Provider Type Discipline     06/16/21 -  Rosy Harmon, JASMIN Physical Therapist PT              PT Recommendation and Plan  Anticipated Discharge Disposition (PT): skilled nursing facility  Planned Therapy Interventions (PT): balance training, bed mobility training, gait training, home exercise program, patient/family education, strengthening, transfer training  Therapy Frequency (PT): daily  Progress Summary (PT)  Progress Toward Functional Goals (PT): progress toward functional goals is good  Barriers to Overall Progress (PT): medical status  Plan of Care Reviewed With: patient  Outcome Summary: PT: Patient performs supine to sit with mod/max assist x2.  Patient able to tolerate sitting with SBA/CGA.  Patient attempts sit to stand from elevated bed surface with max assist x2, able to achieve approximately 75% upright posture.  Patient tolerates standing for approximately 5 seconds before returning to sitting.  Patient continues to progress with  therapy activities, will continue to follow.  Recommend short term rehab at discharge.   Outcome Measures     Row Name 01/07/22 1000 01/07/22 0906 01/06/22 1000       How much help from another person do you currently need...    Turning from your back to your side while in flat bed without using bedrails? -- 1  -JW --    Moving from lying on back to sitting on the side of a flat bed without bedrails? -- 1  -JW --    Moving to and from a bed to a chair (including a wheelchair)? -- 1  -JW --    Standing up from a chair using your arms (e.g., wheelchair, bedside chair)? -- 1  -JW --    Climbing 3-5 steps with a railing? -- 1  -JW --    To walk in hospital room? -- 1  -JW --    AM-PAC 6 Clicks Score (PT) -- 6  -JW --       How much help from another is currently needed...    Putting on and taking off regular lower body clothing? 1  -EN -- 1  -EN    Bathing (including washing, rinsing, and drying) 2  -EN -- 2  -EN    Toileting (which includes using toilet bed pan or urinal) 1  -EN -- 1  -EN    Putting on and taking off regular upper body clothing 1  -EN -- 1  -EN    Taking care of personal grooming (such as brushing teeth) 2  -EN -- 2  -EN    Eating meals 2  -EN -- 2  -EN    AM-PAC 6 Clicks Score (OT) 9  -EN -- 9  -EN       Functional Assessment    Outcome Measure Options -- AM-PAC 6 Clicks Basic Mobility (PT)  -JW --    Row Name 01/06/22 0814 01/05/22 1100 01/05/22 0830       How much help from another person do you currently need...    Turning from your back to your side while in flat bed without using bedrails? 1  -JW -- 1  -JW    Moving from lying on back to sitting on the side of a flat bed without bedrails? 1  -JW -- 1  -JW    Moving to and from a bed to a chair (including a wheelchair)? 1  -JW -- 1  -JW    Standing up from a chair using your arms (e.g., wheelchair, bedside chair)? 1  -JW -- 1  -JW    Climbing 3-5 steps with a railing? 1  -JW -- 1  -JW    To walk in hospital room? 1  -JW -- 1  -JW    AM-PAC 6  Clicks Score (PT) 6  -JW -- 6  -JW       How much help from another is currently needed...    Putting on and taking off regular lower body clothing? -- 1  -EN --    Bathing (including washing, rinsing, and drying) -- 1  -EN --    Toileting (which includes using toilet bed pan or urinal) -- 1  -EN --    Putting on and taking off regular upper body clothing -- 1  -EN --    Taking care of personal grooming (such as brushing teeth) -- 2  -EN --    Eating meals -- 1  -EN --    AM-PAC 6 Clicks Score (OT) -- 7  -EN --       Functional Assessment    Outcome Measure Options AM-PAC 6 Clicks Basic Mobility (PT)  - -- AM-PAC 6 Clicks Basic Mobility (PT)  -          User Key  (r) = Recorded By, (t) = Taken By, (c) = Cosigned By    Initials Name Provider Type    Isi Wiggins, OTR Occupational Therapist    Rosy Arevalo PT Physical Therapist                 Time Calculation:    PT Charges     Row Name 01/07/22 1142             Time Calculation    Start Time 0906  -      Stop Time 0929  -      Time Calculation (min) 23 min  -      PT Received On 01/07/22  -      PT - Next Appointment 01/07/22  -MONTEZ      PT Goal Re-Cert Due Date 01/08/22  -              Timed Charges    43751 - PT Therapeutic Exercise Minutes 23  -              Total Minutes    Timed Charges Total Minutes 23  -JW       Total Minutes 23  -JW            User Key  (r) = Recorded By, (t) = Taken By, (c) = Cosigned By    Initials Name Provider Type    Rosy Arevalo PT Physical Therapist              Therapy Charges for Today     Code Description Service Date Service Provider Modifiers Qty    43044253845 HC PT THER PROC EA 15 MIN 1/6/2022 Rosy Harmon, PT GP 2    23480728283 HC PT THER PROC EA 15 MIN 1/7/2022 Rosy Harmon, PT GP 2          PT G-Codes  Outcome Measure Options: AM-PAC 6 Clicks Basic Mobility (PT)  AM-PAC 6 Clicks Score (PT): 6  AM-PAC 6 Clicks Score (OT): 9    Rosy Harmon PT  1/7/2022

## 2022-01-07 NOTE — PLAN OF CARE
Goal Outcome Evaluation:  Plan of Care Reviewed With: patient, other (see comments) (RN, Leydi; INDU, Valencia)           Outcome Summary: SLP: Pt continues to demonstrate tolerance of his diet. Recommend no straws with liquids. Pt able to comprehend exercises and verbalize understanding of continued practice when therapist not present. SLP to continue to follow during stay and continued recommendation for further therapy at next level of care.

## 2022-01-07 NOTE — PLAN OF CARE
Goal Outcome Evaluation:           Progress: improving  Outcome Summary: continue to monitor vital signs. patient on O2 2 liters per [place]. plan for rehab.

## 2022-01-07 NOTE — PLAN OF CARE
Goal Outcome Evaluation:  Plan of Care Reviewed With: patient           Outcome Summary: PT: Patient performs supine to sit with mod/max assist x2.  Patient able to tolerate sitting with SBA/CGA.  Patient attempts sit to stand from elevated bed surface with max assist x2, able to achieve approximately 75% upright posture.  Patient tolerates standing for approximately 5 seconds before returning to sitting.  Patient continues to progress with therapy activities, will continue to follow.  Recommend short term rehab at discharge.

## 2022-01-07 NOTE — THERAPY TREATMENT NOTE
Acute Care - Speech Language Pathology   Swallow Treatment Note ARELIS Flaherty     Patient Name: Sergio Daniel  : 1955  MRN: [id number]  Today's Date: 2022               Admit Date: 2021    Visit Dx:     ICD-10-CM ICD-9-CM   1. Acute respiratory failure with hypoxia (HCC)  J96.01 518.81   2. Pneumonia due to COVID-19 virus  U07.1 480.8    J12.82 079.89   3. Paroxysmal atrial fibrillation (HCC)  I48.0 427.31   4. Pharyngeal dysphagia  R13.13 787.23   5. Oropharyngeal dysphagia  R13.12 787.22     Patient Active Problem List   Diagnosis   • Acute respiratory failure with hypoxia (HCC)   • Acute hypoxemic respiratory failure due to COVID-19 (HCC)     Past Medical History:   Diagnosis Date   • Diabetes mellitus (HCC)      History reviewed. No pertinent surgical history.    SLP Recommendation and Plan     SLP Diet Recommendation: honey thick liquids, soft textures, ground (22)  Recommended Precautions and Strategies: upright posture during/after eating, small bites of food and sips of liquid, no straw, multiple swallows per bite of food, multiple swallows per sip of liquid, check mouth frequently for oral residue/pocketing, general aspiration precautions, reflux precautions, fatigue precautions, 1:1 supervision (22)  SLP Rec. for Method of Medication Administration: meds whole, meds crushed, with pudding or applesauce, as tolerated (22)     Monitor for Signs of Aspiration: no, none - silent aspiration present, fever, upper respiratory, pneumonia (22)  Recommended Diagnostics: reassess via VFSS ([facility] [place]) (22)     Anticipated Discharge Disposition (SLP): skilled nursing facility, anticipate therapy at next level of care (22)     Therapy Frequency (Swallow): daily, at least, 5 days per week (22)  Predicted Duration Therapy Intervention (Days): 2 weeks, until discharge (22)     Daily Summary of Progress (SLP): progress toward  functional goals as expected (01/07/22 1400)         Patient/Family Concerns, Anticipated Discharge Disposition (SLP): No current discharge concerns reported per patient. (01/07/22 1400)     Treatment Assessment (SLP): Pt continues with progress towards his functional goals. He is able to demonstrate improved performance on oropharyngeal exercises and strengthening. He demonstrates improved effort and voicing on laryngeal/pharyngeal exercises. Continues with tolerance of po intake of soft, ground diet with honey thick liquids. Education provided to staff for use of no straws due to silent aspiration during MBS with straw use on nectar thick liquids. RN and PCA verbalize understanding. (01/07/22 1400)  Plan for Continued Treatment (SLP): continue treatment per plan of care (01/07/22 1400)         Plan of Care Reviewed With: patient, other (see comments) (RN, Leydi; PCA, Valencia)  Outcome Summary: SLP: Pt continues to demonstrate tolerance of his diet. Recommend no straws with liquids. Pt able to comprehend exercises and verbalize understanding of continued practice when therapist not present. SLP to continue to follow during stay and continued recommendation for further therapy at next level of care.      SWALLOW EVALUATION (last 72 hours)     SLP Adult Swallow Evaluation     Row Name 01/07/22 1400 01/06/22 0910 01/05/22 1330             Rehab Evaluation    Document Type therapy note (daily note)  -AD therapy note (daily note)  -AD evaluation  MBS  -AD      Subjective Information no complaints  -AD no complaints  -AD no complaints  -AD      Patient Observations alert; cooperative; agree to therapy  -AD alert; cooperative; agree to therapy  -AD alert; cooperative; agree to therapy  -AD      Patient/Family/Caregiver Comments/Observations Pt semi -reclined in bed, motivated for therapy and fully participative. Continues with breathy vocal quality. Improved effort at times w/increase in voicing.  -AD Pt seen upright in bed  with assistance from OT to get fully upright. Just finished therapy with OT/PT>  -AD Pt seen upright in videoimaging chair.  -AD      Patient Effort good  -AD good  -AD good  -AD      Comment -- Pt putting forth his best effort on all tasks.  -AD Pt highly motivated to perform well on MBS.  -AD      Symptoms Noted During/After Treatment none  -AD none  -AD other (see comments)  generalized weakness  -AD              General Information    Patient Profile Reviewed -- -- yes  -AD      Pertinent History Of Current Problem -- -- No changes in history. Pt currently on 2L NC. Dobhoff pulled out accidently by pt today. Hx from initial evaluation as follows:Pt is a 67 y/o male admitted with acute hypoxic respiratory failure due to Covid-19 infection/pneumonia. Also diagnosed with ARDS, MSSA/Klebsiella pneum, shock, cyotkine release syndrome s/p remdesivir, extensive DVT LLE, afib w/RVR, uncontrolled diabetes, obesity, mild hepatitis r/t covid and fever. Pt intubated for greater than two weeks and extubated on 1/1/22 and placed on heated hi flow O2. CXR of 1/1/22 with bilateral pneumonia that is stable to worsening appearance in the left lung. Mid to lower lung zone opacities.  -AD      Current Method of Nutrition -- -- NPO; no current diet order  -AD      Precautions/Limitations, Vision -- -- WFL with corrective lenses  -AD      Precautions/Limitations, Hearing -- -- WFL  -AD      Prior Level of Function-Communication -- -- WFL  prior to hospitalization; current suspected cognitive deficits due to prior sedation; monitor for need for further evaluation.  -AD      Prior Level of Function-Swallowing -- -- no diet consistency restrictions; regular textures; thin liquids  -AD      Plans/Goals Discussed with -- -- patient; agreed upon  -AD      Barriers to Rehab -- -- medically complex  -AD      Patient's Goals for Discharge -- -- return to regular diet; return to PO diet  -AD      Family Goals for Discharge -- -- patient able  to return to PO diet; patient able to return to regular diet  prior conversation  -AD              Pain    Additional Documentation --  no pain reported  -AD --  no pain reported  -AD --  no current pain reported  -AD              General Eating/Swallowing Observations    Respiratory Support Currently in Use nasal cannula  2L  -AD nasal cannula  2L  -AD nasal cannula  2L  -AD      Eating/Swallowing Skills -- fed by SLP; other (see comments)  Pt w/assist for self feeding a banana  -AD --      Positioning During Eating -- upright 90 degree; upright in bed; other (see comments)  w/pillows to position fully upright  -AD upright 90 degree; upright in chair  video imaging chair  -AD      Utensils Used -- spoon  -AD --              Respiratory    Respiratory Status WFL  -AD WFL; during swallowing/eating  -AD WFL; during swallowing/eating  -AD      Respiratory Pattern -- exhale-exhale pattern  -AD --              MBS/VFSS    Utensils Used -- -- spoon; straw  -AD      Consistencies Trialed -- -- regular textures; pureed; thin liquids; nectar/syrup-thick liquids; honey-thick liquids  -AD              MBS/VFSS Interpretation    Oral Prep Phase -- -- impaired oral phase of swallowing  -AD      Oral Transit Phase -- -- impaired  -AD      Oral Residue -- -- impaired  -AD      VFSS Summary -- -- Pt presents with a mild to moderate oropharyngeal dysphagia with increased oral prep of solids, tongue pumping on all trials and oral residue due to lingual weakness and decreased bolus control. Pt able to clear oral residue spontaneously. Pharyngeal phase with delay of swallow on all trials and consistencies. Bolus pools in valleculae on spoon size trials of nectar, honey, puree and solid. Pooling to pyriforms on thins and nectar by cup. Delay of swallow also results in decrease vestibular closure during the swallow. This results in transient and non transient penetration of thins and nectar thick liquids and aspiration of nectar thick  liquids from straw w/pt controlled bolus taken. Silent aspiration noted. Pt able to perform a delayed cued cough but unable to view adequately below the vocal folds to see if subglottic material cleared. No significant residue noted in the vestibule after cued cough. Pharyngeal residue noted on the base of tongue, valleculae, posterior pharyngeal wall and pyriform sinuses. Decreased BOT retraction, postive pharyngeal squeeze and pharyngeal stripping wave noted. Pt able to clear all residue with initial cues to perform a subsequent swallow, then demonstrates spontaneous subsequent swallows. No aspiration viewed on thins by spoon, nectar thick by spoon, honey thick by spoon, puree and solids. No penetration noted on trials of solids.  -AD      Oral Phase, Comment -- -- Mild to moderate oral phase dysphagia with decreased bolus control and lingual weakness resulting in tongue pumping, increased mastication time of solids and oral residue. Pt able to spontaneously clear oral residue.  -AD              Oral Preparatory Phase    Oral Preparatory Phase -- -- prolonged manipulation  -AD      Prolonged Manipulation -- -- regular textures; secondary to reduced lingual strength  -AD              Oral Transit Phase    Impaired Oral Transit Phase -- -- tongue pumping; piecemeal oral transit  -AD      Tongue Pumping -- -- all consistencies tested; secondary to reduced lingual control  -AD      Piecemeal Oral Transit -- -- nectar-thick liquids; secondary to reduced lingual control; other (see comments)  from straw only  -AD              Oral Residue    Impaired Oral Residue -- -- lingual residue  -AD      Lingual Residue -- -- pudding/puree; secondary to reduced lingual strength  -AD      Response to Oral Residue -- -- cleared residue; with spontaneous subsequent swallow  -AD              Initiation of Pharyngeal Swallow    Initiation of Pharyngeal Swallow -- -- bolus in valleculae; bolus in pyriform sinuses  -AD      Pharyngeal  Phase -- -- impaired pharyngeal phase of swallowing  -AD      Penetration Before the Swallow -- -- nectar-thick liquids; secondary to reduced back of tongue control; secondary to delayed swallow initiation or mistiming; other (see comments)  from pt controlled straw drinks  -AD      Penetration During the Swallow -- -- thin liquids; nectar-thick liquids; honey-thick liquids; secondary to delayed swallow initiation or mistiming; secondary to reduced vestibular closure  -AD      Aspiration During the Swallow -- -- nectar-thick liquids; secondary to delayed swallow initiation or mistiming; secondary to reduced vestibular closure; secondary to reduced laryngeal (VF) closure  silent on pt controlled straw drink  -AD      Penetration After the Swallow -- -- honey-thick liquids; secondary to residue; in valleculae; in pyriform sinuses  1/3 trials  -AD      Response to Penetration -- -- shallow; transient; deep; no response; response with cue only; weak cough/throat clear  -AD      Response to Aspiration -- -- no response, silent aspiration; response with cue only; weak cough/throat clear; other (see comments)  unable to view subglottic area adequately due to shoulder/positioning  -AD      Rosenbek's Scale -- -- thin:; 3--->level 3; nectar:; 8--->level 8; honey:; 2--->level 2; pudding/puree:; regular textures:; 1--->level 1  -AD      Pharyngeal Residue -- -- all consistencies tested; base of tongue; valleculae; thin liquids; nectar-thick liquids; laryngeal vestibule; pyriform sinuses; secondary to reduced base of tongue retraction; secondary to reduced posterior pharyngeal wall stripping; secondary to reduced hyolaryngeal excursion; other (see comments)  reduced pharyngeal contraction/squeeze  -AD      Response to Residue -- -- cleared residue with spontaneous subsequent swallow; cleared residue with cued swallow; other (see comments)  initial swallows required cues, but able to clear spont for most trials/consistencies   -AD      Attempted Compensatory Maneuvers -- -- bolus size; additional subsequent swallow; throat clear after swallow  -AD      Response to Attempted Compensatory Maneuvers -- -- reduced residue; prevented aspiration  bolus size eliminated aspiration; cued and subsequent swallows clears pharyngeal residue  -AD      Pharyngeal Phase, Comment -- -- Mild to moderate pharyngeal dysphagia with delay of swallow on all trials and consistencies. Bolus pools in valleculae on spoon size trials of nectar, honey, puree and solid. Pooling to pyriforms on thins and nectar by cup. Delay of swallow also results in decrease vestibular closure during the swallow. This results in transient and non transient penetration of thins and nectar thick liquids and aspiration of nectar thick liquids from straw w/pt controlled bolus taken. Silent aspiration noted. Pt able to perform a delayed cued cough but unable to view adequately below the vocal folds to see if subglottic material cleared. No significant residue noted in the vestibule after cued cough. Pharyngeal residue noted on the base of tongue, valleculae, posterior pharyngeal wall and pyriform sinuses. Decreased BOT retraction, postive pharyngeal squeeze and pharyngeal stripping wave noted. Pt able to clear all residue with initial cues to perform a subsequent swallow, then demonstrates spontaneous subsequent swallows. No aspiration viewed on thins by spoon, nectar thick by spoon, honey thick by spoon, puree and solids. No penetration noted on trials of solids.  -AD              Esophageal Phase    Esophageal Phase, Comment -- -- Unable to view at this time due to limitations in position.  -AD              SLP Evaluation Clinical Impression    SLP Swallowing Diagnosis -- -- mild-moderate; oral dysphagia; pharyngeal dysphagia  -AD      Functional Impact -- -- risk of aspiration/pneumonia; risk of malnutrition  -AD      Rehab Potential/Prognosis, Swallowing -- -- good, to achieve stated  therapy goals  -AD      Swallow Criteria for Skilled Therapeutic Interventions Met -- -- demonstrates skilled criteria  -AD              SLP Treatment Clinical Impressions    Treatment Assessment (SLP) Pt continues with progress towards his functional goals. He is able to demonstrate improved performance on oropharyngeal exercises and strengthening. He demonstrates improved effort and voicing on laryngeal/pharyngeal exercises. Continues with tolerance of po intake of soft, ground diet with honey thick liquids. Education provided to staff for use of no straws due to silent aspiration during MBS with straw use on nectar thick liquids. RN and PCA verbalize understanding.  -AD Pt continues to demonstrate excellent progress towards his functional goals. He demonstrate tolerance of his current diet with SLP feeding and small bites, drinks by spoon only. Pt able to follow compensatory strategies but requires cues to perform consistently. No overt s/s and no respiratory changes during meal. Good po intake overall as well. Mild oral residue on trials of mechanical soft and pt requires cues to clear residue on tongue and lateral sulci. Continues with breathy vocal quality, but improved at times with increased effort by patient.  -AD --      Daily Summary of Progress (SLP) progress toward functional goals as expected  -AD progress toward functional goals as expected  -AD --      Barriers to Overall Progress (SLP) Medically complex  -AD -- --      Plan for Continued Treatment (SLP) continue treatment per plan of care  -AD continue treatment per plan of care  -AD --      Care Plan Review care plan/treatment goals reviewed; risks/benefits reviewed; current/potential barriers reviewed; patient/other agree to care plan  -AD care plan/treatment goals reviewed; risks/benefits reviewed; current/potential barriers reviewed; patient/other agree to care plan  -AD --              Recommendations    Therapy Frequency (Swallow) daily; at  least; 5 days per week  -AD daily; at least; 5 days per week  -AD daily; at least; 5 days per week  -AD      Predicted Duration Therapy Intervention (Days) 2 weeks; until discharge  -AD 2 weeks; until discharge  -AD 2 weeks; until discharge  -AD      SLP Diet Recommendation honey thick liquids; soft textures; ground  -AD honey thick liquids; soft textures; ground  -AD honey thick liquids; soft textures; ground  -AD      Recommended Diagnostics reassess via VFSS (MBS)  -AD reassess via VFSS (MBS)  -AD reassess via VFSS (MBS)  -AD      Recommended Precautions and Strategies upright posture during/after eating; small bites of food and sips of liquid; no straw; multiple swallows per bite of food; multiple swallows per sip of liquid; check mouth frequently for oral residue/pocketing; general aspiration precautions; reflux precautions; fatigue precautions; 1:1 supervision  -AD upright posture during/after eating; small bites of food and sips of liquid; no straw; multiple swallows per bite of food; multiple swallows per sip of liquid; check mouth frequently for oral residue/pocketing; general aspiration precautions; reflux precautions; fatigue precautions; 1:1 supervision  -AD upright posture during/after eating; small bites of food and sips of liquid; no straw; multiple swallows per bite of food; multiple swallows per sip of liquid; check mouth frequently for oral residue/pocketing; general aspiration precautions; reflux precautions; fatigue precautions; 1:1 supervision  -AD      Oral Care Recommendations Oral Care before breakfast, after meals and PRN; Toothbrush  -AD Oral Care before breakfast, after meals and PRN; Toothbrush  -AD Oral Care before breakfast, after meals and PRN; Toothbrush  -AD      SLP Rec. for Method of Medication Administration meds whole; meds crushed; with pudding or applesauce; as tolerated  -AD meds whole; meds crushed; with pudding or applesauce; as tolerated  -AD meds whole; meds crushed; with  pudding or applesauce; as tolerated  -AD      Monitor for Signs of Aspiration no; none - silent aspiration present; fever; upper respiratory; pneumonia  -AD no; none - silent aspiration present; fever; upper respiratory; pneumonia  -AD no; none - silent aspiration present; fever; upper respiratory; pneumonia  -AD      Anticipated Discharge Disposition (SLP) skilled nursing facility; anticipate therapy at next level of care  -AD skilled nursing facility; anticipate therapy at next level of care  -AD skilled nursing facility; anticipate therapy at next level of care  -AD      Patient/Family Concerns, Anticipated Discharge Disposition (SLP) No current discharge concerns reported per patient.  -AD No concerns reported per patient at this time.  -AD --              Swallow Goals (SLP)    Swallow Compensatory Strategies Goal Selection (SLP) -- -- swallow compensatory strategies, SLP goal 1  -AD      Additional Documentation -- -- swallow compensatory strategies goal selection (SLP)  -AD              Oral Nutrition/Hydration Goal 1 (SLP)    Oral Nutrition/Hydration Goal 1, SLP -- -- Pt will be able to participate in an instrumental assessment of swallow in order to determine safest, least restrictive diet w/limited risk of aspiration.  -AD      Time Frame (Oral Nutrition/Hydration Goal 1, SLP) -- -- short term goal (STG); 4 days  -AD      Barriers (Oral Nutrition/Hydration Goal 1, SLP) -- -- medically complex  -AD      Progress/Outcomes (Oral Nutrition/Hydration Goal 1, SLP) -- -- goal met  -AD              Oral Nutrition/Hydration Goal 2 (SLP)    Oral Nutrition/Hydration Goal 2, SLP Pt will demonstrate tolerance of a least restrictive diet w/o clinical s/s of aspiration or complications such as aspiration pneumonia  -AD Pt will demonstrate tolerance of a least restrictive diet w/o clinical s/s of aspiration or complications such as aspiration pneumonia  -AD Pt will demonstrate tolerance of a least restrictive diet w/o  clinical s/s of aspiration or complications such as aspiration pneumonia  -AD      Time Frame (Oral Nutrition/Hydration Goal 2, SLP) short term goal (STG); 2 weeks  -AD short term goal (STG); 2 weeks  -AD short term goal (STG); 2 weeks  -AD      Barriers (Oral Nutrition/Hydration Goal 2, SLP) medically complex  -AD medically complex  -AD medically complex  -AD      Progress/Outcomes (Oral Nutrition/Hydration Goal 2, SLP) good progress toward goal; goal ongoing  -AD good progress toward goal; goal ongoing  -AD other (see comments); good progress toward goal; goal revised this date  Revised goal  -AD              Lingual Strengthening Goal 1 (SLP)    Activity (Lingual Strengthening Goal 1, SLP) increase lingual tone/sensation/control/coordination/movement; increase tongue back strength  -AD increase lingual tone/sensation/control/coordination/movement; increase tongue back strength  -AD increase lingual tone/sensation/control/coordination/movement; increase tongue back strength  -AD      Increase Lingual Tone/Sensation/Control/Coordination/Movement lingual movement exercises; swallow trials  -AD lingual movement exercises; swallow trials  -AD lingual movement exercises; swallow trials  -AD      Increase Tongue Back Strength lingual movement exercises; lingual resistance exercises  10 reps of resistance for lingual elevation, protrusion and lateralization.  -AD lingual movement exercises; lingual resistance exercises  -AD lingual movement exercises; lingual resistance exercises  -AD      White/Accuracy (Lingual Strengthening Goal 1, SLP) with minimal cues (75-90% accuracy)  -AD with minimal cues (75-90% accuracy)  -AD with minimal cues (75-90% accuracy)  -AD      Time Frame (Lingual Strengthening Goal 1, SLP) short term goal (STG); 2 weeks  -AD short term goal (STG); 2 weeks  -AD short term goal (STG); 2 weeks  -AD      Barriers (Lingual Strengthening Goal 1, SLP) medically complex  -AD medically complex  -AD  medically complex  -AD      Progress/Outcomes (Lingual Strengthening Goal 1, SLP) continuing progress toward goal; goal ongoing  -AD continuing progress toward goal; goal ongoing  improved lateralization of tongue w/verbal cues during swallow trials.  -AD good progress toward goal; goal ongoing  -AD              Pharyngeal Strengthening Exercise Goal 1 (SLP)    Activity (Pharyngeal Strengthening Goal 1, SLP) increase closure at entrance to airway/closure of airway at glottis  -AD increase closure at entrance to airway/closure of airway at glottis  -AD increase closure at entrance to airway/closure of airway at glottis  -AD      Increase Closure at Entrance to Airway/Closure of Airway at Glottis hard effortful swallow; effortful pitch glide (falsetto + pharyngeal squeeze)  effortful pitch glide and sustained phonation, 10x each w/consistent cues and noted decreased pitch range/laryngeal elevation. Decreased pharyngeal squeeze.  -AD hard effortful swallow; effortful pitch glide (falsetto + pharyngeal squeeze)  -AD hard effortful swallow; effortful pitch glide (falsetto + pharyngeal squeeze)  -AD      [place]/Accuracy (Pharyngeal Strengthening Goal 1, SLP) with moderate cues (50-74% accuracy)  -AD with moderate cues (50-74% accuracy)  -AD with moderate cues (50-74% accuracy)  -AD      Time Frame (Pharyngeal Strengthening Goal 1, SLP) short term goal (STG); 2 weeks  -AD short term goal (STG); 2 weeks  -AD short term goal (STG); 2 weeks  -AD      Barriers (Pharyngeal Strengthening Goal 1, SLP) medically complex  -AD medically complex  -AD medically complex  -AD      Progress/Outcomes (Pharyngeal Strengthening Goal 1, SLP) continuing progress toward goal; goal ongoing  -AD other (see comments)  goal not targeted during this session  -AD goal ongoing  -AD              Swallow Compensatory Strategies Goal 1 (SLP)    Activity (Swallow Compensatory Strategies/Techniques Goal 1, SLP) compensatory strategies; small bites;  liquids by teaspoon only; effortful swallow; extra swallow per bolus; throat clear/extra swallow  -AD compensatory strategies; small bites; liquids by teaspoon only; effortful swallow; extra swallow per bolus; throat clear/extra swallow  -AD compensatory strategies; small bites; liquids by teaspoon only; effortful swallow; extra swallow per bolus; throat clear/extra swallow  -AD      [place]/Accuracy (Swallow Compensatory Strategies/Techniques Goal 1, SLP) with moderate cues (50-74% accuracy)  -AD with moderate cues (50-74% accuracy)  -AD with moderate cues (50-74% accuracy)  -AD      Time Frame (Swallow Compensatory Strategies/Techniques Goal 1, SLP) short term goal (STG); 1 week  -AD short term goal (STG); 1 week  -AD short term goal (STG); 1 week  -AD      Barriers (Swallow Compensatory Strategies/Techniques Goal 1, SLP) medically complex  -AD medically complex  -AD medically complex  -AD      Progress/Outcomes (Swallow Compensatory Strategies/Techniques Goal 1, SLP) other (see comments)  goal not targeted during this session.  -AD continuing progress toward goal; goal ongoing; other (see comments)  requires verbal prompts for oral clearance, subsequent swallows and small bites with use of banana. Bite/drink size otherwise controlled by SLP due to pt wth difficulty self feeding.  -AD other (see comments)  New  -AD            User Key  (r) = Recorded By, (t) = Taken By, (c) = Cosigned By    Initials Name Effective Dates    Denisa Mcconnell, MS CCC-SLP 06/16/21 -                 EDUCATION  The patient has been educated in the following areas:   Home Exercise Program (HEP) Dysphagia (Swallowing Impairment). Pt verbalizes and demonstrates understanding of exercises to practice on his own.        SLP GOALS     Row Name 01/07/22 1400 01/06/22 0910 01/05/22 1330       Oral Nutrition/Hydration Goal 1 (SLP)    Oral Nutrition/Hydration Goal 1, SLP -- -- Pt will be able to participate in an instrumental assessment  of swallow in order to determine safest, least restrictive diet w/limited risk of aspiration.  -AD    Time Frame (Oral Nutrition/Hydration Goal 1, SLP) -- -- short term goal (STG); 4 days  -AD    Barriers (Oral Nutrition/Hydration Goal 1, SLP) -- -- medically complex  -AD    Progress/Outcomes (Oral Nutrition/Hydration Goal 1, SLP) -- -- goal met  -AD       Oral Nutrition/Hydration Goal 2 (SLP)    Oral Nutrition/Hydration Goal 2, SLP Pt will demonstrate tolerance of a least restrictive diet w/o clinical s/s of aspiration or complications such as aspiration pneumonia  -AD Pt will demonstrate tolerance of a least restrictive diet w/o clinical s/s of aspiration or complications such as aspiration pneumonia  -AD Pt will demonstrate tolerance of a least restrictive diet w/o clinical s/s of aspiration or complications such as aspiration pneumonia  -AD    Time Frame (Oral Nutrition/Hydration Goal 2, SLP) short term goal (STG); 2 weeks  -AD short term goal (STG); 2 weeks  -AD short term goal (STG); 2 weeks  -AD    Barriers (Oral Nutrition/Hydration Goal 2, SLP) medically complex  -AD medically complex  -AD medically complex  -AD    Progress/Outcomes (Oral Nutrition/Hydration Goal 2, SLP) good progress toward goal; goal ongoing  -AD good progress toward goal; goal ongoing  -AD other (see comments); good progress toward goal; goal revised this date  Revised goal  -AD       Lingual Strengthening Goal 1 (SLP)    Activity (Lingual Strengthening Goal 1, SLP) increase lingual tone/sensation/control/coordination/movement; increase tongue back strength  -AD increase lingual tone/sensation/control/coordination/movement; increase tongue back strength  -AD increase lingual tone/sensation/control/coordination/movement; increase tongue back strength  -AD    Increase Lingual Tone/Sensation/Control/Coordination/Movement lingual movement exercises; swallow trials  -AD lingual movement exercises; swallow trials  -AD lingual movement  exercises; swallow trials  -AD    Increase Tongue Back Strength lingual movement exercises; lingual resistance exercises  10 reps of resistance for lingual elevation, protrusion and lateralization.  -AD lingual movement exercises; lingual resistance exercises  -AD lingual movement exercises; lingual resistance exercises  -AD    [place]/Accuracy (Lingual Strengthening Goal 1, SLP) with minimal cues (75-90% accuracy)  -AD with minimal cues (75-90% accuracy)  -AD with minimal cues (75-90% accuracy)  -AD    Time Frame (Lingual Strengthening Goal 1, SLP) short term goal (STG); 2 weeks  -AD short term goal (STG); 2 weeks  -AD short term goal (STG); 2 weeks  -AD    Barriers (Lingual Strengthening Goal 1, SLP) medically complex  -AD medically complex  -AD medically complex  -AD    Progress/Outcomes (Lingual Strengthening Goal 1, SLP) continuing progress toward goal; goal ongoing  -AD continuing progress toward goal; goal ongoing  improved lateralization of tongue w/verbal cues during swallow trials.  -AD good progress toward goal; goal ongoing  -AD       Pharyngeal Strengthening Exercise Goal 1 (SLP)    Activity (Pharyngeal Strengthening Goal 1, SLP) increase closure at entrance to airway/closure of airway at glottis  -AD increase closure at entrance to airway/closure of airway at glottis  -AD increase closure at entrance to airway/closure of airway at glottis  -AD    Increase Closure at Entrance to Airway/Closure of Airway at Glottis hard effortful swallow; effortful pitch glide (falsetto + pharyngeal squeeze)  effortful pitch glide and sustained phonation, 10x each w/consistent cues and noted decreased pitch range/laryngeal elevation. Decreased pharyngeal squeeze.  -AD hard effortful swallow; effortful pitch glide (falsetto + pharyngeal squeeze)  -AD hard effortful swallow; effortful pitch glide (falsetto + pharyngeal squeeze)  -AD    [place]/Accuracy (Pharyngeal Strengthening Goal 1, SLP) with moderate cues  (50-74% accuracy)  -AD with moderate cues (50-74% accuracy)  -AD with moderate cues (50-74% accuracy)  -AD    Time Frame (Pharyngeal Strengthening Goal 1, SLP) short term goal (STG); 2 weeks  -AD short term goal (STG); 2 weeks  -AD short term goal (STG); 2 weeks  -AD    Barriers (Pharyngeal Strengthening Goal 1, SLP) medically complex  -AD medically complex  -AD medically complex  -AD    Progress/Outcomes (Pharyngeal Strengthening Goal 1, SLP) continuing progress toward goal; goal ongoing  -AD other (see comments)  goal not targeted during this session  -AD goal ongoing  -AD       Swallow Compensatory Strategies Goal 1 (SLP)    Activity (Swallow Compensatory Strategies/Techniques Goal 1, SLP) compensatory strategies; small bites; liquids by teaspoon only; effortful swallow; extra swallow per bolus; throat clear/extra swallow  -AD compensatory strategies; small bites; liquids by teaspoon only; effortful swallow; extra swallow per bolus; throat clear/extra swallow  -AD compensatory strategies; small bites; liquids by teaspoon only; effortful swallow; extra swallow per bolus; throat clear/extra swallow  -AD    [place]/Accuracy (Swallow Compensatory Strategies/Techniques Goal 1, SLP) with moderate cues (50-74% accuracy)  -AD with moderate cues (50-74% accuracy)  -AD with moderate cues (50-74% accuracy)  -AD    Time Frame (Swallow Compensatory Strategies/Techniques Goal 1, SLP) short term goal (STG); 1 week  -AD short term goal (STG); 1 week  -AD short term goal (STG); 1 week  -AD    Barriers (Swallow Compensatory Strategies/Techniques Goal 1, SLP) medically complex  -AD medically complex  -AD medically complex  -AD    Progress/Outcomes (Swallow Compensatory Strategies/Techniques Goal 1, SLP) other (see comments)  goal not targeted during this session.  -AD continuing progress toward goal; goal ongoing; other (see comments)  requires verbal prompts for oral clearance, subsequent swallows and small bites with use of  banana. Bite/drink size otherwise controlled by SLP due to pt wth difficulty self feeding.  -AD other (see comments)  New  -AD          User Key  (r) = Recorded By, (t) = Taken By, (c) = Cosigned By    Initials Name Provider Type    Denisa Mcconnell MS CCC-SLP Speech and Language Pathologist                   Time Calculation:    Time Calculation- SLP     Row Name 01/07/22 1500             Time Calculation- SLP    SLP Start Time 1315  -AD      SLP Stop Time 1400  -AD      SLP Time Calculation (min) 45 min  -AD      Total Timed Code Minutes- SLP 0 minute(s)  -AD      SLP Non-Billable Time (min) 0 min  -AD      SLP Received On 01/07/22  -AD      SLP - Next Appointment 01/10/22  -AD              Untimed Charges    14452-VM Treatment Swallow Minutes 45  -AD              Total Minutes    Untimed Charges Total Minutes 45  -AD       Total Minutes 45  -AD            User Key  (r) = Recorded By, (t) = Taken By, (c) = Cosigned By    Initials Name Provider Type    Denisa Mcconnell MS CCC-SLP Speech and Language Pathologist                Therapy Charges for Today     Code Description Service Date Service Provider Modifiers Qty    55867997295 HC ST TREATMENT SWALLOW 2 1/6/2022 Denisa Chua MS CCC-SLP GN 1    [id number] HC ST TREATMENT SWALLOW 3 1/7/2022 Densia Chua MS CCC-SLP GN 1               Denisa Chua MS CCC-SLP  1/7/2022

## 2022-01-07 NOTE — PLAN OF CARE
Goal Outcome Evaluation:  Plan of Care Reviewed With: patient        Progress: improving  Outcome Summary: OT- Patient performed supine to sit with mod/max A X 2. Patient sat at EOB with SBA/CGA for 5 minutes. Patient performed 3 trials of sit to stand from elevated EOB. Patient was unable to come to a full upright stand (approximatley 75% upright) with max A X 2 with support of FWW. Patient participated in A/AROM and AROM of B ues and hand strengthening exercises. Patient continues to show progress daily and would benefit from short term rehab at discharge.

## 2022-01-07 NOTE — PROGRESS NOTES
"Hospitalist Team      Patient Care Team:  Farhan Jean Jr., MD as PCP - General (Family Medicine)      Chief Complaint:  Follow-up Acute Hypoxic Respiratory Failure due to COVID-19 Pneumonia    Subjective    Continues to do well.  Participated w/ PT.  PO intake good.  Denies chest pain and dyspnea.    Objective    Vital Signs  Temp:  [97.8 °F (36.6 °C)-98.2 °F (36.8 °C)] 98.2 °F (36.8 °C)  Heart Rate:  [78-98] 83  Resp:  [17-22] 17  BP: (112-145)/(75-87) 112/76  Oxygen Therapy  SpO2: 93 %  Pulse Oximetry Type: Continuous  Device (Oxygen Therapy): ventilator  Device (Oxygen Therapy): nasal cannula  $ High Flow Nasal Cannula Set-Up: yes  Flow (L/min): 2  Oxygen Concentration (%): 35  ETCO2 (mmHg): 35 mmHg  Probe Placed On (Pulse Ox): Left:, finger  Probe Removed From (Pulse Ox): Right:, finger}    Flowsheet Rows      First Filed Value   Admission Height 185.4 cm (73\") Documented at 12/16/2021 1515   Admission Weight 120 kg (265 lb) Documented at 12/16/2021 1515          Physical Exam:    General: Appears stated age in no acute distress.  Lungs: Breath sounds are diminished throughout all fields.  No wheeze appreciated.  Respirations are non-labored.  CV: Regular rate and rhythm.  No murmurs appreciated.  Radial pulses are 2+ and symmetric.  Abdomen: Obese, soft, and non-tender w/ active bowel sounds.  MSK: No C/C/E.  Neuro: CN II-XII grossly intact.  Psych: Pleasant affect.  AAOx3.    Results Review:     I reviewed the patient's new clinical results.    Lab Results (last 24 hours)     Procedure Component Value Units Date/Time    POC Glucose Once [[id number]]  (Abnormal) Collected: 01/07/22 1212    Specimen: Blood Updated: 01/07/22 1229     Glucose 227 mg/dL      Comment: Meter: [id number] : [id number] Kennedi Jason NURSING ASSISTANT       Renal Function Panel [[id number]]  (Abnormal) Collected: 01/07/22 0852    Specimen: Blood Updated: 01/07/22 0916     Glucose 195 mg/dL      BUN 21 mg/dL      Creatinine 0.54 " mg/dL      Sodium 137 mmol/L      Potassium 4.0 mmol/L      Chloride 101 mmol/L      CO2 27.0 mmol/L      Calcium 8.9 mg/dL      Albumin 2.90 g/dL      Phosphorus 2.8 mg/dL      Anion Gap 9.0 mmol/L      BUN/Creatinine Ratio 38.9     eGFR Non African Amer >150 mL/min/1.73     Narrative:      GFR Normal >60  Chronic Kidney Disease <60  Kidney Failure <15      POC Glucose Once [[id number]]  (Abnormal) Collected: 01/07/22 0804    Specimen: Blood Updated: 01/07/22 0811     Glucose 178 mg/dL      Comment: Meter: [id number] : [id number] Kody Valencia LIZETT       Procalcitonin [[id number]]  (Normal) Collected: 01/07/22 0328    Specimen: Blood Updated: 01/07/22 0506     Procalcitonin 0.06 ng/mL     CBC (No Diff) [[id number]]  (Abnormal) Collected: 01/07/22 0328    Specimen: Blood Updated: 01/07/22 0434     WBC 10.32 10*3/mm3      RBC 3.52 10*6/mm3      Hemoglobin 10.8 g/dL      Hematocrit 34.6 %      MCV 98.3 fL      MCH 30.7 pg      MCHC 31.2 g/dL      RDW 14.4 %      RDW-SD 51.7 fl      MPV 10.2 fL      Platelets 295 10*3/mm3     POC Glucose Once [[id number]]  (Abnormal) Collected: 01/06/22 1952    Specimen: Blood Updated: 01/06/22 1958     Glucose 295 mg/dL      Comment: Meter: [id number] : [id number] Fco Llanos CNA       POC Glucose Once [[id number]]  (Abnormal) Collected: 01/06/22 1640    Specimen: Blood Updated: 01/06/22 1654     Glucose 290 mg/dL      Comment: Meter: [id number] : [id number] Kennedi Jason NURSING ASSISTANT             Imaging Results (Last 24 Hours)     ** No results found for the last 24 hours. **          Medication Review:   I have reviewed the patient's current medication list    Current Facility-Administered Medications:   •  acetaminophen (TYLENOL) tablet 650 mg, 650 mg, Oral, Q6H PRN, Jayden Hyatt, DO, 650 mg at 12/26/21 1153  •  [COMPLETED] apixaban (ELIQUIS) tablet 10 mg, 10 mg, Nasogastric, Q12H, 10 mg at 01/05/22 1528 **FOLLOWED BY** apixaban (ELIQUIS) tablet 5 mg, 5 mg, Oral,  Q12H, Dakota Moore MD, 5 mg at 01/07/22 0831  •  Aquaphor Advanced Therapy ointment 1 application, 1 application, Topical, Q12H, Dakota Moore MD, 1 application at 01/07/22 0832  •  bisacodyl (DULCOLAX) suppository 10 mg, 10 mg, Rectal, Daily, Dakota Moore MD, 10 mg at 01/05/22 0900  •  chlorhexidine (PERIDEX) 0.12 % solution 15 mL, 15 mL, Mouth/Throat, Q12H, Spencer Carmichael MD, 15 mL at 01/07/22 0831  •  dexamethasone (DECADRON) tablet 6 mg, 6 mg, Oral, Daily With Breakfast, 6 mg at 01/07/22 0831 **OR** dexamethasone (DECADRON) injection 6 mg, 6 mg, Intravenous, Daily With Breakfast, Charli Ayala MD, 6 mg at 01/05/22 0900  •  dextromethorphan polistirex ER (DELSYM) 30 MG/5ML oral suspension 60 mg, 60 mg, Oral, Q12H PRN, Jaleesa Cárdenas MD, 60 mg at 01/06/22 0922  •  dextrose (D50W) (25 g/50 mL) IV injection 25 g, 25 g, Intravenous, Q15 Min PRN, Dakota Moore MD, 25 g at 12/29/21 0804  •  dextrose (GLUTOSE) oral gel 15 g, 15 g, Oral, Q15 Min PRN, Dakota Moore MD  •  dilTIAZem (CARDIZEM) tablet 60 mg, 60 mg, Nasogastric, Q8H, Dakota Moore MD, 60 mg at 01/07/22 1506  •  famotidine (PEPCID) injection 20 mg, 20 mg, Intravenous, Daily, Spencer Carmichael MD, 20 mg at 01/07/22 0845  •  glucagon (GLUCAGEN) injection 1 mg, 1 mg, Subcutaneous, Q15 Min PRN, Dakota Moore MD  •  hydrALAZINE (APRESOLINE) injection 10 mg, 10 mg, Intravenous, Q6H PRN, Jayden Hyatt,   •  insulin aspart (novoLOG) injection 0-24 Units, 0-24 Units, Subcutaneous, TID With Meals, Jayden Hyatt DO, 8 Units at 01/07/22 1239  •  insulin detemir (LEVEMIR) injection 20 Units, 20 Units, Subcutaneous, Q12H, Dakota Moore MD, 20 Units at 01/07/22 0831  •  ipratropium-albuterol (DUO-NEB) nebulizer solution 3 mL, 3 mL, Nebulization, Q6H PRN, Jaleesa Cárdenas MD  •  [START ON 1/8/2022] linagliptin (TRADJENTA) tablet 5 mg, 5 mg, Oral, Daily, Dakota Moore MD  •  metFORMIN (GLUCOPHAGE) tablet  500 mg, 500 mg, Oral, BID With Meals, Dakota Moore MD  •  polyethylene glycol (MIRALAX) packet 17 g, 17 g, Oral, Daily, Luca Hermosillo MD, 17 g at 01/06/22 0820  •  potassium chloride 10 mEq in 100 mL IVPB, 10 mEq, Intravenous, Q1H PRN, Jaleesa Cárdenas MD, Stopped at 12/28/21 0940  •  potassium phosphate 45 mmol in sodium chloride 0.9 % 500 mL infusion, 45 mmol, Intravenous, PRN **OR** potassium phosphate 30 mmol in sodium chloride 0.9 % 250 mL infusion, 30 mmol, Intravenous, PRN **OR** potassium phosphate 15 mmol in sodium chloride 0.9 % 100 mL infusion, 15 mmol, Intravenous, PRN **OR** sodium phosphates 45 mmol in sodium chloride 0.9 % 500 mL IVPB, 45 mmol, Intravenous, PRN **OR** sodium phosphates 30 mmol in sodium chloride 0.9 % 250 mL IVPB, 30 mmol, Intravenous, PRN **OR** sodium phosphates 15 mmol in sodium chloride 0.9 % 250 mL IVPB, 15 mmol, Intravenous, PRN, Jaleesa Cárdenas MD, Last Rate: 62.5 mL/hr at 12/18/21 0429, 15 mmol at 12/18/21 0429  •  [COMPLETED] Insert peripheral IV, , , Once **AND** sodium chloride 0.9 % flush 10 mL, 10 mL, Intravenous, PRN, Jaleesa Cárdenas MD, 10 mL at 12/28/21 0732  •  sodium chloride 0.9 % flush 10 mL, 10 mL, Intravenous, PRN, Jeana Grubbs MD  •  sodium chloride 0.9 % flush 10 mL, 10 mL, Intravenous, Q12H, Dakota Moore MD, 10 mL at 01/07/22 0833  •  sodium chloride 0.9 % flush 10 mL, 10 mL, Intravenous, Q12H, Dakota Moore MD, 10 mL at 01/07/22 0832  •  sodium chloride 0.9 % flush 10 mL, 10 mL, Intravenous, Q12H, Dakota Moore MD, 10 mL at 01/07/22 0832  •  sodium chloride 0.9 % flush 10 mL, 10 mL, Intravenous, PRN, Dakota Moore MD  •  sodium chloride 0.9 % flush 20 mL, 20 mL, Intravenous, PRN, Jeana Grubbs MD  •  sodium chloride 0.9 % flush 20 mL, 20 mL, Intravenous, PRN, Dakota Moore MD      Assessment/Plan     1.  Acute Hypoxic Respiratory Failure due to COVID-19 Pneumonia w/ Superimposed MSSA and Klebsiella  pneumonia:  Continues to progress well.  Continue to jostin O2 as able.  Need to discuss steroid taper.  Continue Levaquin as prescribed by ID.     2.  Diabetes Mellitus, Type 2 in Obese w/ Hyperglycemia: Bedsides not at goal.  Will start him on some oral therapy and decrease his basal insulin dose.  Plan on Glipizide, Metformin, and Januvia at discharge.      3.  LLE DVT:  Continues on Eliquis    Plan for disposition: Discussed condition and plan w/ his wife.  Anticipate discharge to facility soon.    Dakota Moore MD  01/07/22  16:00 EST

## 2022-01-07 NOTE — PLAN OF CARE
Goal Outcome Evaluation:  Plan of Care Reviewed With: patient        Progress: improving  Outcome Summary: patient vss. sinus rhyhtm on tele. on eliquis for blood thinner. nadeen taken out this shift. levaquin continued, picc line in place

## 2022-01-07 NOTE — PROGRESS NOTES
Adult Nutrition  Assessment/PES    Patient Name:  Sergio Daniel  YOB: 1955  MRN: [id number]  Admit Date:  12/16/2021    Assessment Date:  1/7/2022    Comments:  Agree with diet per SLP. 100% of last 3 meals recorded. Pt doing well off TF.  Will cont to follow and monitor.     This note completed with aid of nursing and review of medical record.      Reason for Assessment     Row Name 01/07/22 1405          Reason for Assessment    Reason For Assessment follow-up protocol     Diagnosis infection/sepsis; pulmonary disease; diabetes diagnosis/complications  AHRF s/p vent COVID 19 DM                Nutrition/Diet History     Row Name 01/07/22 1405          Nutrition/Diet History    Typical Food/Fluid Intake CNA reports pt eats well just needs assistance wtih feeding                  Labs/Tests/Procedures/Meds     Row Name 01/07/22 1406          Labs/Procedures/Meds    Lab Results Reviewed reviewed     Lab Results Comments glu 195            Diagnostic Tests/Procedures    Diagnostic Test/Procedure Reviewed reviewed            Medications    Pertinent Medications Reviewed reviewed     Pertinent Medications Comments decadron, novolog, levemir                Physical Findings     Row Name 01/07/22 1407          Physical Findings    Skin other (see comments)  hand blisters                  Nutrition Prescription Ordered     Row Name 01/07/22 1407          Nutrition Prescription PO    Current PO Diet Soft Texture     Texture Ground     Fluid Consistency Honey thick     Common Modifiers Consistent Carbohydrate                Evaluation of Received Nutrient/Fluid Intake     Row Name 01/07/22 1408          Fluid Intake Evaluation    Oral Fluid (mL) 360  ave x 2, 17%            PO Evaluation    Number of Meals 5     % PO Intake 70                     Problem/Interventions:   Problem 1     Row Name 01/07/22 1409          Nutrition Diagnoses Problem 1    Problem 1 Inadequate Nutrient Intake     Etiology (related to)  Factors Affecting Nutrition; Medical Diagnosis     Signs/Symptoms (evidenced by) Report/Observation                      Intervention Goal     Row Name 01/07/22 1409          Intervention Goal    General Maintain nutrition     PO Maintain intake; PO intake (%)     PO Intake % 50 %  or greater                Nutrition Intervention     Row Name 01/07/22 1409          Nutrition Intervention    RD/Tech Action Follow Tx progress                  Education/Evaluation     Row Name 01/07/22 1410          Education    Education Education not appropriate at this time            Monitor/Evaluation    Monitor Per protocol; I&O; PO intake; Pertinent labs; Weight; Symptoms; Skin status                 Electronically signed by:  Le Fregoso RD  01/07/22 14:10 EST

## 2022-01-07 NOTE — THERAPY TREATMENT NOTE
Acute Care - Occupational Therapy Treatment Note  ARELIS Flaherty     Patient Name: Sergio Daniel  : 1955  MRN: [id number]  Today's Date: 2022             Admit Date: 2021       ICD-10-CM ICD-9-CM   1. Acute respiratory failure with hypoxia (HCC)  J96.01 518.81   2. Pneumonia due to COVID-19 virus  U07.1 480.8    J12.82 079.89   3. Paroxysmal atrial fibrillation (HCC)  I48.0 427.31   4. Pharyngeal dysphagia  R13.13 787.23   5. Oropharyngeal dysphagia  R13.12 787.22     Patient Active Problem List   Diagnosis   • Acute respiratory failure with hypoxia (HCC)   • Acute hypoxemic respiratory failure due to COVID-19 (HCC)     Past Medical History:   Diagnosis Date   • Diabetes mellitus (HCC)      History reviewed. No pertinent surgical history.      OT ASSESSMENT FLOWSHEET (last 12 hours)     OT Evaluation and Treatment     Row Name 22 0907                   OT Time and Intention    Subjective Information no complaints  -EN        Document Type therapy note (daily note)  -EN        Mode of Treatment occupational therapy  -EN        Patient Effort good  -EN        Symptoms Noted During/After Treatment fatigue  -EN                  General Information    Patient/Family/Caregiver Comments/Observations Patient reclined in bed, agreed to therapy  -EN        Risks Reviewed patient:; LOB  -EN        Benefits Reviewed patient:; improve function; increase independence; increase strength; increase balance  -EN                  Cognition    Personal Safety Interventions gait belt; nonskid shoes/slippers when out of bed  -EN                  Pain Scale: Numbers Pre/Post-Treatment    Pretreatment Pain Rating 0/10 - no pain  -EN        Posttreatment Pain Rating 0/10 - no pain  -EN                  Bed Mobility    Supine-Sit Wells (Bed Mobility) verbal cues; moderate assist (50% patient effort); maximum assist (25% patient effort); 2 person assist  -EN        Sit-Supine Wells (Bed Mobility) verbal  cues; maximum assist (25% patient effort); 2 person assist  -EN        Assistive Device (Bed Mobility) bed rails; draw sheet; head of bed elevated  -EN                  Transfer Assessment/Treatment    Transfers sit-stand transfer; stand-sit transfer  -EN        Comment (Transfers) Patient attempted standing from elevated EOB. Patient unable to come to full upright position (approximately 75% upright).  -EN                  Transfers    Sit-Stand Hiko (Transfers) maximum assist (25% patient effort); 2 person assist; verbal cues  -EN        Stand-Sit Hiko (Transfers) maximum assist (25% patient effort); 2 person assist; verbal cues  -EN                  Sit-Stand Transfer    Assistive Device (Sit-Stand Transfers) walker, front-wheeled  -EN                  Stand-Sit Transfer    Assistive Device (Stand-Sit Transfers) walker, front-wheeled  -EN                  Motor Skills    Therapeutic Exercise shoulder; elbow/forearm; wrist; hand  -EN                  Shoulder (Therapeutic Exercise)    Shoulder AAROM (Therapeutic Exercise) bilateral; flexion; extension; sitting; 10 repetitions  sitting relinced in bed, 2 sets  -EN                  Elbow/Forearm (Therapeutic Exercise)    Elbow/Forearm (Therapeutic Exercise) AROM (active range of motion)  -EN        Elbow/Forearm AROM (Therapeutic Exercise) bilateral; flexion; extension  -EN                  Wrist (Therapeutic Exercise)    Wrist (Therapeutic Exercise) AROM (active range of motion)  -EN        Wrist AROM (Therapeutic Exercise) bilateral; flexion; extension  -EN                  Hand (Therapeutic Exercise)    Hand (Therapeutic Exercise) AROM (active range of motion); strengthening exercise  -EN        Hand AROM/AAROM (Therapeutic Exercise) bilateral; AROM (active range of motion); 10 repetitions  -EN        Hand Strengthening (Therapeutic Exercise) bilateral;  strengthening  stress ball left in room  -EN                  Balance    Comment, Balance  Patient sat EOB with SBA/CGA up to 5 minutes.  -EN                  Activities of Daily Living    BADL Assessment/Intervention feeding  -EN                  Self-Feeding Assessment/Training    Comment (Feeding) held cup and drank thinkened juice from straw independently  -EN                  Wound Right hand Blisters    Wound - Properties Group Present on Hospital Admission: N  -LC Side: Right  -LC Location: hand  -LC, includes hand and wrist  Primary Wound Type: Blisters  -LC        Retired Wound - Properties Group Present on Hospital Admission: N  -LC Side: Right  -LC Location: hand  -LC, includes hand and wrist  Primary Wound Type: Blisters  -LC                  Plan of Care Review    Plan of Care Reviewed With patient  -EN        Outcome Summary OT- Patient performed supine to sit with mod/max A X 2. Patient sat at EOB with SBA/CGA for 5 minutes. Patient performed 3 trials of sit to stand from elevated EOB. Patient was unable to come to a full upright stand (approximatley 75% upright) with max A X 2 with support of FWW. Patient participated in A/AROM and AROM of B ues and hand strengthening exercises. Patient continues to show progress daily and would benefit from short term rehab at discharge.  -EN                  Positioning and Restraints    Pre-Treatment Position in bed  -EN        Post Treatment Position bed  -EN        In Bed notified nsg; call light within reach; encouraged to call for assist; supine  -EN                  Progress Summary (OT)    Progress Toward Functional Goals (OT) progress toward functional goals is good  -EN        Daily Progress Summary (OT) improving  -EN              User Key  (r) = Recorded By, (t) = Taken By, (c) = Cosigned By    Initials Name Effective Dates    Isi Wiggins, OTR 06/16/21 -     Chitra Faith, RN, CWON 07/26/21 -                  Occupational Therapy Education                 Title: PT OT SLP Therapies (Done)     Topic: Occupational Therapy  (Done)     Point: ADL training (Done)     Description:   Instruct learner(s) on proper safety adaptation and remediation techniques during self care or transfers.   Instruct in proper use of assistive devices.              Learning Progress Summary           Patient Acceptance, E, VU by EN at 1/7/2022 1020    Comment: UE HEP, safety during transfers    Acceptance, E, VU by EN at 1/6/2022 1007    Comment: UE HEP    Acceptance, E, VU by EN at 1/5/2022 1115    Comment: UE HEP, benefits of activity    Acceptance, E, VU by EN at 1/5/2022 1026    Comment: Patient educated on UE HEP, benefits of activity.    Acceptance, E, VU by EN at 1/4/2022 0959    Comment: transfer safety, UE HEP    Acceptance, E, VU by EN at 1/3/2022 1352    Comment: Educated on UE HEP                   Point: Home exercise program (Done)     Description:   Instruct learner(s) on appropriate technique for monitoring, assisting and/or progressing therapeutic exercises/activities.              Learning Progress Summary           Patient Acceptance, E, VU by EN at 1/7/2022 1020    Comment: UE HEP, safety during transfers    Acceptance, E, VU by EN at 1/6/2022 1007    Comment: UE HEP    Acceptance, E, VU by EN at 1/5/2022 1115    Comment: UE HEP, benefits of activity    Acceptance, E, VU by EN at 1/5/2022 1026    Comment: Patient educated on UE HEP, benefits of activity.    Acceptance, E, VU by EN at 1/4/2022 0959    Comment: transfer safety, UE HEP    Acceptance, E, VU by EN at 1/3/2022 1352    Comment: Educated on UE HEP                               User Key     Initials Effective Dates Name Provider Type Discipline    EN 06/16/21 -  Isi Theodore OTR Occupational Therapist OT                  OT Recommendation and Plan  Planned Therapy Interventions (OT): BADL retraining, functional balance retraining, passive ROM/stretching, ROM/therapeutic exercise, strengthening exercise, transfer/mobility retraining  Therapy Frequency (OT): 5  times/wk  Progress Toward Functional Goals (OT): progress toward functional goals is good  Plan of Care Review  Plan of Care Reviewed With: patient  Progress: improving  Outcome Summary: OT- Patient performed supine to sit with mod/max A X 2. Patient sat at EOB with SBA/CGA for 5 minutes. Patient performed 3 trials of sit to stand from elevated EOB. Patient was unable to come to a full upright stand (approximatley 75% upright) with max A X 2 with support of FWW. Patient participated in A/AROM and AROM of B ues and hand strengthening exercises. Patient continues to show progress daily and would benefit from short term rehab at discharge.  Plan of Care Reviewed With: patient  Outcome Summary: OT- Patient performed supine to sit with mod/max A X 2. Patient sat at EOB with SBA/CGA for 5 minutes. Patient performed 3 trials of sit to stand from elevated EOB. Patient was unable to come to a full upright stand (approximatley 75% upright) with max A X 2 with support of FWW. Patient participated in A/AROM and AROM of B ues and hand strengthening exercises. Patient continues to show progress daily and would benefit from short term rehab at discharge.     Outcome Measures     Row Name 01/07/22 1000 01/06/22 1000 01/06/22 0814       How much help from another person do you currently need...    Turning from your back to your side while in flat bed without using bedrails? -- -- 1  -JW    Moving from lying on back to sitting on the side of a flat bed without bedrails? -- -- 1  -JW    Moving to and from a bed to a chair (including a wheelchair)? -- -- 1  -JW    Standing up from a chair using your arms (e.g., wheelchair, bedside chair)? -- -- 1  -JW    Climbing 3-5 steps with a railing? -- -- 1  -JW    To walk in hospital room? -- -- 1  -JW    AM-PAC 6 Clicks Score (PT) -- -- 6  -JW       How much help from another is currently needed...    Putting on and taking off regular lower body clothing? 1  -EN 1  -EN --    Bathing (including  washing, rinsing, and drying) 2  -EN 2  -EN --    Toileting (which includes using toilet bed pan or urinal) 1  -EN 1  -EN --    Putting on and taking off regular upper body clothing 1  -EN 1  -EN --    Taking care of personal grooming (such as brushing teeth) 2  -EN 2  -EN --    Eating meals 2  -EN 2  -EN --    AM-PAC 6 Clicks Score (OT) 9  -EN 9  -EN --       Functional Assessment    Outcome Measure Options -- -- AM-PAC 6 Clicks Basic Mobility (PT)  -    Row Name 01/05/22 1100 01/05/22 0830          How much help from another person do you currently need...    Turning from your back to your side while in flat bed without using bedrails? -- 1  -JW     Moving from lying on back to sitting on the side of a flat bed without bedrails? -- 1  -JW     Moving to and from a bed to a chair (including a wheelchair)? -- 1  -JW     Standing up from a chair using your arms (e.g., wheelchair, bedside chair)? -- 1  -JW     Climbing 3-5 steps with a railing? -- 1  -JW     To walk in hospital room? -- 1  -JW     AM-PAC 6 Clicks Score (PT) -- 6  -JW            How much help from another is currently needed...    Putting on and taking off regular lower body clothing? 1  -EN --     Bathing (including washing, rinsing, and drying) 1  -EN --     Toileting (which includes using toilet bed pan or urinal) 1  -EN --     Putting on and taking off regular upper body clothing 1  -EN --     Taking care of personal grooming (such as brushing teeth) 2  -EN --     Eating meals 1  -EN --     AM-PAC 6 Clicks Score (OT) 7  -EN --            Functional Assessment    Outcome Measure Options -- AM-PAC 6 Clicks Basic Mobility (PT)  -           User Key  (r) = Recorded By, (t) = Taken By, (c) = Cosigned By    Initials Name Provider Type    Isi Wiggins, OTR Occupational Therapist    Rosy Arevalo, PT Physical Therapist                Time Calculation:    Time Calculation- OT     Row Name 01/07/22 1022             Time Calculation- OT     OT Start Time 0906  -EN      OT Stop Time 0937  -EN      OT Time Calculation (min) 31 min  -EN              Timed Charges    00410 - OT Therapeutic Activity Minutes 31  -EN              Total Minutes    Timed Charges Total Minutes 31  -EN       Total Minutes 31  -EN            User Key  (r) = Recorded By, (t) = Taken By, (c) = Cosigned By    Initials Name Provider Type    EN Isi Theodore, OTR Occupational Therapist              Therapy Charges for Today     Code Description Service Date Service Provider Modifiers Qty    05200316054 HC OT THERAPEUTIC ACT EA 15 MIN 1/6/2022 Isi Theodore OTR GO 2    13898022698 HC OT THERAPEUTIC ACT EA 15 MIN 1/7/2022 Isi Theodore OTCORY GO 2               LINDA Pepe  1/7/2022

## 2022-01-08 LAB
DEPRECATED RDW RBC AUTO: 51.1 FL (ref 37–54)
ERYTHROCYTE [DISTWIDTH] IN BLOOD BY AUTOMATED COUNT: 14.3 % (ref 12.3–15.4)
GLUCOSE BLDC GLUCOMTR-MCNC: 141 MG/DL (ref 70–130)
GLUCOSE BLDC GLUCOMTR-MCNC: 155 MG/DL (ref 70–130)
GLUCOSE BLDC GLUCOMTR-MCNC: 242 MG/DL (ref 70–130)
GLUCOSE BLDC GLUCOMTR-MCNC: 260 MG/DL (ref 70–130)
HCT VFR BLD AUTO: 33.7 % (ref 37.5–51)
HGB BLD-MCNC: 10.5 G/DL (ref 13–17.7)
MCH RBC QN AUTO: 30.3 PG (ref 26.6–33)
MCHC RBC AUTO-ENTMCNC: 31.2 G/DL (ref 31.5–35.7)
MCV RBC AUTO: 97.4 FL (ref 79–97)
PLATELET # BLD AUTO: 327 10*3/MM3 (ref 140–450)
PMV BLD AUTO: 9.4 FL (ref 6–12)
PROCALCITONIN SERPL-MCNC: 0.06 NG/ML (ref 0–0.25)
RBC # BLD AUTO: 3.46 10*6/MM3 (ref 4.14–5.8)
WBC NRBC COR # BLD: 10.13 10*3/MM3 (ref 3.4–10.8)

## 2022-01-08 PROCEDURE — 84145 PROCALCITONIN (PCT): CPT | Performed by: INTERNAL MEDICINE

## 2022-01-08 PROCEDURE — 97110 THERAPEUTIC EXERCISES: CPT

## 2022-01-08 PROCEDURE — 85027 COMPLETE CBC AUTOMATED: CPT | Performed by: INTERNAL MEDICINE

## 2022-01-08 PROCEDURE — 99232 SBSQ HOSP IP/OBS MODERATE 35: CPT | Performed by: HOSPITALIST

## 2022-01-08 PROCEDURE — 94669 MECHANICAL CHEST WALL OSCILL: CPT

## 2022-01-08 PROCEDURE — 82962 GLUCOSE BLOOD TEST: CPT

## 2022-01-08 PROCEDURE — 94761 N-INVAS EAR/PLS OXIMETRY MLT: CPT

## 2022-01-08 PROCEDURE — 63710000001 INSULIN ASPART PER 5 UNITS: Performed by: INTERNAL MEDICINE

## 2022-01-08 RX ORDER — BISACODYL 10 MG
10 SUPPOSITORY, RECTAL RECTAL ONCE
Status: COMPLETED | OUTPATIENT
Start: 2022-01-08 | End: 2022-01-08

## 2022-01-08 RX ORDER — DEXAMETHASONE 1 MG
3 TABLET ORAL
Status: DISCONTINUED | OUTPATIENT
Start: 2022-01-08 | End: 2022-01-13 | Stop reason: HOSPADM

## 2022-01-08 RX ORDER — GLIPIZIDE 5 MG/1
5 TABLET ORAL
Status: DISCONTINUED | OUTPATIENT
Start: 2022-01-08 | End: 2022-01-13 | Stop reason: HOSPADM

## 2022-01-08 RX ADMIN — SODIUM CHLORIDE, PRESERVATIVE FREE 10 ML: 5 INJECTION INTRAVENOUS at 08:48

## 2022-01-08 RX ADMIN — SODIUM CHLORIDE, PRESERVATIVE FREE 10 ML: 5 INJECTION INTRAVENOUS at 08:49

## 2022-01-08 RX ADMIN — WHITE PETROLATUM 41 % TOPICAL OINTMENT 1 APPLICATION: OINTMENT at 22:15

## 2022-01-08 RX ADMIN — GLIPIZIDE 5 MG: 5 TABLET ORAL at 18:03

## 2022-01-08 RX ADMIN — POLYETHYLENE GLYCOL 3350 17 G: 17 POWDER, FOR SOLUTION ORAL at 08:46

## 2022-01-08 RX ADMIN — DEXAMETHASONE 3 MG: 1 TABLET ORAL at 08:48

## 2022-01-08 RX ADMIN — BISACODYL 10 MG: 10 SUPPOSITORY RECTAL at 02:02

## 2022-01-08 RX ADMIN — FAMOTIDINE 20 MG: 10 INJECTION INTRAVENOUS at 09:01

## 2022-01-08 RX ADMIN — INSULIN ASPART 8 UNITS: 100 INJECTION, SOLUTION INTRAVENOUS; SUBCUTANEOUS at 18:03

## 2022-01-08 RX ADMIN — DILTIAZEM HYDROCHLORIDE 240 MG: 120 CAPSULE, COATED, EXTENDED RELEASE ORAL at 08:48

## 2022-01-08 RX ADMIN — APIXABAN 5 MG: 2.5 TABLET, FILM COATED ORAL at 23:52

## 2022-01-08 RX ADMIN — SODIUM CHLORIDE, PRESERVATIVE FREE 10 ML: 5 INJECTION INTRAVENOUS at 23:42

## 2022-01-08 RX ADMIN — CHLORHEXIDINE GLUCONATE 0.12% ORAL RINSE 15 ML: 1.2 LIQUID ORAL at 08:47

## 2022-01-08 RX ADMIN — METFORMIN HYDROCHLORIDE 500 MG: 500 TABLET ORAL at 08:48

## 2022-01-08 RX ADMIN — GLIPIZIDE 5 MG: 5 TABLET ORAL at 08:47

## 2022-01-08 RX ADMIN — METFORMIN HYDROCHLORIDE 500 MG: 500 TABLET ORAL at 18:03

## 2022-01-08 RX ADMIN — WHITE PETROLATUM 41 % TOPICAL OINTMENT 1 APPLICATION: OINTMENT at 08:46

## 2022-01-08 RX ADMIN — INSULIN ASPART 4 UNITS: 100 INJECTION, SOLUTION INTRAVENOUS; SUBCUTANEOUS at 08:47

## 2022-01-08 RX ADMIN — LINAGLIPTIN 5 MG: 5 TABLET, FILM COATED ORAL at 08:47

## 2022-01-08 RX ADMIN — BISACODYL 10 MG: 10 SUPPOSITORY RECTAL at 02:03

## 2022-01-08 RX ADMIN — APIXABAN 5 MG: 2.5 TABLET, FILM COATED ORAL at 08:48

## 2022-01-08 NOTE — DISCHARGE PLACEMENT REQUEST
"Christianne Daniel (66 y.o. Male)             Date of Birth Social Security Number Address Home Phone MRN    1955  [address] [facility] [address] [phone] [id number]    Judaism Marital Status             Non-Church        Admission Date Admission Type Admitting Provider Attending Provider Department, Room/Bed    12/16/21 Emergency Dakota Moore MD Ellis, Rusty T, MD [facility] MED SURG, 1411/1    Discharge Date Discharge Disposition Discharge Destination                         Attending Provider: Dakota Moore MD    Allergies: No Known Allergies    Isolation: Contact   Infection: MDRO (01/01/22), COVID (History) (01/05/22)   Code Status: CPR   Advance Care Planning Activity    Ht: 188 cm (74\")   Wt: 130 kg (286 lb 2.5 oz)    Admission Cmt: None   Principal Problem: Acute hypoxemic respiratory failure due to COVID-19 ([facility]) [U07.1,J96.01]                 Active Insurance as of 12/16/2021     Primary Coverage     Payor Plan Insurance Group Employer/Plan Group    HUMANA MEDICARE REPLACEMENT HU[employer]NA MEDICARE REPLACEMENT [id number]     Payor Plan Address Payor Plan Phone Number Payor Plan Fax Number Effective Dates    [address] [address] [phone]  3/1/2021 - None Entered    [facility] [address]       Subscriber Name Subscriber Birth Date Member ID       CHRISTIANNE DANIEL 1955 [id number]           Secondary Coverage     Payor Plan Insurance Group Employer/Plan Group    [id number]      Payor Plan Address Payor Plan Phone Number Payor Plan Fax Number Effective Dates    [facility] [phone]  12/16/2021 - None Entered    [address] [address]       [facility] [address]       Subscriber Name Subscriber Birth Date Member ID       CHRISTIANNE DANIEL 1955 [id number]                 Emergency Contacts      (Rel.) Home Phone Work Phone Mobile Phone    MANUEL DANIEL (Spouse) -- -- [phone]    Peña Correia (Daughter) -- -- " [phone]

## 2022-01-08 NOTE — THERAPY TREATMENT NOTE
Acute Care - Physical Therapy Treatment Note   [place]     Patient Name: Sergio Daniel  : 1955  MRN: [id number]  Today's Date: 2022      Visit Dx:     ICD-10-CM ICD-9-CM   1. Acute respiratory failure with hypoxia (HCC)  J96.01 518.81   2. Pneumonia due to COVID-19 virus  U07.1 480.8    J12.82 079.89   3. Paroxysmal atrial fibrillation (HCC)  I48.0 427.31   4. Pharyngeal dysphagia  R13.13 787.23   5. Oropharyngeal dysphagia  R13.12 787.22     Patient Active Problem List   Diagnosis   • Acute respiratory failure with hypoxia (HCC)   • Acute hypoxemic respiratory failure due to COVID-19 (HCC)     Past Medical History:   Diagnosis Date   • Diabetes mellitus (HCC)      History reviewed. No pertinent surgical history.  PT Assessment (last 12 hours)     PT Evaluation and Treatment     Row Name 22 1003          Physical Therapy Time and Intention    Subjective Information complains of; weakness; fatigue  -     Document Type therapy note (daily note)  -     Mode of Treatment physical therapy  -     Symptoms Noted During/After Treatment fatigue; shortness of breath  -     Comment Patient motivated to participate and progress as able.  -     Row Name 22 1003          General Information    General Observations of Patient Patient supine in bed, on room air, agrees to therapy.  -     Risks Reviewed patient:; change in vital signs  -     Benefits Reviewed patient:; improve function; increase strength  -     Row Name 22 1003          Pain    Additional Documentation --  no c/o pain  -     Row Name 22 1003          Bed Mobility    Supine-Sit [place] (Bed Mobility) maximum assist (25% patient effort); 2 person assist; verbal cues; moderate assist (50% patient effort)  -     Sit-Supine [place] (Bed Mobility) maximum assist (25% patient effort); 2 person assist; moderate assist (50% patient effort)  -     Bed Mobility, Safety Issues decreased use of arms for  pushing/pulling; decreased use of legs for bridging/pushing  -     Assistive Device (Bed Mobility) draw sheet; head of bed elevated  -     Row Name 01/08/22 1003          Transfers    Comment (Transfers) cues for hand placement and to flex knees in seated position to bring feet under body.  Patient performed sit to stand 2x, maintaining 5 seconds each time.  First attempt, patient unable to come fully to stand, significantly forward flexed.  Improved second attempt, 75% upright per visual exam.  -     Sit-Stand [place] (Transfers) maximum assist (25% patient effort); 2 person assist; moderate assist (50% patient effort)  -     Row Name 01/08/22 1003          Sit-Stand Transfer    Assistive Device (Sit-Stand Transfers) walker, front-wheeled  -     Row Name 01/08/22 1003          Gait/Stairs (Locomotion)    Comment (Gait/Stairs) unable to attempt gait at this time.  -     Row Name 01/08/22 1003          Motor Skills    Therapeutic Exercise hip; knee; ankle  -     Row Name 01/08/22 1003          Knee (Therapeutic Exercise)    Knee (Therapeutic Exercise) AROM (active range of motion)  -     Knee AROM (Therapeutic Exercise) bilateral; heel slides; 5 repetitions; supine  -[facility] Name 01/08/22 1003          Ankle (Therapeutic Exercise)    Ankle (Therapeutic Exercise) AROM (active range of motion)  -     Ankle AROM (Therapeutic Exercise) bilateral; dorsiflexion; plantarflexion; supine; 5 repetitions  -     Row Name             Wound Right hand Blisters    Wound - Properties Group Present on Hospital Admission: N  -LC Side: Right  -LC Location: hand  -LC, includes hand and wrist  Primary Wound Type: Blisters  -LC     Retired Wound - Properties Group Present on Hospital Admission: N  -LC Side: Right  -LC Location: hand  -LC, includes hand and wrist  Primary Wound Type: Blisters  -LC     Row Name 01/08/22 1003          Plan of Care Review    Plan of Care Reviewed With patient  -     Outcome  Summary PT:  Patient required mod/max A x 2 for bed mobility.  He performed sit to stand 2x with mod/max A x 2, maintaining standing 5 seconds each time, forward flexed at hips but improved on the 2nd rep.  Patient is motivated to participate and progress, however he does fatigue with minimal activity.  PT to continue to see daily to progress functional mobility and strength/endurance.  -     Row Name 01/08/22 1003          Positioning and Restraints    Pre-Treatment Position in bed  -     Post Treatment Position bed  -     In Bed notified nsg; supine; call light within reach; encouraged to call for assist  -     Row Name 01/08/22 1003          Therapy Assessment/Plan (PT)    PT Diagnosis (PT) impaired functional mobility, generalized weakness s/p COVID-19 infection  -     Rehab Potential (PT) good, to achieve stated therapy goals  -     Criteria for Skilled Interventions Met (PT) yes; meets criteria; skilled treatment is necessary  -     Predicted Duration of Therapy Intervention (PT) 5 days  -     Problem List (PT) problems related to; balance; mobility; strength  -     Activity Limitations Related to Problem List (PT) unable to ambulate safely; unable to transfer safely  -           User Key  (r) = Recorded By, (t) = Taken By, (c) = Cosigned By    Initials Name Provider Type     Alba Albright, PT Physical Therapist    Chitra Faith, RN, CWON Registered Nurse                Physical Therapy Education                 Title: PT OT SLP Therapies (Done)     Topic: Physical Therapy (Done)     Point: Mobility training (Done)     Learning Progress Summary           Patient Acceptance, E,TB, VU by  at 1/8/2022 1045    Acceptance, E,TB, VU by  at 1/7/2022 1140    Acceptance, E,TB, VU by  at 1/6/2022 1036    Acceptance, E,TB, VU by  at 1/5/2022 1018    Acceptance, E,TB, VU by  at 1/4/2022 1023    Acceptance, E,TB, VU,NR by  at 1/3/2022 1216                               User  Key     Initials Effective Dates Name Provider Type Discipline     06/16/21 -  Alba Albright, PT Physical Therapist PT     06/16/21 -  Rosy Harmon, PT Physical Therapist PT              PT Recommendation and Plan  Anticipated Discharge Disposition (PT): inpatient rehabilitation facility, skilled nursing facility  Planned Therapy Interventions (PT): bed mobility training, gait training, home exercise program, patient/family education, strengthening, transfer training  Therapy Frequency (PT): daily  Plan of Care Reviewed With: patient  Outcome Summary: PT:  Patient required mod/max A x 2 for bed mobility.  He performed sit to stand 2x with mod/max A x 2, maintaining standing 5 seconds each time, forward flexed at hips but improved on the 2nd rep.  Patient is motivated to participate and progress, however he does fatigue with minimal activity.  PT to continue to see daily to progress functional mobility and strength/endurance.   Outcome Measures     Row Name 01/08/22 1000 01/07/22 1000 01/07/22 0906       How much help from another person do you currently need...    Turning from your back to your side while in flat bed without using bedrails? 1  - -- 1  -JW    Moving from lying on back to sitting on the side of a flat bed without bedrails? 1  - -- 1  -JW    Moving to and from a bed to a chair (including a wheelchair)? 1  - -- 1  -JW    Standing up from a chair using your arms (e.g., wheelchair, bedside chair)? 1  - -- 1  -JW    Climbing 3-5 steps with a railing? 1  - -- 1  -JW    To walk in hospital room? 1  - -- 1  -JW    AM-PAC 6 Clicks Score (PT) 6  -LH -- 6  -JW       How much help from another is currently needed...    Putting on and taking off regular lower body clothing? -- 1  -EN --    Bathing (including washing, rinsing, and drying) -- 2  -EN --    Toileting (which includes using toilet bed pan or urinal) -- 1  -EN --    Putting on and taking off regular upper body clothing -- 1  -EN  --    Taking care of personal grooming (such as brushing teeth) -- 2  -EN --    Eating meals -- 2  -EN --    AM-PAC 6 Clicks Score (OT) -- 9  -EN --       Functional Assessment    Outcome Measure Options AM-PAC 6 Clicks Basic Mobility (PT)  - -- AM-PAC 6 Clicks Basic Mobility (PT)  -    Row Name 01/06/22 1000 01/06/22 0814 01/05/22 1100       How much help from another person do you currently need...    Turning from your back to your side while in flat bed without using bedrails? -- 1  -JW --    Moving from lying on back to sitting on the side of a flat bed without bedrails? -- 1  -JW --    Moving to and from a bed to a chair (including a wheelchair)? -- 1  -JW --    Standing up from a chair using your arms (e.g., wheelchair, bedside chair)? -- 1  -JW --    Climbing 3-5 steps with a railing? -- 1  -JW --    To walk in hospital room? -- 1  -JW --    AM-PAC 6 Clicks Score (PT) -- 6  -JW --       How much help from another is currently needed...    Putting on and taking off regular lower body clothing? 1  -EN -- 1  -EN    Bathing (including washing, rinsing, and drying) 2  -EN -- 1  -EN    Toileting (which includes using toilet bed pan or urinal) 1  -EN -- 1  -EN    Putting on and taking off regular upper body clothing 1  -EN -- 1  -EN    Taking care of personal grooming (such as brushing teeth) 2  -EN -- 2  -EN    Eating meals 2  -EN -- 1  -EN    AM-PAC 6 Clicks Score (OT) 9  -EN -- 7  -EN       Functional Assessment    Outcome Measure Options -- AM-PAC 6 Clicks Basic Mobility (PT)  - --          User Key  (r) = Recorded By, (t) = Taken By, (c) = Cosigned By    Initials Name Provider Type     Alba Albright, PT Physical Therapist    EN Isi Theodore, OTR Occupational Therapist    Rosy Arevalo, PT Physical Therapist                 Time Calculation:    PT Charges     Row Name 01/08/22 1048 01/08/22 1046          Time Calculation    Start Time -- 1003  -LH     Stop Time -- 1023  -     Time  Calculation (min) -- 20 min  -     PT Received On -- 01/08/22  -     PT - Next Appointment -- 01/09/22  -     PT Goal Re-Cert Due Date 01/13/22  - --            Timed Charges    47897 - PT Therapeutic Exercise Minutes 20  -LH --            Total Minutes    Timed Charges Total Minutes 20  -LH --      Total Minutes 20  -LH --           User Key  (r) = Recorded By, (t) = Taken By, (c) = Cosigned By    Initials Name Provider Type     Alba Albright, PT Physical Therapist              Therapy Charges for Today     Code Description Service Date Service Provider Modifiers Qty    68207828913  PT THER PROC EA 15 MIN 1/8/2022 Alba Albright, PT GP 1    51832660797 HC PT THER SUPP EA 15 MIN 1/8/2022 Alba Albright, PT GP 1          PT G-Codes  Outcome Measure Options: AM-PAC 6 Clicks Basic Mobility (PT)  AM-PAC 6 Clicks Score (PT): 6  AM-PAC 6 Clicks Score (OT): 9    Alba Albright PT  1/8/2022

## 2022-01-08 NOTE — PLAN OF CARE
Goal Outcome Evaluation:  Plan of Care Reviewed With: patient        Progress: no change  Outcome Summary: patient vss. unable to feed self, but has good appetite. patient turned as needed, encouraged by PT to stand. sinus rhyhtm on tele. on eliquis for DVT. picc line remains in place. unable to urinate on own, bladder scans monitored

## 2022-01-08 NOTE — PROGRESS NOTES
"Hospitalist Team      Patient Care Team:  Farhan Jean Jr., MD as PCP - General (Family Medicine)      Chief Complaint: Follow-up COVID-19    Subjective    Doing well today, and has been off of O2.  Spoke w/ PT this morning, and they note continued progress.  No reported chest pain.  Tolerating PO.    Objective    Vital Signs  Temp:  [97.3 °F (36.3 °C)-98.7 °F (37.1 °C)] 97.3 °F (36.3 °C)  Heart Rate:  [76-85] 85  Resp:  [16-17] 16  BP: (102-120)/(66-77) 110/72  Oxygen Therapy  SpO2: 93 %  Pulse Oximetry Type: Intermittent  Device (Oxygen Therapy): ventilator  Device (Oxygen Therapy): room air  $ High Flow Nasal Cannula Set-Up: yes  Flow (L/min): 2  Oxygen Concentration (%): 35  ETCO2 (mmHg): 35 mmHg  Probe Placed On (Pulse Ox): Left:, finger  Probe Removed From (Pulse Ox): Right:, finger}  Flowsheet Rows      First Filed Value   Admission Height 185.4 cm (73\") Documented at 12/16/2021 1515   Admission Weight 120 kg (265 lb) Documented at 12/16/2021 1515          Physical Exam:    General: Appears in no acute distress.  Lungs: Breath sounds are diminished, but respirations are non-labored.  CV: Regular rate and rhythm.  No murmurs appreciated.  Radial pulses are 2+ and symmetric.  Abdomen: Obese. Soft, and non-tender w/ active bowel sounds.  MSK: No C/C/E  Neuro: CN II-XII grossly intact.  Psych: Pleasant affect.  Ox3.    Results Review:     I reviewed the patient's new clinical results.    Lab Results (last 24 hours)     Procedure Component Value Units Date/Time    POC Glucose Once [[id number]]  (Abnormal) Collected: 01/08/22 1718    Specimen: Blood Updated: 01/08/22 1724     Glucose 242 mg/dL      Comment: Meter: [id number] : [id number] Renaldo Feliciano CNA       POC Glucose Once [[id number]]  (Abnormal) Collected: 01/08/22 1150    Specimen: Blood Updated: 01/08/22 1157     Glucose 141 mg/dL      Comment: Meter: [id number] : [id number] Renaldo Feliciano CNA       POC Glucose Once [[id number]]  (Abnormal) " Collected: 01/08/22 0755    Specimen: Blood Updated: 01/08/22 0802     Glucose 155 mg/dL      Comment: Meter: [id number] : [id number] Renaldo Feliciano CNA       Procalcitonin [[id number]]  (Normal) Collected: 01/08/22 0434    Specimen: Blood Updated: 01/08/22 0512     Procalcitonin 0.06 ng/mL     CBC (No Diff) [[id number]]  (Abnormal) Collected: 01/08/22 0434    Specimen: Blood Updated: 01/08/22 0449     WBC 10.13 10*3/mm3      RBC 3.46 10*6/mm3      Hemoglobin 10.5 g/dL      Hematocrit 33.7 %      MCV 97.4 fL      MCH 30.3 pg      MCHC 31.2 g/dL      RDW 14.3 %      RDW-SD 51.1 fl      MPV 9.4 fL      Platelets 327 10*3/mm3     POC Glucose Once [[id number]]  (Abnormal) Collected: 01/07/22 2123    Specimen: Blood Updated: 01/07/22 2129     Glucose 311 mg/dL      Comment: Meter: [id number] : [id number] [facility]             Imaging Results (Last 24 Hours)     ** No results found for the last 24 hours. **          Medication Review:   I have reviewed the patient's current medication list    Current Facility-Administered Medications:   •  acetaminophen (TYLENOL) tablet 650 mg, 650 mg, Oral, Q6H PRN, Jayden Hyatt, DO, 650 mg at 12/26/21 1153  •  [COMPLETED] apixaban (ELIQUIS) tablet 10 mg, 10 mg, Nasogastric, Q12H, 10 mg at 01/05/22 1528 **FOLLOWED BY** apixaban (ELIQUIS) tablet 5 mg, 5 mg, Oral, Q12H, Dakota Moore MD, 5 mg at 01/08/22 0848  •  Aquaphor Advanced Therapy ointment 1 application, 1 application, Topical, Q12H, Dakota Moore MD, 1 application at 01/08/22 0846  •  bisacodyl (DULCOLAX) suppository 10 mg, 10 mg, Rectal, Daily, Dakota Moore MD, 10 mg at 01/08/22 0202  •  chlorhexidine (PERIDEX) 0.12 % solution 15 mL, 15 mL, Mouth/Throat, Q12H, Spencer Carmichael MD, 15 mL at 01/08/22 0847  •  dexamethasone (DECADRON) tablet 3 mg, 3 mg, Oral, Daily With Breakfast, 3 mg at 01/08/22 0848 **OR** [DISCONTINUED] dexamethasone (DECADRON) injection 6 mg, 6 mg, Intravenous, Daily With Breakfast,  Charli Ayala MD, 6 mg at 01/05/22 0900  •  dextromethorphan polistirex ER (DELSYM) 30 MG/5ML oral suspension 60 mg, 60 mg, Oral, Q12H PRN, Jaleesa Cárdenas MD, 60 mg at 01/06/22 0922  •  dextrose (D50W) (25 g/50 mL) IV injection 25 g, 25 g, Intravenous, Q15 Min PRN, Dakota Moore MD, 25 g at 12/29/21 0804  •  dextrose (GLUTOSE) oral gel 15 g, 15 g, Oral, Q15 Min PRN, Dakota Moore MD  •  dilTIAZem CD (CARDIZEM CD) 24 hr capsule 240 mg, 240 mg, Oral, Q24H, Dakota Moore MD, 240 mg at 01/08/22 0848  •  famotidine (PEPCID) injection 20 mg, 20 mg, Intravenous, Daily, Spencer Carmichael MD, 20 mg at 01/08/22 0901  •  glipizide (GLUCOTROL) tablet 5 mg, 5 mg, Oral, BID AC, Dakota Moore MD, 5 mg at 01/08/22 1803  •  glucagon (GLUCAGEN) injection 1 mg, 1 mg, Subcutaneous, Q15 Min PRN, Dakota Moore MD  •  hydrALAZINE (APRESOLINE) injection 10 mg, 10 mg, Intravenous, Q6H PRN, Jayden Hyatt, DO  •  insulin aspart (novoLOG) injection 0-24 Units, 0-24 Units, Subcutaneous, TID With Meals, Jayden Hyatt, , 8 Units at 01/08/22 1803  •  ipratropium-albuterol (DUO-NEB) nebulizer solution 3 mL, 3 mL, Nebulization, Q6H PRN, Jaleesa Cádrenas MD  •  linagliptin (TRADJENTA) tablet 5 mg, 5 mg, Oral, Daily, Dakota Moore MD, 5 mg at 01/08/22 0847  •  metFORMIN (GLUCOPHAGE) tablet 500 mg, 500 mg, Oral, BID With Meals, Dakota Moore MD, 500 mg at 01/08/22 1803  •  polyethylene glycol (MIRALAX) packet 17 g, 17 g, Oral, Daily, Luca Hermosillo MD, 17 g at 01/08/22 0846  •  potassium chloride 10 mEq in 100 mL IVPB, 10 mEq, Intravenous, Q1H PRN, Jaleesa Cárdenas MD, Stopped at 12/28/21 0940  •  potassium phosphate 45 mmol in sodium chloride 0.9 % 500 mL infusion, 45 mmol, Intravenous, PRN **OR** potassium phosphate 30 mmol in sodium chloride 0.9 % 250 mL infusion, 30 mmol, Intravenous, PRN **OR** potassium phosphate 15 mmol in sodium chloride 0.9 % 100 mL infusion, 15 mmol,  Intravenous, PRN **OR** sodium phosphates 45 mmol in sodium chloride 0.9 % 500 mL IVPB, 45 mmol, Intravenous, PRN **OR** sodium phosphates 30 mmol in sodium chloride 0.9 % 250 mL IVPB, 30 mmol, Intravenous, PRN **OR** sodium phosphates 15 mmol in sodium chloride 0.9 % 250 mL IVPB, 15 mmol, Intravenous, PRN, Jaleesa Cárdenas MD, Last Rate: 62.5 mL/hr at 12/18/21 0429, 15 mmol at 12/18/21 0429  •  [COMPLETED] Insert peripheral IV, , , Once **AND** sodium chloride 0.9 % flush 10 mL, 10 mL, Intravenous, PRN, Jaleesa Cárdenas MD, 10 mL at 12/28/21 0732  •  sodium chloride 0.9 % flush 10 mL, 10 mL, Intravenous, PRNRomie Rami, MD  •  sodium chloride 0.9 % flush 10 mL, 10 mL, Intravenous, Q12H, Dakota Moore MD, 10 mL at 01/08/22 0849  •  sodium chloride 0.9 % flush 10 mL, 10 mL, Intravenous, Q12H, Dakota Moore MD, 10 mL at 01/08/22 0848  •  sodium chloride 0.9 % flush 10 mL, 10 mL, Intravenous, Q12H, Dakota Moore MD, 10 mL at 01/08/22 0848  •  sodium chloride 0.9 % flush 10 mL, 10 mL, Intravenous, PROscar SINCLAIR Rusty T, MD  •  sodium chloride 0.9 % flush 20 mL, 20 mL, Intravenous, Romie HARRINGTON Rami, MD  •  sodium chloride 0.9 % flush 20 mL, 20 mL, Intravenous, Oscar HARRINGTON Rusty T, MD      Assessment/Plan     1.  Acute Hypoxic Respiratory Failure due to COVID: Off O2 this afternoon.  I've started him on a steroid wean given length of treatment.  Good progress w/ PT.  Encouraged by his continued recovery.    2.  Diabetes Mellitus, Type 2 in Obese: Transitioned to all oral regimen now that steroids being weaned.  Continue to monitor.    3.  LLE DVT: Continue Eliquis    Plan for disposition: Spoke at length w/ both Mr. And Mrs. Daniel as well as PT.  I feel pretty strongly that his path to recovery will be greatly enhanced by referral to [facility] versus [place].  All in agreement.  Case Management consult placed for referral.    Dakota Moore MD  01/08/22  18:17 EST

## 2022-01-08 NOTE — PLAN OF CARE
Goal Outcome Evaluation:  Plan of Care Reviewed With: patient           Outcome Summary: PT:  Patient required mod/max A x 2 for bed mobility.  He performed sit to stand 2x with mod/max A x 2, maintaining standing 5 seconds each time, forward flexed at hips but improved on the 2nd rep.  Patient is motivated to participate and progress, however he does fatigue with minimal activity.  PT to continue to see daily to progress functional mobility and strength/endurance.

## 2022-01-08 NOTE — PLAN OF CARE
Goal Outcome Evaluation:  Plan of Care Reviewed With: patient        Progress: improving  Outcome Summary: patient c/o constipation this shift. one time order for suppository. patient had difficulty urinating this shift. patient had to be straight cath. patient on O2 2 liters per NC. continue to monitor vital signs and telemetry.

## 2022-01-09 LAB
ALBUMIN SERPL-MCNC: 2.9 G/DL (ref 3.5–5.2)
ALBUMIN/GLOB SERPL: 0.8 G/DL
ALP SERPL-CCNC: 144 U/L (ref 39–117)
ALT SERPL W P-5'-P-CCNC: 94 U/L (ref 1–41)
ANION GAP SERPL CALCULATED.3IONS-SCNC: 10.1 MMOL/L (ref 5–15)
AST SERPL-CCNC: 27 U/L (ref 1–40)
BASOPHILS # BLD AUTO: 0.02 10*3/MM3 (ref 0–0.2)
BASOPHILS NFR BLD AUTO: 0.2 % (ref 0–1.5)
BILIRUB SERPL-MCNC: 0.8 MG/DL (ref 0–1.2)
BUN SERPL-MCNC: 25 MG/DL (ref 8–23)
BUN/CREAT SERPL: 43.1 (ref 7–25)
CALCIUM SPEC-SCNC: 8.6 MG/DL (ref 8.6–10.5)
CHLORIDE SERPL-SCNC: 102 MMOL/L (ref 98–107)
CO2 SERPL-SCNC: 24.9 MMOL/L (ref 22–29)
CREAT SERPL-MCNC: 0.58 MG/DL (ref 0.76–1.27)
CRP SERPL-MCNC: 0.93 MG/DL (ref 0–0.5)
DEPRECATED RDW RBC AUTO: 52.9 FL (ref 37–54)
EOSINOPHIL # BLD AUTO: 0.25 10*3/MM3 (ref 0–0.4)
EOSINOPHIL NFR BLD AUTO: 2 % (ref 0.3–6.2)
ERYTHROCYTE [DISTWIDTH] IN BLOOD BY AUTOMATED COUNT: 14.7 % (ref 12.3–15.4)
FERRITIN SERPL-MCNC: 1399 NG/ML (ref 30–400)
GFR SERPL CREATININE-BSD FRML MDRD: 141 ML/MIN/1.73
GLOBULIN UR ELPH-MCNC: 3.5 GM/DL
GLUCOSE BLDC GLUCOMTR-MCNC: 127 MG/DL (ref 70–130)
GLUCOSE BLDC GLUCOMTR-MCNC: 198 MG/DL (ref 70–130)
GLUCOSE BLDC GLUCOMTR-MCNC: 204 MG/DL (ref 70–130)
GLUCOSE BLDC GLUCOMTR-MCNC: 229 MG/DL (ref 70–130)
GLUCOSE SERPL-MCNC: 141 MG/DL (ref 65–99)
HCT VFR BLD AUTO: 37 % (ref 37.5–51)
HGB BLD-MCNC: 11.7 G/DL (ref 13–17.7)
IMM GRANULOCYTES # BLD AUTO: 0.14 10*3/MM3 (ref 0–0.05)
IMM GRANULOCYTES NFR BLD AUTO: 1.1 % (ref 0–0.5)
LYMPHOCYTES # BLD AUTO: 2.76 10*3/MM3 (ref 0.7–3.1)
LYMPHOCYTES NFR BLD AUTO: 21.8 % (ref 19.6–45.3)
MCH RBC QN AUTO: 31.1 PG (ref 26.6–33)
MCHC RBC AUTO-ENTMCNC: 31.6 G/DL (ref 31.5–35.7)
MCV RBC AUTO: 98.4 FL (ref 79–97)
MONOCYTES # BLD AUTO: 0.81 10*3/MM3 (ref 0.1–0.9)
MONOCYTES NFR BLD AUTO: 6.4 % (ref 5–12)
NEUTROPHILS NFR BLD AUTO: 68.5 % (ref 42.7–76)
NEUTROPHILS NFR BLD AUTO: 8.67 10*3/MM3 (ref 1.7–7)
NRBC BLD AUTO-RTO: 0 /100 WBC (ref 0–0.2)
PLATELET # BLD AUTO: 332 10*3/MM3 (ref 140–450)
PMV BLD AUTO: 9.3 FL (ref 6–12)
POTASSIUM SERPL-SCNC: 3.7 MMOL/L (ref 3.5–5.2)
PROCALCITONIN SERPL-MCNC: 0.07 NG/ML (ref 0–0.25)
PROT SERPL-MCNC: 6.4 G/DL (ref 6–8.5)
RBC # BLD AUTO: 3.76 10*6/MM3 (ref 4.14–5.8)
SODIUM SERPL-SCNC: 137 MMOL/L (ref 136–145)
WBC NRBC COR # BLD: 12.65 10*3/MM3 (ref 3.4–10.8)

## 2022-01-09 PROCEDURE — 80053 COMPREHEN METABOLIC PANEL: CPT | Performed by: HOSPITALIST

## 2022-01-09 PROCEDURE — 92526 ORAL FUNCTION THERAPY: CPT

## 2022-01-09 PROCEDURE — 97110 THERAPEUTIC EXERCISES: CPT

## 2022-01-09 PROCEDURE — 82728 ASSAY OF FERRITIN: CPT | Performed by: HOSPITALIST

## 2022-01-09 PROCEDURE — 82962 GLUCOSE BLOOD TEST: CPT

## 2022-01-09 PROCEDURE — 97535 SELF CARE MNGMENT TRAINING: CPT

## 2022-01-09 PROCEDURE — 84145 PROCALCITONIN (PCT): CPT | Performed by: HOSPITALIST

## 2022-01-09 PROCEDURE — 86140 C-REACTIVE PROTEIN: CPT | Performed by: HOSPITALIST

## 2022-01-09 PROCEDURE — 63710000001 INSULIN ASPART PER 5 UNITS: Performed by: INTERNAL MEDICINE

## 2022-01-09 PROCEDURE — 94761 N-INVAS EAR/PLS OXIMETRY MLT: CPT

## 2022-01-09 PROCEDURE — 94669 MECHANICAL CHEST WALL OSCILL: CPT

## 2022-01-09 PROCEDURE — 94799 UNLISTED PULMONARY SVC/PX: CPT

## 2022-01-09 PROCEDURE — 85025 COMPLETE CBC W/AUTO DIFF WBC: CPT | Performed by: HOSPITALIST

## 2022-01-09 RX ADMIN — SODIUM CHLORIDE, PRESERVATIVE FREE 10 ML: 5 INJECTION INTRAVENOUS at 08:40

## 2022-01-09 RX ADMIN — SODIUM CHLORIDE, PRESERVATIVE FREE 10 ML: 5 INJECTION INTRAVENOUS at 20:09

## 2022-01-09 RX ADMIN — SODIUM CHLORIDE, PRESERVATIVE FREE 10 ML: 5 INJECTION INTRAVENOUS at 08:41

## 2022-01-09 RX ADMIN — POLYETHYLENE GLYCOL 3350 17 G: 17 POWDER, FOR SOLUTION ORAL at 08:40

## 2022-01-09 RX ADMIN — INSULIN ASPART 8 UNITS: 100 INJECTION, SOLUTION INTRAVENOUS; SUBCUTANEOUS at 17:50

## 2022-01-09 RX ADMIN — GLIPIZIDE 5 MG: 5 TABLET ORAL at 17:50

## 2022-01-09 RX ADMIN — GLIPIZIDE 5 MG: 5 TABLET ORAL at 08:39

## 2022-01-09 RX ADMIN — APIXABAN 5 MG: 2.5 TABLET, FILM COATED ORAL at 08:39

## 2022-01-09 RX ADMIN — METFORMIN HYDROCHLORIDE 500 MG: 500 TABLET ORAL at 08:39

## 2022-01-09 RX ADMIN — LINAGLIPTIN 5 MG: 5 TABLET, FILM COATED ORAL at 08:39

## 2022-01-09 RX ADMIN — CHLORHEXIDINE GLUCONATE 0.12% ORAL RINSE 15 ML: 1.2 LIQUID ORAL at 08:40

## 2022-01-09 RX ADMIN — SODIUM CHLORIDE, PRESERVATIVE FREE 10 ML: 5 INJECTION INTRAVENOUS at 20:08

## 2022-01-09 RX ADMIN — DEXAMETHASONE 3 MG: 1 TABLET ORAL at 08:39

## 2022-01-09 RX ADMIN — METFORMIN HYDROCHLORIDE 500 MG: 500 TABLET ORAL at 17:50

## 2022-01-09 RX ADMIN — FAMOTIDINE 20 MG: 10 INJECTION INTRAVENOUS at 09:36

## 2022-01-09 RX ADMIN — WHITE PETROLATUM 41 % TOPICAL OINTMENT 1 APPLICATION: OINTMENT at 20:03

## 2022-01-09 RX ADMIN — WHITE PETROLATUM 41 % TOPICAL OINTMENT 1 APPLICATION: OINTMENT at 08:40

## 2022-01-09 RX ADMIN — INSULIN ASPART 8 UNITS: 100 INJECTION, SOLUTION INTRAVENOUS; SUBCUTANEOUS at 13:17

## 2022-01-09 RX ADMIN — APIXABAN 5 MG: 2.5 TABLET, FILM COATED ORAL at 20:03

## 2022-01-09 RX ADMIN — CHLORHEXIDINE GLUCONATE 0.12% ORAL RINSE 15 ML: 1.2 LIQUID ORAL at 20:26

## 2022-01-09 RX ADMIN — DILTIAZEM HYDROCHLORIDE 240 MG: 120 CAPSULE, COATED, EXTENDED RELEASE ORAL at 08:39

## 2022-01-09 NOTE — PLAN OF CARE
Goal Outcome Evaluation:  Plan of Care Reviewed With: patient           Outcome Summary: PT:  Patient required max A x 2 for bed mobility and sit to stand transfers today.  He performed sit to stand 3x, maintaining stand 3-4 seconds, signifcantly forward flexed at hips, max A x 2 and rolling walker.  Patient fatigues quickly.  O2 sats remained 91-92% throughout mobility on room air.  Increased coughing noted after therapy.  PT to continue progressing strength and functional mobility.  Recommend short term or acute rehab at discharge prior to return home.

## 2022-01-09 NOTE — PLAN OF CARE
Goal Outcome Evaluation:  Plan of Care Reviewed With: patient        Progress: improving  Outcome Summary: patient vss, tolerating diet and urinating well. on eliquis for DVT. working well with physical therapy and speech. continues to have coccyx skin irritation, barrier cream applied and patient turned frequently.

## 2022-01-09 NOTE — THERAPY TREATMENT NOTE
Acute Care - Physical Therapy Treatment Note   [place]     Patient Name: Sergio Daniel  : 1955  MRN: [id number]  Today's Date: 2022      Visit Dx:     ICD-10-CM ICD-9-CM   1. Acute respiratory failure with hypoxia (HCC)  J96.01 518.81   2. Pneumonia due to COVID-19 virus  U07.1 480.8    J12.82 079.89   3. Paroxysmal atrial fibrillation (HCC)  I48.0 427.31   4. Pharyngeal dysphagia  R13.13 787.23   5. Oropharyngeal dysphagia  R13.12 787.22     Patient Active Problem List   Diagnosis   • Acute respiratory failure with hypoxia (HCC)   • Acute hypoxemic respiratory failure due to COVID-19 (HCC)     Past Medical History:   Diagnosis Date   • Diabetes mellitus ([facility])      History reviewed. No pertinent surgical history.  PT Assessment (last 12 hours)     PT Evaluation and Treatment     Row Name 22 0853          Physical Therapy Time and Intention    Subjective Information complains of; weakness; fatigue; pain  -     Document Type therapy note (daily note)  -     Mode of Treatment physical therapy  -     Patient Effort good  -     Symptoms Noted During/After Treatment other (see comments)  increased coughing  -     Row Name 22 0853          General Information    General Observations of Patient Patient seated in bed upon arrival with breakfast tray positioned in front of him, requesting to be fed.  -     Row Name 22 0853          Pain Scale: Word Pre/Post-Treatment    Pre/Posttreatment Pain Comment Patient c/o discomfort in buttocks, unrated.  -     Row Name 22 0853          Bed Mobility    Supine-Sit [place] (Bed Mobility) maximum assist (25% patient effort); 2 person assist  -     Sit-Supine [place] (Bed Mobility) maximum assist (25% patient effort); 2 person assist  -     Bed Mobility, Safety Issues decreased use of arms for pushing/pulling; decreased use of legs for bridging/pushing; impaired trunk control for bed mobility  -     Assistive Device (Bed  "Mobility) draw sheet; head of bed elevated  -     Comment (Bed Mobility) Patient requesting to be fed, however with encouragement and assist to obtain food on fork/spoon, patient was then able to grasp utensil and flex elbow to bring food to mouth.  Increased difficulty with liquids due to significant weakness/UE shaking.  Patient sat edge of bed with CGA.  -     Row Name 01/09/22 0853          Transfers    Comment (Transfers) Performed sit to stand 3x today, significant forward flexion.  With cues to stand upright, patient states \"I can't\" and returns to sit after 3-4 seconds each rep.  Limited by significant weakness and decreased activity tolerance.  -     Row Name 01/09/22 0853          Ankle (Therapeutic Exercise)    Ankle AROM (Therapeutic Exercise) bilateral; dorsiflexion; plantarflexion; 5 repetitions; supine  -     Row Name             Wound Right hand Blisters    Wound - Properties Group Present on Hospital Admission: N  -LC Side: Right  -LC Location: hand  -LC, includes hand and wrist  Primary Wound Type: Blisters  -LC     Retired Wound - Properties Group Present on Hospital Admission: N  -LC Side: Right  -LC Location: hand  -LC, includes hand and wrist  Primary Wound Type: Blisters  -LC     Row Name 01/09/22 0853          Plan of Care Review    Plan of Care Reviewed With patient  -     Outcome Summary PT:  Patient required max A x 2 for bed mobility and sit to stand transfers today.  He performed sit to stand 3x, maintaining stand 3-4 seconds, signifcantly forward flexed at hips, max A x 2 and rolling walker.  Patient fatigues quickly.  O2 sats remained 91-92% throughout mobility on room air.  Increased coughing noted after therapy.  PT to continue progressing strength and functional mobility.  Recommend short term or acute rehab at discharge prior to return home.  -     Row Name 01/09/22 0853          Positioning and Restraints    Pre-Treatment Position in bed  -     Post Treatment " Position bed  -LH     In Bed call light within reach; encouraged to call for assist; supine  -LH           User Key  (r) = Recorded By, (t) = Taken By, (c) = Cosigned By    Initials Name Provider Type     Alba Albright, PT Physical Therapist    Chitra Faith, RN, CWON Registered Nurse                Physical Therapy Education                 Title: PT OT SLP Therapies (Done)     Topic: Physical Therapy (Done)     Point: Mobility training (Done)     Learning Progress Summary           Patient Acceptance, E,TB, VU by  at 1/9/2022 0944    Acceptance, E,TB, VU by  at 1/8/2022 1045    Acceptance, E,TB, VU by  at 1/7/2022 1140    Acceptance, E,TB, VU by  at 1/6/2022 1036    Acceptance, E,TB, VU by  at 1/5/2022 1018    Acceptance, E,TB, VU by  at 1/4/2022 1023    Acceptance, E,TB, VU,NR by  at 1/3/2022 1216                               User Key     Initials Effective Dates Name Provider Type Discipline     06/16/21 -  Alba Albright, PT Physical Therapist PT     06/16/21 -  Rosy Harmon PT Physical Therapist PT              PT Recommendation and Plan  Anticipated Discharge Disposition (PT): inpatient rehabilitation facility, skilled nursing facility  Planned Therapy Interventions (PT): bed mobility training, gait training, home exercise program, patient/family education, strengthening, transfer training  Therapy Frequency (PT): daily  Plan of Care Reviewed With: patient  Outcome Summary: PT:  Patient required max A x 2 for bed mobility and sit to stand transfers today.  He performed sit to stand 3x, maintaining stand 3-4 seconds, signifcantly forward flexed at hips, max A x 2 and rolling walker.  Patient fatigues quickly.  O2 sats remained 91-92% throughout mobility on room air.  Increased coughing noted after therapy.  PT to continue progressing strength and functional mobility.  Recommend short term or acute rehab at discharge prior to return home.   Outcome Measures     Row Name  01/09/22 0900 01/08/22 1000 01/07/22 1000       How much help from another person do you currently need...    Turning from your back to your side while in flat bed without using bedrails? 1  - 1  -LH --    Moving from lying on back to sitting on the side of a flat bed without bedrails? 1  - 1  -LH --    Moving to and from a bed to a chair (including a wheelchair)? 1  - 1  -LH --    Standing up from a chair using your arms (e.g., wheelchair, bedside chair)? 1  - 1  -LH --    Climbing 3-5 steps with a railing? 1  -LH 1  -LH --    To walk in hospital room? 1  - 1  -LH --    AM-PAC 6 Clicks Score (PT) 6  - 6  - --       How much help from another is currently needed...    Putting on and taking off regular lower body clothing? -- -- 1  -EN    Bathing (including washing, rinsing, and drying) -- -- 2  -EN    Toileting (which includes using toilet bed pan or urinal) -- -- 1  -EN    Putting on and taking off regular upper body clothing -- -- 1  -EN    Taking care of personal grooming (such as brushing teeth) -- -- 2  -EN    Eating meals -- -- 2  -EN    AM-PAC 6 Clicks Score (OT) -- -- 9  -EN       Functional Assessment    Outcome Measure Options -PAC 6 Clicks Basic Mobility (PT)  [facility]  - --    Row Name 01/07/22 0906 01/06/22 1000          How much help from another person do you currently need...    Turning from your back to your side while in flat bed without using bedrails? 1  -JW --     Moving from lying on back to sitting on the side of a flat bed without bedrails? 1  -JW --     Moving to and from a bed to a chair (including a wheelchair)? 1  -JW --     Standing up from a chair using your arms (e.g., wheelchair, bedside chair)? 1  -JW --     Climbing 3-5 steps with a railing? 1  -JW --     To walk in hospital room? 1  -JW --     AM-PAC 6 Clicks Score (PT) 6  -JW --            How much help from another is currently needed...    Putting on and taking off regular lower  body clothing? -- 1  -EN     Bathing (including washing, rinsing, and drying) -- 2  -EN     Toileting (which includes using toilet bed pan or urinal) -- 1  -EN     Putting on and taking off regular upper body clothing -- 1  -EN     Taking care of personal grooming (such as brushing teeth) -- 2  -EN     Eating meals -- 2  -EN     AM-PAC 6 Clicks Score (OT) -- 9  -EN            Functional Assessment    Outcome Measure Options AM-PAC 6 Clicks Basic Mobility (PT)  - --           User Key  (r) = Recorded By, (t) = Taken By, (c) = Cosigned By    Initials Name Provider Type     Alba Albright, PT Physical Therapist    Isi Wiggins, OTR Occupational Therapist    Rosy Arevalo, JASMIN Physical Therapist                 Time Calculation:    PT Charges     Row Name 01/09/22 0944             Time Calculation    Start Time 0853  -      Stop Time 0930  -      Time Calculation (min) 37 min  -      PT Received On 01/09/22  -      PT - Next Appointment 01/10/22  -              Timed Charges    08010 - PT Therapeutic Exercise Minutes 37  -              Total Minutes    Timed Charges Total Minutes 37  -       Total Minutes 37  -LH            User Key  (r) = Recorded By, (t) = Taken By, (c) = Cosigned By    Initials Name Provider Type     Alba Albright, PT Physical Therapist              Therapy Charges for Today     Code Description Service Date Service Provider Modifiers Qty    43664245719 HC PT THER PROC EA 15 MIN 1/8/2022 Alba Albright, PT GP 1    37881786937 HC PT THER SUPP EA 15 MIN 1/8/2022 Alba Albright, PT GP 1    09267109003 HC PT THER PROC EA 15 MIN 1/9/2022 Alba Albright, PT GP 2          PT G-Codes  Outcome Measure Options: AM-PAC 6 Clicks Basic Mobility (PT)  AM-PAC 6 Clicks Score (PT): 6  AM-PAC 6 Clicks Score (OT): 9    Alba Albright PT  1/9/2022

## 2022-01-09 NOTE — THERAPY TREATMENT NOTE
Acute Care - Speech Language Pathology   Swallow Treatment Note [facility] [place]     Patient Name: Sergio Daniel  : 1955  MRN: [id number]  Today's Date: 2022               Admit Date: 2021    Visit Dx:     ICD-10-CM ICD-9-CM   1. Acute respiratory failure with hypoxia (HCC)  J96.01 518.81   2. Pneumonia due to COVID-19 virus  U07.1 480.8    J12.82 079.89   3. Paroxysmal atrial fibrillation (HCC)  I48.0 427.31   4. Pharyngeal dysphagia  R13.13 787.23   5. Oropharyngeal dysphagia  R13.12 787.22     Patient Active Problem List   Diagnosis   • Acute respiratory failure with hypoxia (HCC)   • Acute hypoxemic respiratory failure due to COVID-19 (HCC)     Past Medical History:   Diagnosis Date   • Diabetes mellitus (HCC)      History reviewed. No pertinent surgical history.    SLP Recommendation and Plan  Discussed plan of care with pt.   Continue current diet HTL by tsp and mechanical soft/ground solids with ongoing interventions and dysphagia therapy.   Discussed pt will require repeat VFSS prior to advancement - improvement dependent on level of impairment, dependent on clinical course and progression within therapy. This may include at next level of care. Pt verbalizes he is hopeful to dc to acute rehab ([facility]     SWALLOW EVALUATION (last 72 hours)     SLP Adult Swallow Evaluation     Row Name 22 1100       Rehab Evaluation    Document Type therapy note (daily note)  -AB    Subjective Information no complaints  -AB    Patient Observations alert; cooperative; agree to therapy  -AB    Patient/Family/Caregiver Comments/Observations Pt with continued severely dysphonic vocal quality, can increase with cues. He is pleasant, cooperative, highly motivated to participate. Pt participates in PO trials ([facility] soft solids, HTL by tsp), swallow exercises (see goals). No overt s/s aspiration, with exception of x1 HTL by tsp. Intermittent production of sputum, cleared  with Yankauer suction. Vocal quality with approximately 60-70% intelligible, can improve loudness with MIN cues.-AB      Patient Effort good  -AB    Symptoms Noted During/After Treatment none  -AB            Pain    Additional Documentation Pain Scale: Numbers Pre/Post-Treatment (Group)  -AB            Pain Scale: Numbers Pre/Post-Treatment    Pretreatment Pain Rating 0/10 - no pain  -AB    Posttreatment Pain Rating 0/10 - no pain  -AB               User Key  (r) = Recorded By, (t) = Taken By, (c) = Cosigned By    Initials Name Effective Dates    AB Dawn Reese, MS CCC-SLP 08/01/21 -                 EDUCATION  The patient has been educated in the following areas:   Dysphagia (Swallowing Impairment) Oral Care/Hydration Modified Diet Instruction.        SLP GOALS     Row Name 01/09/22 1200       Oral Nutrition/Hydration Goal 2 (SLP)    Oral Nutrition/Hydration Goal 2, SLP Pt will demonstrate tolerance of a least restrictive diet w/o clinical s/s of aspiration or complications such as aspiration pneumonia  -AB    Time Frame (Oral Nutrition/Hydration Goal 2, SLP) short term goal (STG); 2 weeks  -AB    Barriers (Oral Nutrition/Hydration Goal 2, SLP) medically complex  -AB    Progress/Outcomes (Oral Nutrition/Hydration Goal 2, SLP) good progress toward goal; goal ongoing  -AB           Lingual Strengthening Goal 1 (SLP)    Activity (Lingual Strengthening Goal 1, SLP) increase lingual tone/sensation/control/coordination/movement; increase tongue back strength  -AB    Increase Lingual Tone/Sensation/Control/Coordination/Movement lingual movement exercises; swallow trials  -AB    Increase Tongue Back Strength lingual movement exercises; lingual resistance exercises  lingual protrusion, lateralization x10 with resistance (tongue depressor)  -AB    [place]/Accuracy (Lingual Strengthening Goal 1, SLP) with minimal cues (75-90% accuracy)  -AB    Time Frame (Lingual Strengthening Goal 1, SLP) short term goal (STG); 2  weeks  -AB    Barriers (Lingual Strengthening Goal 1, SLP) medically complex  -AB    Progress/Outcomes (Lingual Strengthening Goal 1, SLP) continuing progress toward goal; goal ongoing  -AB           Pharyngeal Strengthening Exercise Goal 1 (SLP)    Activity (Pharyngeal Strengthening Goal 1, SLP) increase closure at entrance to airway/closure of airway at glottis  -AB    Increase Closure at Entrance to Airway/Closure of Airway at Glottis hard effortful swallow; effortful pitch glide (falsetto + pharyngeal squeeze)  effortful swallow x20 with and without PO, effortful pitch glide x10, decreased sustained phonation 2-4 sec  -AB    [place]/Accuracy (Pharyngeal Strengthening Goal 1, SLP) with moderate cues (50-74% accuracy)  -AB    Time Frame (Pharyngeal Strengthening Goal 1, SLP) short term goal (STG); 2 weeks  -AB    Barriers (Pharyngeal Strengthening Goal 1, SLP) medically complex  -AB    Progress/Outcomes (Pharyngeal Strengthening Goal 1, SLP) continuing progress toward goal; goal ongoing  -AB           Swallow Compensatory Strategies Goal 1 (SLP)    Activity (Swallow Compensatory Strategies/Techniques Goal 1, SLP) compensatory strategies; small bites; liquids by teaspoon only; effortful swallow; extra swallow per bolus; throat clear/extra swallow  consumes orange slices and HTL by tsp, fed by SLP, cued for intermittent throat clear, re-swallow - x18 trials overall, x1 throat clear w/HTL  -AB    [place]/Accuracy (Swallow Compensatory Strategies/Techniques Goal 1, SLP) with moderate cues (50-74% accuracy)  -AB    Time Frame (Swallow Compensatory Strategies/Techniques Goal 1, SLP) short term goal (STG); 1 week  -AB    Barriers (Swallow Compensatory Strategies/Techniques Goal 1, SLP) medically complex  -AB    Progress/Outcomes (Swallow Compensatory Strategies/Techniques Goal 1, SLP) good progress toward goal; goal ongoing  -AB          User Key  (r) = Recorded By, (t) = Taken By, (c) = Cosigned By    Initials  Name Provider Type    AB Dawn Reese MS CCC-SLP Speech and Language Pathologist                   Time Calculation:    Time Calculation- SLP     Row Name 01/09/22 1205             Time Calculation- SLP    SLP Start Time 1113  -AB      SLP Stop Time 1207  -AB      SLP Time Calculation (min) 54 min  -AB      SLP Received On 01/09/22  -AB              Untimed Charges    76304-SW Treatment Swallow Minutes 54  -AB              Total Minutes    Untimed Charges Total Minutes 54  -AB       Total Minutes 54  -AB            User Key  (r) = Recorded By, (t) = Taken By, (c) = Cosigned By    Initials Name Provider Type    AB Dawn Reese, MS CCC-SLP Speech and Language Pathologist                Therapy Charges for Today     Code Description Service Date Service Provider Modifiers Qty    43904354678 HC ST TREATMENT SWALLOW 4 1/9/2022 Dawn Reese, MS CCC-SLP GN 1        PPE:  Patient was not wearing a face mask during this therapy encounter. Therapist used appropriate personal protective equipment including gown, eye protection, mask and gloves.  Mask used was standard procedure mask. Appropriate PPE was worn during the entire therapy session. The patient coughed during this evaluation. Therapist was within 6 feet for 15 minutes or more during the evaluation. Hand hygiene was completed before and after therapy session. Patient is not in enhanced droplet precautions.            Dawn Reese MS CCC-SLP  1/9/2022

## 2022-01-09 NOTE — PLAN OF CARE
Goal Outcome Evaluation:  Plan of Care Reviewed With: patient           Outcome Summary: OT: Pt actively participated in self feeding. He initially was dependent to scoop food onto his utensil, but he was able to independently scoop oatmeal onto his spoon and bring it to his mouth independenty x 3. Pt fatigues with min activity. Pt required max assist x 2 for bed mobility and to attempt to stand from EOB. Pt not able to come to a full upright standing position. Pt's 02 sats in 90's during activity. Education with pt regarding BUE rom program. Pt is motivated to participate in therapy. Plan is for rehab (SNF or acute rehab) upon discharge.

## 2022-01-09 NOTE — THERAPY TREATMENT NOTE
Patient Name: Sergio Daniel  : 1955    MRN: [id number]                              Today's Date: 2022       Admit Date: 2021    Visit Dx:     ICD-10-CM ICD-9-CM   1. Acute respiratory failure with hypoxia (HCC)  J96.01 518.81   2. Pneumonia due to COVID-19 virus  U07.1 480.8    J12.82 079.89   3. Paroxysmal atrial fibrillation (HCC)  I48.0 427.31   4. Pharyngeal dysphagia  R13.13 787.23   5. Oropharyngeal dysphagia  R13.12 787.22     Patient Active Problem List   Diagnosis   • Acute respiratory failure with hypoxia (HCC)   • Acute hypoxemic respiratory failure due to COVID-19 (HCC)     Past Medical History:   Diagnosis Date   • Diabetes mellitus (HCC)      History reviewed. No pertinent surgical history.   General Information     Row Name 22 1009          OT Time and Intention    Document Type therapy note (daily note)  -SD     Mode of Treatment occupational therapy  -SD     Row Name 22 1009          Safety Issues, Functional Mobility    Impairments Affecting Function (Mobility) strength  -SD           User Key  (r) = Recorded By, (t) = Taken By, (c) = Cosigned By    Initials Name Provider Type    SD Feliciano Chua OTCORY Occupational Therapist                 Mobility/ADL's     Row Name 22 1010          Bed Mobility    Bed Mobility supine-sit; sit-supine  -SD     Rolling Left [place] (Bed Mobility) maximum assist (25% patient effort); 2 person assist; verbal cues  -SD     Supine-Sit [place] (Bed Mobility) maximum assist (25% patient effort); 2 person assist  -SD     Sit-Supine [place] (Bed Mobility) maximum assist (25% patient effort); 2 person assist  -SD     Bed Mobility, Safety Issues decreased use of arms for pushing/pulling; decreased use of legs for bridging/pushing; impaired trunk control for bed mobility  -SD     Assistive Device (Bed Mobility) draw sheet; head of bed elevated  -SD     Row Name 22 1010          Transfers    Sit-Stand [place]  (Transfers) maximum assist (25% patient effort); 2 person assist; moderate assist (50% patient effort)  -[facility] Name 01/09/22 1010          Sit-Stand Transfer    Assistive Device (Sit-Stand Transfers) walker, front-wheeled  -SD     Row Name 01/09/22 1010          Activities of Daily Living    BADL Assessment/Intervention feeding  -[facility] Name 01/09/22 1010          Self-Feeding Assessment/Training    Comment (Feeding) Pt required assistance intially to scoop food on utensils. After food on utensil, pt was able to bring food to his mouth with min spillage. Pt fatigue during self feeding activity. Encourage pt to actively participate in self feeding activity and request assistance as needed. He may benefit from adaptive equipment due to BUE weakness/  -SD           User Key  (r) = Recorded By, (t) = Taken By, (c) = Cosigned By    Initials Name Provider Type    SD Feliciano Chua, OTR Occupational Therapist               Obj/Interventions     [facility] Name 01/09/22 1013          Range of Motion Comprehensive    Comment, General Range of Motion pt with approx 45 active right shoulder flexion, approx 15 active left shoulder flexion. PROM within functional limite  -[facility] Name 01/09/22 1013          Shoulder (Therapeutic Exercise)    Shoulder AROM (Therapeutic Exercise) bilateral; flexion; 3 repetitions  -SD     Shoulder AAROM (Therapeutic Exercise) bilateral; flexion; other (see comments); 10 repetitions  sitting upright in bed (with back support)  -[facility] Name 01/09/22 1013          Elbow/Forearm (Therapeutic Exercise)    Elbow/Forearm (Therapeutic Exercise) AROM (active range of motion)  pt performed arom of right elbow 10+ times during self feeding activity.  -SD     Elbow/Forearm AROM (Therapeutic Exercise) left; flexion; 10 repetitions  -[facility] Name 01/09/22 1013          Hand (Therapeutic Exercise)    Hand (Therapeutic Exercise) AROM (active range of motion)  pt performed hand pumping with  bilateral hands  -SD     Row Name 01/09/22 1013          Balance    Comment, Balance Pt sat on EOB with SBA/CGA >5 minutes. Pt attempted to stand from EOB x 3. Pt requested a rest break in between each attempt. sitting balance SBA/CGA throughout treatement session.  -SD           User Key  (r) = Recorded By, (t) = Taken By, (c) = Cosigned By    Initials Name Provider Type    SD Feliicano Chua OTR Occupational Therapist               Goals/Plan     Row Name 01/09/22 1023          Bed Mobility Goal 1 (OT)    Activity/Assistive Device (Bed Mobility Goal 1, OT) supine to sit  -SD     [place] Level/Cues Needed (Bed Mobility Goal 1, OT) maximum assist (25-49% patient effort)  -SD     Time Frame (Bed Mobility Goal 1, OT) 1 week  -SD     Progress/Outcomes (Bed Mobility Goal 1, OT) goal ongoing  -SD     Row Name 01/09/22 1023          Grooming Goal 1 (OT)    Activity/Device (Grooming Goal 1, OT) hair care; oral care; wash face, hands  -SD     [place] (Grooming Goal 1, OT) minimum assist (75% or more patient effort)  -SD     Time Frame (Grooming Goal 1, OT) 1 week  -SD     Progress/Outcome (Grooming Goal 1, OT) goal ongoing  -SD     Row Name 01/09/22 1023          ROM Goal 1 (OT)    ROM Goal 1 (OT) Patient will perform B UE HEP independently.  -SD     Time Frame (ROM Goal 1, OT) 1 week  -SD     Progress/Outcome (ROM Goal 1, OT) goal ongoing  -SD     Row Name 01/09/22 1023          Problem Specific Goal 1 (OT)    Problem Specific Goal 1 (OT) Patient to perform static sitting EOB with min assist for improved ind with ADLs.  -SD     Time Frame (Problem Specific Goal 1, OT) 1 week  -SD     Progress/Outcome (Problem Specific Goal 1, OT) goal met  -SD     Row Name 01/09/22 1023          Therapy Assessment/Plan (OT)    Planned Therapy Interventions (OT) BADL retraining; functional balance retraining; passive ROM/stretching; ROM/therapeutic exercise; strengthening exercise; transfer/mobility retraining  -SD            User Key  (r) = Recorded By, (t) = Taken By, (c) = Cosigned By    Initials Name Provider Type    Feliciano Marcial, OTR Occupational Therapist               Clinical Impression     Row Name 01/09/22 1018          Pain Scale: Numbers Pre/Post-Treatment    Pretreatment Pain Rating 0/10 - no pain  -SD     Posttreatment Pain Rating 0/10 - no pain  -SD     Row Name 01/09/22 1018          Plan of Care Review    Plan of Care Reviewed With patient  -SD     Outcome Summary OT: Pt actively participated in self feeding. He initially needed asssitance to scoop food onto his utensil, but he was able to independently scoop oatmeal onto his spoon and bring it to his mouth independenty x 3. Pt fatigues with min activity. Pt required max assist x 2 for bed mobility and to attempt to stand from EOB. Pt not able to come to a full upright standing position. Pt's 02 sats in 90's during activity. Education with pt regarding BUE rom program. Pt is motivated to participate in therapy. Plan is for rehab (SNF or acute rehab) upon discharge.  -SD     Row Name 01/09/22 1018          Therapy Plan Review/Discharge Plan (OT)    Anticipated Discharge Disposition (OT) skilled nursing facility; inpatient rehabilitation facility  -SD     Row Name 01/09/22 1018          Positioning and Restraints    Pre-Treatment Position in bed  -SD     Post Treatment Position bed  -SD     In Bed notified nsg; supine; call light within reach; encouraged to call for assist  pt supine with HOB elevated. BUE's elevated on pillows.  -SD           User Key  (r) = Recorded By, (t) = Taken By, (c) = Cosigned By    Initials Name Provider Type    Feliciano Marcial, OTR Occupational Therapist               Outcome Measures     Row Name 01/09/22 1024          How much help from another is currently needed...    Putting on and taking off regular lower body clothing? 1  -SD     Bathing (including washing, rinsing, and drying) 2  -SD     Toileting (which includes using  toilet bed pan or urinal) 1  -SD     Putting on and taking off regular upper body clothing 1  -SD     Taking care of personal grooming (such as brushing teeth) 2  -SD     Eating meals 2  -SD     AM-PAC 6 Clicks Score (OT) 9  -SD     Row Name 01/09/22 0900          How much help from another person do you currently need...    Turning from your back to your side while in flat bed without using bedrails? 1  -LH     Moving from lying on back to sitting on the side of a flat bed without bedrails? 1  -LH     Moving to and from a bed to a chair (including a wheelchair)? 1  -LH     Standing up from a chair using your arms (e.g., wheelchair, bedside chair)? 1  -LH     Climbing 3-5 steps with a railing? 1  -LH     To walk in hospital room? 1  -LH     AM-PAC 6 Clicks Score (PT) 6  -     Row Name 01/09/22 0900          Functional Assessment    Outcome Measure Options AM-PAC 6 Clicks Basic Mobility (PT)  -           User Key  (r) = Recorded By, (t) = Taken By, (c) = Cosigned By    Initials Name Provider Type     Alba Albright, PT Physical Therapist    Feliciano Marcial, OTR Occupational Therapist                Occupational Therapy Education                 Title: PT OT SLP Therapies (Done)     Topic: Occupational Therapy (Done)     Point: ADL training (Done)     Description:   Instruct learner(s) on proper safety adaptation and remediation techniques during self care or transfers.   Instruct in proper use of assistive devices.              Learning Progress Summary           Patient Acceptance, E,TB,D, VU,DU by SD at 1/9/2022 1025    Comment: Education with benefits of activity, BUE HEP and safety with bed mobility/transfers. Pt actively participated in self feeding today.    Acceptance, E, VU by EN at 1/7/2022 1020    Comment: UE HEP, safety during transfers    Acceptance, E, VU by EN at 1/6/2022 1007    Comment: UE HEP    Acceptance, E, VU by EN at 1/5/2022 1115    Comment: UE HEP, benefits of activity     Acceptance, E, VU by EN at 1/5/2022 1026    Comment: Patient educated on UE HEP, benefits of activity.    Acceptance, E, VU by EN at 1/4/2022 0959    Comment: transfer safety, UE HEP    Acceptance, E, VU by EN at 1/3/2022 1352    Comment: Educated on UE HEP                   Point: Home exercise program (Done)     Description:   Instruct learner(s) on appropriate technique for monitoring, assisting and/or progressing therapeutic exercises/activities.              Learning Progress Summary           Patient Acceptance, E,TB,D, VU,DU by SD at 1/9/2022 1025    Comment: Education with benefits of activity, BUE HEP and safety with bed mobility/transfers. Pt actively participated in self feeding today.    Acceptance, E, VU by EN at 1/7/2022 1020    Comment: UE HEP, safety during transfers    Acceptance, E, VU by EN at 1/6/2022 1007    Comment: UE HEP    Acceptance, E, VU by EN at 1/5/2022 1115    Comment: UE HEP, benefits of activity    Acceptance, E, VU by EN at 1/5/2022 1026    Comment: Patient educated on UE HEP, benefits of activity.    Acceptance, E, VU by EN at 1/4/2022 0959    Comment: transfer safety, UE HEP    Acceptance, E, VU by EN at 1/3/2022 1352    Comment: Educated on UE HEP                               User Key     Initials Effective Dates Name Provider Type Discipline    EN 06/16/21 -  Isi Theodore, OTR Occupational Therapist OT    SD 06/16/21 -  Feliciano Chua OTCORY Occupational Therapist OT              OT Recommendation and Plan  Planned Therapy Interventions (OT): BADL retraining, functional balance retraining, passive ROM/stretching, ROM/therapeutic exercise, strengthening exercise, transfer/mobility retraining  Plan of Care Review  Plan of Care Reviewed With: patient  Outcome Summary: OT: Pt actively participated in self feeding. He initially needed asssitance to scoop food onto his utensil, but he was able to independently scoop oatmeal onto his spoon and bring it to his mouth  independenty x 3. Pt fatigues with min activity. Pt required max assist x 2 for bed mobility and to attempt to stand from EOB. Pt not able to come to a full upright standing position. Pt's 02 sats in 90's during activity. Education with pt regarding BUE rom program. Pt is motivated to participate in therapy. Plan is for rehab (SNF or acute rehab) upon discharge.     Time Calculation:    Time Calculation- OT     Row Name 01/09/22 1009             Time Calculation- OT    OT Start Time 0910  -SD              Timed Charges    41417 - OT Self Care/Mgmt Minutes 23  -SD              Total Minutes    Timed Charges Total Minutes 23  -SD       Total Minutes 23  -SD            User Key  (r) = Recorded By, (t) = Taken By, (c) = Cosigned By    Initials Name Provider Type    Feliciano Marcial OTR Occupational Therapist              Therapy Charges for Today     Code Description Service Date Service Provider Modifiers Qty    [id number] HC OT SELF CARE/MGMT/TRAIN EA 15 MIN 1/9/2022 Feliciano Chua OTR GO 2               LINDA Montesinos  1/9/2022

## 2022-01-09 NOTE — PROGRESS NOTES
"Daily Progress Note:      Chief complaint: Follow-up COVID-19 with superimposed MSSA Klebsiella pneumonia, acute hypoxic respiratory failure, diabetes, atrial fibrillation, left lower extremity DVT, adult-onset diabetes mellitus    Subjective: No overnight events noted.  No cough shortness of breath or chest discomfort     LOS: 24 days     Vital Signs  Temp:  [97.3 °F (36.3 °C)-97.6 °F (36.4 °C)] 97.6 °F (36.4 °C)  Heart Rate:  [] 98  Resp:  [16-18] 18  BP: (110-125)/(72-77) 112/77  Oxygen Therapy  SpO2: 91 %  Pulse Oximetry Type: Continuous  Device (Oxygen Therapy): ventilator  Device (Oxygen Therapy): room air  $ High Flow Nasal Cannula Set-Up: yes  Flow (L/min): 2  Oxygen Concentration (%): 35  ETCO2 (mmHg): 35 mmHg  Probe Placed On (Pulse Ox): Left:, finger  Probe Removed From (Pulse Ox): Right:, finger}  Body mass index is 36.74 kg/m².  Flowsheet Rows      First Filed Value   Admission Height 185.4 cm (73\") Documented at 12/16/2021 1515   Admission Weight 120 kg (265 lb) Documented at 12/16/2021 1515                   Documented weights    12/16/21 1515 12/16/21 2014 12/20/21 0500 12/22/21 0406   Weight: 120 kg (265 lb) 115 kg (253 lb 12.8 oz) 125 kg (275 lb) 123 kg (270 lb 1.6 oz)    12/27/21 0600 12/28/21 0423 01/03/22 0517   Weight: 120 kg (263 lb 14.4 oz) 124 kg (273 lb 9.6 oz) 130 kg (286 lb 2.5 oz)           Patient Vitals for the past 24 hrs:   BP Temp Temp src Pulse Resp SpO2   01/09/22 0935 -- -- -- 98 18 91 %   01/09/22 0557 112/77 97.6 °F (36.4 °C) Oral 101 18 92 %   01/08/22 2140 -- -- -- 85 16 93 %   01/08/22 2029 125/72 97.5 °F (36.4 °C) Oral 98 16 93 %   01/08/22 1548 110/72 97.3 °F (36.3 °C) Axillary 85 16 93 %       130 kg (286 lb 2.5 oz)    Intake/Output                       01/07/22 0701 - 01/08/22 0700 01/08/22 0701 - 01/09/22 0700 0701-1900 1901-0700 Total 9995-4573 8079-6911 Total                 Intake    P.O.  360  -- 360  360  -- 360    Total Intake 360 -- 360 360 -- 360    "    Output    Urine  275  425 700  --  -- --    Total Output 275 425 700 -- -- --           Intake/Output Summary (Last 24 hours) at 1/9/2022 1021  Last data filed at 1/8/2022 1840  Gross per 24 hour   Intake 120 ml   Output --   Net 120 ml        Intake/Output Summary (Last 24 hours) at 1/9/2022 1021  Last data filed at 1/8/2022 1840  Gross per 24 hour   Intake 120 ml   Output --   Net 120 ml        Review of Systems   Constitutional: Positive for activity change. Negative for appetite change and fatigue.   HENT: Negative for congestion.    Respiratory: Negative for cough, chest tightness, shortness of breath and wheezing.    Cardiovascular: Positive for leg swelling. Negative for chest pain.   Gastrointestinal: Negative for abdominal distention, abdominal pain, diarrhea, nausea and vomiting.   Endocrine: Negative for polyphagia and polyuria.   Genitourinary: Negative for frequency.   Skin: Negative for rash.   Neurological: Positive for weakness. Negative for light-headedness.   Hematological: Does not bruise/bleed easily.   Psychiatric/Behavioral: Negative for agitation and behavioral problems.       Physical Exam  Vitals and nursing note reviewed.   Constitutional:       Appearance: He is well-developed. He is obese.   HENT:      Head: Normocephalic.   Eyes:      Conjunctiva/sclera: Conjunctivae normal.   Neck:      Thyroid: No thyromegaly.      Vascular: No JVD.   Cardiovascular:      Rate and Rhythm: Normal rate and regular rhythm.      Heart sounds: Normal heart sounds. No murmur heard.      Pulmonary:      Effort: Pulmonary effort is normal. No respiratory distress.      Breath sounds: Decreased breath sounds present. No wheezing or rales.   Abdominal:      General: Bowel sounds are normal. There is no distension.      Palpations: Abdomen is soft.      Tenderness: There is no abdominal tenderness. There is no guarding.   Skin:     General: Skin is warm and dry.      Findings: No rash.   Neurological:       General: No focal deficit present.      Mental Status: He is alert and oriented to person, place, and time.         Medication Review:   I have reviewed the patient's current medication list  Scheduled Meds:apixaban, 5 mg, Oral, Q12H  Aquaphor Advanced Therapy, 1 application, Topical, Q12H  bisacodyl, 10 mg, Rectal, Daily  chlorhexidine, 15 mL, Mouth/Throat, Q12H  dexamethasone, 3 mg, Oral, Daily With Breakfast  dilTIAZem CD, 240 mg, Oral, Q24H  famotidine, 20 mg, Intravenous, Daily  glipizide, 5 mg, Oral, BID AC  insulin aspart, 0-24 Units, Subcutaneous, TID With Meals  linagliptin, 5 mg, Oral, Daily  metFORMIN, 500 mg, Oral, BID With Meals  polyethylene glycol, 17 g, Oral, Daily  sodium chloride, 10 mL, Intravenous, Q12H  sodium chloride, 10 mL, Intravenous, Q12H  sodium chloride, 10 mL, Intravenous, Q12H      Continuous Infusions:   PRN Meds:.•  acetaminophen  •  dextromethorphan polistirex ER  •  dextrose  •  dextrose  •  glucagon (human recombinant)  •  hydrALAZINE  •  ipratropium-albuterol  •  potassium chloride  •  potassium phosphate infusion greater than 15 mMoles **OR** potassium phosphate infusion greater than 15 mMoles **OR** potassium phosphate **OR** sodium phosphate IVPB **OR** sodium phosphate IVPB **OR** sodium phosphate IVPB  •  [COMPLETED] Insert peripheral IV **AND** sodium chloride  •  sodium chloride  •  sodium chloride  •  sodium chloride  •  sodium chloride      Result Review    Result Review:  I have personally reviewed the results from the time of this admission to 1/9/2022 10:21 EST and agree with these findings:  [x]  Laboratory  [x]  Microbiology  []  Radiology  [x]  EKG/Telemetry   []  Cardiology/Vascular   []  Pathology  []  Old records  []  Other:          Labs:  Results from last 7 days   Lab Units 01/09/22  0535 01/08/22  0434 01/07/22  0328 01/06/22  0654 01/06/22  0654 01/05/22  0420 01/05/22  0420 01/04/22  0437 01/04/22  0437 01/03/22  0449 01/03/22  0449   WBC 10*3/mm3 12.65*  10.13 10.32  --  11.09*  --  12.88*  --  12.78*  --  12.37*   HEMOGLOBIN g/dL 11.7* 10.5* 10.8*   < > 10.8*   < > 10.6*   < > 10.6*   < > 10.0*   HEMATOCRIT % 37.0* 33.7* 34.6*  --  35.2*  --  33.2*  --  34.2*  --  31.0*   PLATELETS 10*3/mm3 332 327 295  --  343  --  300  --  346  --  299    < > = values in this interval not displayed.     Results from last 7 days   Lab Units 01/09/22  0535 01/07/22  0852 01/06/22  0654 01/04/22 0437 01/03/22 0449   SODIUM mmol/L 137 137 135* 137 135*   POTASSIUM mmol/L 3.7 4.0 3.5 3.8 3.8   CHLORIDE mmol/L 102 101 101 99 99   CO2 mmol/L 24.9 27.0 26.0 29.3* 31.4*   BUN mg/dL 25* 21 19 19 17   CREATININE mg/dL 0.58* 0.54* 0.48* 0.54* 0.52*   CALCIUM mg/dL 8.6 8.9 8.7 9.0 8.7   BILIRUBIN mg/dL 0.8  --   --   --  0.6   ALK PHOS U/L 144*  --   --   --  133*   ALT (SGPT) U/L 94*  --   --   --  49*   AST (SGOT) U/L 27  --   --   --  27   GLUCOSE mg/dL 141* 195* 75 144* 202*           Results from last 7 days   Lab Units 01/09/22  0535 01/08/22  0434 01/07/22 0328 01/06/22  0654 01/06/22  0654 01/05/22  0420 01/05/22  0420 01/04/22 0437 01/04/22 0437 01/03/22 0449 01/03/22 0449   AST (SGOT) U/L 27  --   --   --   --   --   --   --   --   --  27   ALT (SGPT) U/L 94*  --   --   --   --   --   --   --   --   --  49*   PROCALCITONIN ng/mL 0.07 0.06 0.06   < > 0.07   < > 0.10   < > 0.10   < > 0.10   FERRITIN ng/mL 1,399.00*  --   --   --   --   --   --   --   --   --  1,564.00*   D DIMER QUANT MCGFEU/mL  --   --   --   --  1.61*  --   --   --  1.74*  --   --    CRP mg/dL  --   --   --   --   --   --  5.29*  --  5.36*  --  7.99*   PLATELETS 10*3/mm3 332 327 295   < > 343   < > 300   < > 346   < > 299    < > = values in this interval not displayed.         Lab Results (last 24 hours)     Procedure Component Value Units Date/Time    POC Glucose Once [[id number]]  (Normal) Collected: 01/09/22 0723    Specimen: Blood Updated: 01/09/22 0730     Glucose 127 mg/dL      Comment: Meter: [id number]  : [id number] Baldemar PHOENIX       Ferritin [[id number]]  (Abnormal) Collected: 01/09/22 0535    Specimen: Blood Updated: 01/09/22 0617     Ferritin 1,399.00 ng/mL     Narrative:      Results may be falsely decreased if patient taking Biotin.      Procalcitonin [[id number]]  (Normal) Collected: 01/09/22 0535    Specimen: Blood Updated: 01/09/22 0605     Procalcitonin 0.07 ng/mL     Comprehensive Metabolic Panel [[id number]]  (Abnormal) Collected: 01/09/22 0535    Specimen: Blood Updated: 01/09/22 0604     Glucose 141 mg/dL      BUN 25 mg/dL      Creatinine 0.58 mg/dL      Sodium 137 mmol/L      Potassium 3.7 mmol/L      Chloride 102 mmol/L      CO2 24.9 mmol/L      Calcium 8.6 mg/dL      Total Protein 6.4 g/dL      Albumin 2.90 g/dL      ALT (SGPT) 94 U/L      AST (SGOT) 27 U/L      Alkaline Phosphatase 144 U/L      Total Bilirubin 0.8 mg/dL      eGFR Non African Amer 141 mL/min/1.73      Globulin 3.5 gm/dL      A/G Ratio 0.8 g/dL      BUN/Creatinine Ratio 43.1     Anion Gap 10.1 mmol/L     Narrative:      GFR Normal >60  Chronic Kidney Disease <60  Kidney Failure <15      CBC & Differential [[id number]]  (Abnormal) Collected: 01/09/22 0535    Specimen: Blood Updated: 01/09/22 0539    Narrative:      The following orders were created for panel order CBC & Differential.  Procedure                               Abnormality         Status                     ---------                               -----------         ------                     CBC Auto Differential[[id number]]        Abnormal            Final result                 Please view results for these tests on the individual orders.    CBC Auto Differential [[id number]]  (Abnormal) Collected: 01/09/22 0535    Specimen: Blood Updated: 01/09/22 0539     WBC 12.65 10*3/mm3      RBC 3.76 10*6/mm3      Hemoglobin 11.7 g/dL      Hematocrit 37.0 %      MCV 98.4 fL      MCH 31.1 pg      MCHC 31.6 g/dL      RDW 14.7 %      RDW-SD 52.9 fl      MPV 9.3 fL       Platelets 332 10*3/mm3      Neutrophil % 68.5 %      Lymphocyte % 21.8 %      Monocyte % 6.4 %      Eosinophil % 2.0 %      Basophil % 0.2 %      Immature Grans % 1.1 %      Neutrophils, Absolute 8.67 10*3/mm3      Lymphocytes, Absolute 2.76 10*3/mm3      Monocytes, Absolute 0.81 10*3/mm3      Eosinophils, Absolute 0.25 10*3/mm3      Basophils, Absolute 0.02 10*3/mm3      Immature Grans, Absolute 0.14 10*3/mm3      nRBC 0.0 /100 WBC     C-reactive Protein [[id number]] Collected: 01/09/22 0536    Specimen: Blood Updated: 01/09/22 0536    POC Glucose Once [[id number]]  (Abnormal) Collected: 01/08/22 2037    Specimen: Blood Updated: 01/08/22 2043     Glucose 260 mg/dL      Comment: Meter: [id number] : [id number] Tami PHOENIX       POC Glucose Once [[id number]]  (Abnormal) Collected: 01/08/22 1718    Specimen: Blood Updated: 01/08/22 1724     Glucose 242 mg/dL      Comment: Meter: [id number] : [id number] Renaldo CARPENTERA       POC Glucose Once [[id number]]  (Abnormal) Collected: 01/08/22 1150    Specimen: Blood Updated: 01/08/22 1157     Glucose 141 mg/dL      Comment: Meter: [id number] : [id number] Renaldo Feliciano CNA           Results from last 7 days   Lab Units 01/06/22  0654 01/04/22  0437   D DIMER QUANT MCGFEU/mL 1.61* 1.74*                                 Glucose   Date/Time Value Ref Range Status   01/09/2022 0723 127 70 - 130 mg/dL Final     Comment:     Meter: [id number] : [id number] Baldemar Spangler NA   01/08/2022 2037 260 (H) 70 - 130 mg/dL Final     Comment:     Meter: [id number] : [id number] Tami PHOENIX   01/08/2022 1718 242 (H) 70 - 130 mg/dL Final     Comment:     Meter: [id number] : [id number] Renaldo CARPENTERA   01/08/2022 1150 141 (H) 70 - 130 mg/dL Final     Comment:     Meter: [id number] : [id number] Renaldo Feliciano [facility]   01/08/2022 0755 155 (H) 70 - 130 mg/dL Final     Comment:     Meter: [id number] : [id number] Renaldo Feliciano [facility]   01/07/2022  2123 311 (H) 70 - 130 mg/dL Final     Comment:     Meter: [id number] : [id number] Tami Carbone NA   01/07/2022 1650 158 (H) 70 - 130 mg/dL Final     Comment:     Meter: [id number] : [id number] Kody [place] NA   01/07/2022 1212 227 (H) 70 - 130 mg/dL Final     Comment:     Meter: [id number] : [id number] Sixtofrank Casie NURSING ASSISTANT     Results from last 7 days   Lab Units 01/09/22  0535 01/08/22  0434 01/07/22  0328 01/06/22  0654 01/05/22  0420 01/04/22  0437 01/03/22  0449   PROCALCITONIN ng/mL 0.07 0.06 0.06 0.07 0.10 0.10 0.10                     Radiology:  Imaging Results (Last 24 Hours)     ** No results found for the last 24 hours. **          Cardiology:  ECG/EMG Results (last 24 hours)     ** No results found for the last 24 hours. **          I have reviewed recent labs results and consult notes.    Please note portions of this assessment/plan may have been copied and pasted, but I have personally seen this patient and reviewed each line of this assessment and plan for accuracy and made updates to reflect my necessary changes    Assessment and Plan:    1.  COVID-19 pneumonia with superimposed MSSA Klebsiella pneumonia antibiotics completed    2.  Acute about respiratory failure extubated on 1/1/2022 stable on current O2    3.  Atrial fibrillation in sinus rhythm continue with diltiazem and Eliquis    4.  Left lower extremity DVT on Eliquis stable    5.  Adult onset diabetes mellitus with hyperglycemia 18 Accu-Chek sliding scale insulin    6.  Right hand wound continues to improve    7.  Generalized weakness placement being sought at [facility] rehab for short-term rehab    8. moderate malnutrition continue with present nutritional supplements    9. anemia stable continue to monitor H&H is    Much of this encounter note is an electronic transcription/translation of spoken language to printed text using Dragon Software

## 2022-01-10 LAB
GLUCOSE BLDC GLUCOMTR-MCNC: 108 MG/DL (ref 70–130)
GLUCOSE BLDC GLUCOMTR-MCNC: 126 MG/DL (ref 70–130)
GLUCOSE BLDC GLUCOMTR-MCNC: 160 MG/DL (ref 70–130)
GLUCOSE BLDC GLUCOMTR-MCNC: 210 MG/DL (ref 70–130)
GLUCOSE BLDC GLUCOMTR-MCNC: 221 MG/DL (ref 70–130)

## 2022-01-10 PROCEDURE — 97110 THERAPEUTIC EXERCISES: CPT

## 2022-01-10 PROCEDURE — 99232 SBSQ HOSP IP/OBS MODERATE 35: CPT | Performed by: INTERNAL MEDICINE

## 2022-01-10 PROCEDURE — 63710000001 INSULIN ASPART PER 5 UNITS: Performed by: INTERNAL MEDICINE

## 2022-01-10 PROCEDURE — 92526 ORAL FUNCTION THERAPY: CPT

## 2022-01-10 PROCEDURE — 94799 UNLISTED PULMONARY SVC/PX: CPT

## 2022-01-10 PROCEDURE — 97535 SELF CARE MNGMENT TRAINING: CPT

## 2022-01-10 PROCEDURE — 82962 GLUCOSE BLOOD TEST: CPT

## 2022-01-10 RX ADMIN — FAMOTIDINE 20 MG: 10 INJECTION INTRAVENOUS at 08:50

## 2022-01-10 RX ADMIN — INSULIN ASPART 4 UNITS: 100 INJECTION, SOLUTION INTRAVENOUS; SUBCUTANEOUS at 12:00

## 2022-01-10 RX ADMIN — INSULIN ASPART 8 UNITS: 100 INJECTION, SOLUTION INTRAVENOUS; SUBCUTANEOUS at 17:42

## 2022-01-10 RX ADMIN — CHLORHEXIDINE GLUCONATE 0.12% ORAL RINSE 15 ML: 1.2 LIQUID ORAL at 20:28

## 2022-01-10 RX ADMIN — CHLORHEXIDINE GLUCONATE 0.12% ORAL RINSE 15 ML: 1.2 LIQUID ORAL at 11:15

## 2022-01-10 RX ADMIN — APIXABAN 5 MG: 2.5 TABLET, FILM COATED ORAL at 08:50

## 2022-01-10 RX ADMIN — SODIUM CHLORIDE, PRESERVATIVE FREE 10 ML: 5 INJECTION INTRAVENOUS at 20:29

## 2022-01-10 RX ADMIN — WHITE PETROLATUM 41 % TOPICAL OINTMENT 1 APPLICATION: OINTMENT at 20:28

## 2022-01-10 RX ADMIN — DILTIAZEM HYDROCHLORIDE 240 MG: 120 CAPSULE, COATED, EXTENDED RELEASE ORAL at 08:50

## 2022-01-10 RX ADMIN — SODIUM CHLORIDE, PRESERVATIVE FREE 10 ML: 5 INJECTION INTRAVENOUS at 08:50

## 2022-01-10 RX ADMIN — APIXABAN 5 MG: 2.5 TABLET, FILM COATED ORAL at 20:28

## 2022-01-10 RX ADMIN — DEXAMETHASONE 3 MG: 1 TABLET ORAL at 08:50

## 2022-01-10 RX ADMIN — GLIPIZIDE 5 MG: 5 TABLET ORAL at 17:49

## 2022-01-10 RX ADMIN — SODIUM CHLORIDE, PRESERVATIVE FREE 10 ML: 5 INJECTION INTRAVENOUS at 08:52

## 2022-01-10 RX ADMIN — WHITE PETROLATUM 41 % TOPICAL OINTMENT 1 APPLICATION: OINTMENT at 08:51

## 2022-01-10 RX ADMIN — LINAGLIPTIN 5 MG: 5 TABLET, FILM COATED ORAL at 08:49

## 2022-01-10 RX ADMIN — SODIUM CHLORIDE, PRESERVATIVE FREE 10 ML: 5 INJECTION INTRAVENOUS at 08:51

## 2022-01-10 RX ADMIN — BISACODYL 10 MG: 10 SUPPOSITORY RECTAL at 08:50

## 2022-01-10 RX ADMIN — METFORMIN HYDROCHLORIDE 500 MG: 500 TABLET ORAL at 08:50

## 2022-01-10 RX ADMIN — GLIPIZIDE 5 MG: 5 TABLET ORAL at 08:50

## 2022-01-10 RX ADMIN — METFORMIN HYDROCHLORIDE 500 MG: 500 TABLET ORAL at 17:49

## 2022-01-10 NOTE — THERAPY TREATMENT NOTE
Acute Care - Speech Language Pathology   Swallow Treatment Note ARELIS Flaherty     Patient Name: Sergio Daniel  : 1955  MRN: [id number]  Today's Date: 1/10/2022               Admit Date: 2021    Visit Dx:     ICD-10-CM ICD-9-CM   1. Acute respiratory failure with hypoxia (HCC)  J96.01 518.81   2. Pneumonia due to COVID-19 virus  U07.1 480.8    J12.82 079.89   3. Paroxysmal atrial fibrillation (HCC)  I48.0 427.31   4. Pharyngeal dysphagia  R13.13 787.23   5. Oropharyngeal dysphagia  R13.12 787.22     Patient Active Problem List   Diagnosis   • Acute respiratory failure with hypoxia (HCC)   • Acute hypoxemic respiratory failure due to COVID-19 (HCC)     Past Medical History:   Diagnosis Date   • Diabetes mellitus (HCC)      History reviewed. No pertinent surgical history.    SLP Recommendation and Plan     SLP Diet Recommendation: honey thick liquids, soft textures, ground (01/10/22 1337)  Recommended Precautions and Strategies: upright posture during/after eating, small bites of food and sips of liquid, no straw, multiple swallows per bite of food, multiple swallows per sip of liquid, check mouth frequently for oral residue/pocketing, general aspiration precautions, reflux precautions, fatigue precautions, 1:1 supervision (01/10/22 1337)  SLP Rec. for Method of Medication Administration: meds whole, meds crushed, with pudding or applesauce, as tolerated (01/10/22 1337)     Monitor for Signs of Aspiration: no, none - silent aspiration present, fever, upper respiratory, pneumonia (01/10/22 1337)  Recommended Diagnostics: reassess via [facility]SS ([facility] [place]) (01/10/22 1337)     Anticipated Discharge Disposition (SLP): inpatient rehabilitation facility, skilled nursing facility, anticipate therapy at next level of care (01/10/22 1337)     Therapy Frequency (Swallow): daily, at least, 5 days per week (01/10/22 1337)  Predicted Duration Therapy Intervention (Days): 2 weeks, until discharge (01/10/22 1337)     Daily Summary  of Progress (SLP): progress toward functional goals as expected (01/10/22 1337)         Patient/Family Concerns, Anticipated Discharge Disposition (SLP): No current concerns regarding discharge or therapy per pt or wife. (01/10/22 1337)     Treatment Assessment (SLP): Pt continues to demonstrate improved tolerance of po diet with good oral intake. Increased impulsivity noted today as pt is feeding himself now. He demonstrates fast rate and large bites. Consistent cues needed in order to slow down and clear each bite before taking another bite. Cues to perform double swallows as well to aid in clearance of pharyngeal residue. Pt demonstrates consistent performance on trials of HTL with spoon administration. Good effort on lingual resistance and pharyngeal exercises to improve airway closure during the swallow. Voice continues to improve, but also continues with breathy quality. May benefit from an ENT consult if no improvement in voicing over the next week. Will need to be done at the next level of care as no ENT available here currently. (01/10/22 1337)  Plan for Continued Treatment (SLP): continue treatment per plan of care (01/10/22 1337)         Plan of Care Reviewed With: patient, spouse  Outcome Summary: SLP: Pt continues to tolerate diet. Note pt feeding self now, but rate and intake is too fast with too large of bites taken. Pt continues to need reinforcement for small bites and oral clearance prior to taking another bite. Does demonstrate use of HTL intake by spoon. Recommend staff continue to reinforce small bites and oral clearance before taking another bite during meal time. SLP to continue to follow. Recommend f/u MBS Thursday or Friday to assess for tolerance of thinner liquids. May need ENT consult in future if voice does not improve in the next week.      SWALLOW EVALUATION (last 72 hours)     SLP Adult Swallow Evaluation     Row Name 01/10/22 1337 01/09/22 1100                Rehab Evaluation     Document Type therapy note (daily note)  -AD therapy note (daily note)  -AB       Subjective Information no complaints  -AD no complaints  -AB       Patient Observations alert; cooperative; agree to therapy  -AD alert; cooperative; agree to therapy  -AB       Patient/Family/Caregiver Comments/Observations Pt seen upright in bed during lunch. Pillows used to aid with upright positioning. Pt able to assist. Family present at window during session and speaking to pt and therapist via phone.  -AD --       Patient Effort good  -AD good  -AB       Symptoms Noted During/After Treatment none  -AD none  -AB                Pain    Additional Documentation --  reports pain in his right leg. States it is beginning to 'wake up'. No level given  -AD Pain Scale: Numbers Pre/Post-Treatment (Group)  -AB                Pain Scale: Numbers Pre/Post-Treatment    Pretreatment Pain Rating -- 0/10 - no pain  -AB       Posttreatment Pain Rating -- 0/10 - no pain  -AB                General Eating/Swallowing Observations    Respiratory Support Currently in Use nasal cannula  2L  -AD --       Eating/Swallowing Skills self-fed; unsafe self-feeding skills observed; rate is too fast; requires cueing for compensatory strategies and precautions  -AD --       Utensils Used spoon  -AD --                Respiratory    Respiratory Status WFL  -AD --                SLP Treatment Clinical Impressions    Treatment Assessment (SLP) Pt continues to demonstrate improved tolerance of po diet with good oral intake. Increased impulsivity noted today as pt is feeding himself now. He demonstrates fast rate and large bites. Consistent cues needed in order to slow down and clear each bite before taking another bite. Cues to perform double swallows as well to aid in clearance of pharyngeal residue. Pt demonstrates consistent performance on trials of HTL with spoon administration. Good effort on lingual resistance and pharyngeal exercises to improve airway closure  during the swallow. Voice continues to improve, but also continues with breathy quality. May benefit from an ENT consult if no improvement in voicing over the next week. Will need to be done at the next level of care as no ENT available here currently.  -AD --       Daily Summary of Progress (SLP) progress toward functional goals as expected  -AD --       Barriers to Overall Progress (SLP) Medically complex  -AD --       Plan for Continued Treatment (SLP) continue treatment per plan of care  -AD --       Care Plan Review care plan/treatment goals reviewed; risks/benefits reviewed; current/potential barriers reviewed; patient/other agree to care plan  -AD --       Care Plan Review, Other Participant(s) spouse  via phone and window  -AD --                Recommendations    Therapy Frequency (Swallow) daily; at least; 5 days per week  -AD --       Predicted Duration Therapy Intervention (Days) 2 weeks; until discharge  -AD --       SLP Diet Recommendation honey thick liquids; soft textures; ground  -AD --       Recommended Diagnostics reassess via VFSS ([facility])  -AD --       Recommended Precautions and Strategies upright posture during/after eating; small bites of food and sips of liquid; no straw; multiple swallows per bite of food; multiple swallows per sip of liquid; check mouth frequently for oral residue/pocketing; general aspiration precautions; reflux precautions; fatigue precautions; 1:1 supervision  -AD --       Oral Care Recommendations Oral Care before breakfast, after meals and PRN; Toothbrush  -AD --       SLP Rec. for Method of Medication Administration meds whole; meds crushed; with pudding or applesauce; as tolerated  -AD --       Monitor for Signs of Aspiration no; none - silent aspiration present; fever; upper respiratory; pneumonia  -AD --       Anticipated Discharge Disposition (SLP) inpatient rehabilitation facility; skilled nursing facility; anticipate therapy at next level of care  -AD --        Patient/Family Concerns, Anticipated Discharge Disposition (SLP) No current concerns regarding discharge or therapy per pt or wife.  -AD --                Oral Nutrition/Hydration Goal 2 (SLP)    Oral Nutrition/Hydration Goal 2, SLP Pt will demonstrate tolerance of a least restrictive diet w/o clinical s/s of aspiration or complications such as aspiration pneumonia  -AD --       Time Frame (Oral Nutrition/Hydration Goal 2, SLP) short term goal (STG); 2 weeks  -AD --       Barriers (Oral Nutrition/Hydration Goal 2, SLP) medically complex  -AD --       Progress/Outcomes (Oral Nutrition/Hydration Goal 2, SLP) good progress toward goal; goal ongoing  Tolerating soft diet w/grd meats and HTL. Needs increased cues for slow rate and small bites due to pt now able to feed himself.  -AD --                Lingual Strengthening Goal 1 (SLP)    Activity (Lingual Strengthening Goal 1, SLP) increase lingual tone/sensation/control/coordination/movement; increase tongue back strength  -AD --       Increase Lingual Tone/Sensation/Control/Coordination/Movement lingual movement exercises; swallow trials  -AD --       Increase Tongue Back Strength lingual movement exercises; lingual resistance exercises  -AD --       [place]/Accuracy (Lingual Strengthening Goal 1, SLP) with minimal cues (75-90% accuracy)  -AD --       Time Frame (Lingual Strengthening Goal 1, SLP) short term goal (STG); 2 weeks  -AD --       Barriers (Lingual Strengthening Goal 1, SLP) medically complex  -AD --       Progress/Outcomes (Lingual Strengthening Goal 1, SLP) continuing progress toward goal; goal ongoing  lingual resistance for lateralization x10 each right and left w/min to no cues. Fatigue noted after 5 trials each and increased verbal prompts provided. Pt needing encouragement to slow down reps in order to achieve full rest, contraction in between reps  -AD --                Pharyngeal Strengthening Exercise Goal 1 (SLP)    Activity (Pharyngeal  Strengthening Goal 1, SLP) increase closure at entrance to airway/closure of airway at glottis  -AD --       Increase Closure at Entrance to Airway/Closure of Airway at Glottis hard effortful swallow; effortful pitch glide (falsetto + pharyngeal squeeze)  -AD --       [place]/Accuracy (Pharyngeal Strengthening Goal 1, SLP) with moderate cues (50-74% accuracy)  -AD --       Time Frame (Pharyngeal Strengthening Goal 1, SLP) short term goal (STG); 2 weeks  -AD --       Barriers (Pharyngeal Strengthening Goal 1, SLP) medically complex  -AD --       Progress/Outcomes (Pharyngeal Strengthening Goal 1, SLP) continuing progress toward goal; goal ongoing  hard effortful swallow x10, effortful pitch glides x10 w/cues to increase effort vocal cord closure; vocalized /ah/ 10x with increased effort and pull x10 w/sustained vocalization for an average of 2 sec each.  -AD --                Swallow Compensatory Strategies Goal 1 (SLP)    Activity (Swallow Compensatory Strategies/Techniques Goal 1, SLP) compensatory strategies; small bites; liquids by teaspoon only; effortful swallow; extra swallow per bolus; throat clear/extra swallow  -AD --       [place]/Accuracy (Swallow Compensatory Strategies/Techniques Goal 1, SLP) with moderate cues (50-74% accuracy)  -AD --       Time Frame (Swallow Compensatory Strategies/Techniques Goal 1, SLP) short term goal (STG); 1 week  -AD --       Barriers (Swallow Compensatory Strategies/Techniques Goal 1, SLP) medically complex  -AD --       Progress/Outcomes (Swallow Compensatory Strategies/Techniques Goal 1, SLP) good progress toward goal; goal ongoing  Pt required increased verbal prompts for sm bites as he is feeding himself now. Consistent cues for oral clearance before taking another bite. Able to consistently adhere to spoon size drinks w/use of spoon for HTL. Cons cues for double swallows on po.  -AD --             User Key  (r) = Recorded By, (t) = Taken By, (c) = Cosigned By     Initials Name Effective Dates    AD Denisa Chua, MS CCC-SLP 06/16/21 -     AB Dawn Reese, MS CCC-SLP 08/01/21 -                 EDUCATION  The patient has been educated in the following areas:   Home Exercise Program (HEP) Dysphagia (Swallowing Impairment) Modified Diet Instruction Compensatory strategies. Pt and wife verbalize understanding. Pt requires reinforcement with use of compensatory strategies and slow rate/small bites.         SLP GOALS     Row Name 01/10/22 1337 01/09/22 1200          Oral Nutrition/Hydration Goal 2 (SLP)    Oral Nutrition/Hydration Goal 2, SLP Pt will demonstrate tolerance of a least restrictive diet w/o clinical s/s of aspiration or complications such as aspiration pneumonia  -AD Pt will demonstrate tolerance of a least restrictive diet w/o clinical s/s of aspiration or complications such as aspiration pneumonia  -AB     Time Frame (Oral Nutrition/Hydration Goal 2, SLP) short term goal (STG); 2 weeks  -AD short term goal (STG); 2 weeks  -AB     Barriers (Oral Nutrition/Hydration Goal 2, SLP) medically complex  -AD medically complex  -AB     Progress/Outcomes (Oral Nutrition/Hydration Goal 2, SLP) good progress toward goal; goal ongoing  Tolerating soft diet w/grd meats and HTL. Needs increased cues for slow rate and small bites due to pt now able to feed himself.  -AD good progress toward goal; goal ongoing  -AB            Lingual Strengthening Goal 1 (SLP)    Activity (Lingual Strengthening Goal 1, SLP) increase lingual tone/sensation/control/coordination/movement; increase tongue back strength  -AD increase lingual tone/sensation/control/coordination/movement; increase tongue back strength  -AB     Increase Lingual Tone/Sensation/Control/Coordination/Movement lingual movement exercises; swallow trials  -AD lingual movement exercises; swallow trials  -AB     Increase Tongue Back Strength lingual movement exercises; lingual resistance exercises  -AD lingual movement  exercises; lingual resistance exercises  lingual protrusion, lateralization x10 with resistance (tongue depressor)  -AB     [place]/Accuracy (Lingual Strengthening Goal 1, SLP) with minimal cues (75-90% accuracy)  -AD with minimal cues (75-90% accuracy)  -AB     Time Frame (Lingual Strengthening Goal 1, SLP) short term goal (STG); 2 weeks  -AD short term goal (STG); 2 weeks  -AB     Barriers (Lingual Strengthening Goal 1, SLP) medically complex  -AD medically complex  -AB     Progress/Outcomes (Lingual Strengthening Goal 1, SLP) continuing progress toward goal; goal ongoing  lingual resistance for lateralization x10 each right and left w/min to no cues. Fatigue noted after 5 trials each and increased verbal prompts provided. Pt needing encouragement to slow down reps in order to achieve full rest, contraction in between reps  -AD continuing progress toward goal; goal ongoing  -AB            Pharyngeal Strengthening Exercise Goal 1 (SLP)    Activity (Pharyngeal Strengthening Goal 1, SLP) increase closure at entrance to airway/closure of airway at glottis  -AD increase closure at entrance to airway/closure of airway at glottis  -AB     Increase Closure at Entrance to Airway/Closure of Airway at Glottis hard effortful swallow; effortful pitch glide (falsetto + pharyngeal squeeze)  -AD hard effortful swallow; effortful pitch glide (falsetto + pharyngeal squeeze)  effortful swallow x20 with and without PO, effortful pitch glide x10, decreased sustained phonation 2-4 sec  -AB     [place]/Accuracy (Pharyngeal Strengthening Goal 1, SLP) with moderate cues (50-74% accuracy)  -AD with moderate cues (50-74% accuracy)  -AB     Time Frame (Pharyngeal Strengthening Goal 1, SLP) short term goal (STG); 2 weeks  -AD short term goal (STG); 2 weeks  -AB     Barriers (Pharyngeal Strengthening Goal 1, SLP) medically complex  -AD medically complex  -AB     Progress/Outcomes (Pharyngeal Strengthening Goal 1, SLP) continuing  progress toward goal; goal ongoing  hard effortful swallow x10, effortful pitch glides x10 w/cues to increase effort vocal cord closure; vocalized /ah/ 10x with increased effort and pull x10 w/sustained vocalization for an average of 2 sec each.  -AD continuing progress toward goal; goal ongoing  -AB            Swallow Compensatory Strategies Goal 1 (SLP)    Activity (Swallow Compensatory Strategies/Techniques Goal 1, SLP) compensatory strategies; small bites; liquids by teaspoon only; effortful swallow; extra swallow per bolus; throat clear/extra swallow  -AD compensatory strategies; small bites; liquids by teaspoon only; effortful swallow; extra swallow per bolus; throat clear/extra swallow  consumes orange slices and HTL by tsp, fed by SLP, cued for intermittent throat clear, re-swallow - x18 trials overall, x1 throat clear w/HTL  -AB     [place]/Accuracy (Swallow Compensatory Strategies/Techniques Goal 1, SLP) with moderate cues (50-74% accuracy)  -AD with moderate cues (50-74% accuracy)  -AB     Time Frame (Swallow Compensatory Strategies/Techniques Goal 1, SLP) short term goal (STG); 1 week  -AD short term goal (STG); 1 week  -AB     Barriers (Swallow Compensatory Strategies/Techniques Goal 1, SLP) medically complex  -AD medically complex  -AB     Progress/Outcomes (Swallow Compensatory Strategies/Techniques Goal 1, SLP) good progress toward goal; goal ongoing  Pt required increased verbal prompts for sm bites as he is feeding himself now. Consistent cues for oral clearance before taking another bite. Able to consistently adhere to spoon size drinks w/use of spoon for HTL. Cons cues for double swallows on po.  -AD good progress toward goal; goal ongoing  -AB           User Key  (r) = Recorded By, (t) = Taken By, (c) = Cosigned By    Initials Name Provider Type    AD Denisa Chua, MS CCC-SLP Speech and Language Pathologist    AB Dawn Reese, MS CCC-SLP Speech and Language Pathologist                    Time Calculation:    Time Calculation- SLP     Row Name 01/10/22 1423             Time Calculation- SLP    SLP Start Time 1244  -AD      SLP Stop Time 1337  -AD      SLP Time Calculation (min) 53 min  -AD      Total Timed Code Minutes- SLP 0 minute(s)  -AD      SLP Non-Billable Time (min) 0 min  -AD      SLP Received On 01/10/22  -AD              Untimed Charges    [id number] Treatment Swallow Minutes 53  -AD              Total Minutes    Untimed Charges Total Minutes 53  -AD       Total Minutes 53  -AD            User Key  (r) = Recorded By, (t) = Taken By, (c) = Cosigned By    Initials Name Provider Type    Denisa Mcconnell, MS CCC-SLP Speech and Language Pathologist                Therapy Charges for Today     Code Description Service Date Service Provider Modifiers Qty    [id number]  ST TREATMENT SWALLOW 4 1/10/2022 Denisa Chua, MS CCC-SLP GN 1               Denisa Chua MS CCC-SLP  1/10/2022

## 2022-01-10 NOTE — PLAN OF CARE
Goal Outcome Evaluation:  Plan of Care Reviewed With: patient, spouse           Outcome Summary: SLP: Pt continues to tolerate diet. Note pt feeding self now, but rate and intake is too fast with too large of bites taken. Pt continues to need reinforcement for small bites and oral clearance prior to taking another bite. Does demonstrate use of HTL intake by spoon. Recommend staff continue to reinforce small bites and oral clearance before taking another bite during meal time. SLP to continue to follow. Recommend f/u MBS Thursday or Friday to assess for tolerance of thinner liquids. May need ENT consult in future if voice does not improve in the next week.

## 2022-01-10 NOTE — PLAN OF CARE
Goal Outcome Evaluation:  Plan of Care Reviewed With: patient        Progress: improving  Outcome Summary: OT- Patient demonstrated improved B shoulder AROM today. Patient brushed his teeth independently after set up and washed face independently. Required assist to comb back of hair however combed front and sides independently. Patient required max A X 2 for supine to sit and sit to supine. Sat EOB with SBA/CGA for over 5 minutes. Patient stood from elevated EOB 3 trials with max assist X 2 (approximately 75% upright). Patient stated he was very fatigued today but continues to be very motivated to improve. Continue to recommend short term rehab at discharge.

## 2022-01-10 NOTE — THERAPY TREATMENT NOTE
"Acute Care - Physical Therapy Treatment Note   [place]     Patient Name: Sergio Daniel  : 1955  MRN: [id number]  Today's Date: 1/10/2022      Visit Dx:     ICD-10-CM ICD-9-CM   1. Acute respiratory failure with hypoxia (HCC)  J96.01 518.81   2. Pneumonia due to COVID-19 virus  U07.1 480.8    J12.82 079.89   3. Paroxysmal atrial fibrillation (HCC)  I48.0 427.31   4. Pharyngeal dysphagia  R13.13 787.23   5. Oropharyngeal dysphagia  R13.12 787.22     Patient Active Problem List   Diagnosis   • Acute respiratory failure with hypoxia (HCC)   • Acute hypoxemic respiratory failure due to COVID-19 (HCC)     Past Medical History:   Diagnosis Date   • Diabetes mellitus (HCC)      History reviewed. No pertinent surgical history.  PT Assessment (last 12 hours)     PT Evaluation and Treatment     Row Name 01/10/22 1020          Physical Therapy Time and Intention    Subjective Information complains of; fatigue  pt reports \"it just takes it out of me\"  -     Document Type therapy note (daily note)  -     Mode of Treatment physical therapy  -     Patient Effort good  -     Row Name 01/10/22 1020          General Information    Patient Observations alert; cooperative; agree to therapy  -     Patient/Family/Caregiver Comments/Observations pt supine, agrees to therapy  -     Existing Precautions/Restrictions fall  -     Barriers to Rehab medically complex  -     Row Name 01/10/22 1020          Cognition    Follows Commands (Cognition) follows one-step commands  -     Personal Safety Interventions gait belt; nonskid shoes/slippers when out of bed  -     Row Name 01/10/22 1020          Pain Scale: Word Pre/Post-Treatment    Pre/Posttreatment Pain Comment no c/o pain  -     Row Name 01/10/22 1020          Bed Mobility    Bed Mobility supine-sit; sit-supine  -     Supine-Sit [place] (Bed Mobility) maximum assist (25% patient effort); 2 person assist; verbal cues  -     Sit-Supine [place] " (Bed Mobility) maximum assist (25% patient effort); 2 person assist; verbal cues  -     Bed Mobility, Safety Issues decreased use of arms for pushing/pulling; decreased use of legs for bridging/pushing  -     Assistive Device (Bed Mobility) bed rails; draw sheet; head of bed elevated  -     Row Name 01/10/22 1020          Transfers    Transfers sit-stand transfer; stand-sit transfer  -     Comment (Transfers) requires elevated bed height to stand, cues for hand placement.  pt unable to obtain upright posture, noted increased forward flexion.  pt immediately returns to sitting due to fatigue.  sit to stand attempted x3  -     Sit-Stand [place] (Transfers) maximum assist (25% patient effort); 2 person assist  -     Stand-Sit [place] (Transfers) maximum assist (25% patient effort); 2 person assist  -     Row Name 01/10/22 1020          Sit-Stand Transfer    Assistive Device (Sit-Stand Transfers) walker, front-wheeled  -     Row Name 01/10/22 1020          Stand-Sit Transfer    Assistive Device (Stand-Sit Transfers) walker, front-wheeled  -     Row Name 01/10/22 1020          Balance    Comment, Balance pt able to sit EOB with SBA/CGA  -     Row Name 01/10/22 1020          Motor Skills    Therapeutic Exercise --  pt reports he has been performing LE exercises as able  -     Row Name             Wound Right hand Blisters    Wound - Properties Group Present on Hospital Admission: N  -LC Side: Right  -LC Location: hand  -LC, includes hand and wrist  Primary Wound Type: Blisters  -LC     Retired Wound - Properties Group Present on Hospital Admission: N  -LC Side: Right  -LC Location: hand  -LC, includes hand and wrist  Primary Wound Type: Blisters  -LC     Row Name 01/10/22 1020          Plan of Care Review    Outcome Summary PT: Patient performs supine to sit with max assist x2.  Sit to stand attempted x3 with max assist x2.  Patient unable to obtain upright posture with significant forward  "flexion noted.  Patient immediately requires return to sitting due to reported fatigue.  Patient states \"it just takes it out of me\".  Patient continues to provide maximal effort with all tasks and is motivated to participate and improve.  Recommend rehab (subacute vs acute) at discharge.   Patient would benefit from out of bed to chair via theresa lift with nursing staff.   Will continue to follow.  -     Row Name 01/10/22 1020          Positioning and Restraints    Pre-Treatment Position in bed  -     Post Treatment Position bed  -JW     In Bed supine; call light within reach; encouraged to call for assist; with OT  -     Row Name 01/10/22 1020          Progress Summary (PT)    Progress Toward Functional Goals (PT) progress toward functional goals is good  -           User Key  (r) = Recorded By, (t) = Taken By, (c) = Cosigned By    Initials Name Provider Type     Cihtra Hale, RN, CWON Registered Nurse    Rosy Arevalo, PT Physical Therapist                Physical Therapy Education                 Title: PT OT SLP Therapies (Done)     Topic: Physical Therapy (Done)     Point: Mobility training (Done)     Learning Progress Summary           Patient Acceptance, E,TB, VU by  at 1/10/2022 1139    Acceptance, E,TB, VU by  at 1/9/2022 0944    Acceptance, E,TB, VU by  at 1/8/2022 1045    Acceptance, E,TB, VU by  at 1/7/2022 1140    Acceptance, E,TB, VU by  at 1/6/2022 1036    Acceptance, E,TB, VU by  at 1/5/2022 1018    Acceptance, E,TB, VU by  at 1/4/2022 1023    Acceptance, E,TB, VU,NR by  at 1/3/2022 1216                               User Key     Initials Effective Dates Name Provider Type Discipline     06/16/21 -  Alba Albright, PT Physical Therapist PT     06/16/21 -  Rosy Harmon, JASMIN Physical Therapist PT              PT Recommendation and Plan  Anticipated Discharge Disposition (PT): skilled nursing facility, inpatient rehabilitation facility  Planned Therapy " "Interventions (PT): balance training, bed mobility training, gait training, home exercise program, patient/family education, strengthening, transfer training  Therapy Frequency (PT): daily  Progress Summary (PT)  Progress Toward Functional Goals (PT): progress toward functional goals is good  Barriers to Overall Progress (PT): medical status  Plan of Care Reviewed With: patient  Outcome Summary: PT: Patient performs supine to sit with max assist x2.  Sit to stand attempted x3 with max assist x2.  Patient unable to obtain upright posture with significant forward flexion noted.  Patient immediately requires return to sitting due to reported fatigue.  Patient states \"it just takes it out of me\".  Patient continues to provide maximal effort with all tasks and is motivated to participate and improve.  Recommend rehab (subacute vs acute) at discharge.   Patient would benefit from out of bed to chair via theresa lift with nursing staff.   Will continue to follow.   Outcome Measures     Row Name 01/10/22 1020 01/09/22 0900 01/08/22 1000       How much help from another person do you currently need...    Turning from your back to your side while in flat bed without using bedrails? 1  - 1  - 1  -    Moving from lying on back to sitting on the side of a flat bed without bedrails? 1  - 1  - 1  -    Moving to and from a bed to a chair (including a wheelchair)? 1  - 1  - 1  -LH    Standing up from a chair using your arms (e.g., wheelchair, bedside chair)? 1  - 1  - 1  -    Climbing 3-5 steps with a railing? 1  - 1  - 1  -    To walk in hospital room? 1  - 1  - 1  -    AM-PAC 6 Clicks Score (PT) 6  - 6  - 6  -       Functional Assessment    Outcome Measure Options AM-PAC 6 Clicks Basic Mobility (PT)  - AM-PAC 6 Clicks Basic Mobility (PT)  - AM-PAC 6 Clicks Basic Mobility (PT)  -          User Key  (r) = Recorded By, (t) = Taken By, (c) = Cosigned By    Initials Name Provider Type     " Alba Albright, PT Physical Therapist    Rosy Arevalo PT Physical Therapist                 Time Calculation:    PT Charges     Row Name 01/10/22 1140             Time Calculation    Start Time 1020  -      Stop Time 1043  -      Time Calculation (min) 23 min  -JW      PT Received On 01/10/22  -JW      PT - Next Appointment 01/11/22  -MONTEZ              Timed Charges    07721 - PT Therapeutic Exercise Minutes 23  -JW              Total Minutes    Timed Charges Total Minutes 23  -JW       Total Minutes 23  -JW            User Key  (r) = Recorded By, (t) = Taken By, (c) = Cosigned By    Initials Name Provider Type    Rosy Arevalo, JASMIN Physical Therapist              Therapy Charges for Today     Code Description Service Date Service Provider Modifiers Qty    58783721323 HC PT THER PROC EA 15 MIN 1/10/2022 Rosy Harmon, PT GP 2          PT G-Codes  Outcome Measure Options: AM-PAC 6 Clicks Basic Mobility (PT)  AM-PAC 6 Clicks Score (PT): 6  AM-PAC 6 Clicks Score (OT): 9    Rosy Harmon PT  1/10/2022

## 2022-01-10 NOTE — PLAN OF CARE
Goal Outcome Evaluation:  Plan of Care Reviewed With: patient        Progress: improving  Outcome Summary: Denies pain; turned q2 hrs & prn; remains weak; fed self meals;labs in am; plan is [facility]

## 2022-01-10 NOTE — CASE MANAGEMENT/SOCIAL WORK
Continued Stay Note  ARELIS Flaherty     Patient Name: Sergio Daniel  MRN: [id number]  Today's Date: 1/10/2022    Admit Date: 12/16/2021     Discharge Plan     Row Name 01/10/22 1409       Plan    Plan STR vs. Acute rehab    Plan Comments I spoke with the patients wife, Luz Elena via phone.  We discussed Dr. Moore's recommendation for [facility].  She advised that she had spoken with Dr. Moore and is agreeable to that plan.  I then called and spoke with Erin with [facility] at [phone] and she faxed information that was completed and faxed back to her at [phone] per her request.  Awaiting response from [facility].  CM will continue to follow.               Discharge Codes    No documentation.                     Maris Thomas RN

## 2022-01-10 NOTE — THERAPY TREATMENT NOTE
Acute Care - Occupational Therapy Treatment Note  ARELIS Flaherty     Patient Name: Sergio Daniel  : 1955  MRN: [id number]  Today's Date: 1/10/2022             Admit Date: 2021       ICD-10-CM ICD-9-CM   1. Acute respiratory failure with hypoxia (HCC)  J96.01 518.81   2. Pneumonia due to COVID-19 virus  U07.1 480.8    J12.82 079.89   3. Paroxysmal atrial fibrillation (HCC)  I48.0 427.31   4. Pharyngeal dysphagia  R13.13 787.23   5. Oropharyngeal dysphagia  R13.12 787.22     Patient Active Problem List   Diagnosis   • Acute respiratory failure with hypoxia (HCC)   • Acute hypoxemic respiratory failure due to COVID-19 (HCC)     Past Medical History:   Diagnosis Date   • Diabetes mellitus (HCC)      History reviewed. No pertinent surgical history.      OT ASSESSMENT FLOWSHEET (last 12 hours)     OT Evaluation and Treatment     Row Name 01/10/22 1021                   OT Time and Intention    Subjective Information complains of; fatigue  -EN        Document Type therapy note (daily note)  -EN        Mode of Treatment occupational therapy  -EN        Patient Effort good  -EN        Symptoms Noted During/After Treatment fatigue  -EN                  General Information    Patient/Family/Caregiver Comments/Observations Patient reclined in bed, agreed to OT.  -EN        Existing Precautions/Restrictions fall  -EN        Barriers to Rehab medically complex  -EN                  Cognition    Personal Safety Interventions gait belt; nonskid shoes/slippers when out of bed  -EN                  Pain Scale: Word Pre/Post-Treatment    Pre/Posttreatment Pain Comment no c/o pain  -EN                  Range of Motion Comprehensive    Comment, General Range of Motion Patient's R shoulder flexion approximately 60 degrees today, L approximately 45 degrees.  -EN                  Bed Mobility    Bed Mobility supine-sit; sit-supine  -EN        Supine-Sit [place] (Bed Mobility) maximum assist (25% patient effort); 2 person  assist; verbal cues  -EN        Sit-Supine [place] (Bed Mobility) verbal cues; maximum assist (25% patient effort); 2 person assist  -EN        Bed Mobility, Safety Issues decreased use of arms for pushing/pulling; decreased use of legs for bridging/pushing  -EN        Assistive Device (Bed Mobility) bed rails; draw sheet; head of bed elevated  -EN                  Transfer Assessment/Treatment    Transfers sit-stand transfer; stand-sit transfer  -EN        Comment (Transfers) Patient required elevated EOB for sit to stand. Patient unable to obtain full upright posture. Stood 3 trials and required max A X 2 each trial ([id number]% upright)  -EN                  Transfers    Sit-Stand [place] (Transfers) verbal cues; maximum assist (25% patient effort); 2 person assist  -EN        Stand-Sit [place] (Transfers) verbal cues; maximum assist (25% patient effort); 2 person assist  -EN                  Sit-Stand Transfer    Assistive Device (Sit-Stand Transfers) walker, front-wheeled  -EN                  Stand-Sit Transfer    Assistive Device (Stand-Sit Transfers) walker, front-wheeled  -EN                  Motor Skills    Therapeutic Exercise shoulder; elbow/forearm; wrist; hand  -EN                  Shoulder (Therapeutic Exercise)    Shoulder (Therapeutic Exercise) AROM (active range of motion); AAROM (active assistive range of motion); strengthening exercise  -EN        Shoulder AROM (Therapeutic Exercise) bilateral; flexion; 3 repetitions  -EN        Shoulder AAROM (Therapeutic Exercise) bilateral; flexion; extension; 10 repetitions  -EN                  Elbow/Forearm (Therapeutic Exercise)    Elbow/Forearm (Therapeutic Exercise) AROM (active range of motion)  -EN        Elbow/Forearm AROM (Therapeutic Exercise) bilateral; flexion; extension; supination; pronation  -EN                  Wrist (Therapeutic Exercise)    Wrist (Therapeutic Exercise) AROM (active range of motion)  -EN        Wrist AROM  (Therapeutic Exercise) bilateral; flexion; extension; 10 repetitions  -EN                  Hand (Therapeutic Exercise)    Hand (Therapeutic Exercise) AROM (active range of motion); strengthening exercise  -EN        Hand AROM/AAROM (Therapeutic Exercise) bilateral; AROM (active range of motion); 10 repetitions  -EN        Hand Strengthening (Therapeutic Exercise) bilateral; hand gripper  -EN                  Balance    Comment, Balance Patient sat EOB with SBA/CGA for over 5 minutes  -EN                  Grooming Assessment/Training    [place] Level (Grooming) grooming skills; hair care, combing/brushing; oral care regimen; wash face, hands  -EN        Comment (Grooming) Patient brushed teeth independently after set up. Patient washed face independently. Patient combed hair (front/sides) independently.  -EN                  Self-Feeding Assessment/Training    Comment (Feeding) Patient feeding self breakfast earlier this am.  -EN                  Wound Right hand Blisters    Wound - Properties Group Present on Hospital Admission: N  -LC Side: Right  -LC Location: hand  -LC, includes hand and wrist  Primary Wound Type: Blisters  -LC        Retired Wound - Properties Group Present on Hospital Admission: N  -LC Side: Right  -LC Location: hand  -LC, includes hand and wrist  Primary Wound Type: Blisters  -LC                  Plan of Care Review    Plan of Care Reviewed With patient  -EN        Outcome Summary OT- Patient demonstrated improved B shoulder AROM today. Patient brushed his teeth independently after set up and washed face independently. Required assist to comb back of hair however combed front and sides independently. Patient required max A X 2 for supine to sit and sit to supine. Sat EOB with SBA/CGA for over 5 minutes. Patient stood from elevated EOB 3 trials with max assist X 3 (approximately 75% upright). Patient stated he was very fatigued today but continues to be very motivated to improve. Continue  to recommend short term rehab at discharge.  -EN                  Positioning and Restraints    Pre-Treatment Position in bed  -EN        Post Treatment Position bed  -EN        In Bed call light within reach; encouraged to call for assist  reclined in bed  -EN                  Progress Summary (OT)    Progress Toward Functional Goals (OT) progress toward functional goals is good  -EN        Daily Progress Summary (OT) improving  -EN              User Key  (r) = Recorded By, (t) = Taken By, (c) = Cosigned By    Initials Name Effective Dates    Isi Wiggins, OTR 06/16/21 -     Chitra Faith RN, CWON 07/26/21 -                  Occupational Therapy Education                 Title: PT OT SLP Therapies (Done)     Topic: Occupational Therapy (Done)     Point: ADL training (Done)     Description:   Instruct learner(s) on proper safety adaptation and remediation techniques during self care or transfers.   Instruct in proper use of assistive devices.              Learning Progress Summary           Patient Acceptance, E, VU by EN at 1/10/2022 1333    Comment: UE HEP, adl retraining, transfer training    Acceptance, E,TB,D, VU,DU by SD at 1/9/2022 1025    Comment: Education with benefits of activity, BUE HEP and safety with bed mobility/transfers. Pt actively participated in self feeding today.    Acceptance, E, VU by EN at 1/7/2022 1020    Comment: UE HEP, safety during transfers    Acceptance, E, VU by EN at 1/6/2022 1007    Comment: UE HEP    Acceptance, E, VU by EN at 1/5/2022 1115    Comment: UE HEP, benefits of activity    Acceptance, E, VU by EN at 1/5/2022 1026    Comment: Patient educated on UE HEP, benefits of activity.    Acceptance, E, VU by EN at 1/4/2022 0959    Comment: transfer safety, UE HEP    Acceptance, E, VU by EN at 1/3/2022 1352    Comment: Educated on UE HEP                   Point: Home exercise program (Done)     Description:   Instruct learner(s) on appropriate technique for  monitoring, assisting and/or progressing therapeutic exercises/activities.              Learning Progress Summary           Patient Acceptance, E, VU by EN at 1/10/2022 1333    Comment: UE HEP, adl retraining, transfer training    Acceptance, E,TB,D, VU,DU by SD at 1/9/2022 1025    Comment: Education with benefits of activity, BUE HEP and safety with bed mobility/transfers. Pt actively participated in self feeding today.    Acceptance, E, VU by EN at 1/7/2022 1020    Comment: UE HEP, safety during transfers    Acceptance, E, VU by EN at 1/6/2022 1007    Comment: UE HEP    Acceptance, E, VU by EN at 1/5/2022 1115    Comment: UE HEP, benefits of activity    Acceptance, E, VU by EN at 1/5/2022 1026    Comment: Patient educated on UE HEP, benefits of activity.    Acceptance, E, VU by EN at 1/4/2022 0959    Comment: transfer safety, UE HEP    Acceptance, E, VU by EN at 1/3/2022 1352    Comment: Educated on UE HEP                               User Key     Initials Effective Dates Name Provider Type Discipline    EN 06/16/21 -  Isi Theodore OTR Occupational Therapist OT    SD 06/16/21 -  Feliciano Chua OTCORY Occupational Therapist OT                  OT Recommendation and Plan  Planned Therapy Interventions (OT): BADL retraining, functional balance retraining, passive ROM/stretching, ROM/therapeutic exercise, strengthening exercise, transfer/mobility retraining  Therapy Frequency (OT): 5 times/wk  Progress Toward Functional Goals (OT): progress toward functional goals is good  Plan of Care Review  Plan of Care Reviewed With: patient  Progress: improving  Outcome Summary: OT- Patient demonstrated improved B shoulder AROM today. Patient brushed his teeth independently after set up and washed face independently. Required assist to comb back of hair however combed front and sides independently. Patient required max A X 2 for supine to sit and sit to supine. Sat EOB with SBA/CGA for over 5 minutes. Patient stood  from elevated EOB 3 trials with max assist X 3 (approximately 75% upright). Patient stated he was very fatigued today but continues to be very motivated to improve. Continue to recommend short term rehab at discharge.  Plan of Care Reviewed With: patient  Outcome Summary: OT- Patient demonstrated improved B shoulder AROM today. Patient brushed his teeth independently after set up and washed face independently. Required assist to comb back of hair however combed front and sides independently. Patient required max A X 2 for supine to sit and sit to supine. Sat EOB with SBA/CGA for over 5 minutes. Patient stood from elevated EOB 3 trials with max assist X 3 (approximately 75% upright). Patient stated he was very fatigued today but continues to be very motivated to improve. Continue to recommend short term rehab at discharge.     Outcome Measures     Row Name 01/10/22 1021 01/10/22 1020 01/09/22 0900       How much help from another person do you currently need...    Turning from your back to your side while in flat bed without using bedrails? -- 1  - 1  -    Moving from lying on back to sitting on the side of a flat bed without bedrails? -- 1  - 1  -LH    Moving to and from a bed to a chair (including a wheelchair)? -- 1  - 1  -LH    Standing up from a chair using your arms (e.g., wheelchair, bedside chair)? -- 1  - 1  -LH    Climbing 3-5 steps with a railing? -- 1  - 1  -LH    To walk in hospital room? -- 1  - 1  -LH    AM-PAC 6 Clicks Score (PT) -- 6  - 6  -LH       How much help from another is currently needed...    Putting on and taking off regular lower body clothing? 2  -EN -- --    Bathing (including washing, rinsing, and drying) 2  -EN -- --    Toileting (which includes using toilet bed pan or urinal) 1  -EN -- --    Putting on and taking off regular upper body clothing 2  -EN -- --    Taking care of personal grooming (such as brushing teeth) 3  -EN -- --    Eating meals 3  -EN -- --    AM-PAC  6 Clicks Score (OT) 13  -EN -- --       Functional Assessment    Outcome Measure Options -- AM-PAC 6 Clicks Basic Mobility (PT)  - AM-PAC 6 Clicks Basic Mobility (PT)  -    Row Name 01/08/22 1000             How much help from another person do you currently need...    Turning from your back to your side while in flat bed without using bedrails? 1  -LH      Moving from lying on back to sitting on the side of a flat bed without bedrails? 1  -LH      Moving to and from a bed to a chair (including a wheelchair)? 1  -LH      Standing up from a chair using your arms (e.g., wheelchair, bedside chair)? 1  -LH      Climbing 3-5 steps with a railing? 1  -LH      To walk in hospital room? 1  -      AM-PAC 6 Clicks Score (PT) 6  -LH              Functional Assessment    Outcome Measure Options AM-PAC 6 Clicks Basic Mobility (PT)  -            User Key  (r) = Recorded By, (t) = Taken By, (c) = Cosigned By    Initials Name Provider Type     Alba Albright, PT Physical Therapist    Isi Wiggins OTCORY Occupational Therapist    Rosy Arevalo, PT Physical Therapist                Time Calculation:    Time Calculation- OT     Row Name 01/10/22 1335             Time Calculation- OT    OT Start Time 1020  -EN      OT Stop Time 1100  -EN      OT Time Calculation (min) 40 min  -EN              Timed Charges    21881 - OT Self Care/Mgmt Minutes 40  -EN              Total Minutes    Timed Charges Total Minutes 40  -EN       Total Minutes 40  -EN            User Key  (r) = Recorded By, (t) = Taken By, (c) = Cosigned By    Initials Name Provider Type    Isi Wiggins OTCORY Occupational Therapist              Therapy Charges for Today     Code Description Service Date Service Provider Modifiers Qty    13079282912 HC OT SELF CARE/MGMT/TRAIN EA 15 MIN 1/10/2022 Isi Theodore OTR GO 3               ILNDA Pepe  1/10/2022

## 2022-01-10 NOTE — PLAN OF CARE
"Goal Outcome Evaluation:  Plan of Care Reviewed With: patient           Outcome Summary: PT: Patient performs supine to sit with max assist x2.  Sit to stand attempted x3 with max assist x2.  Patient unable to obtain upright posture with significant forward flexion noted.  Patient immediately requires return to sitting due to reported fatigue.  Patient states \"it just takes it out of me\".  Patient continues to provide maximal effort with all tasks and is motivated to participate and improve.  Recommend rehab (subacute vs acute) at discharge.   Patient would benefit from out of bed to chair via theresa lift with nursing staff.   Will continue to follow.  "

## 2022-01-10 NOTE — PLAN OF CARE
Goal Outcome Evaluation:  Plan of Care Reviewed With: patient        Progress: no change  Outcome Summary: Temp high 99.7. r/a. Continue to turn/reposition, check/change during shift. Remains incontinent of bladder/bowels. Barrier cream applied to coccyx prn.

## 2022-01-10 NOTE — PROGRESS NOTES
Adult Nutrition  Assessment/PES    Patient Name:  Sergio Daniel  YOB: 1955  MRN: [id number]  Admit Date:  12/16/2021    Assessment Date:  1/10/2022    Comments:  Good po intake 81% average of meals.   Noted SLP plan repeat MBS later in the week for goal thinner liquid.    Will cont to follow and monitor.      Reason for Assessment     Row Name 01/10/22 1435          Reason for Assessment    Reason For Assessment follow-up protocol     Diagnosis pulmonary disease; infection/sepsis  AHRF COVID 19 s/p vent DM                Nutrition/Diet History     Row Name 01/10/22 1435          Nutrition/Diet History    Typical Food/Fluid Intake Spoke w pt voice very soft but reports eating well and no needs , feels like he is getting what he needs.                Anthropometrics     Row Name 01/10/22 1436          Anthropometrics    Weight --  no new wt            Body Mass Index (BMI)    BMI Assessment BMI 35-39.9: obesity grade II                Labs/Tests/Procedures/Meds     Row Name 01/10/22 1436          Labs/Procedures/Meds    Lab Results Reviewed reviewed     Lab Results Comments glu 160 H            Diagnostic Tests/Procedures    Diagnostic Test/Procedure Reviewed reviewed            Medications    Pertinent Medications Reviewed reviewed     Pertinent Medications Comments miralax, decadron, glucotrol, novolog                Physical Findings     Row Name 01/10/22 1436          Physical Findings    Skin other (see comments)  R hand blisters                  Nutrition Prescription Ordered     Row Name 01/10/22 1442          Nutrition Prescription PO    Current PO Diet Soft Texture     Texture Ground     Fluid Consistency Honey thick     Common Modifiers Consistent Carbohydrate                Evaluation of Received Nutrient/Fluid Intake     Row Name 01/10/22 1442          Fluid Intake Evaluation    Oral Fluid (mL) 400  ave x 3, 19%            PO Evaluation    Number of Meals 8     % PO Intake 81                      Problem/Interventions:   Problem 1     Row Name 01/10/22 1445          Nutrition Diagnoses Problem 1    Problem 1 Inadequate Nutrient Intake     Etiology (related to) Factors Affecting Nutrition; Medical Diagnosis     Signs/Symptoms (evidenced by) Report/Observation                      Intervention Goal     Row Name 01/10/22 1446          Intervention Goal    General Maintain nutrition     PO PO intake (%); Continue positive trend     PO Intake % 80 %  or greater                Nutrition Intervention     Row Name 01/10/22 1446          Nutrition Intervention    RD/Tech Action Encourage intake; Interview for preference; Follow Tx progress                  Education/Evaluation     Row Name 01/10/22 1446          Education    Education Education not appropriate at this time            Monitor/Evaluation    Monitor Per protocol; I&O; PO intake; Pertinent labs; Weight; Symptoms                 Electronically signed by:  Le Fregoso RD  01/10/22 14:46 EST

## 2022-01-10 NOTE — PROGRESS NOTES
"Hospitalist Team      Patient Care Team:  Farhan Jean Jr., MD as PCP - General (Family Medicine)      Chief Complaint:  Follow-up Acute Hypoxic Respiratory Failure due COVID-19    Subjective    Weak but improving he remains on room air.  Breathing is stable.  No other new overnight events    Objective    Vital Signs  Temp:  [97.7 °F (36.5 °C)-99.7 °F (37.6 °C)] 97.8 °F (36.6 °C)  Heart Rate:  [] 83  Resp:  [18-20] 18  BP: (108-117)/(66-75) 117/74  Oxygen Therapy  SpO2: 96 %  Pulse Oximetry Type: Continuous  Device (Oxygen Therapy): ventilator  Device (Oxygen Therapy): room air  $ High Flow Nasal Cannula Set-Up: yes  Flow (L/min): 2  Oxygen Concentration (%): 35  ETCO2 (mmHg): 35 mmHg  Probe Placed On (Pulse Ox): Left:, finger  Probe Removed From (Pulse Ox): Right:, finger}  Flowsheet Rows      First Filed Value   Admission Height 185.4 cm (73\") Documented at 12/16/2021 1515   Admission Weight 120 kg (265 lb) Documented at 12/16/2021 1515          Physical Exam:  Vitals reviewed  General: Acute distress  HEENT:  Normocephalic  Lungs: Somewhat diminished at the bases otherwise no distress  CV: Rate stable  Abdomen: Soft with active bowel sounds.  MSK: Moves extremities spontaneously  Skin: Warm dry  Psych:  Following commands    Results Review:     I reviewed the patient's new clinical results.    Lab Results (last 24 hours)     Procedure Component Value Units Date/Time    POC Glucose Once [[id number]]  (Abnormal) Collected: 01/10/22 1134    Specimen: Blood Updated: 01/10/22 1140     Glucose 160 mg/dL      Comment: Meter: [id number] : [id number] Scrogham Kelly CNA       POC Glucose Once [[id number]]  (Normal) Collected: 01/10/22 0803    Specimen: Blood Updated: 01/10/22 0809     Glucose 108 mg/dL      Comment: Meter: [id number] : [id number] Scrogham Kelly CNA       POC Glucose Once [[id number]]  (Normal) Collected: 01/10/22 0437    Specimen: Blood Updated: 01/10/22 0447     Glucose 126 mg/dL      " Comment: Meter: [id number] : [id number] Keya Naranjo RN (Validator)       POC Glucose Once [[id number]]  (Abnormal) Collected: 01/09/22 2022    Specimen: Blood Updated: 01/09/22 2028     Glucose 198 mg/dL      Comment: Meter: [id number] : [id number] Nadja Og RN       POC Glucose Once [[id number]]  (Abnormal) Collected: 01/09/22 1709    Specimen: Blood Updated: 01/09/22 1715     Glucose 204 mg/dL      Comment: Meter: [id number] : [id number] Baldemar PHOENIX       C-reactive Protein [[id number]]  (Abnormal) Collected: 01/09/22 0536    Specimen: Blood Updated: 01/09/22 1424     C-Reactive Protein 0.93 mg/dL           Imaging Results (Last 24 Hours)     ** No results found for the last 24 hours. **          Xray reviewed personally by physician.      Medication Review:   I have reviewed the patient's current medication list    Current Facility-Administered Medications:   •  acetaminophen (TYLENOL) tablet 650 mg, 650 mg, Oral, Q6H PRN, Jayden Hyatt, DO, 650 mg at 12/26/21 1153  •  [COMPLETED] apixaban (ELIQUIS) tablet 10 mg, 10 mg, Nasogastric, Q12H, 10 mg at 01/05/22 1528 **FOLLOWED BY** apixaban (ELIQUIS) tablet 5 mg, 5 mg, Oral, Q12H, Dakota Moore MD, 5 mg at 01/10/22 0850  •  Aquaphor Advanced Therapy ointment 1 application, 1 application, Topical, Q12H, Dakota Moore MD, 1 application at 01/10/22 0851  •  bisacodyl (DULCOLAX) suppository 10 mg, 10 mg, Rectal, Daily, Dakota Moore MD, 10 mg at 01/10/22 0850  •  chlorhexidine (PERIDEX) 0.12 % solution 15 mL, 15 mL, Mouth/Throat, Q12H, Spencer Carmichael MD, 15 mL at 01/10/22 1115  •  dexamethasone (DECADRON) tablet 3 mg, 3 mg, Oral, Daily With Breakfast, 3 mg at 01/10/22 0850 **OR** [DISCONTINUED] dexamethasone (DECADRON) injection 6 mg, 6 mg, Intravenous, Daily With Breakfast, Charli Ayala MD, 6 mg at 01/05/22 0900  •  dextromethorphan polistirex ER (DELSYM) 30 MG/5ML oral suspension 60 mg, 60 mg, Oral, Q12H  PRN, Jaleesa Cárdenas MD, 60 mg at 01/06/22 0922  •  dextrose (D50W) (25 g/50 mL) IV injection 25 g, 25 g, Intravenous, Q15 Min PRN, Dakota Moore MD, 25 g at 12/29/21 0804  •  dextrose (GLUTOSE) oral gel 15 g, 15 g, Oral, Q15 Min PRN, Dakota Moore MD  •  dilTIAZem CD (CARDIZEM CD) 24 hr capsule 240 mg, 240 mg, Oral, Q24H, Dakota Moore MD, 240 mg at 01/10/22 0850  •  famotidine (PEPCID) injection 20 mg, 20 mg, Intravenous, Daily, Spencer Carmichael MD, 20 mg at 01/10/22 0850  •  glipizide (GLUCOTROL) tablet 5 mg, 5 mg, Oral, BID AC, Dakota Moore MD, 5 mg at 01/10/22 0850  •  glucagon (GLUCAGEN) injection 1 mg, 1 mg, Subcutaneous, Q15 Min PRN, Dakota Moore MD  •  hydrALAZINE (APRESOLINE) injection 10 mg, 10 mg, Intravenous, Q6H PRN, Jayden Hyatt, DO  •  insulin aspart (novoLOG) injection 0-24 Units, 0-24 Units, Subcutaneous, TID With Meals, Jayden Hyatt, DO, 4 Units at 01/10/22 1200  •  ipratropium-albuterol (DUO-NEB) nebulizer solution 3 mL, 3 mL, Nebulization, Q6H PRN, Jaleesa Cárdenas MD  •  linagliptin (TRADJENTA) tablet 5 mg, 5 mg, Oral, Daily, Dakota Moore MD, 5 mg at 01/10/22 0849  •  metFORMIN (GLUCOPHAGE) tablet 500 mg, 500 mg, Oral, BID With Meals, Dakota Moore MD, 500 mg at 01/10/22 0850  •  polyethylene glycol (MIRALAX) packet 17 g, 17 g, Oral, Daily, Luca Hermosillo MD, 17 g at 01/09/22 0840  •  potassium chloride 10 mEq in 100 mL IVPB, 10 mEq, Intravenous, Q1H PRN, Jaleesa Cárdenas MD, Stopped at 12/28/21 0940  •  potassium phosphate 45 mmol in sodium chloride 0.9 % 500 mL infusion, 45 mmol, Intravenous, PRN **OR** potassium phosphate 30 mmol in sodium chloride 0.9 % 250 mL infusion, 30 mmol, Intravenous, PRN **OR** potassium phosphate 15 mmol in sodium chloride 0.9 % 100 mL infusion, 15 mmol, Intravenous, PRN **OR** sodium phosphates 45 mmol in sodium chloride 0.9 % 500 mL IVPB, 45 mmol, Intravenous, PRN **OR** sodium phosphates 30 mmol in  sodium chloride 0.9 % 250 mL IVPB, 30 mmol, Intravenous, PRN **OR** sodium phosphates 15 mmol in sodium chloride 0.9 % 250 mL IVPB, 15 mmol, Intravenous, PRN, Jaleesa Cárdenas MD, Last Rate: 62.5 mL/hr at 12/18/21 0429, 15 mmol at 12/18/21 0429  •  [COMPLETED] Insert peripheral IV, , , Once **AND** sodium chloride 0.9 % flush 10 mL, 10 mL, Intravenous, PRN, Jaleesa Cárdenas MD, 10 mL at 12/28/21 0732  •  sodium chloride 0.9 % flush 10 mL, 10 mL, Intravenous, PRN, Jeana Grubbs MD  •  sodium chloride 0.9 % flush 10 mL, 10 mL, Intravenous, Q12H, Dakota Moore MD, 10 mL at 01/10/22 0852  •  sodium chloride 0.9 % flush 10 mL, 10 mL, Intravenous, Q12H, Dakota Moore MD, 10 mL at 01/10/22 0851  •  sodium chloride 0.9 % flush 10 mL, 10 mL, Intravenous, Q12H, Dakota Moore MD, 10 mL at 01/10/22 0850  •  sodium chloride 0.9 % flush 10 mL, 10 mL, Intravenous, PRN, Dakota Moore MD  •  sodium chloride 0.9 % flush 20 mL, 20 mL, Intravenous, PRN, Jeana Grubbs MD  •  sodium chloride 0.9 % flush 20 mL, 20 mL, Intravenous, Oscar HARRINGTON Rusty T, MD      Assessment/Plan      1.  Acute Hypoxic Respiratory Failure due to COVID-19 Pneumonia w/ Superimposed MSSA and Klebsiella pneumonia:    -Extubated 1/1/22.  Diego on room air  -Sputum culture 12/29/2021: MSSA, Citrobacter resistant to ceftriaxone and stenotrophomonas sensitive to Levaquin  -completed the Rocephin and Levaquin course  -Pulmonary now signed off     2.  Diabetes Mellitus, Type 2 in Obese w/ Hyperglycemia:  - blood glucose stable    - Continue basal and SSI   - follow and adjust prn    3.  Right Hand Wound:   -Continue local wound care     4.  Atrial Fibrillation: Remains in NSR.    -On Diltiazem and Eliquis.     5.   LLE DVT:   -On Eliquis.     6. Generalized weakness  -Continue PT OT    7. Dysphagia, severe malnutrition  -Diet has been advanced continue      Plan for disposition: Awaiting placement    Jayden Hyatt DO  01/10/22  13:50  EST

## 2022-01-10 NOTE — NURSING NOTE
CWON head to toe skin assessment performed. Bilateral heels clear and intact. Significant bruising noted to left trunk, firm.   Friction linear partial thickness skin injury to upper bilateral glutes. Recommend continuing Z guard ointment for barrier and moisture protection. Will also recommend sacral foam dressing to coccyx for PI prevention. Patient currently on a low air loss mattress.  Continue turn protocol using wedge to facilitate position changes. Elevate heels will pillows or heel boots.

## 2022-01-11 LAB
ANION GAP SERPL CALCULATED.3IONS-SCNC: 11.9 MMOL/L (ref 5–15)
BUN SERPL-MCNC: 21 MG/DL (ref 8–23)
BUN/CREAT SERPL: 41.2 (ref 7–25)
CALCIUM SPEC-SCNC: 8.7 MG/DL (ref 8.6–10.5)
CHLORIDE SERPL-SCNC: 99 MMOL/L (ref 98–107)
CO2 SERPL-SCNC: 24.1 MMOL/L (ref 22–29)
CREAT SERPL-MCNC: 0.51 MG/DL (ref 0.76–1.27)
DEPRECATED RDW RBC AUTO: 52.1 FL (ref 37–54)
ERYTHROCYTE [DISTWIDTH] IN BLOOD BY AUTOMATED COUNT: 14.8 % (ref 12.3–15.4)
GFR SERPL CREATININE-BSD FRML MDRD: >150 ML/MIN/1.73
GLUCOSE BLDC GLUCOMTR-MCNC: 152 MG/DL (ref 70–130)
GLUCOSE BLDC GLUCOMTR-MCNC: 177 MG/DL (ref 70–130)
GLUCOSE BLDC GLUCOMTR-MCNC: 244 MG/DL (ref 70–130)
GLUCOSE BLDC GLUCOMTR-MCNC: 94 MG/DL (ref 70–130)
GLUCOSE SERPL-MCNC: 151 MG/DL (ref 65–99)
HCT VFR BLD AUTO: 37.4 % (ref 37.5–51)
HGB BLD-MCNC: 11.8 G/DL (ref 13–17.7)
MCH RBC QN AUTO: 30.8 PG (ref 26.6–33)
MCHC RBC AUTO-ENTMCNC: 31.6 G/DL (ref 31.5–35.7)
MCV RBC AUTO: 97.7 FL (ref 79–97)
PLATELET # BLD AUTO: 359 10*3/MM3 (ref 140–450)
PMV BLD AUTO: 9.7 FL (ref 6–12)
POTASSIUM SERPL-SCNC: 3.7 MMOL/L (ref 3.5–5.2)
RBC # BLD AUTO: 3.83 10*6/MM3 (ref 4.14–5.8)
SODIUM SERPL-SCNC: 135 MMOL/L (ref 136–145)
WBC NRBC COR # BLD: 10.73 10*3/MM3 (ref 3.4–10.8)

## 2022-01-11 PROCEDURE — 94799 UNLISTED PULMONARY SVC/PX: CPT

## 2022-01-11 PROCEDURE — 97110 THERAPEUTIC EXERCISES: CPT

## 2022-01-11 PROCEDURE — 82962 GLUCOSE BLOOD TEST: CPT

## 2022-01-11 PROCEDURE — 99232 SBSQ HOSP IP/OBS MODERATE 35: CPT | Performed by: INTERNAL MEDICINE

## 2022-01-11 PROCEDURE — 80048 BASIC METABOLIC PNL TOTAL CA: CPT | Performed by: INTERNAL MEDICINE

## 2022-01-11 PROCEDURE — 63710000001 INSULIN ASPART PER 5 UNITS: Performed by: INTERNAL MEDICINE

## 2022-01-11 PROCEDURE — 85027 COMPLETE CBC AUTOMATED: CPT | Performed by: INTERNAL MEDICINE

## 2022-01-11 PROCEDURE — 97535 SELF CARE MNGMENT TRAINING: CPT

## 2022-01-11 RX ORDER — FAMOTIDINE 20 MG/1
20 TABLET, FILM COATED ORAL DAILY
Status: DISCONTINUED | OUTPATIENT
Start: 2022-01-12 | End: 2022-01-13 | Stop reason: HOSPADM

## 2022-01-11 RX ADMIN — METFORMIN HYDROCHLORIDE 500 MG: 500 TABLET ORAL at 17:36

## 2022-01-11 RX ADMIN — CHLORHEXIDINE GLUCONATE 0.12% ORAL RINSE 15 ML: 1.2 LIQUID ORAL at 20:26

## 2022-01-11 RX ADMIN — SODIUM CHLORIDE, PRESERVATIVE FREE 10 ML: 5 INJECTION INTRAVENOUS at 08:40

## 2022-01-11 RX ADMIN — SODIUM CHLORIDE, PRESERVATIVE FREE 10 ML: 5 INJECTION INTRAVENOUS at 20:26

## 2022-01-11 RX ADMIN — FAMOTIDINE 20 MG: 10 INJECTION INTRAVENOUS at 09:47

## 2022-01-11 RX ADMIN — INSULIN ASPART 8 UNITS: 100 INJECTION, SOLUTION INTRAVENOUS; SUBCUTANEOUS at 13:09

## 2022-01-11 RX ADMIN — SODIUM CHLORIDE, PRESERVATIVE FREE 10 ML: 5 INJECTION INTRAVENOUS at 08:39

## 2022-01-11 RX ADMIN — APIXABAN 5 MG: 2.5 TABLET, FILM COATED ORAL at 20:26

## 2022-01-11 RX ADMIN — DILTIAZEM HYDROCHLORIDE 240 MG: 120 CAPSULE, COATED, EXTENDED RELEASE ORAL at 08:38

## 2022-01-11 RX ADMIN — INSULIN ASPART 4 UNITS: 100 INJECTION, SOLUTION INTRAVENOUS; SUBCUTANEOUS at 17:36

## 2022-01-11 RX ADMIN — POLYETHYLENE GLYCOL 3350 17 G: 17 POWDER, FOR SOLUTION ORAL at 08:38

## 2022-01-11 RX ADMIN — GLIPIZIDE 5 MG: 5 TABLET ORAL at 17:36

## 2022-01-11 RX ADMIN — WHITE PETROLATUM 41 % TOPICAL OINTMENT 1 APPLICATION: OINTMENT at 20:26

## 2022-01-11 RX ADMIN — DEXAMETHASONE 3 MG: 1 TABLET ORAL at 08:38

## 2022-01-11 RX ADMIN — CHLORHEXIDINE GLUCONATE 0.12% ORAL RINSE 15 ML: 1.2 LIQUID ORAL at 09:47

## 2022-01-11 RX ADMIN — GLIPIZIDE 5 MG: 5 TABLET ORAL at 08:38

## 2022-01-11 RX ADMIN — WHITE PETROLATUM 41 % TOPICAL OINTMENT 1 APPLICATION: OINTMENT at 10:00

## 2022-01-11 RX ADMIN — METFORMIN HYDROCHLORIDE 500 MG: 500 TABLET ORAL at 08:38

## 2022-01-11 RX ADMIN — APIXABAN 5 MG: 2.5 TABLET, FILM COATED ORAL at 08:38

## 2022-01-11 RX ADMIN — LINAGLIPTIN 5 MG: 5 TABLET, FILM COATED ORAL at 08:38

## 2022-01-11 NOTE — CASE MANAGEMENT/SOCIAL WORK
Continued Stay Note   Hien Neely     Patient Name: Sergio Daniel  MRN: [id number]  Today's Date: 1/11/2022    Admit Date: 12/16/2021     Discharge Plan     Row Name 01/11/22 1454       Plan    Plan Comments I spoke with Elise with [facility] who declined the patient.  I then made a referral to  Acute Rehab.  I called and spoke with the patients wife, Luz Elena and advised her of same.  She stated that she only needed him to stand and pivot because her daughter is an OT and they can work with him at home with home health and eventually outpatient PT.  I called back Elise with [facility] and advised her of my conversation with Luz Elena and she is agreeable to take the patient and will start the precert.  CM will continue to follow.                Discharge Codes    No documentation.                     Maris Thomas RN

## 2022-01-11 NOTE — PROGRESS NOTES
"Hospitalist Team      Patient Care Team:  Farhan Jean Jr., MD as PCP - General (Family Medicine)      Chief Complaint:  Follow-up Acute Hypoxic Respiratory Failure due COVID-19    Subjective    Doing well no new issues. Weakness overriding issue.     Objective    Vital Signs  Temp:  [95.4 °F (35.2 °C)-96.3 °F (35.7 °C)] 95.4 °F (35.2 °C)  Heart Rate:  [77-78] 77  Resp:  [18-20] 20  BP: (103-123)/(67-83) 123/83  Oxygen Therapy  SpO2: 95 %  Pulse Oximetry Type: Intermittent  Device (Oxygen Therapy): ventilator  Device (Oxygen Therapy): room air  $ High Flow Nasal Cannula Set-Up: yes  Flow (L/min): 2  Oxygen Concentration (%): 35  ETCO2 (mmHg): 35 mmHg  Probe Placed On (Pulse Ox): Left:, finger  Probe Removed From (Pulse Ox): Right:, finger}  Flowsheet Rows      First Filed Value   Admission Height 185.4 cm (73\") Documented at 12/16/2021 1515   Admission Weight 120 kg (265 lb) Documented at 12/16/2021 1515          Physical Exam:  Vitals reviewed  General: Acute distress  HEENT:  Normocephalic  Lungs: Somewhat diminished at the bases otherwise no distress  CV: Rate stable  Abdomen: Soft with active bowel sounds.  MSK: Moves extremities spontaneously  Skin: Warm dry  Psych:  Following commands    Results Review:     I reviewed the patient's new clinical results.    Lab Results (last 24 hours)     Procedure Component Value Units Date/Time    POC Glucose Once [[id number]]  (Abnormal) Collected: 01/11/22 1253    Specimen: Blood Updated: 01/11/22 1258     Glucose 244 mg/dL      Comment: Meter: [id number] : [id number] Cathy Mendoza CNA       POC Glucose Once [[id number]]  (Normal) Collected: 01/11/22 0814    Specimen: Blood Updated: 01/11/22 0823     Glucose 94 mg/dL      Comment: Meter: [id number] : [id number] Kody PHOENIX       Basic Metabolic Panel [[id number]]  (Abnormal) Collected: 01/11/22 0357    Specimen: Blood Updated: 01/11/22 0524     Glucose 151 mg/dL      BUN 21 mg/dL      Creatinine 0.51 " mg/dL      Sodium 135 mmol/L      Potassium 3.7 mmol/L      Chloride 99 mmol/L      CO2 24.1 mmol/L      Calcium 8.7 mg/dL      eGFR Non African Amer >150 mL/min/1.73      BUN/Creatinine Ratio 41.2     Anion Gap 11.9 mmol/L     Narrative:      GFR Normal >60  Chronic Kidney Disease <60  Kidney Failure <15      CBC (No Diff) [[id number]]  (Abnormal) Collected: 01/11/22 0357    Specimen: Blood Updated: 01/11/22 0449     WBC 10.73 10*3/mm3      RBC 3.83 10*6/mm3      Hemoglobin 11.8 g/dL      Hematocrit 37.4 %      MCV 97.7 fL      MCH 30.8 pg      MCHC 31.6 g/dL      RDW 14.8 %      RDW-SD 52.1 fl      MPV 9.7 fL      Platelets 359 10*3/mm3     POC Glucose Once [[id number]]  (Abnormal) Collected: 01/10/22 1929    Specimen: Blood Updated: 01/10/22 1936     Glucose 210 mg/dL      Comment: Meter: [id number] : [id number] Kristin PHOENIX       POC Glucose Once [[id number]]  (Abnormal) Collected: 01/10/22 1653    Specimen: Blood Updated: 01/10/22 1659     Glucose 221 mg/dL      Comment: Meter: [id number] : [id number] Cathy Mendoza CNA             Imaging Results (Last 24 Hours)     ** No results found for the last 24 hours. **          Xray reviewed personally by physician.      Medication Review:   I have reviewed the patient's current medication list    Current Facility-Administered Medications:   •  acetaminophen (TYLENOL) tablet 650 mg, 650 mg, Oral, Q6H PRN, Jayden Hyatt, , 650 mg at 12/26/21 1153  •  [COMPLETED] apixaban (ELIQUIS) tablet 10 mg, 10 mg, Nasogastric, Q12H, 10 mg at 01/05/22 1528 **FOLLOWED BY** apixaban (ELIQUIS) tablet 5 mg, 5 mg, Oral, Q12H, Dakota Moore MD, 5 mg at 01/11/22 0838  •  Aquaphor Advanced Therapy ointment 1 application, 1 application, Topical, Q12H, Dakota Moore MD, 1 application at 01/10/22 2028  •  bisacodyl (DULCOLAX) suppository 10 mg, 10 mg, Rectal, Daily, Dakota Moore MD, 10 mg at 01/10/22 0850  •  chlorhexidine (PERIDEX) 0.12 % solution 15 mL, 15 mL,  Mouth/Throat, Q12H, Spencer Carmichael MD, 15 mL at 01/11/22 0947  •  dexamethasone (DECADRON) tablet 3 mg, 3 mg, Oral, Daily With Breakfast, 3 mg at 01/11/22 0838 **OR** [DISCONTINUED] dexamethasone (DECADRON) injection 6 mg, 6 mg, Intravenous, Daily With Breakfast, Charli Ayala MD, 6 mg at 01/05/22 0900  •  dextromethorphan polistirex ER (DELSYM) 30 MG/5ML oral suspension 60 mg, 60 mg, Oral, Q12H PRN, Jaleesa Cárdenas MD, 60 mg at 01/06/22 0922  •  dextrose (D50W) (25 g/50 mL) IV injection 25 g, 25 g, Intravenous, Q15 Min PRN, Dakota Moore MD, 25 g at 12/29/21 0804  •  dextrose (GLUTOSE) oral gel 15 g, 15 g, Oral, Q15 Min PRN, Dakota Moore MD  •  dilTIAZem CD (CARDIZEM CD) 24 hr capsule 240 mg, 240 mg, Oral, Q24H, Dakota Moore MD, 240 mg at 01/11/22 0838  •  [START ON 1/12/2022] famotidine (PEPCID) tablet 20 mg, 20 mg, Oral, Daily, Spencer Carmichael MD  •  glipizide (GLUCOTROL) tablet 5 mg, 5 mg, Oral, BID AC, Dakota Moore MD, 5 mg at 01/11/22 0838  •  glucagon (GLUCAGEN) injection 1 mg, 1 mg, Subcutaneous, Q15 Min PRN, Dakota Moore MD  •  hydrALAZINE (APRESOLINE) injection 10 mg, 10 mg, Intravenous, Q6H PRN, Jayden Hyatt DO  •  insulin aspart (novoLOG) injection 0-24 Units, 0-24 Units, Subcutaneous, TID With Meals, Jayden Hyatt DO, 8 Units at 01/11/22 1309  •  ipratropium-albuterol (DUO-NEB) nebulizer solution 3 mL, 3 mL, Nebulization, Q6H PRN, Jaleesa Cárdenas MD  •  linagliptin (TRADJENTA) tablet 5 mg, 5 mg, Oral, Daily, Dakota Moore MD, 5 mg at 01/11/22 0838  •  metFORMIN (GLUCOPHAGE) tablet 500 mg, 500 mg, Oral, BID With Meals, Dakota Moore MD, 500 mg at 01/11/22 0838  •  polyethylene glycol (MIRALAX) packet 17 g, 17 g, Oral, Daily, Luca Hermosillo MD, 17 g at 01/11/22 0838  •  potassium chloride 10 mEq in 100 mL IVPB, 10 mEq, Intravenous, Q1H PRN, Jaleesa Cárdenas MD, Stopped at 12/28/21 0940  •  potassium phosphate 45 mmol in  sodium chloride 0.9 % 500 mL infusion, 45 mmol, Intravenous, PRN **OR** potassium phosphate 30 mmol in sodium chloride 0.9 % 250 mL infusion, 30 mmol, Intravenous, PRN **OR** potassium phosphate 15 mmol in sodium chloride 0.9 % 100 mL infusion, 15 mmol, Intravenous, PRN **OR** sodium phosphates 45 mmol in sodium chloride 0.9 % 500 mL IVPB, 45 mmol, Intravenous, PRN **OR** sodium phosphates 30 mmol in sodium chloride 0.9 % 250 mL IVPB, 30 mmol, Intravenous, PRN **OR** sodium phosphates 15 mmol in sodium chloride 0.9 % 250 mL IVPB, 15 mmol, Intravenous, PRN, Jaleesa Cárdenas MD, Last Rate: 62.5 mL/hr at 12/18/21 0429, 15 mmol at 12/18/21 0429  •  [COMPLETED] Insert peripheral IV, , , Once **AND** sodium chloride 0.9 % flush 10 mL, 10 mL, Intravenous, PRN, Jaleesa Cárdenas MD, 10 mL at 12/28/21 0732  •  sodium chloride 0.9 % flush 10 mL, 10 mL, Intravenous, PRN, Jeana Grubbs MD  •  sodium chloride 0.9 % flush 10 mL, 10 mL, Intravenous, Q12H, Dakota Moore MD, 10 mL at 01/11/22 0840  •  sodium chloride 0.9 % flush 10 mL, 10 mL, Intravenous, Q12H, Dakota Moore MD, 10 mL at 01/11/22 0839  •  sodium chloride 0.9 % flush 10 mL, 10 mL, Intravenous, Q12H, Dakota Moore MD, 10 mL at 01/11/22 0839  •  sodium chloride 0.9 % flush 10 mL, 10 mL, Intravenous, PROscar SINCLAIR Rusty T, MD  •  sodium chloride 0.9 % flush 20 mL, 20 mL, Intravenous, PRN, Jeana Grubbs MD  •  sodium chloride 0.9 % flush 20 mL, 20 mL, Intravenous, PROscar SINCLAIR Rusty T, MD      Assessment/Plan      1.  Acute Hypoxic Respiratory Failure due to COVID-19 Pneumonia w/ Superimposed MSSA and Klebsiella pneumonia:    -Extubated 1/1/22.  Diego on room air  -Sputum culture 12/29/2021: MSSA, Citrobacter resistant to ceftriaxone and stenotrophomonas sensitive to Levaquin  -completed the Rocephin and Levaquin course  -Pulmonary now signed off     2.  Diabetes Mellitus, Type 2 in Obese w/ Hyperglycemia:  - blood glucose stable    -  Continue basal and SSI   - follow and adjust prn    3.  Right Hand Wound:   -Continue local wound care     4.  Atrial Fibrillation: Remains in NSR.    -On Diltiazem and Eliquis.     5.   LLE DVT:   -On Eliquis.     6. Generalized weakness  -Continue PT OT    7. Dysphagia, severe malnutrition  -tolerating current diet     Labs have been stable will check prn now      Plan for disposition: accepted to rehab transfer there once precert obtained    Jayden Hyatt DO  01/11/22  15:18 EST

## 2022-01-11 NOTE — PLAN OF CARE
Goal Outcome Evaluation:  Plan of Care Reviewed With: patient        Progress: improving  Outcome Summary: Pt VSS this shift. Pt reports no pain this shift. Pt able to feed self meals with only assistance with liquids. Weight shift assistance provided Q2 hours this shift. Pt heels offloaded. Pt resting in bed at this time.

## 2022-01-11 NOTE — PLAN OF CARE
Goal Outcome Evaluation:  Plan of Care Reviewed With: patient        Progress: improving  Outcome Summary: OT- Patient demonstrated significant improvement with UE ROM and transfers today. Patient able to activily flex the right shoulder 120 degrees  and L  45 degrees. Patient performed supine to sit with mod A X 2. Patient performed sit to stand from elevated EOB 3 trials. The first trial patient stood with min A X 2, second and third trial stood with min/mod A X 2. Patient stood with walker up to 10 seconds on the third trial. Patient continues to be very motivated to improve. Recommend acute rehab at discharge.   Alejandra Doran

## 2022-01-11 NOTE — PLAN OF CARE
Goal Outcome Evaluation:  Plan of Care Reviewed With: patient           Outcome Summary: PT:  Patient demonstrated significant improvement today, requiring mod A x 2 for bed mobility.  He performed sit to stand from elevated edge of bed with walker and min A x 2, then min/mod A x2 for additional 2 reps. Able to stand max time of 10 seconds, forward flexed at hips.  Patient fatigues quickly but is motivated and demonstrates daily improvements.  Continue to recommend acute rehab at discharge to increase strength and mobility prior to return home.

## 2022-01-11 NOTE — THERAPY TREATMENT NOTE
"Acute Care - Physical Therapy Treatment Note  ARELIS Flaherty     Patient Name: Sergio Daniel  : 1955  MRN: [id number]  Today's Date: 2022      Visit Dx:     ICD-10-CM ICD-9-CM   1. Acute respiratory failure with hypoxia (HCC)  J96.01 518.81   2. Pneumonia due to COVID-19 virus  U07.1 480.8    J12.82 079.89   3. Paroxysmal atrial fibrillation (HCC)  I48.0 427.31   4. Pharyngeal dysphagia  R13.13 787.23   5. Oropharyngeal dysphagia  R13.12 787.22     Patient Active Problem List   Diagnosis   • Acute respiratory failure with hypoxia (HCC)   • Acute hypoxemic respiratory failure due to COVID-19 (HCC)     Past Medical History:   Diagnosis Date   • Diabetes mellitus (HCC)      History reviewed. No pertinent surgical history.  PT Assessment (last 12 hours)     PT Evaluation and Treatment     Row Name 22 0855          Physical Therapy Time and Intention    Subjective Information complains of; fatigue; weakness  -     Document Type therapy note (daily note)  -     Mode of Treatment physical therapy; co-treatment  [facility]reat with OT  -     Patient Effort good  -     Symptoms Noted During/After Treatment fatigue  -     Row Name 22 0855          General Information    General Observations of Patient Patient reclined in bed, on room air.  Patient agreeable to therapy.  -     Risks Reviewed patient:; increased discomfort  -     Benefits Reviewed patient:; improve function; increase independence; increase strength  -     Row Name 22 0855          Pain Scale: Word Pre/Post-Treatment    Pre/Posttreatment Pain Comment Patient reports  standing \"didn't hurt as much today.\"  -     Row Name 22 0855          Bed Mobility    Supine-Sit [place] (Bed Mobility) moderate assist (50% patient effort); 2 person assist; verbal cues  -     Sit-Supine [place] (Bed Mobility) moderate assist (50% patient effort); 2 person assist  -     Bed Mobility, Safety Issues decreased use of arms for " pushing/pulling; decreased use of legs for bridging/pushing  -     Assistive Device (Bed Mobility) bed rails; draw sheet; head of bed elevated  -     Row Name 01/11/22 0855          Transfers    Comment (Transfers) Patient performed sit to stand from elevated edge of bed, 3x.  First attempt, patient required min A x 2, however only stood 2 seconds before returning to sit.  2nd and 3rd reps, patient required min/mod A x 2 for sit to stand and maintained standing 8 seconds, then 10 seconds with seated rest break in between.  Forward flexed at hips in standing position.  -     Sit-Stand [place] (Transfers) minimum assist (75% patient effort); moderate assist (50% patient effort); 2 person assist  -     Stand-Sit [place] (Transfers) minimum assist (75% patient effort); moderate assist (50% patient effort); 2 person assist  -     Row Name 01/11/22 0855          Sit-Stand Transfer    Assistive Device (Sit-Stand Transfers) walker, front-wheeled  -     Row Name 01/11/22 0855          Stand-Sit Transfer    Assistive Device (Stand-Sit Transfers) walker, front-wheeled  -     Row Name 01/11/22 0855          Hip (Therapeutic Exercise)    Hip AROM (Therapeutic Exercise) bilateral; flexion; sitting; 5 repetitions  -     Row Name 01/11/22 0855          Knee (Therapeutic Exercise)    Knee AROM (Therapeutic Exercise) bilateral; LAQ (long arc quad); heel slides; sitting; 5 repetitions  attempted to repeat LE exercise after standing, however unable due to fatigue  -     Row Name             Wound Right hand Blisters    Wound - Properties Group Present on Hospital Admission: N  -LC Side: Right  -LC Location: hand  -LC, includes hand and wrist  Primary Wound Type: Blisters  -LC     Retired Wound - Properties Group Present on Hospital Admission: N  -LC Side: Right  -LC Location: hand  -LC, includes hand and wrist  Primary Wound Type: Blisters  -LC     Row Name 01/11/22 0855          Plan of Care Review    Plan  of Care Reviewed With patient  -     Outcome Summary PT:  Patient demonstrated significant improvement today, requiring mod A x 2 for bed mobility.  He performed sit to stand from elevated edge of bed with walker and min A x 2, then min/mod A x2 for additional 2 reps. Able to stand max time of 10 seconds, forward flexed at hips.  Patient fatigues quickly but is motivated and demonstrates daily improvements.  Continue to recommend acute rehab at discharge to increase strength and mobility prior to return home.  -     Row Name 01/11/22 0855          Positioning and Restraints    Pre-Treatment Position in bed  -     Post Treatment Position bed  -     In Bed supine; notified nsg; call light within reach; encouraged to call for assist  -           User Key  (r) = Recorded By, (t) = Taken By, (c) = Cosigned By    Initials Name Provider Type     Alba Albright, PT Physical Therapist    Chitra Faith, RN, CWON Registered Nurse                Physical Therapy Education                 Title: PT OT SLP Therapies (Done)     Topic: Physical Therapy (Done)     Point: Mobility training (Done)     Learning Progress Summary           Patient Acceptance, E,TB, VU by  at 1/11/2022 1005    Acceptance, E,TB, VU by  at 1/10/2022 1139    Acceptance, E,TB, VU by  at 1/9/2022 0944    Acceptance, E,TB, VU by  at 1/8/2022 1045    Acceptance, E,TB, VU by  at 1/7/2022 1140    Acceptance, E,TB, VU by  at 1/6/2022 1036    Acceptance, E,TB, VU by  at 1/5/2022 1018    Acceptance, E,TB, VU by  at 1/4/2022 1023    Acceptance, E,TB, VU,NR by  at 1/3/2022 1216                               User Key     Initials Effective Dates Name Provider Type Discipline     06/16/21 -  Alba Albright, PT Physical Therapist PT     06/16/21 -  Rosy Harmon, PT Physical Therapist PT              PT Recommendation and Plan  Anticipated Discharge Disposition (PT): inpatient rehabilitation facility, skilled nursing  facility  Planned Therapy Interventions (PT): bed mobility training, gait training, home exercise program, patient/family education, strengthening, transfer training  Therapy Frequency (PT): daily  Plan of Care Reviewed With: patient  Outcome Summary: PT:  Patient demonstrated significant improvement today, requiring mod A x 2 for bed mobility.  He performed sit to stand from elevated edge of bed with walker and min A x 2, then min/mod A x2 for additional 2 reps. Able to stand max time of 10 seconds, forward flexed at hips.  Patient fatigues quickly but is motivated and demonstrates daily improvements.  Continue to recommend acute rehab at discharge to increase strength and mobility prior to return home.   Outcome Measures     Row Name 01/11/22 1000 01/10/22 1021 01/10/22 1020       How much help from another person do you currently need...    Turning from your back to your side while in flat bed without using bedrails? 2  -LH -- 1  -JW    Moving from lying on back to sitting on the side of a flat bed without bedrails? 2  -LH -- 1  -JW    Moving to and from a bed to a chair (including a wheelchair)? 1  -LH -- 1  -JW    Standing up from a chair using your arms (e.g., wheelchair, bedside chair)? 2  -LH -- 1  -JW    Climbing 3-5 steps with a railing? 1  -LH -- 1  -JW    To walk in hospital room? 1  -LH -- 1  -JW    AM-PAC 6 Clicks Score (PT) 9  -LH -- 6  -JW       How much help from another is currently needed...    Putting on and taking off regular lower body clothing? -- 2  -EN --    Bathing (including washing, rinsing, and drying) -- 2  -EN --    Toileting (which includes using toilet bed pan or urinal) -- 1  -EN --    Putting on and taking off regular upper body clothing -- 2  -EN --    Taking care of personal grooming (such as brushing teeth) -- 3  -EN --    Eating meals -- 3  -EN --    AM-PAC 6 Clicks Score (OT) -- 13  -EN --       Functional Assessment    Outcome Measure Options AM-PAC 6 Clicks Basic Mobility  (PT)  - -- AM-PAC 6 Clicks Basic Mobility (PT)  -MONTEZ    Row Name 01/09/22 0900             How much help from another person do you currently need...    Turning from your back to your side while in flat bed without using bedrails? 1  -LH      Moving from lying on back to sitting on the side of a flat bed without bedrails? 1  -LH      Moving to and from a bed to a chair (including a wheelchair)? 1  -LH      Standing up from a chair using your arms (e.g., wheelchair, bedside chair)? 1  -LH      Climbing 3-5 steps with a railing? 1  -LH      To walk in hospital room? 1  -      AM-PAC 6 Clicks Score (PT) 6  -              Functional Assessment    Outcome Measure Options AM-PAC 6 Clicks Basic Mobility (PT)  -            User Key  (r) = Recorded By, (t) = Taken By, (c) = Cosigned By    Initials Name Provider Type     Alba Albright, PT Physical Therapist    Isi Wiggins, OTR Occupational Therapist    Rosy Arevalo, JASMIN Physical Therapist                 Time Calculation:    PT Charges     Row Name 01/11/22 1006             Time Calculation    Start Time 0855  -      Stop Time 0921  -      Time Calculation (min) 26 min  -      PT Received On 01/11/22  -      PT - Next Appointment 01/12/22  -              Timed Charges    37301 - PT Therapeutic Exercise Minutes 26  -              Total Minutes    Timed Charges Total Minutes 26  -       Total Minutes 26  -            User Key  (r) = Recorded By, (t) = Taken By, (c) = Cosigned By    Initials Name Provider Type     Alba Albright, PT Physical Therapist              Therapy Charges for Today     Code Description Service Date Service Provider Modifiers Qty    91516074694 HC PT THER PROC EA 15 MIN 1/11/2022 Alba Albright, PT GP 2          PT G-Codes  Outcome Measure Options: AM-PAC 6 Clicks Basic Mobility (PT)  AM-PAC 6 Clicks Score (PT): 9  AM-PAC 6 Clicks Score (OT): 13    Alba Albright PT  1/11/2022

## 2022-01-11 NOTE — PROGRESS NOTES
LOS: 26 days   Patient Care Team:  Farhan Jean Jr., MD as PCP - General (Family Medicine)        Subjective     Interval History:     Patient Complaints:   Patient Denies:  weak       Review of Systems:    weak    Objective     Vital Signs  Temp:  [95.4 °F (35.2 °C)-98 °F (36.7 °C)] 98 °F (36.7 °C)  Heart Rate:  [77-88] 88  Resp:  [18-20] 20  BP: (103-123)/(67-83) 109/75    Physical Exam:     General Appearance:    Off vent   Head:     Eyes:            Lids and lashes normal, conjunctivae and sclerae normal, no   icterus, no pallor, corneas clear, PERRLA   Ears:    Ears appear intact with no abnormalities noted   Throat:   No oral lesions, no thrush, oral mucosa moist   Neck:   No adenopathy, supple, trachea midline, no thyromegaly, no   carotid bruit, no JVD   Back:     No kyphosis present, no scoliosis present, no skin lesions,      erythema or scars, no tenderness to percussion or                   palpation,   range of motion normal   Lungs:     Clear to auscultation,respirations regular, even and                  unlabored    Heart:    Regular rhythm and normal rate, normal S1 and S2, no            murmur, no gallop, no rub, no click   Chest Wall:    No abnormalities observed   Abdomen:     Normal bowel sounds, no masses, no organomegaly, soft        non-tender, non-distended, no guarding, no rebound                tenderness   Rectal:     Deferred   Extremities:   Moves all extremities well, no edema, no cyanosis, no             redness   Pulses:   Pulses palpable and equal bilaterally   Skin:   No bleeding, bruising or rash   Lymph nodes:   No palpable adenopathy   Neurologic:   Cranial nerves 2 - 12 grossly intact, sensation intact, DTR       present and equal bilaterally          Results Review:      Lab Results (last 72 hours)     Procedure Component Value Units Date/Time    POC Glucose Once [[id number]]  (Abnormal) Collected: 01/02/22 1654    Specimen: Blood Updated: 01/02/22 1701     Glucose  250 mg/dL      Comment: Meter: [id number] : [id number] Issa Cleary RN Float       C-reactive Protein [[id number]]  (Abnormal) Collected: 01/02/22 0408    Specimen: Blood Updated: 01/02/22 1250     C-Reactive Protein 5.09 mg/dL     POC Glucose Once [[id number]]  (Abnormal) Collected: 01/02/22 1110    Specimen: Blood Updated: 01/02/22 1117     Glucose 170 mg/dL      Comment: Meter: [id number] : [id number] Issa Cleary RN Float       Ferritin [[id number]]  (Abnormal) Collected: 01/02/22 0408    Specimen: Blood Updated: 01/02/22 0515     Ferritin 1,581.00 ng/mL     Narrative:      Results may be falsely decreased if patient taking Biotin.      Comprehensive Metabolic Panel [[id number]]  (Abnormal) Collected: 01/02/22 0408    Specimen: Blood Updated: 01/02/22 0503     Glucose 127 mg/dL      BUN 17 mg/dL      Creatinine 0.54 mg/dL      Sodium 139 mmol/L      Potassium 3.9 mmol/L      Chloride 100 mmol/L      CO2 30.8 mmol/L      Calcium 8.8 mg/dL      Total Protein 5.9 g/dL      Albumin 2.40 g/dL      ALT (SGPT) 51 U/L      AST (SGOT) 37 U/L      Alkaline Phosphatase 126 U/L      Total Bilirubin 0.5 mg/dL      eGFR Non African Amer >150 mL/min/1.73      Globulin 3.5 gm/dL      A/G Ratio 0.7 g/dL      BUN/Creatinine Ratio 31.5     Anion Gap 8.2 mmol/L     Narrative:      GFR Normal >60  Chronic Kidney Disease <60  Kidney Failure <15      Lactate Dehydrogenase [[id number]]  (Abnormal) Collected: 01/02/22 0408    Specimen: Blood Updated: 01/02/22 0503      U/L     Procalcitonin [[id number]]  (Normal) Collected: 01/02/22 0408    Specimen: Blood Updated: 01/02/22 0459     Procalcitonin 0.11 ng/mL     D-dimer, Quantitative [[id number]]  (Abnormal) Collected: 01/02/22 0408    Specimen: Blood Updated: 01/02/22 0449     D-Dimer, Quantitative 2.08 MCGFEU/mL     Narrative:      Can be elevated in, but is not diagnostic for deep vein thrombosis (DVT) or pulmonary embolis (PE).  It is also elevated in other medical  conditions.  Clinical correlation is required.  The negative cut-off value for the D-Dimer is 0.50 mcg FEU/mL for DVT and PE.      CBC (No Diff) [[id number]]  (Abnormal) Collected: 01/02/22 0408    Specimen: Blood Updated: 01/02/22 0430     WBC 13.56 10*3/mm3      RBC 3.27 10*6/mm3      Hemoglobin 10.1 g/dL      Hematocrit 31.8 %      MCV 97.2 fL      MCH 30.9 pg      MCHC 31.8 g/dL      RDW 14.5 %      RDW-SD 49.4 fl      MPV 10.7 fL      Platelets 323 10*3/mm3     POC Glucose Once [[id number]]  (Abnormal) Collected: 01/02/22 0023    Specimen: Blood Updated: 01/02/22 0029     Glucose 133 mg/dL      Comment: Meter: [id number] : [id number] Sindhu Seay RN       POC Glucose Once [[id number]]  (Abnormal) Collected: 01/01/22 2144    Specimen: Blood Updated: 01/01/22 2150     Glucose 141 mg/dL      Comment: Meter: [id number] : [id number] Sindhu Seay RN       POC Glucose Once [[id number]]  (Abnormal) Collected: 01/01/22 1737    Specimen: Blood Updated: 01/01/22 1743     Glucose 148 mg/dL      Comment: Meter: [id number] : [id number] Norris Mari RN       C-reactive Protein [[id number]]  (Abnormal) Collected: 01/01/22 0435    Specimen: Blood Updated: 01/01/22 1248     C-Reactive Protein 5.64 mg/dL     POC Glucose Once [[id number]]  (Normal) Collected: 01/01/22 1147    Specimen: Blood Updated: 01/01/22 1155     Glucose 94 mg/dL      Comment: Meter: [id number] : [id number] Norris Mari RN       Respiratory Culture - Sputum, ET Suction [[id number]]  (Abnormal)  (Susceptibility) Collected: 12/29/21 0804    Specimen: Sputum from ET Suction Updated: 01/01/22 0957     Respiratory Culture Heavy growth (4+) Staphylococcus aureus      Heavy growth (4+) Stenotrophomonas maltophilia      Light growth (2+) Citrobacter freundii      No Normal Respiratory Alida     Gram Stain Moderate (3+) WBCs seen      Rare (1+) Epithelial cells seen      Moderate (3+) Gram positive cocci in pairs, chains and clusters    Susceptibility       Staphylococcus aureus     JANUSZ     Clindamycin Susceptible     Inducible Clindamycin Resistance Negative     Oxacillin Susceptible     Tetracycline Susceptible     Trimethoprim + Sulfamethoxazole Susceptible     Vancomycin Susceptible                  Linear View               Susceptibility      Stenotrophomonas maltophilia     JANUSZ     Levofloxacin Susceptible     Trimethoprim + Sulfamethoxazole Susceptible                  Linear View               Susceptibility      Citrobacter freundii     JANUSZ     Cefepime Susceptible     Ceftazidime Resistant     Ceftriaxone Resistant     Gentamicin Susceptible     Levofloxacin Susceptible     Piperacillin + Tazobactam Intermediate     Tetracycline Susceptible     Trimethoprim + Sulfamethoxazole Susceptible                  Linear View               Susceptibility Comments     Staphylococcus aureus    This isolate does not demonstrate inducible clindamycin resistance in vitro.    This isolate does not demonstrate inducible clindamycin resistance in vitro.      Citrobacter freundii    Cefazolin sensitivity will not be reported for Enterobacteriaceae in non-urine isolates. If cefazolin is preferred, please call the microbiology lab to request an E-test.  With the exception of urinary-sourced infections, aminoglycosides should not be used as monotherapy.             Blood Gas, Arterial - [[id number]]  (Abnormal) Collected: 01/01/22 0942    Specimen: Arterial Blood Updated: 01/01/22 0942     Site Left Radial     Triston's Test Positive     pH, Arterial 7.513 pH units      Comment: 83 Value above reference range        pCO2, Arterial 43.4 mm Hg      pO2, Arterial 64.1 mm Hg      Comment: 84 Value below reference range        HCO3, Arterial 34.9 mmol/L      Comment: 83 Value above reference range        Base Excess, Arterial 10.9 mmol/L      Comment: 83 Value above reference range        O2 Saturation, Arterial 93.7 %      Comment: 84 Value below reference range        Hemoglobin, Blood  Gas 9.4 g/dL      Comment: 84 Value below reference range        Temperature 37.0 C      Barometric Pressure for Blood Gas 731 mmHg      Modality Ventilator     FIO2 30 %      Ventilator Mode CPAP     Set Tidal Volume 574     PEEP 5.0     PSV 10.0 cmH2O      Collected by [id number]     Comment: Meter: [id number]     :  [id number]        pCO2, Temperature Corrected 43.4 mm Hg      pH, Temp Corrected 7.513 pH Units      pO2, Temperature Corrected 64.1 mm Hg     POC Glucose Once [[id number]]  (Abnormal) Collected: 01/01/22 0619    Specimen: Blood Updated: 01/01/22 0626     Glucose 172 mg/dL      Comment: Meter: [id number] : [id number] Sindhu Seay RN       Ferritin [[id number]]  (Abnormal) Collected: 01/01/22 0435    Specimen: Blood Updated: 01/01/22 0541     Ferritin 1,386.00 ng/mL     Narrative:      Results may be falsely decreased if patient taking Biotin.      Comprehensive Metabolic Panel [[id number]]  (Abnormal) Collected: 01/01/22 0435    Specimen: Blood Updated: 01/01/22 0530     Glucose 181 mg/dL      BUN 15 mg/dL      Creatinine 0.54 mg/dL      Sodium 139 mmol/L      Potassium 3.8 mmol/L      Chloride 104 mmol/L      CO2 29.8 mmol/L      Calcium 8.5 mg/dL      Total Protein 5.2 g/dL      Albumin 2.00 g/dL      ALT (SGPT) 31 U/L      AST (SGOT) 22 U/L      Alkaline Phosphatase 97 U/L      Total Bilirubin 0.4 mg/dL      eGFR Non African Amer >150 mL/min/1.73      Globulin 3.2 gm/dL      A/G Ratio 0.6 g/dL      BUN/Creatinine Ratio 27.8     Anion Gap 5.2 mmol/L     Narrative:      GFR Normal >60  Chronic Kidney Disease <60  Kidney Failure <15      Procalcitonin [[id number]]  (Normal) Collected: 01/01/22 0435    Specimen: Blood Updated: 01/01/22 0512     Procalcitonin 0.12 ng/mL     CBC (No Diff) [[id number]]  (Abnormal) Collected: 01/01/22 0435    Specimen: Blood Updated: 01/01/22 0448     WBC 10.72 10*3/mm3      RBC 2.82 10*6/mm3      Hemoglobin 8.9 g/dL      Hematocrit 27.8 %      MCV 98.6 fL      MCH  31.6 pg      MCHC 32.0 g/dL      RDW 14.2 %      RDW-SD 49.0 fl      MPV 10.8 fL      Platelets 246 10*3/mm3     POC Glucose Once [[id number]]  (Abnormal) Collected: 12/31/21 2334    Specimen: Blood Updated: 12/31/21 2340     Glucose 196 mg/dL      Comment: Meter: [id number] : [id number] Sindhu Seay RN       POC Glucose Once [[id number]]  (Abnormal) Collected: 12/31/21 2023    Specimen: Blood Updated: 12/31/21 2030     Glucose 226 mg/dL      Comment: Meter: [id number] : [id number] Sindhu Seay RN       POC Glucose Once [[id number]]  (Abnormal) Collected: 12/31/21 1710    Specimen: Blood Updated: 12/31/21 1716     Glucose 202 mg/dL      Comment: Meter: [id number] : MICHAEL Roland RN       C-reactive Protein [[id number]]  (Abnormal) Collected: 12/31/21 0512    Specimen: Blood Updated: 12/31/21 1340     C-Reactive Protein 11.00 mg/dL     POC Glucose Once [[id number]]  (Abnormal) Collected: 12/31/21 1202    Specimen: Blood Updated: 12/31/21 1208     Glucose 194 mg/dL      Comment: Meter: [id number] : [id number] Dejon Johnson RN       Ferritin [[id number]]  (Abnormal) Collected: 12/31/21 0512    Specimen: Blood Updated: 12/31/21 0640     Ferritin 1,551.00 ng/mL     Narrative:      Results may be falsely decreased if patient taking Biotin.      Lactate Dehydrogenase [[id number]]  (Abnormal) Collected: 12/31/21 0512    Specimen: Blood Updated: 12/31/21 0634      U/L     Procalcitonin [[id number]]  (Normal) Collected: 12/31/21 0512    Specimen: Blood Updated: 12/31/21 0623     Procalcitonin 0.17 ng/mL     POC Glucose Once [[id number]]  (Abnormal) Collected: 12/31/21 0609    Specimen: Blood Updated: 12/31/21 0616     Glucose 183 mg/dL      Comment: Meter: [id number] : [id number] Sindhu Seay RN       D-dimer, Quantitative [[id number]]  (Abnormal) Collected: 12/31/21 0512    Specimen: Blood Updated: 12/31/21 0609     D-Dimer, Quantitative 1.43 MCGFEU/mL     Narrative:      Can be elevated in,  but is not diagnostic for deep vein thrombosis (DVT) or pulmonary embolis (PE).  It is also elevated in other medical conditions.  Clinical correlation is required.  The negative cut-off value for the D-Dimer is 0.50 mcg FEU/mL for DVT and PE.      Blood Gas, Arterial - [[id number]]  (Abnormal) Collected: 12/31/21 0520    Specimen: Arterial Blood Updated: 12/31/21 0526     Site Left Radial     Triston's Test Positive     pH, Arterial 7.480 pH units      Comment: 83 Value above reference range        pCO2, Arterial 44.1 mm Hg      pO2, Arterial 72.2 mm Hg      Comment: 84 Value below reference range        HCO3, Arterial 32.8 mmol/L      Comment: 83 Value above reference range        Base Excess, Arterial 8.4 mmol/L      Comment: 83 Value above reference range        O2 Saturation, Arterial 96.4 %      Hemoglobin, Blood Gas 8.5 g/dL      Comment: 84 Value below reference range        Temperature 37.0 C      Barometric Pressure for Blood Gas 734 mmHg      Modality Ventilator     FIO2 35 %      Ventilator Mode AC     Set Mech Resp Rate 16.0     PEEP 5.0     PIP 14 cmH2O      Comment: Meter: [id number]     :  [id number]        Collected by [id number]     pCO2, Temperature Corrected 44.1 mm Hg      pH, Temp Corrected 7.480 pH Units      pO2, Temperature Corrected 72.2 mm Hg     POC Glucose Once [[id number]]  (Abnormal) Collected: 12/31/21 0007    Specimen: Blood Updated: 12/31/21 0013     Glucose 178 mg/dL      Comment: Meter: [id number] : [id number] Sindhu Seay RN       POC Glucose Once [[id number]]  (Abnormal) Collected: 12/30/21 2104    Specimen: Blood Updated: 12/30/21 2110     Glucose 184 mg/dL      Comment: Meter: [id number] : [id number] Seay RN             Imaging Results (Last 72 Hours)     ** No results found for the last 72 hours. **            Medication Review:     Hospital Medications (active)       Dose Frequency Start End    acetaminophen (TYLENOL) tablet 650 mg 650 mg Every 6 Hours PRN  "12/22/2021     Admin Instructions: Do not exceed 4 grams of acetaminophen in a 24 hr period. Max dose of 2gm for AST/ALT greater than 120 units/L      If given for pain, use the following pain scale:   Mild Pain = Pain Score of 1-3, CPOT 1-2  Moderate Pain = Pain Score of 4-6, CPOT 3-4  Severe Pain = Pain Score of 7-10, CPOT 5-8    Route: Oral    apixaban (ELIQUIS) tablet 10 mg 10 mg Every 12 Hours Scheduled 12/29/2021 1/5/2022    Admin Instructions: Tablet may be crushed and suspended in 60 mL of water or D5W and immediately delivered via NG tube.  Avoid grapefruit juice.    Route: Nasogastric    Linked Group 1: \"Followed by\" Linked Group Details        apixaban (ELIQUIS) tablet 5 mg 5 mg Every 12 Hours Scheduled 1/5/2022     Admin Instructions: Tablet may be crushed and suspended in 60 mL of water or D5W and immediately delivered via NG tube.  Avoid grapefruit juice.    Route: Oral    Linked Group 1: \"Followed by\" Linked Group Details        bisacodyl (DULCOLAX) suppository 10 mg 10 mg Daily 12/27/2021     Route: Rectal    chlorhexidine (PERIDEX) 0.12 % solution 15 mL 15 mL Every 12 Hours Scheduled 12/27/2021     Admin Instructions:  Do not swallow.    Route: Mouth/Throat    dexamethasone (DECADRON) injection 6 mg 6 mg Daily With Breakfast 12/25/2021     Admin Instructions: Switch to IV if unable to take medication by mouth  For IV administration.  May be pushed over a minimum of 1 minute.    Route: Intravenous    Linked Group 2: \"Or\" Linked Group Details        dexamethasone (DECADRON) tablet 6 mg 6 mg Daily With Breakfast 12/25/2021     Admin Instructions: Switch to IV if unable to take medication by mouth  Take with food.    Route: Oral    Linked Group 2: \"Or\" Linked Group Details        dexmedetomidine (PRECEDEX) 400 mcg in 100 mL NS infusion 0.2-1.5 mcg/kg/hr × 115 kg Titrated 12/17/2021     Admin Instructions: Begin infusion at 0.2 mcg/kg/hr and titrate by 0.1 mcg/kg/hr every 15 minutes to maintain RASS " goal.  Maximum rate 1.5 mcg/kg/hr.  Notify MD if not at RASS goal and requiring 1.5 mcg/kg/hr or heart rate is less than 50 beats per minute or MAP is less than 60 mmHg.    Route: Intravenous    dextromethorphan polistirex ER (DELSYM) 30 MG/5ML oral suspension 60 mg 60 mg Every 12 Hours PRN 12/16/2021     Route: Oral    dextrose (D50W) (25 g/50 mL) IV injection 25 g 25 g Every 15 Minutes PRN 12/19/2021     Admin Instructions: Blood sugar less than 70; patient has IV access - Unresponsive, NPO or Unable To Safely Swallow    Route: Intravenous    dextrose (GLUTOSE) oral gel 15 g 15 g Every 15 Minutes PRN 12/19/2021     Admin Instructions: BS<70, Patient Alert, Is not NPO, Can safely swallow.    Route: Oral    dilTIAZem (CARDIZEM) tablet 60 mg 60 mg Every 8 Hours Scheduled 12/24/2021     Admin Instructions: HOLD FOR HR <60 and/or SBP <,= 90mmHg  Caution: Look alike/sound alike drug alert.  Take on empty stomach 1 hr before or 2 hrs after meals. Avoid grapefruit juice. Maximum simvastatin dose 10 mg.    Route: Nasogastric    famotidine (PEPCID) injection 20 mg 20 mg Daily 12/27/2021     Admin Instructions: Dilute to 10 mL total volume and give IV push over 2 minutes.    Route: Intravenous    fentaNYL citrate (PF) (SUBLIMAZE) injection 25 mcg 25 mcg Every 1 Hour PRN 12/27/2021 1/3/2022    Admin Instructions: Use filter needle to withdraw dose from ampule.  If given for pain, use the following pain scale:  Mild Pain = Pain Score of 1-3, CPOT 1-2  Moderate Pain = Pain Score of 4-6, CPOT 3-4  Severe Pain = Pain Score of 7-10, CPOT 5-8    Route: Intravenous    glucagon (GLUCAGEN) injection 1 mg 1 mg Every 15 Minutes PRN 12/19/2021     Admin Instructions: Blood Glucose Less Than 70 - Patient Without IV Access - Unresponsive, NPO or Unable To Safely Swallow    Route: Subcutaneous    insulin aspart (novoLOG) injection 0-24 Units 0-24 Units Every 6 Hours 12/21/2021     Admin Instructions: Correction - High Dose.  Greater than 80  units/day total insulin dose or, on steroids, insulin resistant, infection.    Blood glucose 150-199 mg/dL - 4 units  Blood glucose 200-249 mg/dL - 8 units  Blood glucose 250-299 mg/dL - 12 units  Blood glucose 300-349 mg/dL - 16 units  Blood glucose 350-400 mg/dL - 20 units  Blood glucose greater than 400 mg/dL - 24 units and call provider      Route: Subcutaneous    insulin detemir (LEVEMIR) injection 23 Units 23 Units Every 12 Hours Scheduled 12/31/2021     Admin Instructions:     Route: Subcutaneous    ipratropium-albuterol (DUO-NEB) nebulizer solution 3 mL 3 mL Every 6 Hours PRN 12/16/2021     Route: Nebulization    levoFLOXacin (LEVAQUIN) 750 mg/150 mL D5W (premix) (LEVAQUIN) 750 mg 750 mg Every 24 Hours 1/1/2022 1/8/2022    Admin Instructions: Caution: Look alike/sound alike drug alert. Protect from light. Do NOT refrigerate.    Route: Intravenous    polyethylene glycol (MIRALAX) packet 17 g 17 g Daily 12/26/2021     Admin Instructions: Use 4-8 ounces of water, tea, or juice for each 17 gram dose.    Route: Oral    potassium chloride 10 mEq in 100 mL IVPB 10 mEq Every 1 Hour PRN 12/17/2021     Admin Instructions: Peripheral IV  Potassium 3.1 or Less Give KCl 10 mEq/100 mL NS IV q1h x6 Doses  Potassium 3.2 - 3.6 Give KCl 10 mEq/100 mL NS q1h x4 Doses    Check Potassium 4 Hours After Last Dose Given  Check Magnesium if Potassium Remains Low After Replacement  DO NOT GIVE if CrCl is Less Than 30 mL/minute or Urine Output Less Than 30 mL/hr.     Rates Greater Than 10 mEq/hr Require ECG Monitoring.  OUTPATIENT/NON-MONITORED UNITS: Potassium Chloride standard bolus infusion rate is a maximum of 10 mEq/hr on unmonitored patients    MONITORED UNITS: Potassium Chloride standard bolus infusion rate is a maximum of 20 mEq/hr on ECG monitored patients ONLY      Route: Intravenous    potassium phosphate 15 mmol in sodium chloride 0.9 % 100 mL infusion 15 mmol As Needed 12/17/2021     Admin Instructions: Recheck Phosphorus  "level 6 hours after replacement complete.  Refrigerate.      Doses listed as mmol of phosphate.   4.4 mEq of Potassium = 3 mmol of Phosphate    Route: Intravenous    Linked Group 3: \"Or\" Linked Group Details        potassium phosphate 30 mmol in sodium chloride 0.9 % 250 mL infusion 30 mmol As Needed 12/17/2021     Admin Instructions: Recheck Phosphorus level 6 hours after replacement complete.  Refrigerate.      Doses listed as mmol of phosphate.   4.4 mEq of Potassium = 3 mmol of Phosphate    Route: Intravenous    Linked Group 3: \"Or\" Linked Group Details        potassium phosphate 45 mmol in sodium chloride 0.9 % 500 mL infusion 45 mmol As Needed 12/17/2021     Admin Instructions: Recheck Phosphorus level 6 hours after replacement complete.  Refrigerate.      Doses listed as mmol of phosphate.   4.4 mEq of Potassium = 3 mmol of Phosphate    Route: Intravenous    Linked Group 3: \"Or\" Linked Group Details        propofol (DIPRIVAN) infusion 10 mg/mL 100 mL 5-50 mcg/kg/min × 115 kg Titrated 12/18/2021     Admin Instructions: Do not administer through the same IV catheter with blood or plasma.  Tubing and any unused portions of propofol vials should be discarded after 12 hours.  Begin infusion at 5 mcg/kg/min and titrate by by 5 mcg/kg/min every 5 minutes to maintain RASS goal. Maximum rate 50 mcg/kg/min.  Notify MD if not at goal and requiring more than 50 mcg/kg/min. Infuse into dedicated line, change vial and tube every 12 hours. Patient must be intubated.    Route: Intravenous    sodium chloride 0.9 % flush 10 mL 10 mL As Needed 12/16/2021     Route: Intravenous    Linked Group 4: \"And\" Linked Group Details        sodium chloride 0.9 % flush 10 mL 10 mL As Needed 12/21/2021     Route: Intravenous    sodium chloride 0.9 % flush 10 mL 10 mL Every 12 Hours Scheduled 12/29/2021     Admin Instructions: Lumen #1    Route: Intravenous    sodium chloride 0.9 % flush 10 mL 10 mL Every 12 Hours Scheduled 12/29/2021     " "Admin Instructions: Lumen #2    Route: Intravenous    sodium chloride 0.9 % flush 10 mL 10 mL Every 12 Hours Scheduled 12/29/2021     Admin Instructions: Lumen #3    Route: Intravenous    sodium chloride 0.9 % flush 10 mL 10 mL As Needed 12/29/2021     Route: Intravenous    sodium chloride 0.9 % flush 20 mL 20 mL As Needed 12/21/2021     Route: Intravenous    sodium chloride 0.9 % flush 20 mL 20 mL As Needed 12/29/2021     Route: Intravenous    sodium phosphates 15 mmol in sodium chloride 0.9 % 250 mL IVPB 15 mmol As Needed 12/17/2021     Admin Instructions: Recheck Phosphorus level 6 hours after replacement complete.    Route: Intravenous    Linked Group 3: \"Or\" Linked Group Details        sodium phosphates 30 mmol in sodium chloride 0.9 % 250 mL IVPB 30 mmol As Needed 12/17/2021     Admin Instructions: Recheck Phosphorus level 6 hours after replacement complete.    Route: Intravenous    Linked Group 3: \"Or\" Linked Group Details        sodium phosphates 45 mmol in sodium chloride 0.9 % 500 mL IVPB 45 mmol As Needed 12/17/2021     Admin Instructions: Recheck Phosphorus level 6 hours after replacement complete.    Route: Intravenous    Linked Group 3: \"Or\" Linked Group Details            apixaban, 5 mg, Oral, Q12H  Aquaphor Advanced Therapy, 1 application, Topical, Q12H  bisacodyl, 10 mg, Rectal, Daily  chlorhexidine, 15 mL, Mouth/Throat, Q12H  dexamethasone, 3 mg, Oral, Daily With Breakfast  dilTIAZem CD, 240 mg, Oral, Q24H  [START ON 1/12/2022] famotidine, 20 mg, Oral, Daily  glipizide, 5 mg, Oral, BID AC  insulin aspart, 0-24 Units, Subcutaneous, TID With Meals  linagliptin, 5 mg, Oral, Daily  metFORMIN, 500 mg, Oral, BID With Meals  polyethylene glycol, 17 g, Oral, Daily  sodium chloride, 10 mL, Intravenous, Q12H  sodium chloride, 10 mL, Intravenous, Q12H  sodium chloride, 10 mL, Intravenous, Q12H        Assessment/Plan         Acute hypoxemic respiratory failure due to COVID-19 (HCC)    Acute respiratory failure " with hypoxia (HCC)    1. Acute hypoxic respiratory failure s/p mechanical ventilation 12/18   2. Ac COVID-19 pneumonia  3. ARDS  4. MSSA / Klebsiella pneumonia  5. Pneumomediastinum  6. Shock  7. Cytokine release syndrome s/p remdesivir  8. Extensive left leg DVT on AC  9. A. fib RVR  10. Uncontrolled diabetes  11. Obesity  12. Mild hepatitis; Covid related  13. Fever        C&S  noted     Plan :        Off AB  Observe     supp care  Will follow  Thank you              William Osborne MD  01/11/22  15:57 EST

## 2022-01-11 NOTE — PLAN OF CARE
Goal Outcome Evaluation:  Plan of Care Reviewed With: patient        Progress: improving  Outcome Summary: no c/o pain - repositioned q 2hr - pt uses suction at bedside - large left abd/flank bruise - honey thickened liquids - meds with applesauce - [facility]

## 2022-01-11 NOTE — THERAPY TREATMENT NOTE
"Acute Care - Occupational Therapy Treatment Note  ARELIS Flaherty     Patient Name: Sergio Daniel  : 1955  MRN: [id number]  Today's Date: 2022             Admit Date: 2021       ICD-10-CM ICD-9-CM   1. Acute respiratory failure with hypoxia (HCC)  J96.01 518.81   2. Pneumonia due to COVID-19 virus  U07.1 480.8    J12.82 079.89   3. Paroxysmal atrial fibrillation (HCC)  I48.0 427.31   4. Pharyngeal dysphagia  R13.13 787.23   5. Oropharyngeal dysphagia  R13.12 787.22     Patient Active Problem List   Diagnosis   • Acute respiratory failure with hypoxia (HCC)   • Acute hypoxemic respiratory failure due to COVID-19 (HCC)     Past Medical History:   Diagnosis Date   • Diabetes mellitus (HCC)      History reviewed. No pertinent surgical history.      OT ASSESSMENT FLOWSHEET (last 12 hours)     OT Evaluation and Treatment     Row Name 22 0853                   OT Time and Intention    Subjective Information complains of; fatigue; weakness  -EN        Document Type therapy note (daily note)  -EN        Mode of Treatment physical therapy  -EN        Patient Effort good  -EN        Symptoms Noted During/After Treatment fatigue  -EN                  General Information    General Observations of Patient Patiet reclined in bed, agreed to therapy  -EN        Risks Reviewed patient:; LOB  -EN        Benefits Reviewed patient:; improve function; increase independence; increase strength; increase balance  -EN                  Cognition    Personal Safety Interventions gait belt; nonskid shoes/slippers when out of bed  -EN                  Pain Scale: Numbers Pre/Post-Treatment    Pretreatment Pain Rating 0/10 - no pain  -EN        Posttreatment Pain Rating 0/10 - no pain  -EN                  Pain Scale: Word Pre/Post-Treatment    Pre/Posttreatment Pain Comment Reported standing, \"didn't hurt as much today\"  -EN                  Range of Motion Comprehensive    Comment, General Range of Motion Patient actively " flexed R shoulder approximately 120 degrees, L shoulder approximatley 45 degrees. Elbow/wrist/hand WFL  -EN                  Bed Mobility    Supine-Sit [place] (Bed Mobility) verbal cues; moderate assist (50% patient effort); 2 person assist  -EN        Sit-Supine [place] (Bed Mobility) verbal cues; moderate assist (50% patient effort); 2 person assist  -EN        Bed Mobility, Safety Issues decreased use of arms for pushing/pulling; decreased use of legs for bridging/pushing  -EN        Assistive Device (Bed Mobility) bed rails; head of bed elevated  -EN                  Transfer Assessment/Treatment    Comment (Transfers) Pateint performed sit to stand from elevated EOB 3 trials. Patient stood initially with min A X 2 (briefly) than sat. Patient stood second trial with min/mod A X 2 for 8 seconds and third trial stood with min/mod A X 2 and stood for 10 seconds.  -EN                  Transfers    Sit-Stand [place] (Transfers) minimum assist (75% patient effort); moderate assist (50% patient effort); 2 person assist; verbal cues  -EN        Stand-Sit [place] (Transfers) verbal cues; minimum assist (75% patient effort); moderate assist (50% patient effort); 2 person assist  -EN                  Sit-Stand Transfer    Assistive Device (Sit-Stand Transfers) walker, front-wheeled  -EN                  Stand-Sit Transfer    Assistive Device (Stand-Sit Transfers) walker, front-wheeled  -EN                  Motor Skills    Therapeutic Exercise shoulder; elbow/forearm; wrist; hand  -EN                  Shoulder (Therapeutic Exercise)    Shoulder (Therapeutic Exercise) AROM (active range of motion); AAROM (active assistive range of motion)  -EN        Shoulder AROM (Therapeutic Exercise) bilateral; 10 repetitions  -EN        Shoulder AAROM (Therapeutic Exercise) bilateral; other (see comments)  15 repetitions  -EN                  Elbow/Forearm (Therapeutic Exercise)    Elbow/Forearm (Therapeutic  Exercise) AROM (active range of motion)  -EN        Elbow/Forearm AROM (Therapeutic Exercise) bilateral; flexion; extension; 10 repetitions  -EN                  Hand (Therapeutic Exercise)    Hand (Therapeutic Exercise) AROM (active range of motion); strengthening exercise  -EN        Hand AROM/AAROM (Therapeutic Exercise) bilateral; AROM (active range of motion); 10 repetitions  -EN        Hand Strengthening (Therapeutic Exercise) squeeze ball/egg  -EN                  Balance    Comment, Balance sat EOB with SBA  -EN                  Grooming Assessment/Training    [place] Level (Grooming) oral care regimen; independent; set up  -EN                  Wound Right hand Blisters    Wound - Properties Group Present on Hospital Admission: N  -LC Side: Right  -LC Location: hand  -LC, includes hand and wrist  Primary Wound Type: Blisters  -LC        Retired Wound - Properties Group Present on Hospital Admission: N  -LC Side: Right  -LC Location: hand  -LC, includes hand and wrist  Primary Wound Type: Blisters  -LC                  Plan of Care Review    Plan of Care Reviewed With patient  -EN        Outcome Summary OT- Patient demonstrated significant improvement with UE ROM and transfers today. Patient able to activily flex the right shoulder 120 degrees  and L  45 degrees. Patient performed supine to sit with mod A X 2. Patient performed sit to stand from elevated EOB 3 trials. The first trial patient stood with min A X 2, second and third trial stood with min/mod A X 2. Patient stood with walker up to 10 seconds on the third trial. Patient continues to be very motivated to improve. Recommend acute rehab at discharge.  -EN                  Positioning and Restraints    Pre-Treatment Position in bed  -EN        Post Treatment Position bed  -EN        In Bed notified nsg; supine; call light within reach; encouraged to call for assist  -EN                  Progress Summary (OT)    Progress Toward Functional Goals  (OT) progress toward functional goals is good  -EN        Daily Progress Summary (OT) improving  -EN              User Key  (r) = Recorded By, (t) = Taken By, (c) = Cosigned By    Initials Name Effective Dates    Isi Wiggins, OTR 06/16/21 -     Chitra Faith RN, CWON 07/26/21 -                  Occupational Therapy Education                 Title: PT OT SLP Therapies (Done)     Topic: Occupational Therapy (Done)     Point: ADL training (Done)     Description:   Instruct learner(s) on proper safety adaptation and remediation techniques during self care or transfers.   Instruct in proper use of assistive devices.              Learning Progress Summary           Patient Acceptance, E, VU by EN at 1/11/2022 1234    Comment: UE HEP, safety during transfers    Acceptance, E, VU by EN at 1/10/2022 1333    Comment: UE HEP, adl retraining, transfer training    Acceptance, E,TB,D, VU,DU by SD at 1/9/2022 1025    Comment: Education with benefits of activity, BUE HEP and safety with bed mobility/transfers. Pt actively participated in self feeding today.    Acceptance, E, VU by EN at 1/7/2022 1020    Comment: UE HEP, safety during transfers    Acceptance, E, VU by EN at 1/6/2022 1007    Comment: UE HEP    Acceptance, E, VU by EN at 1/5/2022 1115    Comment: UE HEP, benefits of activity    Acceptance, E, VU by EN at 1/5/2022 1026    Comment: Patient educated on UE HEP, benefits of activity.    Acceptance, E, VU by EN at 1/4/2022 0959    Comment: transfer safety, UE HEP    Acceptance, E, VU by EN at 1/3/2022 1352    Comment: Educated on UE HEP                   Point: Home exercise program (Done)     Description:   Instruct learner(s) on appropriate technique for monitoring, assisting and/or progressing therapeutic exercises/activities.              Learning Progress Summary           Patient Acceptance, E, VU by EN at 1/11/2022 1234    Comment: UE HEP, safety during transfers    Acceptance, E, VU by EN at  1/10/2022 1333    Comment: UE HEP, adl retraining, transfer training    Acceptance, E,TB,D, VU,DU by SD at 1/9/2022 1025    Comment: Education with benefits of activity, BUE HEP and safety with bed mobility/transfers. Pt actively participated in self feeding today.    Acceptance, E, VU by EN at 1/7/2022 1020    Comment: UE HEP, safety during transfers    Acceptance, E, VU by EN at 1/6/2022 1007    Comment: UE HEP    Acceptance, E, VU by EN at 1/5/2022 1115    Comment: UE HEP, benefits of activity    Acceptance, E, VU by EN at 1/5/2022 1026    Comment: Patient educated on UE HEP, benefits of activity.    Acceptance, E, VU by EN at 1/4/2022 0959    Comment: transfer safety, UE HEP    Acceptance, E, VU by EN at 1/3/2022 1352    Comment: Educated on UE HEP                               User Key     Initials Effective Dates Name Provider Type Discipline    EN 06/16/21 -  Isi Theodore OTR Occupational Therapist OT    SD 06/16/21 -  Feliciano Chua OTR Occupational Therapist OT                  OT Recommendation and Plan  Planned Therapy Interventions (OT): BADL retraining, functional balance retraining, passive ROM/stretching, ROM/therapeutic exercise, strengthening exercise, transfer/mobility retraining  Therapy Frequency (OT): 5 times/wk  Progress Toward Functional Goals (OT): progress toward functional goals is good  Plan of Care Review  Plan of Care Reviewed With: patient  Progress: improving  Outcome Summary: OT- Patient demonstrated significant improvement with UE ROM and transfers today. Patient able to activily flex the right shoulder 120 degrees  and L  45 degrees. Patient performed supine to sit with mod A X 2. Patient performed sit to stand from elevated EOB 3 trials. The first trial patient stood with min A X 2, second and third trial stood with min/mod A X 2. Patient stood with walker up to 10 seconds on the third trial. Patient continues to be very motivated to improve. Recommend acute rehab at  discharge.  Plan of Care Reviewed With: patient  Outcome Summary: OT- Patient demonstrated significant improvement with UE ROM and transfers today. Patient able to activily flex the right shoulder 120 degrees  and L  45 degrees. Patient performed supine to sit with mod A X 2. Patient performed sit to stand from elevated EOB 3 trials. The first trial patient stood with min A X 2, second and third trial stood with min/mod A X 2. Patient stood with walker up to 10 seconds on the third trial. Patient continues to be very motivated to improve. Recommend acute rehab at discharge.     Outcome Measures     Row Name 01/11/22 1200 01/11/22 1000 01/10/22 1021       How much help from another person do you currently need...    Turning from your back to your side while in flat bed without using bedrails? -- 2  -LH --    Moving from lying on back to sitting on the side of a flat bed without bedrails? -- 2  -LH --    Moving to and from a bed to a chair (including a wheelchair)? -- 1  -LH --    Standing up from a chair using your arms (e.g., wheelchair, bedside chair)? -- 2  -LH --    Climbing 3-5 steps with a railing? -- 1  -LH --    To walk in hospital room? -- 1  -LH --    AM-PAC 6 Clicks Score (PT) -- 9  -LH --       How much help from another is currently needed...    Putting on and taking off regular lower body clothing? 2  -EN -- 2  -EN    Bathing (including washing, rinsing, and drying) 2  -EN -- 2  -EN    Toileting (which includes using toilet bed pan or urinal) 2  -EN -- 1  -EN    Putting on and taking off regular upper body clothing 2  -EN -- 2  -EN    Taking care of personal grooming (such as brushing teeth) 3  -EN -- 3  -EN    Eating meals 3  -EN -- 3  -EN    AM-PAC 6 Clicks Score (OT) 14  -EN -- 13  -EN       Functional Assessment    Outcome Measure Options -- AM-PAC 6 Clicks Basic Mobility (PT)  -LH --    Row Name 01/10/22 1020 01/09/22 0900          How much help from another person do you currently need...     Turning from your back to your side while in flat bed without using bedrails? 1  - 1  -LH     Moving from lying on back to sitting on the side of a flat bed without bedrails? 1  - 1  -LH     Moving to and from a bed to a chair (including a wheelchair)? 1  - 1  -LH     Standing up from a chair using your arms (e.g., wheelchair, bedside chair)? 1  - 1  -LH     Climbing 3-5 steps with a railing? 1  - 1  -LH     To walk in hospital room? 1  - 1  -LH     AM-PAC 6 Clicks Score (PT) 6  - 6  -            Functional Assessment    Outcome Measure Options AM-PAC 6 Clicks Basic Mobility (PT)  - AM-PAC 6 Clicks Basic Mobility (PT)  -           User Key  (r) = Recorded By, (t) = Taken By, (c) = Cosigned By    Initials Name Provider Type     Alba Albright, PT Physical Therapist    EN Isi Theodore OTR Occupational Therapist    JW Rosy Harmon, PT Physical Therapist                Time Calculation:    Time Calculation- OT     Row Name 01/11/22 1238             Time Calculation- OT    OT Start Time 0853  -EN      OT Stop Time 0936  -EN      OT Time Calculation (min) 43 min  -EN              Timed Charges    56358 - OT Self Care/Mgmt Minutes 43  -EN              Total Minutes    Timed Charges Total Minutes 43  -EN       Total Minutes 43  -EN            User Key  (r) = Recorded By, (t) = Taken By, (c) = Cosigned By    Initials Name Provider Type    EN Isi Theodore OTR Occupational Therapist              Therapy Charges for Today     Code Description Service Date Service Provider Modifiers Qty    01755014825 HC OT SELF CARE/MGMT/TRAIN EA 15 MIN 1/10/2022 Isi Theodore OTR GO 3    78241331305 HC OT SELF CARE/MGMT/TRAIN EA 15 MIN 1/11/2022 Isi Theodore OTR GO 3               LINDA Pepe  1/11/2022

## 2022-01-12 LAB
GLUCOSE BLDC GLUCOMTR-MCNC: 199 MG/DL (ref 70–130)
GLUCOSE BLDC GLUCOMTR-MCNC: 203 MG/DL (ref 70–130)

## 2022-01-12 PROCEDURE — 82962 GLUCOSE BLOOD TEST: CPT

## 2022-01-12 PROCEDURE — 99231 SBSQ HOSP IP/OBS SF/LOW 25: CPT | Performed by: INTERNAL MEDICINE

## 2022-01-12 PROCEDURE — 97535 SELF CARE MNGMENT TRAINING: CPT

## 2022-01-12 PROCEDURE — 94799 UNLISTED PULMONARY SVC/PX: CPT

## 2022-01-12 PROCEDURE — 92526 ORAL FUNCTION THERAPY: CPT

## 2022-01-12 PROCEDURE — 94761 N-INVAS EAR/PLS OXIMETRY MLT: CPT

## 2022-01-12 PROCEDURE — 63710000001 INSULIN ASPART PER 5 UNITS: Performed by: INTERNAL MEDICINE

## 2022-01-12 PROCEDURE — 97110 THERAPEUTIC EXERCISES: CPT

## 2022-01-12 RX ADMIN — SODIUM CHLORIDE, PRESERVATIVE FREE 10 ML: 5 INJECTION INTRAVENOUS at 20:34

## 2022-01-12 RX ADMIN — WHITE PETROLATUM 41 % TOPICAL OINTMENT 1 APPLICATION: OINTMENT at 20:33

## 2022-01-12 RX ADMIN — METFORMIN HYDROCHLORIDE 500 MG: 500 TABLET ORAL at 09:13

## 2022-01-12 RX ADMIN — POLYETHYLENE GLYCOL 3350 17 G: 17 POWDER, FOR SOLUTION ORAL at 09:20

## 2022-01-12 RX ADMIN — APIXABAN 5 MG: 2.5 TABLET, FILM COATED ORAL at 09:08

## 2022-01-12 RX ADMIN — WHITE PETROLATUM 41 % TOPICAL OINTMENT 1 APPLICATION: OINTMENT at 09:09

## 2022-01-12 RX ADMIN — GLIPIZIDE 5 MG: 5 TABLET ORAL at 09:14

## 2022-01-12 RX ADMIN — FAMOTIDINE 20 MG: 20 TABLET ORAL at 09:13

## 2022-01-12 RX ADMIN — INSULIN ASPART 4 UNITS: 100 INJECTION, SOLUTION INTRAVENOUS; SUBCUTANEOUS at 12:39

## 2022-01-12 RX ADMIN — SODIUM CHLORIDE, PRESERVATIVE FREE 10 ML: 5 INJECTION INTRAVENOUS at 09:24

## 2022-01-12 RX ADMIN — INSULIN ASPART 4 UNITS: 100 INJECTION, SOLUTION INTRAVENOUS; SUBCUTANEOUS at 09:18

## 2022-01-12 RX ADMIN — SODIUM CHLORIDE, PRESERVATIVE FREE 10 ML: 5 INJECTION INTRAVENOUS at 20:33

## 2022-01-12 RX ADMIN — LINAGLIPTIN 5 MG: 5 TABLET, FILM COATED ORAL at 12:40

## 2022-01-12 RX ADMIN — METFORMIN HYDROCHLORIDE 500 MG: 500 TABLET ORAL at 18:17

## 2022-01-12 RX ADMIN — CHLORHEXIDINE GLUCONATE 0.12% ORAL RINSE 15 ML: 1.2 LIQUID ORAL at 20:33

## 2022-01-12 RX ADMIN — DILTIAZEM HYDROCHLORIDE 240 MG: 120 CAPSULE, COATED, EXTENDED RELEASE ORAL at 09:12

## 2022-01-12 RX ADMIN — INSULIN ASPART 8 UNITS: 100 INJECTION, SOLUTION INTRAVENOUS; SUBCUTANEOUS at 18:17

## 2022-01-12 RX ADMIN — GLIPIZIDE 5 MG: 5 TABLET ORAL at 18:17

## 2022-01-12 RX ADMIN — APIXABAN 5 MG: 2.5 TABLET, FILM COATED ORAL at 20:33

## 2022-01-12 RX ADMIN — CHLORHEXIDINE GLUCONATE 0.12% ORAL RINSE 15 ML: 1.2 LIQUID ORAL at 09:10

## 2022-01-12 RX ADMIN — DEXAMETHASONE 3 MG: 1 TABLET ORAL at 09:11

## 2022-01-12 RX ADMIN — SODIUM CHLORIDE, PRESERVATIVE FREE 10 ML: 5 INJECTION INTRAVENOUS at 09:25

## 2022-01-12 NOTE — PLAN OF CARE
Goal Outcome Evaluation:  Plan of Care Reviewed With: patient           Outcome Summary: PT:  Patient performed supine to/from sit with mod A today.  He performed sit to stand from elevated edge of bed 3x, with min/mod A x 2.  Patient with increased c/o fatigue today due to lack of sleep.  Continue to recommend acute rehab at discharge prior to return home.

## 2022-01-12 NOTE — THERAPY TREATMENT NOTE
Acute Care - Occupational Therapy Treatment Note  ARELIS Flaherty     Patient Name: Sergio Daniel  : 1955  MRN: [id number]  Today's Date: 2022             Admit Date: 2021       ICD-10-CM ICD-9-CM   1. Acute respiratory failure with hypoxia (HCC)  J96.01 518.81   2. Pneumonia due to COVID-19 virus  U07.1 480.8    J12.82 079.89   3. Paroxysmal atrial fibrillation (HCC)  I48.0 427.31   4. Pharyngeal dysphagia  R13.13 787.23   5. Oropharyngeal dysphagia  R13.12 787.22     Patient Active Problem List   Diagnosis   • Acute respiratory failure with hypoxia (HCC)   • Acute hypoxemic respiratory failure due to COVID-19 (HCC)     Past Medical History:   Diagnosis Date   • Diabetes mellitus (HCC)      History reviewed. No pertinent surgical history.      OT ASSESSMENT FLOWSHEET (last 12 hours)     OT Evaluation and Treatment     Row Name 22 1031                   OT Time and Intention    Subjective Information complains of; fatigue  reports he was unable to fall asleep until 6:00 AM  -EN        Document Type therapy note (daily note)  -EN        Mode of Treatment occupational therapy  -EN        Patient Effort adequate  -EN        Symptoms Noted During/After Treatment fatigue  -EN                  General Information    General Observations of Patient Patient reclined in bed with c/o not being able to fall asleep until 6AM and is very sleepy. Agreed to therapy.  -EN        Risks Reviewed patient:; LOB  -EN        Benefits Reviewed patient:; improve function; increase independence; increase strength; increase balance  -EN                  Cognition    Personal Safety Interventions gait belt; nonskid shoes/slippers when out of bed  -EN                  Pain Scale: Numbers Pre/Post-Treatment    Pretreatment Pain Rating 0/10 - no pain  -EN        Posttreatment Pain Rating 0/10 - no pain  -EN                  Bed Mobility    Supine-Sit [place] (Bed Mobility) moderate assist (50% patient effort)  -EN         Sit-Supine [place] (Bed Mobility) moderate assist (50% patient effort)  -EN        Assistive Device (Bed Mobility) bed rails; head of bed elevated; draw sheet  -EN        Comment (Bed Mobility) Independently brought legs to edge of bed, assist to push from elbow to sitting position  -EN                  Transfer Assessment/Treatment    Comment (Transfers) Patient performed sit to stand from elevated EOB 3 trials. Each attempt patient required min/mod A X 2  -EN                  Transfers    Sit-Stand [place] (Transfers) verbal cues; moderate assist (50% patient effort); 2 person assist  -EN        Stand-Sit [place] (Transfers) minimum assist (75% patient effort); verbal cues; moderate assist (50% patient effort); 2 person assist  -EN                  Sit-Stand Transfer    Assistive Device (Sit-Stand Transfers) walker, front-wheeled  -EN                  Stand-Sit Transfer    Assistive Device (Stand-Sit Transfers) walker, front-wheeled  -EN                  Motor Skills    Therapeutic Exercise shoulder; elbow/forearm; hand  -EN                  Shoulder (Therapeutic Exercise)    Shoulder (Therapeutic Exercise) AROM (active range of motion); AAROM (active assistive range of motion)  -EN        Shoulder AROM (Therapeutic Exercise) bilateral; flexion  15 reps  -EN        Shoulder AAROM (Therapeutic Exercise) bilateral; flexion; other (see comments); 10 repetitions  -EN                  Elbow/Forearm (Therapeutic Exercise)    Elbow/Forearm (Therapeutic Exercise) AROM (active range of motion)  -EN        Elbow/Forearm AROM (Therapeutic Exercise) 10 repetitions  -EN                  Hand (Therapeutic Exercise)    Hand (Therapeutic Exercise) AROM (active range of motion)  -EN        Hand AROM/AAROM (Therapeutic Exercise) bilateral  -EN                  Grooming Assessment/Training    [place] Level (Grooming) grooming skills; oral care regimen; independent; set up  -EN        Position (Grooming) --   reclined in bed  -EN                  Wound Right hand Blisters    Wound - Properties Group Present on Hospital Admission: N  -LC Side: Right  -LC Location: hand  -LC, includes hand and wrist  Primary Wound Type: Blisters  -LC        Retired Wound - Properties Group Present on Hospital Admission: N  -LC Side: Right  -LC Location: hand  -LC, includes hand and wrist  Primary Wound Type: Blisters  -LC                  Wound 01/10/22 1322 Right gluteal Other (comment)    Wound - Properties Group Placement Date: 01/10/22  - Placement Time: 1322 -LC Side: Right  -LC Location: gluteal  -LC Primary Wound Type: Other  -LC, friction skin injury         Retired Wound - Properties Group Date first assessed: 01/10/22  -LC Time first assessed: 1322 -LC Side: Right  -LC Location: gluteal  -LC Primary Wound Type: Other  -LC, friction skin injury                   Plan of Care Review    Plan of Care Reviewed With patient  -EN        Outcome Summary OT- Patient more fatigued today and stated he wasn't able to fall asleep until 6 AM this morning. Patient did agree to participate despite fatigue. Patient requried mod A for supine to sit. Patient sat at EOB with supervision and no LOB. Patient completed 3 trials of sit to stand, each trial required min/mod A X 2. Patient participated in UE AROM exercises and demonstrated improved AROM in both shoulders. Continue to recommend acute rehab at discharge.  -EN                  OT Goals    Dressing Goal Selection (OT) dressing, OT goal 1  -EN        Balance Goal Selection (OT) balance, OT goal 1  -EN                  Bed Mobility Goal 1 (OT)    Activity/Assistive Device (Bed Mobility Goal 1, OT) supine to sit  -EN        [place] Level/Cues Needed (Bed Mobility Goal 1, OT) maximum assist (25-49% patient effort)  -EN        Time Frame (Bed Mobility Goal 1, OT) 1 week  -EN        Progress/Outcomes (Bed Mobility Goal 1, OT) goal met; other (see comments)  performed with mod a  -EN                   Dressing Goal 1 (OT)    Activity/Device (Dressing Goal 1, OT) upper body dressing  -EN        [place]/Cues Needed (Dressing Goal 1, OT) moderate assist (50-74% patient effort)  -EN        Time Frame (Dressing Goal 1, OT) 1 week  -EN        Progress/Outcome (Dressing Goal 1, OT) other (see comments)  new goal  -EN                  Grooming Goal 1 (OT)    Activity/Device (Grooming Goal 1, OT) hair care; oral care; wash face, hands  -EN        [place] (Grooming Goal 1, OT) minimum assist (75% or more patient effort)  -EN        Time Frame (Grooming Goal 1, OT) 1 week  -EN        Progress/Outcome (Grooming Goal 1, OT) goal met  -EN                  ROM Goal 1 (OT)    ROM Goal 1 (OT) Patient will perform B UE HEP independently.  -EN        Time Frame (ROM Goal 1, OT) 1 week  -EN        Progress/Outcome (ROM Goal 1, OT) goal met  -EN                  Balance Goal 1 (OT)    Activity/Assistive Device (Balance Goal 1, OT) sitting, dynamic  2-3 minutes for improved safety during ADL routine  -EN        [place] Level/Cues Needed (Balance Goal 1, OT) contact guard assist  -EN        Time Frame (Balance Goal 1, OT) 1 week  -EN        Progress/Outcomes (Balance Goal 1, OT) other (see comments)  new goal  -EN                  Problem Specific Goal 1 (OT)    Problem Specific Goal 1 (OT) Patient to perform static sitting EOB with min assist for improved ind with ADLs.  -EN        Time Frame (Problem Specific Goal 1, OT) 1 week  -EN        Progress/Outcome (Problem Specific Goal 1, OT) goal met; other (see comments)  supervision only  -EN                  Positioning and Restraints    Pre-Treatment Position in bed  -EN        Post Treatment Position bed  -EN        In Bed call light within reach; encouraged to call for assist  -EN                  Progress Summary (OT)    Progress Toward Functional Goals (OT) progress toward functional goals is good  -EN        Daily Progress Summary (OT) improving  -EN               User Key  (r) = Recorded By, (t) = Taken By, (c) = Cosigned By    Initials Name Effective Dates    Isi Wiggins, OTR 06/16/21 -     Chitra Faith RN, CWON 07/26/21 -                  Occupational Therapy Education                 Title: PT OT SLP Therapies (Done)     Topic: Occupational Therapy (Done)     Point: ADL training (Done)     Description:   Instruct learner(s) on proper safety adaptation and remediation techniques during self care or transfers.   Instruct in proper use of assistive devices.              Learning Progress Summary           Patient Acceptance, E, VU by EN at 1/12/2022 1433    Comment: UE HEP, safety during transfer    Acceptance, E, VU by EN at 1/11/2022 1234    Comment: UE HEP, safety during transfers    Acceptance, E, VU by EN at 1/10/2022 1333    Comment: UE HEP, adl retraining, transfer training    Acceptance, E,TB,D, VU,DU by SD at 1/9/2022 1025    Comment: Education with benefits of activity, BUE HEP and safety with bed mobility/transfers. Pt actively participated in self feeding today.    Acceptance, E, VU by EN at 1/7/2022 1020    Comment: UE HEP, safety during transfers    Acceptance, E, VU by EN at 1/6/2022 1007    Comment: UE HEP    Acceptance, E, VU by EN at 1/5/2022 1115    Comment: UE HEP, benefits of activity    Acceptance, E, VU by EN at 1/5/2022 1026    Comment: Patient educated on UE HEP, benefits of activity.    Acceptance, E, VU by EN at 1/4/2022 0959    Comment: transfer safety, UE HEP    Acceptance, E, VU by EN at 1/3/2022 1352    Comment: Educated on UE HEP                   Point: Home exercise program (Done)     Description:   Instruct learner(s) on appropriate technique for monitoring, assisting and/or progressing therapeutic exercises/activities.              Learning Progress Summary           Patient Acceptance, E, VU by EN at 1/12/2022 1433    Comment: UE HEP, safety during transfer    Acceptance, E, VU by EN at 1/11/2022 1234     Comment: UE HEP, safety during transfers    Acceptance, E, VU by EN at 1/10/2022 1333    Comment: UE HEP, adl retraining, transfer training    Acceptance, E,TB,D, VU,DU by SD at 1/9/2022 1025    Comment: Education with benefits of activity, BUE HEP and safety with bed mobility/transfers. Pt actively participated in self feeding today.    Acceptance, E, VU by EN at 1/7/2022 1020    Comment: UE HEP, safety during transfers    Acceptance, E, VU by EN at 1/6/2022 1007    Comment: UE HEP    Acceptance, E, VU by EN at 1/5/2022 1115    Comment: UE HEP, benefits of activity    Acceptance, E, VU by EN at 1/5/2022 1026    Comment: Patient educated on UE HEP, benefits of activity.    Acceptance, E, VU by EN at 1/4/2022 0959    Comment: transfer safety, UE HEP    Acceptance, E, VU by EN at 1/3/2022 1352    Comment: Educated on UE HEP                               User Key     Initials Effective Dates Name Provider Type Discipline    EN 06/16/21 -  Isi Theodore OTR Occupational Therapist OT    SD 06/16/21 -  Feliciano Chua OTCORY Occupational Therapist OT                  OT Recommendation and Plan  Planned Therapy Interventions (OT): BADL retraining, functional balance retraining, passive ROM/stretching, ROM/therapeutic exercise, strengthening exercise, transfer/mobility retraining  Therapy Frequency (OT): 5 times/wk  Progress Toward Functional Goals (OT): progress toward functional goals is good  Plan of Care Review  Plan of Care Reviewed With: patient  Progress: improving  Outcome Summary: OT- Patient more fatigued today and stated he wasn't able to fall asleep until 6 AM this morning. Patient did agree to participate despite fatigue. Patient requried mod A for supine to sit. Patient sat at EOB with supervision and no LOB. Patient completed 3 trials of sit to stand, each trial required min/mod A X 2. Patient participated in UE AROM exercises and demonstrated improved AROM in both shoulders. Continue to recommend  acute rehab at discharge.  Plan of Care Reviewed With: patient  Outcome Summary: OT- Patient more fatigued today and stated he wasn't able to fall asleep until 6 AM this morning. Patient did agree to participate despite fatigue. Patient requried mod A for supine to sit. Patient sat at EOB with supervision and no LOB. Patient completed 3 trials of sit to stand, each trial required min/mod A X 2. Patient participated in UE AROM exercises and demonstrated improved AROM in both shoulders. Continue to recommend acute rehab at discharge.     Outcome Measures     Row Name 01/12/22 1400 01/12/22 1100 01/11/22 1200       How much help from another person do you currently need...    Turning from your back to your side while in flat bed without using bedrails? -- 2  -LH --    Moving from lying on back to sitting on the side of a flat bed without bedrails? -- 2  -LH --    Moving to and from a bed to a chair (including a wheelchair)? -- 1  -LH --    Standing up from a chair using your arms (e.g., wheelchair, bedside chair)? -- 2  -LH --    Climbing 3-5 steps with a railing? -- 1  -LH --    To walk in hospital room? -- 1  -LH --    AM-PAC 6 Clicks Score (PT) -- 9  -LH --       How much help from another is currently needed...    Putting on and taking off regular lower body clothing? 2  -EN -- 2  -EN    Bathing (including washing, rinsing, and drying) 2  -EN -- 2  -EN    Toileting (which includes using toilet bed pan or urinal) 2  -EN -- 2  -EN    Putting on and taking off regular upper body clothing 2  -EN -- 2  -EN    Taking care of personal grooming (such as brushing teeth) 3  -EN -- 3  -EN    Eating meals 3  -EN -- 3  -EN    AM-PAC 6 Clicks Score (OT) 14  -EN -- 14  -EN       Functional Assessment    Outcome Measure Options -- AM-PAC 6 Clicks Basic Mobility (PT)  -LH --    Row Name 01/11/22 1000 01/10/22 1021 01/10/22 1020       How much help from another person do you currently need...    Turning from your back to your side  while in flat bed without using bedrails? 2  -LH -- 1  -JW    Moving from lying on back to sitting on the side of a flat bed without bedrails? 2  -LH -- 1  -JW    Moving to and from a bed to a chair (including a wheelchair)? 1  -LH -- 1  -JW    Standing up from a chair using your arms (e.g., wheelchair, bedside chair)? 2  -LH -- 1  -JW    Climbing 3-5 steps with a railing? 1  -LH -- 1  -JW    To walk in hospital room? 1  -LH -- 1  -JW    AM-PAC 6 Clicks Score (PT) 9  - -- 6  -JW       How much help from another is currently needed...    Putting on and taking off regular lower body clothing? -- 2  -EN --    Bathing (including washing, rinsing, and drying) -- 2  -EN --    Toileting (which includes using toilet bed pan or urinal) -- 1  -EN --    Putting on and taking off regular upper body clothing -- 2  -EN --    Taking care of personal grooming (such as brushing teeth) -- 3  -EN --    Eating meals -- 3  -EN --    AM-PAC 6 Clicks Score (OT) -- 13  -EN --       Functional Assessment    Outcome Measure Options AM-PAC 6 Clicks Basic Mobility (PT)  - -- AM-[facility] 6 Clicks Basic Mobility (PT)  -          User Key  (r) = Recorded By, (t) = Taken By, (c) = Cosigned By    Initials Name Provider Type     Alba Albright, PT Physical Therapist    Isi Wiggins OTR Occupational Therapist    Rosy Arevalo, PT Physical Therapist                Time Calculation:    Time Calculation- OT     Row Name 01/12/22 1433             Time Calculation- OT    OT Start Time 1030  -EN      OT Stop Time 1100  -EN      OT Time Calculation (min) 30 min  -EN              Timed Charges    66443 - OT Self Care/Mgmt Minutes 30  -EN              Total Minutes    Timed Charges Total Minutes 30  -EN       Total Minutes 30  -EN            User Key  (r) = Recorded By, (t) = Taken By, (c) = Cosigned By    Initials Name Provider Type    Isi Wiggins, OTR Occupational Therapist              Therapy Charges for Today     Code  Description Service Date Service Provider Modifiers Qty    28776220662 HC OT SELF CARE/MGMT/TRAIN EA 15 MIN 1/11/2022 Isi Theodore OTR GO 3    70241257109 HC OT SELF CARE/MGMT/TRAIN EA 15 MIN 1/12/2022 Isi Theodore OTR GO 2               LINDA Pepe  1/12/2022

## 2022-01-12 NOTE — PLAN OF CARE
Goal Outcome Evaluation:  Plan of Care Reviewed With: patient        Progress: improving  Outcome Summary: OT- Patient more fatigued today and stated he wasn't able to fall asleep until 6 AM this morning. Patient did agree to participate despite fatigue. Patient requried mod A for supine to sit. Patient sat at EOB with supervision and no LOB. Patient completed 3 trials of sit to stand, each trial required min/mod A X 2. Patient participated in UE AROM exercises and demonstrated improved AROM in both shoulders. Continue to recommend acute rehab at discharge.

## 2022-01-12 NOTE — PROGRESS NOTES
"Hospitalist Team      Patient Care Team:  Farhan Jean Jr., MD as PCP - General (Family Medicine)      Chief Complaint:  Follow-up Acute Hypoxic Respiratory Failure due COVID-19    Subjective    No shortness of breath.  Weakness slowly improving no other issues.    Objective    Vital Signs  Temp:  [96.6 °F (35.9 °C)-97.8 °F (36.6 °C)] 97.8 °F (36.6 °C)  Heart Rate:  [85-89] 89  Resp:  [18] 18  BP: (103-133)/(67-80) 111/80  Oxygen Therapy  SpO2: 95 %  Pulse Oximetry Type: Continuous  Device (Oxygen Therapy): ventilator  Device (Oxygen Therapy): room air  $ High Flow Nasal Cannula Set-Up: yes  Flow (L/min): 2  Oxygen Concentration (%): 35  ETCO2 (mmHg): 35 mmHg  Probe Placed On (Pulse Ox): Left:, finger  Probe Removed From (Pulse Ox): Right:, finger}  Flowsheet Rows      First Filed Value   Admission Height 185.4 cm (73\") Documented at 12/16/2021 1515   Admission Weight 120 kg (265 lb) Documented at 12/16/2021 1515          Physical Exam:  Vitals reviewed  General: Acute distress  HEENT:  Normocephalic  Lungs: Somewhat diminished at the bases otherwise no distress  CV: Rate stable  Abdomen: Soft with active bowel sounds.  MSK: Moves extremities spontaneously  Skin: Warm dry  Psych:  Following commands    Results Review:     I reviewed the patient's new clinical results.    Lab Results (last 24 hours)     Procedure Component Value Units Date/Time    POC Glucose Once [[id number]]  (Abnormal) Collected: 01/11/22 2014    Specimen: Blood Updated: 01/11/22 2020     Glucose 177 mg/dL      Comment: Meter: [id number] : [id number] Fco Llanos CNA       POC Glucose Once [[id number]]  (Abnormal) Collected: 01/11/22 1702    Specimen: Blood Updated: 01/11/22 1709     Glucose 152 mg/dL      Comment: Meter: [id number] : [id number] Kody [place]             Imaging Results (Last 24 Hours)     ** No results found for the last 24 hours. **          Xray reviewed personally by physician.      Medication Review:   I " have reviewed the patient's current medication list    Current Facility-Administered Medications:   •  acetaminophen (TYLENOL) tablet 650 mg, 650 mg, Oral, Q6H PRN, Jayden Hyatt, DO, 650 mg at 12/26/21 1153  •  [COMPLETED] apixaban (ELIQUIS) tablet 10 mg, 10 mg, Nasogastric, Q12H, 10 mg at 01/05/22 1528 **FOLLOWED BY** apixaban (ELIQUIS) tablet 5 mg, 5 mg, Oral, Q12H, Dakota Moore MD, 5 mg at 01/12/22 0908  •  Aquaphor Advanced Therapy ointment 1 application, 1 application, Topical, Q12H, Dakota Moore MD, 1 application at 01/12/22 0909  •  bisacodyl (DULCOLAX) suppository 10 mg, 10 mg, Rectal, Daily, Dakota Moore MD, 10 mg at 01/10/22 0850  •  chlorhexidine (PERIDEX) 0.12 % solution 15 mL, 15 mL, Mouth/Throat, Q12H, Spencer Carimchael MD, 15 mL at 01/12/22 0910  •  dexamethasone (DECADRON) tablet 3 mg, 3 mg, Oral, Daily With Breakfast, 3 mg at 01/12/22 0911 **OR** [DISCONTINUED] dexamethasone (DECADRON) injection 6 mg, 6 mg, Intravenous, Daily With Breakfast, Charli Ayala MD, 6 mg at 01/05/22 0900  •  dextromethorphan polistirex ER (DELSYM) 30 MG/5ML oral suspension 60 mg, 60 mg, Oral, Q12H PRN, Jaleesa Cárdenas MD, 60 mg at 01/06/22 0922  •  dextrose (D50W) (25 g/50 mL) IV injection 25 g, 25 g, Intravenous, Q15 Min PRN, Dakota Moore MD, 25 g at 12/29/21 0804  •  dextrose (GLUTOSE) oral gel 15 g, 15 g, Oral, Q15 Min PRN, Dakota Moore MD  •  dilTIAZem CD (CARDIZEM CD) 24 hr capsule 240 mg, 240 mg, Oral, Q24H, Dakota Moore MD, 240 mg at 01/12/22 0912  •  famotidine (PEPCID) tablet 20 mg, 20 mg, Oral, Daily, EsterSpencer vargas MD, 20 mg at 01/12/22 0913  •  glipizide (GLUCOTROL) tablet 5 mg, 5 mg, Oral, BID AC, Dakota Moore MD, 5 mg at 01/12/22 0914  •  glucagon (GLUCAGEN) injection 1 mg, 1 mg, Subcutaneous, Q15 Min PRN, Dakota Moore MD  •  hydrALAZINE (APRESOLINE) injection 10 mg, 10 mg, Intravenous, Q6H PRN, Jayden Hyatt, DO  •  insulin aspart (novoLOG) injection  0-24 Units, 0-24 Units, Subcutaneous, TID With Meals, Jayden Hyatt, DO, 4 Units at 01/12/22 1239  •  ipratropium-albuterol (DUO-NEB) nebulizer solution 3 mL, 3 mL, Nebulization, Q6H PRN, Jaleesa Cárdenas MD  •  linagliptin (TRADJENTA) tablet 5 mg, 5 mg, Oral, Daily, Dakota Moore MD, 5 mg at 01/12/22 1240  •  metFORMIN (GLUCOPHAGE) tablet 500 mg, 500 mg, Oral, BID With Meals, Dakota Moore MD, 500 mg at 01/12/22 0913  •  polyethylene glycol (MIRALAX) packet 17 g, 17 g, Oral, Daily, Luca Hermosillo MD, 17 g at 01/12/22 0920  •  potassium chloride 10 mEq in 100 mL IVPB, 10 mEq, Intravenous, Q1H PRN, Jaleesa Cárdenas MD, Stopped at 12/28/21 0940  •  potassium phosphate 45 mmol in sodium chloride 0.9 % 500 mL infusion, 45 mmol, Intravenous, PRN **OR** potassium phosphate 30 mmol in sodium chloride 0.9 % 250 mL infusion, 30 mmol, Intravenous, PRN **OR** potassium phosphate 15 mmol in sodium chloride 0.9 % 100 mL infusion, 15 mmol, Intravenous, PRN **OR** sodium phosphates 45 mmol in sodium chloride 0.9 % 500 mL IVPB, 45 mmol, Intravenous, PRN **OR** sodium phosphates 30 mmol in sodium chloride 0.9 % 250 mL IVPB, 30 mmol, Intravenous, PRN **OR** sodium phosphates 15 mmol in sodium chloride 0.9 % 250 mL IVPB, 15 mmol, Intravenous, PRN, Jaleesa Cárdenas MD, Last Rate: 62.5 mL/hr at 12/18/21 0429, 15 mmol at 12/18/21 0429  •  [COMPLETED] Insert peripheral IV, , , Once **AND** sodium chloride 0.9 % flush 10 mL, 10 mL, Intravenous, PRN, Jaleesa Cárdenas MD, 10 mL at 12/28/21 0732  •  sodium chloride 0.9 % flush 10 mL, 10 mL, Intravenous, PRN, Jeana Grubbs MD  •  sodium chloride 0.9 % flush 10 mL, 10 mL, Intravenous, Q12H, Dakota Moore MD, 10 mL at 01/12/22 0925  •  sodium chloride 0.9 % flush 10 mL, 10 mL, Intravenous, Q12H, Dakota Moore MD, 10 mL at 01/12/22 0925  •  sodium chloride 0.9 % flush 10 mL, 10 mL, Intravenous, Q12H, Dakota Moore MD, 10 mL at  01/12/22 0924  •  sodium chloride 0.9 % flush 10 mL, 10 mL, Intravenous, PRN, Dakota Moore MD  •  sodium chloride 0.9 % flush 20 mL, 20 mL, Intravenous, PRN, Jeana Grubbs MD  •  sodium chloride 0.9 % flush 20 mL, 20 mL, Intravenous, PRN, Dakota Moore MD      Assessment/Plan      1.  Acute Hypoxic Respiratory Failure due to COVID-19 Pneumonia w/ Superimposed MSSA and Klebsiella pneumonia:    -Extubated 1/1/22.  [place] on room air  -Sputum culture 12/29/2021: MSSA, Citrobacter resistant to ceftriaxone and stenotrophomonas sensitive to Levaquin  -completed the Rocephin and Levaquin course  -Pulmonary now signed off     2.  Diabetes Mellitus, Type 2 in Obese w/ Hyperglycemia:  -Blood glucose stable continue to follow  - Continue basal and SSI   - follow and adjust prn    3.  Right Hand Wound:   -Continue local wound care     4.  Atrial Fibrillation: Remains in NSR.    -On Diltiazem and Eliquis.     5.   LLE DVT:   -On Eliquis.     6. Generalized weakness  -Continue PT OT    7. Dysphagia, severe malnutrition  -tolerating current diet         Plan for disposition: Awaiting transfer to rehab but currently medically stable for the same once obtained    Jayden Hyatt DO  01/12/22  15:56 EST

## 2022-01-12 NOTE — PROGRESS NOTES
LOS: 27 days   Patient Care Team:  Farhan Jean Jr., MD as PCP - General (Family Medicine)        Subjective     Interval History:     Patient Complaints:   Patient Denies:  weak       Review of Systems:    weak    Objective     Vital Signs  Temp:  [96.6 °F (35.9 °C)-97.6 °F (36.4 °C)] 97.5 °F (36.4 °C)  Heart Rate:  [85-89] 88  Resp:  [18] 18  BP: (103-133)/(67-77) 109/77    Physical Exam:     General Appearance:    Off vent   Head:     Eyes:            Lids and lashes normal, conjunctivae and sclerae normal, no   icterus, no pallor, corneas clear, PERRLA   Ears:    Ears appear intact with no abnormalities noted   Throat:   No oral lesions, no thrush, oral mucosa moist   Neck:   No adenopathy, supple, trachea midline, no thyromegaly, no   carotid bruit, no JVD   Back:     No kyphosis present, no scoliosis present, no skin lesions,      erythema or scars, no tenderness to percussion or                   palpation,   range of motion normal   Lungs:     Clear to auscultation,respirations regular, even and                  unlabored    Heart:    Regular rhythm and normal rate, normal S1 and S2, no            murmur, no gallop, no rub, no click   Chest Wall:    No abnormalities observed   Abdomen:     Normal bowel sounds, no masses, no organomegaly, soft        non-tender, non-distended, no guarding, no rebound                tenderness   Rectal:     Deferred   Extremities:   Moves all extremities well, no edema, no cyanosis, no             redness   Pulses:   Pulses palpable and equal bilaterally   Skin:   No bleeding, bruising or rash   Lymph nodes:   No palpable adenopathy   Neurologic:   Cranial nerves 2 - 12 grossly intact, sensation intact, DTR       present and equal bilaterally          Results Review:      Lab Results (last 72 hours)     Procedure Component Value Units Date/Time    POC Glucose Once [[id number]]  (Abnormal) Collected: 01/02/22 1654    Specimen: Blood Updated: 01/02/22 1701     Glucose  250 mg/dL      Comment: Meter: [id number] : [id number] Issa Cleary RN Float       C-reactive Protein [[id number]]  (Abnormal) Collected: 01/02/22 0408    Specimen: Blood Updated: 01/02/22 1250     C-Reactive Protein 5.09 mg/dL     POC Glucose Once [[id number]]  (Abnormal) Collected: 01/02/22 1110    Specimen: Blood Updated: 01/02/22 1117     Glucose 170 mg/dL      Comment: Meter: [id number] : [id number] Issa Cleary RN Float       Ferritin [[id number]]  (Abnormal) Collected: 01/02/22 0408    Specimen: Blood Updated: 01/02/22 0515     Ferritin 1,581.00 ng/mL     Narrative:      Results may be falsely decreased if patient taking Biotin.      Comprehensive Metabolic Panel [[id number]]  (Abnormal) Collected: 01/02/22 0408    Specimen: Blood Updated: 01/02/22 0503     Glucose 127 mg/dL      BUN 17 mg/dL      Creatinine 0.54 mg/dL      Sodium 139 mmol/L      Potassium 3.9 mmol/L      Chloride 100 mmol/L      CO2 30.8 mmol/L      Calcium 8.8 mg/dL      Total Protein 5.9 g/dL      Albumin 2.40 g/dL      ALT (SGPT) 51 U/L      AST (SGOT) 37 U/L      Alkaline Phosphatase 126 U/L      Total Bilirubin 0.5 mg/dL      eGFR Non African Amer >150 mL/min/1.73      Globulin 3.5 gm/dL      A/G Ratio 0.7 g/dL      BUN/Creatinine Ratio 31.5     Anion Gap 8.2 mmol/L     Narrative:      GFR Normal >60  Chronic Kidney Disease <60  Kidney Failure <15      Lactate Dehydrogenase [[id number]]  (Abnormal) Collected: 01/02/22 0408    Specimen: Blood Updated: 01/02/22 0503      U/L     Procalcitonin [[id number]]  (Normal) Collected: 01/02/22 0408    Specimen: Blood Updated: 01/02/22 0459     Procalcitonin 0.11 ng/mL     D-dimer, Quantitative [[id number]]  (Abnormal) Collected: 01/02/22 0408    Specimen: Blood Updated: 01/02/22 0449     D-Dimer, Quantitative 2.08 MCGFEU/mL     Narrative:      Can be elevated in, but is not diagnostic for deep vein thrombosis (DVT) or pulmonary embolis (PE).  It is also elevated in other medical  conditions.  Clinical correlation is required.  The negative cut-off value for the D-Dimer is 0.50 mcg FEU/mL for DVT and PE.      CBC (No Diff) [[id number]]  (Abnormal) Collected: 01/02/22 0408    Specimen: Blood Updated: 01/02/22 0430     WBC 13.56 10*3/mm3      RBC 3.27 10*6/mm3      Hemoglobin 10.1 g/dL      Hematocrit 31.8 %      MCV 97.2 fL      MCH 30.9 pg      MCHC 31.8 g/dL      RDW 14.5 %      RDW-SD 49.4 fl      MPV 10.7 fL      Platelets 323 10*3/mm3     POC Glucose Once [[id number]]  (Abnormal) Collected: 01/02/22 0023    Specimen: Blood Updated: 01/02/22 0029     Glucose 133 mg/dL      Comment: Meter: [id number] : [id number] Sindhu Seay RN       POC Glucose Once [[id number]]  (Abnormal) Collected: 01/01/22 2144    Specimen: Blood Updated: 01/01/22 2150     Glucose 141 mg/dL      Comment: Meter: [id number] : [id number] Sindhu Seay RN       POC Glucose Once [[id number]]  (Abnormal) Collected: 01/01/22 1737    Specimen: Blood Updated: 01/01/22 1743     Glucose 148 mg/dL      Comment: Meter: [id number] : [id number] Norris Mari RN       C-reactive Protein [[id number]]  (Abnormal) Collected: 01/01/22 0435    Specimen: Blood Updated: 01/01/22 1248     C-Reactive Protein 5.64 mg/dL     POC Glucose Once [[id number]]  (Normal) Collected: 01/01/22 1147    Specimen: Blood Updated: 01/01/22 1155     Glucose 94 mg/dL      Comment: Meter: [id number] : [id number] Norris Mari RN       Respiratory Culture - Sputum, ET Suction [[id number]]  (Abnormal)  (Susceptibility) Collected: 12/29/21 0804    Specimen: Sputum from ET Suction Updated: 01/01/22 0957     Respiratory Culture Heavy growth (4+) Staphylococcus aureus      Heavy growth (4+) Stenotrophomonas maltophilia      Light growth (2+) Citrobacter freundii      No Normal Respiratory Alida     Gram Stain Moderate (3+) WBCs seen      Rare (1+) Epithelial cells seen      Moderate (3+) Gram positive cocci in pairs, chains and clusters    Susceptibility       Staphylococcus aureus     JANUSZ     Clindamycin Susceptible     Inducible Clindamycin Resistance Negative     Oxacillin Susceptible     Tetracycline Susceptible     Trimethoprim + Sulfamethoxazole Susceptible     Vancomycin Susceptible                  Linear View               Susceptibility      Stenotrophomonas maltophilia     JANUSZ     Levofloxacin Susceptible     Trimethoprim + Sulfamethoxazole Susceptible                  Linear View               Susceptibility      Citrobacter freundii     JANUSZ     Cefepime Susceptible     Ceftazidime Resistant     Ceftriaxone Resistant     Gentamicin Susceptible     Levofloxacin Susceptible     Piperacillin + Tazobactam Intermediate     Tetracycline Susceptible     Trimethoprim + Sulfamethoxazole Susceptible                  Linear View               Susceptibility Comments     Staphylococcus aureus    This isolate does not demonstrate inducible clindamycin resistance in vitro.    This isolate does not demonstrate inducible clindamycin resistance in vitro.      Citrobacter freundii    Cefazolin sensitivity will not be reported for Enterobacteriaceae in non-urine isolates. If cefazolin is preferred, please call the microbiology lab to request an E-test.  With the exception of urinary-sourced infections, aminoglycosides should not be used as monotherapy.             Blood Gas, Arterial - [[id number]]  (Abnormal) Collected: 01/01/22 0942    Specimen: Arterial Blood Updated: 01/01/22 0942     Site Left Radial     Triston's Test Positive     pH, Arterial 7.513 pH units      Comment: 83 Value above reference range        pCO2, Arterial 43.4 mm Hg      pO2, Arterial 64.1 mm Hg      Comment: 84 Value below reference range        HCO3, Arterial 34.9 mmol/L      Comment: 83 Value above reference range        Base Excess, Arterial 10.9 mmol/L      Comment: 83 Value above reference range        O2 Saturation, Arterial 93.7 %      Comment: 84 Value below reference range        Hemoglobin, Blood  Gas 9.4 g/dL      Comment: 84 Value below reference range        Temperature 37.0 C      Barometric Pressure for Blood Gas 731 mmHg      Modality Ventilator     FIO2 30 %      Ventilator Mode CPAP     Set Tidal Volume 574     PEEP 5.0     PSV 10.0 cmH2O      Collected by [id number]     Comment: Meter: [id number]     :  [id number]        pCO2, Temperature Corrected 43.4 mm Hg      pH, Temp Corrected 7.513 pH Units      pO2, Temperature Corrected 64.1 mm Hg     POC Glucose Once [[id number]]  (Abnormal) Collected: 01/01/22 0619    Specimen: Blood Updated: 01/01/22 0626     Glucose 172 mg/dL      Comment: Meter: [id number] : [id number] Sindhu Seay RN       Ferritin [[id number]]  (Abnormal) Collected: 01/01/22 0435    Specimen: Blood Updated: 01/01/22 0541     Ferritin 1,386.00 ng/mL     Narrative:      Results may be falsely decreased if patient taking Biotin.      Comprehensive Metabolic Panel [[id number]]  (Abnormal) Collected: 01/01/22 0435    Specimen: Blood Updated: 01/01/22 0530     Glucose 181 mg/dL      BUN 15 mg/dL      Creatinine 0.54 mg/dL      Sodium 139 mmol/L      Potassium 3.8 mmol/L      Chloride 104 mmol/L      CO2 29.8 mmol/L      Calcium 8.5 mg/dL      Total Protein 5.2 g/dL      Albumin 2.00 g/dL      ALT (SGPT) 31 U/L      AST (SGOT) 22 U/L      Alkaline Phosphatase 97 U/L      Total Bilirubin 0.4 mg/dL      eGFR Non African Amer >150 mL/min/1.73      Globulin 3.2 gm/dL      A/G Ratio 0.6 g/dL      BUN/Creatinine Ratio 27.8     Anion Gap 5.2 mmol/L     Narrative:      GFR Normal >60  Chronic Kidney Disease <60  Kidney Failure <15      Procalcitonin [[id number]]  (Normal) Collected: 01/01/22 0435    Specimen: Blood Updated: 01/01/22 0512     Procalcitonin 0.12 ng/mL     CBC (No Diff) [[id number]]  (Abnormal) Collected: 01/01/22 0435    Specimen: Blood Updated: 01/01/22 0448     WBC 10.72 10*3/mm3      RBC 2.82 10*6/mm3      Hemoglobin 8.9 g/dL      Hematocrit 27.8 %      MCV 98.6 fL      MCH  31.6 pg      MCHC 32.0 g/dL      RDW 14.2 %      RDW-SD 49.0 fl      MPV 10.8 fL      Platelets 246 10*3/mm3     POC Glucose Once [[id number]]  (Abnormal) Collected: 12/31/21 2334    Specimen: Blood Updated: 12/31/21 2340     Glucose 196 mg/dL      Comment: Meter: [id number] : [id number] Sindhu Seay RN       POC Glucose Once [[id number]]  (Abnormal) Collected: 12/31/21 2023    Specimen: Blood Updated: 12/31/21 2030     Glucose 226 mg/dL      Comment: Meter: [id number] : [id number] Sindhu Seay RN       POC Glucose Once [[id number]]  (Abnormal) Collected: 12/31/21 1710    Specimen: Blood Updated: 12/31/21 1716     Glucose 202 mg/dL      Comment: Meter: [id number] : MICHAEL Roland RN       C-reactive Protein [[id number]]  (Abnormal) Collected: 12/31/21 0512    Specimen: Blood Updated: 12/31/21 1340     C-Reactive Protein 11.00 mg/dL     POC Glucose Once [[id number]]  (Abnormal) Collected: 12/31/21 1202    Specimen: Blood Updated: 12/31/21 1208     Glucose 194 mg/dL      Comment: Meter: [id number] : [id number] Dejon Johnson RN       Ferritin [[id number]]  (Abnormal) Collected: 12/31/21 0512    Specimen: Blood Updated: 12/31/21 0640     Ferritin 1,551.00 ng/mL     Narrative:      Results may be falsely decreased if patient taking Biotin.      Lactate Dehydrogenase [[id number]]  (Abnormal) Collected: 12/31/21 0512    Specimen: Blood Updated: 12/31/21 0634      U/L     Procalcitonin [[id number]]  (Normal) Collected: 12/31/21 0512    Specimen: Blood Updated: 12/31/21 0623     Procalcitonin 0.17 ng/mL     POC Glucose Once [[id number]]  (Abnormal) Collected: 12/31/21 0609    Specimen: Blood Updated: 12/31/21 0616     Glucose 183 mg/dL      Comment: Meter: [id number] : [id number] Sindhu Seay RN       D-dimer, Quantitative [[id number]]  (Abnormal) Collected: 12/31/21 0512    Specimen: Blood Updated: 12/31/21 0609     D-Dimer, Quantitative 1.43 MCGFEU/mL     Narrative:      Can be elevated in,  but is not diagnostic for deep vein thrombosis (DVT) or pulmonary embolis (PE).  It is also elevated in other medical conditions.  Clinical correlation is required.  The negative cut-off value for the D-Dimer is 0.50 mcg FEU/mL for DVT and PE.      Blood Gas, Arterial - [[id number]]  (Abnormal) Collected: 12/31/21 0520    Specimen: Arterial Blood Updated: 12/31/21 0526     Site Left Radial     Triston's Test Positive     pH, Arterial 7.480 pH units      Comment: 83 Value above reference range        pCO2, Arterial 44.1 mm Hg      pO2, Arterial 72.2 mm Hg      Comment: 84 Value below reference range        HCO3, Arterial 32.8 mmol/L      Comment: 83 Value above reference range        Base Excess, Arterial 8.4 mmol/L      Comment: 83 Value above reference range        O2 Saturation, Arterial 96.4 %      Hemoglobin, Blood Gas 8.5 g/dL      Comment: 84 Value below reference range        Temperature 37.0 C      Barometric Pressure for Blood Gas 734 mmHg      Modality Ventilator     FIO2 35 %      Ventilator Mode AC     Set Mech Resp Rate 16.0     PEEP 5.0     PIP 14 cmH2O      Comment: Meter: [id number]     :  [id number]        Collected by [id number]     pCO2, Temperature Corrected 44.1 mm Hg      pH, Temp Corrected 7.480 pH Units      pO2, Temperature Corrected 72.2 mm Hg     POC Glucose Once [[id number]]  (Abnormal) Collected: 12/31/21 0007    Specimen: Blood Updated: 12/31/21 0013     Glucose 178 mg/dL      Comment: Meter: [id number] : [id number] Sindhu Seay RN       POC Glucose Once [[id number]]  (Abnormal) Collected: 12/30/21 2104    Specimen: Blood Updated: 12/30/21 2110     Glucose 184 mg/dL      Comment: Meter: [id number] : [id number] Seay RN             Imaging Results (Last 72 Hours)     ** No results found for the last 72 hours. **            Medication Review:     Hospital Medications (active)       Dose Frequency Start End    acetaminophen (TYLENOL) tablet 650 mg 650 mg Every 6 Hours PRN  "12/22/2021     Admin Instructions: Do not exceed 4 grams of acetaminophen in a 24 hr period. Max dose of 2gm for AST/ALT greater than 120 units/L      If given for pain, use the following pain scale:   Mild Pain = Pain Score of 1-3, CPOT 1-2  Moderate Pain = Pain Score of 4-6, CPOT 3-4  Severe Pain = Pain Score of 7-10, CPOT 5-8    Route: Oral    apixaban (ELIQUIS) tablet 10 mg 10 mg Every 12 Hours Scheduled 12/29/2021 1/5/2022    Admin Instructions: Tablet may be crushed and suspended in 60 mL of water or D5W and immediately delivered via NG tube.  Avoid grapefruit juice.    Route: Nasogastric    Linked Group 1: \"Followed by\" Linked Group Details        apixaban (ELIQUIS) tablet 5 mg 5 mg Every 12 Hours Scheduled 1/5/2022     Admin Instructions: Tablet may be crushed and suspended in 60 mL of water or D5W and immediately delivered via NG tube.  Avoid grapefruit juice.    Route: Oral    Linked Group 1: \"Followed by\" Linked Group Details        bisacodyl (DULCOLAX) suppository 10 mg 10 mg Daily 12/27/2021     Route: Rectal    chlorhexidine (PERIDEX) 0.12 % solution 15 mL 15 mL Every 12 Hours Scheduled 12/27/2021     Admin Instructions:  Do not swallow.    Route: Mouth/Throat    dexamethasone (DECADRON) injection 6 mg 6 mg Daily With Breakfast 12/25/2021     Admin Instructions: Switch to IV if unable to take medication by mouth  For IV administration.  May be pushed over a minimum of 1 minute.    Route: Intravenous    Linked Group 2: \"Or\" Linked Group Details        dexamethasone (DECADRON) tablet 6 mg 6 mg Daily With Breakfast 12/25/2021     Admin Instructions: Switch to IV if unable to take medication by mouth  Take with food.    Route: Oral    Linked Group 2: \"Or\" Linked Group Details        dexmedetomidine (PRECEDEX) 400 mcg in 100 mL NS infusion 0.2-1.5 mcg/kg/hr × 115 kg Titrated 12/17/2021     Admin Instructions: Begin infusion at 0.2 mcg/kg/hr and titrate by 0.1 mcg/kg/hr every 15 minutes to maintain RASS " goal.  Maximum rate 1.5 mcg/kg/hr.  Notify MD if not at RASS goal and requiring 1.5 mcg/kg/hr or heart rate is less than 50 beats per minute or MAP is less than 60 mmHg.    Route: Intravenous    dextromethorphan polistirex ER (DELSYM) 30 MG/5ML oral suspension 60 mg 60 mg Every 12 Hours PRN 12/16/2021     Route: Oral    dextrose (D50W) (25 g/50 mL) IV injection 25 g 25 g Every 15 Minutes PRN 12/19/2021     Admin Instructions: Blood sugar less than 70; patient has IV access - Unresponsive, NPO or Unable To Safely Swallow    Route: Intravenous    dextrose (GLUTOSE) oral gel 15 g 15 g Every 15 Minutes PRN 12/19/2021     Admin Instructions: BS<70, Patient Alert, Is not NPO, Can safely swallow.    Route: Oral    dilTIAZem (CARDIZEM) tablet 60 mg 60 mg Every 8 Hours Scheduled 12/24/2021     Admin Instructions: HOLD FOR HR <60 and/or SBP <,= 90mmHg  Caution: Look alike/sound alike drug alert.  Take on empty stomach 1 hr before or 2 hrs after meals. Avoid grapefruit juice. Maximum simvastatin dose 10 mg.    Route: Nasogastric    famotidine (PEPCID) injection 20 mg 20 mg Daily 12/27/2021     Admin Instructions: Dilute to 10 mL total volume and give IV push over 2 minutes.    Route: Intravenous    fentaNYL citrate (PF) (SUBLIMAZE) injection 25 mcg 25 mcg Every 1 Hour PRN 12/27/2021 1/3/2022    Admin Instructions: Use filter needle to withdraw dose from ampule.  If given for pain, use the following pain scale:  Mild Pain = Pain Score of 1-3, CPOT 1-2  Moderate Pain = Pain Score of 4-6, CPOT 3-4  Severe Pain = Pain Score of 7-10, CPOT 5-8    Route: Intravenous    glucagon (GLUCAGEN) injection 1 mg 1 mg Every 15 Minutes PRN 12/19/2021     Admin Instructions: Blood Glucose Less Than 70 - Patient Without IV Access - Unresponsive, NPO or Unable To Safely Swallow    Route: Subcutaneous    insulin aspart (novoLOG) injection 0-24 Units 0-24 Units Every 6 Hours 12/21/2021     Admin Instructions: Correction - High Dose.  Greater than 80  units/day total insulin dose or, on steroids, insulin resistant, infection.    Blood glucose 150-199 mg/dL - 4 units  Blood glucose 200-249 mg/dL - 8 units  Blood glucose 250-299 mg/dL - 12 units  Blood glucose 300-349 mg/dL - 16 units  Blood glucose 350-400 mg/dL - 20 units  Blood glucose greater than 400 mg/dL - 24 units and call provider      Route: Subcutaneous    insulin detemir (LEVEMIR) injection 23 Units 23 Units Every 12 Hours Scheduled 12/31/2021     Admin Instructions:     Route: Subcutaneous    ipratropium-albuterol (DUO-NEB) nebulizer solution 3 mL 3 mL Every 6 Hours PRN 12/16/2021     Route: Nebulization    levoFLOXacin (LEVAQUIN) 750 mg/150 mL D5W (premix) (LEVAQUIN) 750 mg 750 mg Every 24 Hours 1/1/2022 1/8/2022    Admin Instructions: Caution: Look alike/sound alike drug alert. Protect from light. Do NOT refrigerate.    Route: Intravenous    polyethylene glycol (MIRALAX) packet 17 g 17 g Daily 12/26/2021     Admin Instructions: Use 4-8 ounces of water, tea, or juice for each 17 gram dose.    Route: Oral    potassium chloride 10 mEq in 100 mL IVPB 10 mEq Every 1 Hour PRN 12/17/2021     Admin Instructions: Peripheral IV  Potassium 3.1 or Less Give KCl 10 mEq/100 mL NS IV q1h x6 Doses  Potassium 3.2 - 3.6 Give KCl 10 mEq/100 mL NS q1h x4 Doses    Check Potassium 4 Hours After Last Dose Given  Check Magnesium if Potassium Remains Low After Replacement  DO NOT GIVE if CrCl is Less Than 30 mL/minute or Urine Output Less Than 30 mL/hr.     Rates Greater Than 10 mEq/hr Require ECG Monitoring.  OUTPATIENT/NON-MONITORED UNITS: Potassium Chloride standard bolus infusion rate is a maximum of 10 mEq/hr on unmonitored patients    MONITORED UNITS: Potassium Chloride standard bolus infusion rate is a maximum of 20 mEq/hr on ECG monitored patients ONLY      Route: Intravenous    potassium phosphate 15 mmol in sodium chloride 0.9 % 100 mL infusion 15 mmol As Needed 12/17/2021     Admin Instructions: Recheck Phosphorus  "level 6 hours after replacement complete.  Refrigerate.      Doses listed as mmol of phosphate.   4.4 mEq of Potassium = 3 mmol of Phosphate    Route: Intravenous    Linked Group 3: \"Or\" Linked Group Details        potassium phosphate 30 mmol in sodium chloride 0.9 % 250 mL infusion 30 mmol As Needed 12/17/2021     Admin Instructions: Recheck Phosphorus level 6 hours after replacement complete.  Refrigerate.      Doses listed as mmol of phosphate.   4.4 mEq of Potassium = 3 mmol of Phosphate    Route: Intravenous    Linked Group 3: \"Or\" Linked Group Details        potassium phosphate 45 mmol in sodium chloride 0.9 % 500 mL infusion 45 mmol As Needed 12/17/2021     Admin Instructions: Recheck Phosphorus level 6 hours after replacement complete.  Refrigerate.      Doses listed as mmol of phosphate.   4.4 mEq of Potassium = 3 mmol of Phosphate    Route: Intravenous    Linked Group 3: \"Or\" Linked Group Details        propofol (DIPRIVAN) infusion 10 mg/mL 100 mL 5-50 mcg/kg/min × 115 kg Titrated 12/18/2021     Admin Instructions: Do not administer through the same IV catheter with blood or plasma.  Tubing and any unused portions of propofol vials should be discarded after 12 hours.  Begin infusion at 5 mcg/kg/min and titrate by by 5 mcg/kg/min every 5 minutes to maintain RASS goal. Maximum rate 50 mcg/kg/min.  Notify MD if not at goal and requiring more than 50 mcg/kg/min. Infuse into dedicated line, change vial and tube every 12 hours. Patient must be intubated.    Route: Intravenous    sodium chloride 0.9 % flush 10 mL 10 mL As Needed 12/16/2021     Route: Intravenous    Linked Group 4: \"And\" Linked Group Details        sodium chloride 0.9 % flush 10 mL 10 mL As Needed 12/21/2021     Route: Intravenous    sodium chloride 0.9 % flush 10 mL 10 mL Every 12 Hours Scheduled 12/29/2021     Admin Instructions: Lumen #1    Route: Intravenous    sodium chloride 0.9 % flush 10 mL 10 mL Every 12 Hours Scheduled 12/29/2021     " "Admin Instructions: Lumen #2    Route: Intravenous    sodium chloride 0.9 % flush 10 mL 10 mL Every 12 Hours Scheduled 12/29/2021     Admin Instructions: Lumen #3    Route: Intravenous    sodium chloride 0.9 % flush 10 mL 10 mL As Needed 12/29/2021     Route: Intravenous    sodium chloride 0.9 % flush 20 mL 20 mL As Needed 12/21/2021     Route: Intravenous    sodium chloride 0.9 % flush 20 mL 20 mL As Needed 12/29/2021     Route: Intravenous    sodium phosphates 15 mmol in sodium chloride 0.9 % 250 mL IVPB 15 mmol As Needed 12/17/2021     Admin Instructions: Recheck Phosphorus level 6 hours after replacement complete.    Route: Intravenous    Linked Group 3: \"Or\" Linked Group Details        sodium phosphates 30 mmol in sodium chloride 0.9 % 250 mL IVPB 30 mmol As Needed 12/17/2021     Admin Instructions: Recheck Phosphorus level 6 hours after replacement complete.    Route: Intravenous    Linked Group 3: \"Or\" Linked Group Details        sodium phosphates 45 mmol in sodium chloride 0.9 % 500 mL IVPB 45 mmol As Needed 12/17/2021     Admin Instructions: Recheck Phosphorus level 6 hours after replacement complete.    Route: Intravenous    Linked Group 3: \"Or\" Linked Group Details            apixaban, 5 mg, Oral, Q12H  Aquaphor Advanced Therapy, 1 application, Topical, Q12H  bisacodyl, 10 mg, Rectal, Daily  chlorhexidine, 15 mL, Mouth/Throat, Q12H  dexamethasone, 3 mg, Oral, Daily With Breakfast  dilTIAZem CD, 240 mg, Oral, Q24H  famotidine, 20 mg, Oral, Daily  glipizide, 5 mg, Oral, BID AC  insulin aspart, 0-24 Units, Subcutaneous, TID With Meals  linagliptin, 5 mg, Oral, Daily  metFORMIN, 500 mg, Oral, BID With Meals  polyethylene glycol, 17 g, Oral, Daily  sodium chloride, 10 mL, Intravenous, Q12H  sodium chloride, 10 mL, Intravenous, Q12H  sodium chloride, 10 mL, Intravenous, Q12H        Assessment/Plan         Acute hypoxemic respiratory failure due to COVID-19 (HCC)    Acute respiratory failure with hypoxia " (HCC)    1. Acute hypoxic respiratory failure s/p mechanical ventilation 12/18   2. Ac COVID-19 pneumonia  3. ARDS  4. MSSA / Klebsiella pneumonia  5. Pneumomediastinum  6. Shock  7. Cytokine release syndrome s/p remdesivir  8. Extensive left leg DVT on AC  9. A. fib RVR  10. Uncontrolled diabetes  11. Obesity  12. Mild hepatitis; Covid related  13. Fever        C&S  noted     Plan :        Off AB  Observe     supp care  Will follow  Thank you              William Osborne MD  01/12/22  15:44 EST

## 2022-01-12 NOTE — THERAPY TREATMENT NOTE
Acute Care - Speech Language Pathology   Swallow Treatment Note ARELIS Flaherty     Patient Name: Sergio Daniel  : 1955  MRN: [id number]  Today's Date: 2022               Admit Date: 2021    Visit Dx:     ICD-10-CM ICD-9-CM   1. Acute respiratory failure with hypoxia (HCC)  J96.01 518.81   2. Pneumonia due to COVID-19 virus  U07.1 480.8    J12.82 079.89   3. Paroxysmal atrial fibrillation (HCC)  I48.0 427.31   4. Pharyngeal dysphagia  R13.13 787.23   5. Oropharyngeal dysphagia  R13.12 787.22     Patient Active Problem List   Diagnosis   • Acute respiratory failure with hypoxia (HCC)   • Acute hypoxemic respiratory failure due to COVID-19 (HCC)     Past Medical History:   Diagnosis Date   • Diabetes mellitus (HCC)      History reviewed. No pertinent surgical history.    SLP Recommendation and Plan     SLP Diet Recommendation: honey thick liquids, soft textures, ground (22)  Recommended Precautions and Strategies: upright posture during/after eating, small bites of food and sips of liquid, no straw, multiple swallows per bite of food, multiple swallows per sip of liquid, check mouth frequently for oral residue/pocketing, general aspiration precautions, reflux precautions, fatigue precautions, 1:1 supervision (22)  SLP Rec. for Method of Medication Administration: meds whole, meds crushed, with pudding or applesauce, as tolerated (22)     Monitor for Signs of Aspiration: no, none - silent aspiration present, fever, upper respiratory, pneumonia (22)  Recommended Diagnostics: [place] [facility] [place] [place]     Anticipated Discharge Disposition (SLP): inpatient rehabilitation facility, other (see comments) (awaiting precertification) (22)     Therapy Frequency (Swallow): daily, at least, 5 days per week (22)  Predicted Duration Therapy Intervention (Days): 2 weeks, until discharge (22)     Daily Summary of Progress (SLP):  progress toward functional goals is good (01/12/22 1319)         Patient/Family Concerns, Anticipated Discharge Disposition (SLP): Pt eager to move to the next level of care. (01/12/22 1319)     Treatment Assessment (SLP): Pt with improvements noted in voicing and lingual strength. He demonstrates only mild breathiness of voice with effort but is only able to sustain briefly for MPT of 2-3 seconds. Improved resistance with lateralization exercises. Effortful pitch glide continues to be difficulty with significant limitations in pitch variation. Able to vary pitch with consistent verbal prompts and cues for increased effort. Fatigue may have been a factor during today's session.  Continued tolerannce of diet reported but not observed today. Good po intake at meals of both solids and liquids. Consider repeat MBS in next 1-2 days for possible advancement of both solids and liquids. (01/12/22 1319)  Plan for Continued Treatment (SLP): continue treatment per plan of care, further assessment needed (see comments), other (see comments) (consider reassessment via MBS in next 1-2 days) (01/12/22 1319)               SWALLOW EVALUATION (last 72 hours)     SLP Adult Swallow Evaluation     Row Name 01/12/22 1319 01/10/22 1337                Rehab Evaluation    Document Type therapy note (daily note)  -AD therapy note (daily note)  -AD       Subjective Information complains of; fatigue  -AD no complaints  -AD       Patient Observations alert; cooperative; agree to therapy  -AD alert; cooperative; agree to therapy  -AD       Patient/Family/Caregiver Comments/Observations Pt seen sidelying in bed on left side  -AD Pt seen upright in bed during lunch. Pillows used to aid with upright positioning. Pt able to assist. Family present at window during session and speaking to pt and therapist via phone.  -AD       Patient Effort adequate  -AD good  -AD       Symptoms Noted During/After Treatment fatigue  -AD none  -AD                Pain     Additional Documentation --  no pain reported  -AD --  reports pain in his right leg. States it is beginning to 'wake up'. No level given  -AD                General Eating/Swallowing Observations    Respiratory Support Currently in Use room air  -AD nasal cannula  2L  -AD       Eating/Swallowing Skills -- self-fed; unsafe self-feeding skills observed; rate is too fast; requires cueing for compensatory strategies and precautions  -AD       Utensils Used -- spoon  -AD                Respiratory    Respiratory Status WFL; room air  -AD WFL  -AD                SLP Treatment Clinical Impressions    Treatment Assessment (SLP) Pt with improvements noted in voicing and lingual strength. He demonstrates only mild breathiness of voice with effort but is only able to sustain briefly for MPT of 2-3 seconds. Improved resistance with lateralization exercises. Effortful pitch glide continues to be difficulty with significant limitations in pitch variation. Able to vary pitch with consistent verbal prompts and cues for increased effort. Fatigue may have been a factor during today's session.  Continued tolerannce of diet reported but not observed today. Good po intake at meals of both solids and liquids. Consider repeat MBS in next 1-2 days for possible advancement of both solids and liquids.  -AD Pt continues to demonstrate improved tolerance of po diet with good oral intake. Increased impulsivity noted today as pt is feeding himself now. He demonstrates fast rate and large bites. Consistent cues needed in order to slow down and clear each bite before taking another bite. Cues to perform double swallows as well to aid in clearance of pharyngeal residue. Pt demonstrates consistent performance on trials of HTL with spoon administration. Good effort on lingual resistance and pharyngeal exercises to improve airway closure during the swallow. Voice continues to improve, but also continues with breathy quality. May benefit from an  ENT consult if no improvement in voicing over the next week. Will need to be done at the next level of care as no ENT available here currently.  -AD       Daily Summary of Progress (SLP) progress toward functional goals is good  -AD progress toward functional goals as expected  -AD       Barriers to Overall Progress (SLP) Fatigue; Other (see comments)  just during today's session  -AD Medically complex  -AD       Plan for Continued Treatment (SLP) continue treatment per plan of care; further assessment needed (see comments); other (see comments)  consider reassessment via MBS in next 1-2 days  -AD continue treatment per plan of care  -AD       Care Plan Review care plan/treatment goals reviewed; risks/benefits reviewed; current/potential barriers reviewed; patient/other agree to care plan  -AD care plan/treatment goals reviewed; risks/benefits reviewed; current/potential barriers reviewed; patient/other agree to care plan  -AD       Care Plan Review, Other Participant(s) -- spouse  via phone and window  -AD                Recommendations    Therapy Frequency (Swallow) daily; at least; 5 days per week  -AD daily; at least; 5 days per week  -AD       Predicted Duration Therapy Intervention (Days) 2 weeks; until discharge  -AD 2 weeks; until discharge  -AD       SLP Diet Recommendation honey thick liquids; soft textures; ground  -AD honey thick liquids; soft textures; ground  -AD       Recommended Diagnostics reassess via VFSS (MBS)  -AD reassess via VFSS (MBS)  -AD       Recommended Precautions and Strategies upright posture during/after eating; small bites of food and sips of liquid; no straw; multiple swallows per bite of food; multiple swallows per sip of liquid; check mouth frequently for oral residue/pocketing; general aspiration precautions; reflux precautions; fatigue precautions; 1:1 supervision  -AD upright posture during/after eating; small bites of food and sips of liquid; no straw; multiple swallows per  bite of food; multiple swallows per sip of liquid; check mouth frequently for oral residue/pocketing; general aspiration precautions; reflux precautions; fatigue precautions; 1:1 supervision  -AD       Oral Care Recommendations Oral Care before breakfast, after meals and PRN; Toothbrush  -AD Oral Care before breakfast, after meals and PRN; Toothbrush  -AD       SLP Rec. for Method of Medication Administration meds whole; meds crushed; with pudding or applesauce; as tolerated  -AD meds whole; meds crushed; with pudding or applesauce; as tolerated  -AD       Monitor for Signs of Aspiration no; none - silent aspiration present; fever; upper respiratory; pneumonia  -AD no; none - silent aspiration present; fever; upper respiratory; pneumonia  -AD       Anticipated Discharge Disposition (SLP) inpatient rehabilitation facility; other (see comments)  awaiting precertification  -AD inpatient rehabilitation facility; skilled nursing facility; anticipate therapy at next level of care  -AD       Patient/Family Concerns, Anticipated Discharge Disposition (SLP) Pt eager to move to the next level of care.  -AD No current concerns regarding discharge or therapy per pt or wife.  -AD                Oral Nutrition/Hydration Goal 2 (SLP)    Oral Nutrition/Hydration Goal 2, SLP Pt will demonstrate tolerance of a least restrictive diet w/o clinical s/s of aspiration or complications such as aspiration pneumonia  -AD Pt will demonstrate tolerance of a least restrictive diet w/o clinical s/s of aspiration or complications such as aspiration pneumonia  -AD       Time Frame (Oral Nutrition/Hydration Goal 2, SLP) short term goal (STG); 2 weeks  -AD short term goal (STG); 2 weeks  -AD       Barriers (Oral Nutrition/Hydration Goal 2, SLP) medically complex  -AD medically complex  -AD       Progress/Outcomes (Oral Nutrition/Hydration Goal 2, SLP) good progress toward goal; goal ongoing  tolerance of current diet w/o issues; need to reassess via  MBS to see if diet/liquids can be upgraded.  -AD good progress toward goal; goal ongoing  Tolerating soft diet w/grd meats and HTL. Needs increased cues for slow rate and small bites due to pt now able to feed himself.  -AD                Lingual Strengthening Goal 1 (SLP)    Activity (Lingual Strengthening Goal 1, SLP) increase lingual tone/sensation/control/coordination/movement; increase tongue back strength  -AD increase lingual tone/sensation/control/coordination/movement; increase tongue back strength  -AD       Increase Lingual Tone/Sensation/Control/Coordination/Movement lingual movement exercises; swallow trials  -AD lingual movement exercises; swallow trials  -AD       Increase Tongue Back Strength lingual movement exercises; lingual resistance exercises  -AD lingual movement exercises; lingual resistance exercises  -AD       [place]/Accuracy (Lingual Strengthening Goal 1, SLP) with minimal cues (75-90% accuracy)  -AD with minimal cues (75-90% accuracy)  -AD       Time Frame (Lingual Strengthening Goal 1, SLP) short term goal (STG); 2 weeks  -AD short term goal (STG); 2 weeks  -AD       Barriers (Lingual Strengthening Goal 1, SLP) --  no barriers  -AD medically complex  -AD       Progress/Outcomes (Lingual Strengthening Goal 1, SLP) good progress toward goal; goal ongoing  lateralization to left and right w/mild to moderate resistance on 10 reps each. Resistance decreased towards end of reps due to fatigue (rep 7 or 8).  -AD continuing progress toward goal; goal ongoing  lingual resistance for lateralization x10 each right and left w/min to no cues. Fatigue noted after 5 trials each and increased verbal prompts provided. Pt needing encouragement to slow down reps in order to achieve full rest, contraction in between reps  -AD                Pharyngeal Strengthening Exercise Goal 1 (SLP)    Activity (Pharyngeal Strengthening Goal 1, SLP) increase closure at entrance to airway/closure of airway at  glottis  -AD increase closure at entrance to airway/closure of airway at glottis  -AD       Increase Closure at Entrance to Airway/Closure of Airway at Glottis hard effortful swallow; effortful pitch glide (falsetto + pharyngeal squeeze)  -AD hard effortful swallow; effortful pitch glide (falsetto + pharyngeal squeeze)  -AD       [place]/Accuracy (Pharyngeal Strengthening Goal 1, SLP) with moderate cues (50-74% accuracy)  -AD with moderate cues (50-74% accuracy)  -AD       Time Frame (Pharyngeal Strengthening Goal 1, SLP) short term goal (STG); 2 weeks  -AD short term goal (STG); 2 weeks  -AD       Barriers (Pharyngeal Strengthening Goal 1, SLP) No barriers  -AD medically complex  -AD       Progress/Outcomes (Pharyngeal Strengthening Goal 1, SLP) continuing progress toward goal; goal ongoing  effortful swallow x8 with good effort on 5 trials, fatigue affecting performance; effortful pitch glide 10 reps with 1-2 pitch variation with max cues; sustained phonation with effort by pushing hands of SLP 10 reps with MPT 2-3 seconds.  -AD continuing progress toward goal; goal ongoing  hard effortful swallow x10, effortful pitch glides x10 w/cues to increase effort vocal cord closure; vocalized /ah/ 10x with increased effort and pull x10 w/sustained vocalization for an average of 2 sec each.  -AD                Swallow Compensatory Strategies Goal 1 (SLP)    Activity (Swallow Compensatory Strategies/Techniques Goal 1, SLP) compensatory strategies; small bites; liquids by teaspoon only; effortful swallow; extra swallow per bolus; throat clear/extra swallow  -AD compensatory strategies; small bites; liquids by teaspoon only; effortful swallow; extra swallow per bolus; throat clear/extra swallow  -AD       [place]/Accuracy (Swallow Compensatory Strategies/Techniques Goal 1, SLP) with moderate cues (50-74% accuracy)  -AD with moderate cues (50-74% accuracy)  -AD       Time Frame (Swallow Compensatory  Strategies/Techniques Goal 1, SLP) short term goal (STG); 1 week  -AD short term goal (STG); 1 week  -AD       Barriers (Swallow Compensatory Strategies/Techniques Goal 1, SLP) no barriers  -AD medically complex  -AD       Progress/Outcomes (Swallow Compensatory Strategies/Techniques Goal 1, SLP) goal ongoing; other (see comments)  goal not targeted this session; pt just finished lunch  -AD good progress toward goal; goal ongoing  Pt required increased verbal prompts for sm bites as he is feeding himself now. Consistent cues for oral clearance before taking another bite. Able to consistently adhere to spoon size drinks w/use of spoon for HTL. Cons cues for double swallows on po.  -AD             User Key  (r) = Recorded By, (t) = Taken By, (c) = Cosigned By    Initials Name Effective Dates    AD Denisa Chua MS CCC-SLP 06/16/21 -                 EDUCATION  The patient has been educated in the following areas:   Home Exercise Program (HEP) Dysphagia (Swallowing Impairment) Modified Diet Instruction. Pt verbalizes understanding of exercises to perform when SLP not present. Reinforcement needed.        SLP GOALS     Row Name 01/12/22 1319 01/10/22 1337          Oral Nutrition/Hydration Goal 2 (SLP)    Oral Nutrition/Hydration Goal 2, SLP Pt will demonstrate tolerance of a least restrictive diet w/o clinical s/s of aspiration or complications such as aspiration pneumonia  -AD Pt will demonstrate tolerance of a least restrictive diet w/o clinical s/s of aspiration or complications such as aspiration pneumonia  -AD     Time Frame (Oral Nutrition/Hydration Goal 2, SLP) short term goal (STG); 2 weeks  -AD short term goal (STG); 2 weeks  -AD     Barriers (Oral Nutrition/Hydration Goal 2, SLP) medically complex  -AD medically complex  -AD     Progress/Outcomes (Oral Nutrition/Hydration Goal 2, SLP) good progress toward goal; goal ongoing  tolerance of current diet w/o issues; need to reassess via MBS to see if  diet/liquids can be upgraded.  -AD good progress toward goal; goal ongoing  Tolerating soft diet w/grd meats and HTL. Needs increased cues for slow rate and small bites due to pt now able to feed himself.  -AD            Lingual Strengthening Goal 1 (SLP)    Activity (Lingual Strengthening Goal 1, SLP) increase lingual tone/sensation/control/coordination/movement; increase tongue back strength  -AD increase lingual tone/sensation/control/coordination/movement; increase tongue back strength  -AD     Increase Lingual Tone/Sensation/Control/Coordination/Movement lingual movement exercises; swallow trials  -AD lingual movement exercises; swallow trials  -AD     Increase Tongue Back Strength lingual movement exercises; lingual resistance exercises  -AD lingual movement exercises; lingual resistance exercises  -AD     [place]/Accuracy (Lingual Strengthening Goal 1, SLP) with minimal cues (75-90% accuracy)  -AD with minimal cues (75-90% accuracy)  -AD     Time Frame (Lingual Strengthening Goal 1, SLP) short term goal (STG); 2 weeks  -AD short term goal (STG); 2 weeks  -AD     Barriers (Lingual Strengthening Goal 1, SLP) --  no barriers  -AD medically complex  -AD     Progress/Outcomes (Lingual Strengthening Goal 1, SLP) good progress toward goal; goal ongoing  lateralization to left and right w/mild to moderate resistance on 10 reps each. Resistance decreased towards end of reps due to fatigue (rep 7 or 8).  -AD continuing progress toward goal; goal ongoing  lingual resistance for lateralization x10 each right and left w/min to no cues. Fatigue noted after 5 trials each and increased verbal prompts provided. Pt needing encouragement to slow down reps in order to achieve full rest, contraction in between reps  -AD            Pharyngeal Strengthening Exercise Goal 1 (SLP)    Activity (Pharyngeal Strengthening Goal 1, SLP) increase closure at entrance to airway/closure of airway at glottis  -AD increase closure at  entrance to airway/closure of airway at glottis  -AD     Increase Closure at Entrance to Airway/Closure of Airway at Glottis hard effortful swallow; effortful pitch glide (falsetto + pharyngeal squeeze)  -AD hard effortful swallow; effortful pitch glide (falsetto + pharyngeal squeeze)  -AD     [place]/Accuracy (Pharyngeal Strengthening Goal 1, SLP) with moderate cues (50-74% accuracy)  -AD with moderate cues (50-74% accuracy)  -AD     Time Frame (Pharyngeal Strengthening Goal 1, SLP) short term goal (STG); 2 weeks  -AD short term goal (STG); 2 weeks  -AD     Barriers (Pharyngeal Strengthening Goal 1, SLP) No barriers  -AD medically complex  -AD     Progress/Outcomes (Pharyngeal Strengthening Goal 1, SLP) continuing progress toward goal; goal ongoing  effortful swallow x8 with good effort on 5 trials, fatigue affecting performance; effortful pitch glide 10 reps with 1-2 pitch variation with max cues; sustained phonation with effort by pushing hands of SLP 10 reps with MPT 2-3 seconds.  -AD continuing progress toward goal; goal ongoing  hard effortful swallow x10, effortful pitch glides x10 w/cues to increase effort vocal cord closure; vocalized /ah/ 10x with increased effort and pull x10 w/sustained vocalization for an average of 2 sec each.  -AD            Swallow Compensatory Strategies Goal 1 (SLP)    Activity (Swallow Compensatory Strategies/Techniques Goal 1, SLP) compensatory strategies; small bites; liquids by teaspoon only; effortful swallow; extra swallow per bolus; throat clear/extra swallow  -AD compensatory strategies; small bites; liquids by teaspoon only; effortful swallow; extra swallow per bolus; throat clear/extra swallow  -AD     [place]/Accuracy (Swallow Compensatory Strategies/Techniques Goal 1, SLP) with moderate cues (50-74% accuracy)  -AD with moderate cues (50-74% accuracy)  -AD     Time Frame (Swallow Compensatory Strategies/Techniques Goal 1, SLP) short term goal (STG); 1 week  -AD  short term goal (STG); 1 week  -AD     Barriers (Swallow Compensatory Strategies/Techniques Goal 1, SLP) no barriers  -AD medically complex  -AD     Progress/Outcomes (Swallow Compensatory Strategies/Techniques Goal 1, SLP) goal ongoing; other (see comments)  goal not targeted this session; pt just finished lunch  -AD good progress toward goal; goal ongoing  Pt required increased verbal prompts for sm bites as he is feeding himself now. Consistent cues for oral clearance before taking another bite. Able to consistently adhere to spoon size drinks w/use of spoon for HTL. Cons cues for double swallows on po.  -AD           User Key  (r) = Recorded By, (t) = Taken By, (c) = Cosigned By    Initials Name Provider Type    Denisa Mcconnell MS CCC-SLP Speech and Language Pathologist                   Time Calculation:    Time Calculation- SLP     Row Name 01/12/22 1513             Time Calculation- SLP    SLP Start Time 1256  -AD      SLP Stop Time 1319  -AD      SLP Time Calculation (min) 23 min  -AD      Total Timed Code Minutes- SLP 0 minute(s)  -AD      SLP Non-Billable Time (min) 0 min  -AD      SLP Received On 01/12/22  -AD              Untimed Charges    [id number]-[id number] Treatment Swallow Minutes 23  -AD              Total Minutes    Untimed Charges Total Minutes 23  -AD       Total Minutes 23  -AD            User Key  (r) = Recorded By, (t) = Taken By, (c) = Cosigned By    Initials Name Provider Type    Denisa Mcconnell MS CCC-SLP Speech and Language Pathologist                Therapy Charges for Today     Code Description Service Date Service Provider Modifiers Qty    [id number]  ST TREATMENT SWALLOW 2 1/12/2022 Denisa Chua MS CCC-SLP GN 1               Denisa Chua MS CCC-SLP  1/12/2022

## 2022-01-12 NOTE — THERAPY TREATMENT NOTE
Acute Care - Physical Therapy Treatment Note   [place]     Patient Name: Sergio Daniel  : 1955  MRN: [id number]  Today's Date: 2022      Visit Dx:     ICD-10-CM ICD-9-CM   1. Acute respiratory failure with hypoxia (HCC)  J96.01 518.81   2. Pneumonia due to COVID-19 virus  U07.1 480.8    J12.82 079.89   3. Paroxysmal atrial fibrillation (HCC)  I48.0 427.31   4. Pharyngeal dysphagia  R13.13 787.23   5. Oropharyngeal dysphagia  R13.12 787.22     Patient Active Problem List   Diagnosis   • Acute respiratory failure with hypoxia (HCC)   • Acute hypoxemic respiratory failure due to COVID-19 (HCC)     Past Medical History:   Diagnosis Date   • Diabetes mellitus (HCC)      History reviewed. No pertinent surgical history.  PT Assessment (last 12 hours)     PT Evaluation and Treatment     Row Name 22 1003          Physical Therapy Time and Intention    Subjective Information complains of; fatigue  -     Document Type therapy note (daily note)  -     Patient Effort adequate  -     Symptoms Noted During/After Treatment fatigue  -     Row Name 22 1003          General Information    Patient Observations alert; cooperative; agree to therapy  -     General Observations of Patient Patient supine, reports he did not get to sleep until 6 am this morning and is very tired but agrees to try.  Patient initially requesting to try to pivot to chair, however once seated edge of bed, declined due to fatigue.  -     Risks Reviewed patient:; change in vital signs  -     Benefits Reviewed patient:; improve function; increase strength  -     Row Name 22 1003          Pain    Additional Documentation --  no c/o  pain  -     Row Name 22 1003          Bed Mobility    Supine-Sit [place] (Bed Mobility) moderate assist (50% patient effort)  -     Sit-Supine [place] (Bed Mobility) moderate assist (50% patient effort)  -     Assistive Device (Bed Mobility) bed rails; head of bed  elevated; draw sheet  -     Comment (Bed Mobility) improved movement of legs today - advancing legs to edge of bed independently for first time  -     Row Name 01/12/22 1003          Transfers    Comment (Transfers) Patient performed sit to stand from elevated edge of bed 3x.  Min/mod A x 2, maintained standing 2 seconds x2, then 5 seconds.  Forward flexed at hips.  -     Sit-Stand [place] (Transfers) verbal cues; moderate assist (50% patient effort); minimum assist (75% patient effort); 2 person assist  -     Stand-Sit [place] (Transfers) minimum assist (75% patient effort); moderate assist (50% patient effort); 2 person assist  -     Row Name 01/12/22 1003          Sit-Stand Transfer    Assistive Device (Sit-Stand Transfers) walker, front-wheeled  -     Row Name 01/12/22 1003          Stand-Sit Transfer    Assistive Device (Stand-Sit Transfers) walker, front-wheeled  -     Row Name 01/12/22 1003          Motor Skills    Therapeutic Exercise --  LE exercised deferred today due to increased fatigue  -     Row Name             Wound Right hand Blisters    Wound - Properties Group Present on Hospital Admission: N  -LC Side: Right  -LC Location: hand  -LC, includes hand and wrist  Primary Wound Type: Blisters  -LC     Retired Wound - Properties Group Present on Hospital Admission: N  -LC Side: Right  -LC Location: hand  -LC, includes hand and wrist  Primary Wound Type: Blisters  -LC     Row Name             Wound 01/10/22 1322 Right gluteal Other (comment)    Wound - Properties Group Placement Date: 01/10/22  -LC Placement Time: 1322  -LC Side: Right  -LC Location: gluteal  -LC Primary Wound Type: Other  -LC, friction skin injury      Retired Wound - Properties Group Date first assessed: 01/10/22  -LC Time first assessed: 1322  -LC Side: Right  -LC Location: gluteal  -LC Primary Wound Type: Other  -LC, friction skin injury      Row Name 01/12/22 1003          Plan of Care Review    Plan of  Care Reviewed With patient  -LH     Outcome Summary PT:  Patient performed supine to/from sit with mod A today.  He performed sit to stand from elevated edge of bed 3x, with min/mod A x 2.  Patient with increased c/o fatigue today due to lack of sleep.  Continue to recommend acute rehab at discharge prior to return home.  -     Row Name 01/12/22 1003          Positioning and Restraints    Pre-Treatment Position in bed  -     Post Treatment Position bed  -     In Bed supine; call light within reach; encouraged to call for assist; with OT  -LH           User Key  (r) = Recorded By, (t) = Taken By, (c) = Cosigned By    Initials Name Provider Type     Alba Albright, PT Physical Therapist    Chitra Faith, RN, CWON Registered Nurse                Physical Therapy Education                 Title: PT OT SLP Therapies (Done)     Topic: Physical Therapy (Done)     Point: Mobility training (Done)     Learning Progress Summary           Patient Acceptance, E,TB, VU by  at 1/12/2022 1109    Acceptance, E,TB, VU by  at 1/11/2022 1005    Acceptance, E,TB, VU by  at 1/10/2022 1139    Acceptance, E,TB, VU by  at 1/9/2022 0944    Acceptance, E,TB, VU by  at 1/8/2022 1045    Acceptance, E,TB, VU by  at 1/7/2022 1140    Acceptance, E,TB, VU by  at 1/6/2022 1036    Acceptance, E,TB, VU by  at 1/5/2022 1018    Acceptance, E,TB, VU by  at 1/4/2022 1023    Acceptance, E,TB, VU,NR by  at 1/3/2022 1216                               User Key     Initials Effective Dates Name Provider Type Discipline     06/16/21 -  Alba Albright, PT Physical Therapist PT     06/16/21 -  Rosy Harmon, PT Physical Therapist PT              PT Recommendation and Plan  Anticipated Discharge Disposition (PT): inpatient rehabilitation facility, skilled nursing facility  Planned Therapy Interventions (PT): bed mobility training, gait training, home exercise program, patient/family education, strengthening,  transfer training  Therapy Frequency (PT): daily  Plan of Care Reviewed With: patient  Outcome Summary: PT:  Patient performed supine to/from sit with mod A today.  He performed sit to stand from elevated edge of bed 3x, with min/mod A x 2.  Patient with increased c/o fatigue today due to lack of sleep.  Continue to recommend acute rehab at discharge prior to return home.   Outcome Measures     Row Name 01/12/22 1100 01/11/22 1200 01/11/22 1000       How much help from another person do you currently need...    Turning from your back to your side while in flat bed without using bedrails? 2  -LH -- 2  -LH    Moving from lying on back to sitting on the side of a flat bed without bedrails? 2  -LH -- 2  -LH    Moving to and from a bed to a chair (including a wheelchair)? 1  -LH -- 1  -LH    Standing up from a chair using your arms (e.g., wheelchair, bedside chair)? 2  -LH -- 2  -LH    Climbing 3-5 steps with a railing? 1  -LH -- 1  -LH    To walk in hospital room? 1  -LH -- 1  -LH    AM-PAC 6 Clicks Score (PT) 9  -LH -- 9  -LH       How much help from another is currently needed...    Putting on and taking off regular lower body clothing? -- 2  -EN --    Bathing (including washing, rinsing, and drying) -- 2  -EN --    Toileting (which includes using toilet bed pan or urinal) -- 2  -EN --    Putting on and taking off regular upper body clothing -- 2  -EN --    Taking care of personal grooming (such as brushing teeth) -- 3  -EN --    Eating meals -- 3  -EN --    AM-PAC 6 Clicks Score (OT) -- 14  -EN --       Functional Assessment    Outcome Measure Options AM-PAC 6 Clicks Basic Mobility (PT)  - -- AM-PAC 6 Clicks Basic Mobility (PT)  -    Row Name 01/10/22 1021 01/10/22 1020          How much help from another person do you currently need...    Turning from your back to your side while in flat bed without using bedrails? -- 1  -JW     Moving from lying on back to sitting on the side of a flat bed without bedrails? --  1  -JW     Moving to and from a bed to a chair (including a wheelchair)? -- 1  -JW     Standing up from a chair using your arms (e.g., wheelchair, bedside chair)? -- 1  -JW     Climbing 3-5 steps with a railing? -- 1  -JW     To walk in hospital room? -- 1  -JW     AM-PAC 6 Clicks Score (PT) -- 6  -JW            How much help from another is currently needed...    Putting on and taking off regular lower body clothing? 2  -EN --     Bathing (including washing, rinsing, and drying) 2  -EN --     Toileting (which includes using toilet bed pan or urinal) 1  -EN --     Putting on and taking off regular upper body clothing 2  -EN --     Taking care of personal grooming (such as brushing teeth) 3  -EN --     Eating meals 3  -EN --     AM-PAC 6 Clicks Score (OT) 13  -EN --            Functional Assessment    Outcome Measure Options -- AM-PAC 6 Clicks Basic Mobility (PT)  -           User Key  (r) = Recorded By, (t) = Taken By, (c) = Cosigned By    Initials Name Provider Type     Alba Albright, PT Physical Therapist    Isi Wiggins, OTR Occupational Therapist    Rosy Arevalo, PT Physical Therapist                 Time Calculation:    PT Charges     Row Name 01/12/22 1110             Time Calculation    Start Time 1030  -      Stop Time 1051  -      Time Calculation (min) 21 min  -      PT Received On 01/12/22  -      PT - Next Appointment 01/13/22  -              Timed Charges    53388 - PT Therapeutic Exercise Minutes 21  -              Total Minutes    Timed Charges Total Minutes 21  -       Total Minutes 21  -            User Key  (r) = Recorded By, (t) = Taken By, (c) = Cosigned By    Initials Name Provider Type     Alba Albright, PT Physical Therapist              Therapy Charges for Today     Code Description Service Date Service Provider Modifiers Qty    94202407261 HC PT THER PROC EA 15 MIN 1/11/2022 Alba Albright, PT GP 2    93817575515 HC PT THER PROC EA 15 MIN  1/12/2022 Alba Albright, PT GP 1          PT G-Codes  Outcome Measure Options: AM-PAC 6 Clicks Basic Mobility (PT)  AM-PAC 6 Clicks Score (PT): 9  AM-PAC 6 Clicks Score (OT): 14    Alba Albright, PT  1/12/2022

## 2022-01-12 NOTE — PLAN OF CARE
Goal Outcome Evaluation:  Plan of Care Reviewed With: patient           Outcome Summary: Patient VSS, room air. Denied pain or discomfort this shift. Patient turned Q2 hours using pillows and wedges. Voiding without difficulty. Patient resting comfortably in bed at this time.

## 2022-01-13 VITALS
OXYGEN SATURATION: 93 % | HEIGHT: 74 IN | HEART RATE: 83 BPM | BODY MASS INDEX: 36.72 KG/M2 | WEIGHT: 286.16 LBS | SYSTOLIC BLOOD PRESSURE: 102 MMHG | RESPIRATION RATE: 19 BRPM | TEMPERATURE: 97 F | DIASTOLIC BLOOD PRESSURE: 63 MMHG

## 2022-01-13 PROBLEM — U07.1 ACUTE HYPOXEMIC RESPIRATORY FAILURE DUE TO COVID-19 (HCC): Status: RESOLVED | Noted: 2021-12-16 | Resolved: 2022-01-13

## 2022-01-13 PROBLEM — D89.834 CYTOKINE RELEASE SYNDROME, GRADE 4: Status: RESOLVED | Noted: 2022-01-13 | Resolved: 2022-01-13

## 2022-01-13 PROBLEM — D89.834 CYTOKINE RELEASE SYNDROME, GRADE 4: Status: ACTIVE | Noted: 2022-01-13

## 2022-01-13 PROBLEM — J96.01 ACUTE HYPOXEMIC RESPIRATORY FAILURE DUE TO COVID-19: Status: RESOLVED | Noted: 2021-12-16 | Resolved: 2022-01-13

## 2022-01-13 PROBLEM — J96.01 ACUTE RESPIRATORY FAILURE WITH HYPOXIA (HCC): Status: RESOLVED | Noted: 2021-12-16 | Resolved: 2022-01-13

## 2022-01-13 LAB
GLUCOSE BLDC GLUCOMTR-MCNC: 155 MG/DL (ref 70–130)
GLUCOSE BLDC GLUCOMTR-MCNC: 180 MG/DL (ref 70–130)
GLUCOSE BLDC GLUCOMTR-MCNC: 316 MG/DL (ref 70–130)

## 2022-01-13 PROCEDURE — 92526 ORAL FUNCTION THERAPY: CPT

## 2022-01-13 PROCEDURE — 97535 SELF CARE MNGMENT TRAINING: CPT

## 2022-01-13 PROCEDURE — 82962 GLUCOSE BLOOD TEST: CPT

## 2022-01-13 PROCEDURE — 63710000001 INSULIN ASPART PER 5 UNITS: Performed by: INTERNAL MEDICINE

## 2022-01-13 PROCEDURE — 97110 THERAPEUTIC EXERCISES: CPT

## 2022-01-13 PROCEDURE — 99239 HOSP IP/OBS DSCHRG MGMT >30: CPT | Performed by: INTERNAL MEDICINE

## 2022-01-13 RX ORDER — DEXAMETHASONE 1.5 MG/1
3 TABLET ORAL
Qty: 6 TABLET | Refills: 0
Start: 2022-01-14 | End: 2022-02-25

## 2022-01-13 RX ORDER — DEXTROMETHORPHAN POLISTIREX 30 MG/5ML
60 SUSPENSION ORAL EVERY 12 HOURS PRN
Qty: 280 ML
Start: 2022-01-13 | End: 2022-02-10

## 2022-01-13 RX ORDER — DILTIAZEM HYDROCHLORIDE 240 MG/1
240 CAPSULE, COATED, EXTENDED RELEASE ORAL
Start: 2022-01-14 | End: 2022-03-11 | Stop reason: SDUPTHER

## 2022-01-13 RX ADMIN — GLIPIZIDE 5 MG: 5 TABLET ORAL at 08:45

## 2022-01-13 RX ADMIN — METFORMIN HYDROCHLORIDE 500 MG: 500 TABLET ORAL at 17:14

## 2022-01-13 RX ADMIN — SODIUM CHLORIDE, PRESERVATIVE FREE 10 ML: 5 INJECTION INTRAVENOUS at 08:51

## 2022-01-13 RX ADMIN — SODIUM CHLORIDE, PRESERVATIVE FREE 10 ML: 5 INJECTION INTRAVENOUS at 08:46

## 2022-01-13 RX ADMIN — INSULIN ASPART 16 UNITS: 100 INJECTION, SOLUTION INTRAVENOUS; SUBCUTANEOUS at 17:14

## 2022-01-13 RX ADMIN — SODIUM CHLORIDE, PRESERVATIVE FREE 10 ML: 5 INJECTION INTRAVENOUS at 08:50

## 2022-01-13 RX ADMIN — GLIPIZIDE 5 MG: 5 TABLET ORAL at 17:14

## 2022-01-13 RX ADMIN — WHITE PETROLATUM 41 % TOPICAL OINTMENT 1 APPLICATION: OINTMENT at 08:46

## 2022-01-13 RX ADMIN — POLYETHYLENE GLYCOL 3350 17 G: 17 POWDER, FOR SOLUTION ORAL at 08:45

## 2022-01-13 RX ADMIN — METFORMIN HYDROCHLORIDE 500 MG: 500 TABLET ORAL at 08:45

## 2022-01-13 RX ADMIN — INSULIN ASPART 4 UNITS: 100 INJECTION, SOLUTION INTRAVENOUS; SUBCUTANEOUS at 12:29

## 2022-01-13 RX ADMIN — DEXAMETHASONE 3 MG: 1 TABLET ORAL at 08:44

## 2022-01-13 RX ADMIN — FAMOTIDINE 20 MG: 20 TABLET ORAL at 08:45

## 2022-01-13 RX ADMIN — INSULIN ASPART 4 UNITS: 100 INJECTION, SOLUTION INTRAVENOUS; SUBCUTANEOUS at 08:45

## 2022-01-13 RX ADMIN — LINAGLIPTIN 5 MG: 5 TABLET, FILM COATED ORAL at 08:45

## 2022-01-13 RX ADMIN — APIXABAN 5 MG: 2.5 TABLET, FILM COATED ORAL at 08:45

## 2022-01-13 RX ADMIN — DILTIAZEM HYDROCHLORIDE 240 MG: 120 CAPSULE, COATED, EXTENDED RELEASE ORAL at 08:44

## 2022-01-13 RX ADMIN — CHLORHEXIDINE GLUCONATE 0.12% ORAL RINSE 15 ML: 1.2 LIQUID ORAL at 12:30

## 2022-01-13 NOTE — CASE MANAGEMENT/SOCIAL WORK
Continued Stay Note  ARELIS Flaherty     Patient Name: Sergio Daniel  MRN: [id number]  Today's Date: 1/13/2022    Admit Date: 12/16/2021     Discharge Plan     Row Name 01/13/22 1639       Plan    Plan Discharge to [facility]    Plan Comments Ayah from [facility] called and advised that they can accept the patient today.  I advised Dr. Hyatt and Kristin Cardona RN and then called the patients wife to advised that he will be discharged to [facility] today.               Discharge Codes    No documentation.                     Maris Thomas RN

## 2022-01-13 NOTE — THERAPY TREATMENT NOTE
Patient Name: Sergio Daniel  : 1955    MRN: [id number]                              Today's Date: 2022       Admit Date: 2021    Visit Dx:     ICD-10-CM ICD-9-CM   1. Acute respiratory failure with hypoxia (HCC)  J96.01 518.81   2. Pneumonia due to COVID-19 virus  U07.1 480.8    J12.82 079.89   3. Paroxysmal atrial fibrillation (HCC)  I48.0 427.31   4. Pharyngeal dysphagia  R13.13 787.23   5. Oropharyngeal dysphagia  R13.12 787.22     Patient Active Problem List   Diagnosis   • Acute respiratory failure with hypoxia (HCC)   • Acute hypoxemic respiratory failure due to COVID-19 (HCC)     Past Medical History:   Diagnosis Date   • Diabetes mellitus (HCC)      History reviewed. No pertinent surgical history.   General Information     Row Name 22 1258          OT Time and Intention    Document Type therapy note (daily note)  -SD     Mode of Treatment occupational therapy  -SD     Row Name 22 1258          General Information    Existing Precautions/Restrictions fall  -SD     Row Name 22 1258          Safety Issues, Functional Mobility    Impairments Affecting Function (Mobility) strength; endurance/activity tolerance  -SD           User Key  (r) = Recorded By, (t) = Taken By, (c) = Cosigned By    Initials Name Provider Type    SD Feliciano Chua OTR Occupational Therapist                 Mobility/ADL's     Row Name 22 1259          Bed Mobility    Bed Mobility supine-sit; sit-supine; scooting/bridging  -SD     Scooting/Bridging [place] (Bed Mobility) maximum assist (25% patient effort); 2 person assist  -SD     Supine-Sit [place] (Bed Mobility) moderate assist (50% patient effort); 2 person assist  -SD     Sit-Supine [place] (Bed Mobility) maximum assist (25% patient effort); 2 person assist  -SD     Bed Mobility, Safety Issues decreased use of arms for pushing/pulling; decreased use of legs for bridging/pushing  -SD     Assistive Device (Bed Mobility) bed  rails; head of bed elevated; draw sheet  -SD     Row Name 01/13/22 1259          Transfers    Comment (Transfers) Patient attempted to stand from EOB x 3 with mod/max a x 2. Pt able to come to a full standing position on 1st attempt for a few seconds with mod assist x2. Pt unable to come to come to a full standing position on other attempts. Pt reported more fatigue today from actvity earlier in am (sitting at EOB to eat breakfast)  -SD     Sit-Stand [place] (Transfers) verbal cues; moderate assist (50% patient effort); maximum assist (25% patient effort); 2 person assist  -SD     Row Name 01/13/22 1259          Sit-Stand Transfer    Assistive Device (Sit-Stand Transfers) walker, front-wheeled  -SD     Row Name 01/13/22 1259          Activities of Daily Living    BADL Assessment/Intervention grooming  -SD     Row Name 01/13/22 1259          Grooming Assessment/Training    [place] Level (Grooming) grooming skills; hair care, combing/brushing; wash face, hands; shave face  -SD     Position (Grooming) edge of bed sitting; other (see comments)  EOB for brushing hair and washing face, reclined in bed for shaving with electric razor.  -SD           User Key  (r) = Recorded By, (t) = Taken By, (c) = Cosigned By    Initials Name Provider Type    SD Feliciano Chua, OTR Occupational Therapist               Obj/Interventions     Row Name 01/13/22 1310          Range of Motion Comprehensive    Comment, General Range of Motion right shoulder flexion approx 120 arom, left shoulder approx 90 degrees. distal BUE rom wfl  -SD     Row Name 01/13/22 1310          Shoulder (Therapeutic Exercise)    Shoulder AROM (Therapeutic Exercise) bilateral; flexion; aBduction; 10 repetitions  10 reps for flexion and 5 reps for abd  -SD     Row Name 01/13/22 1310          Elbow/Forearm (Therapeutic Exercise)    Elbow/Forearm (Therapeutic Exercise) AROM (active range of motion)  -SD     Elbow/Forearm AROM (Therapeutic Exercise)  bilateral; flexion; extension; 10 repetitions; supination; pronation  -SD     Row Name 01/13/22 1310          Wrist (Therapeutic Exercise)    Wrist AROM (Therapeutic Exercise) bilateral; flexion; extension; 10 repetitions  -SD     [facility] Name 01/13/22 1310          Hand (Therapeutic Exercise)    Hand (Therapeutic Exercise) AROM (active range of motion)  -SD     Hand AROM/AAROM (Therapeutic Exercise) bilateral  -SD     Row Name 01/13/22 1310          Balance    Comment, Balance supervision for static sitting balance at EOB. SBA for dynamic sitting activity  -SD     [facility] Name 01/13/22 1310          Therapeutic Exercise    Therapeutic Exercise shoulder; elbow/forearm; wrist; hand  -SD           User Key  (r) = Recorded By, (t) = Taken By, (c) = Cosigned By    Initials Name Provider Type    SD Feliciano Chua OTR Occupational Therapist               Goals/Plan     [facility] Name 01/13/22 1319          Bed Mobility Goal 1 (OT)    Activity/Assistive Device (Bed Mobility Goal 1, OT) supine to sit  -SD     [place] Level/Cues Needed (Bed Mobility Goal 1, OT) maximum assist (25-49% patient effort)  -SD     Time Frame (Bed Mobility Goal 1, OT) 1 week  -SD     Progress/Outcomes (Bed Mobility Goal 1, OT) goal met  -[facility] Name 01/13/22 1319          Dressing Goal 1 (OT)    Activity/Device (Dressing Goal 1, OT) upper body dressing  -SD     [place]/Cues Needed (Dressing Goal 1, OT) moderate assist (50-74% patient effort)  -SD     Time Frame (Dressing Goal 1, OT) 1 week  -SD     Progress/Outcome (Dressing Goal 1, OT) goal ongoing  -SD     Row Name 01/13/22 1319          Grooming Goal 1 (OT)    Activity/Device (Grooming Goal 1, OT) hair care; oral care; wash face, hands  -SD     [place] (Grooming Goal 1, OT) minimum assist (75% or more patient effort)  -SD     Time Frame (Grooming Goal 1, OT) 1 week  -SD     Progress/Outcome (Grooming Goal 1, OT) goal met  -SD     Row Name 01/13/22 1319          ROM Goal 1 (OT)    Time  Frame (ROM Goal 1, OT) 1 week  -SD     Progress/Outcome (ROM Goal 1, OT) goal met  -SD     Row Name 01/13/22 1319          Problem Specific Goal 1 (OT)    Problem Specific Goal 1 (OT) Patient to perform static sitting EOB with min assist for improved ind with ADLs.  -SD     Time Frame (Problem Specific Goal 1, OT) 1 week  -SD     Progress/Outcome (Problem Specific Goal 1, OT) goal met  -SD     Row Name 01/13/22 1319          Therapy Assessment/Plan (OT)    Planned Therapy Interventions (OT) patient/caregiver education/training; BADL retraining; functional balance retraining; transfer/mobility retraining; strengthening exercise; ROM/therapeutic exercise  -SD           User Key  (r) = Recorded By, (t) = Taken By, (c) = Cosigned By    Initials Name Provider Type    Feliciano Marcial, OTR Occupational Therapist               Clinical Impression     Row Name 01/13/22 1314          Pain Scale: Numbers Pre/Post-Treatment    Pretreatment Pain Rating 0/10 - no pain  -SD     Posttreatment Pain Rating 0/10 - no pain  -SD     Row Name 01/13/22 1314          Plan of Care Review    Plan of Care Reviewed With patient  -SD     Outcome Summary OT: Pt demonstrated improved active rom of BUE's today which allows for increased I with basic adl tasks. He was able to initiate light grooming activity after set up assistance (wash face, brush hair and shave with an electric razor). Pt has limited activity tolerance and needs assistance to fully complete light grooming activity. Pt continues to require significant assistance for bed mobility and to stand from EOB. Education provided regarding benefits of activity and BUE rom program. Plan is for transfer to SNF upon discharge.  -SD     Row Name 01/13/22 1314          Therapy Plan Review/Discharge Plan (OT)    Anticipated Discharge Disposition (OT) skilled nursing facility  -SD     Row Name 01/13/22 1314          Positioning and Restraints    Pre-Treatment Position in bed  -SD     Post  Treatment Position bed  -SD     In Bed supine; call light within reach; encouraged to call for assist; notified nsg  -SD           User Key  (r) = Recorded By, (t) = Taken By, (c) = Cosigned By    Initials Name Provider Type    Feliciano Marcial OTR Occupational Therapist               Outcome Measures     Row Name 01/13/22 1320          How much help from another is currently needed...    Putting on and taking off regular lower body clothing? 2  -SD     Bathing (including washing, rinsing, and drying) 2  -SD     Toileting (which includes using toilet bed pan or urinal) 2  -SD     Putting on and taking off regular upper body clothing 2  -SD     Taking care of personal grooming (such as brushing teeth) 3  -SD     Eating meals 3  -SD     AM-PAC 6 Clicks Score (OT) 14  -SD     Row Name 01/13/22 1040          How much help from another person do you currently need...    Turning from your back to your side while in flat bed without using bedrails? 2  -JW     Moving from lying on back to sitting on the side of a flat bed without bedrails? 2  -JW     Moving to and from a bed to a chair (including a wheelchair)? 1  -JW     Standing up from a chair using your arms (e.g., wheelchair, bedside chair)? 1  -JW     Climbing 3-5 steps with a railing? 1  -JW     To walk in hospital room? 1  -JW     AM-PAC 6 Clicks Score (PT) 8  -JW     Row Name 01/13/22 1040          Functional Assessment    Outcome Measure Options AM-PAC 6 Clicks Basic Mobility (PT)  -           User Key  (r) = Recorded By, (t) = Taken By, (c) = Cosigned By    Initials Name Provider Type    Feliciano Marcial OTR Occupational Therapist    Rosy Arevalo PT Physical Therapist                Occupational Therapy Education                 Title: PT OT SLP Therapies (Done)     Topic: Occupational Therapy (Done)     Point: ADL training (Done)     Description:   Instruct learner(s) on proper safety adaptation and remediation techniques during self care or  transfers.   Instruct in proper use of assistive devices.              Learning Progress Summary           Patient Acceptance, E,TB,D, VU,DU by SD at 1/13/2022 1321    Comment: Education with safety with bed mobility and sit to stand transfers.    Acceptance, E, VU by EN at 1/12/2022 1433    Comment: UE HEP, safety during transfer    Acceptance, E, VU by EN at 1/11/2022 1234    Comment: UE HEP, safety during transfers    Acceptance, E, VU by EN at 1/10/2022 1333    Comment: UE HEP, adl retraining, transfer training    Acceptance, E,TB,D, VU,DU by SD at 1/9/2022 1025    Comment: Education with benefits of activity, BUE HEP and safety with bed mobility/transfers. Pt actively participated in self feeding today.    Acceptance, E, VU by EN at 1/7/2022 1020    Comment: UE HEP, safety during transfers    Acceptance, E, VU by EN at 1/6/2022 1007    Comment: UE HEP    Acceptance, E, VU by EN at 1/5/2022 1115    Comment: UE HEP, benefits of activity    Acceptance, E, VU by EN at 1/5/2022 1026    Comment: Patient educated on UE HEP, benefits of activity.    Acceptance, E, VU by EN at 1/4/2022 0959    Comment: transfer safety, UE HEP    Acceptance, E, VU by EN at 1/3/2022 1352    Comment: Educated on UE HEP                   Point: Home exercise program (Done)     Description:   Instruct learner(s) on appropriate technique for monitoring, assisting and/or progressing therapeutic exercises/activities.              Learning Progress Summary           Patient Acceptance, E,TB,D, VU by SD at 1/13/2022 1320    Comment: BUE HEP    Acceptance, E, VU by EN at 1/12/2022 1433    Comment: UE HEP, safety during transfer    Acceptance, E, VU by EN at 1/11/2022 1234    Comment: UE HEP, safety during transfers    Acceptance, E, VU by EN at 1/10/2022 1333    Comment: UE HEP, adl retraining, transfer training    Acceptance, E,TB,D, VU,DU by SD at 1/9/2022 1025    Comment: Education with benefits of activity, BUE HEP and safety with bed  mobility/transfers. Pt actively participated in self feeding today.    Acceptance, E, VU by EN at 1/7/2022 1020    Comment: UE HEP, safety during transfers    Acceptance, E, VU by EN at 1/6/2022 1007    Comment: UE HEP    Acceptance, E, VU by EN at 1/5/2022 1115    Comment: UE HEP, benefits of activity    Acceptance, E, VU by EN at 1/5/2022 1026    Comment: Patient educated on UE HEP, benefits of activity.    Acceptance, E, VU by EN at 1/4/2022 0959    Comment: transfer safety, UE HEP    Acceptance, E, VU by EN at 1/3/2022 1352    Comment: Educated on UE HEP                               User Key     Initials Effective Dates Name Provider Type Discipline    EN 06/16/21 -  Isi Theodore, OTR Occupational Therapist OT    SD 06/16/21 -  Feliciano Chua OTR Occupational Therapist OT              OT Recommendation and Plan  Planned Therapy Interventions (OT): patient/caregiver education/training, BADL retraining, functional balance retraining, transfer/mobility retraining, strengthening exercise, ROM/therapeutic exercise  Plan of Care Review  Plan of Care Reviewed With: patient  Outcome Summary: OT: Pt demonstrated improved active rom of BUE's today which allowed for active participation with basic adl tasks. He was able to initiate light grooming activity after set up assistance (wash face, brush hair and shave with an electric razor). Pt has limited activity tolerance and needs assistance to fully complete light grooming activity. Pt continues to require significant assistance for bed mobility and to stand from EOB. Education provided regarding benefits of activity and BUE rom program. Plan is for transfer to SNF upon discharge.     Time Calculation:    Time Calculation- OT     Row Name 01/13/22 1322             Time Calculation- OT    OT Start Time 1040  -SD      OT Stop Time 1115  -SD      OT Time Calculation (min) 35 min  -SD              Timed Charges    72292 - OT Self Care/Mgmt Minutes 35  -SD               Total Minutes    Timed Charges Total Minutes 35  -SD       Total Minutes 35  -SD            User Key  (r) = Recorded By, (t) = Taken By, (c) = Cosigned By    Initials Name Provider Type    Feliciano Marcial OTR Occupational Therapist              Therapy Charges for Today     Code Description Service Date Service Provider Modifiers Qty    [id number] HC OT SELF CARE/MGMT/TRAIN EA 15 MIN 1/13/2022 Feliciano Chua OTR GO 2               LINDA Montesinos  1/13/2022

## 2022-01-13 NOTE — THERAPY TREATMENT NOTE
Acute Care - Speech Language Pathology   Swallow Treatment Note ARELIS Flaherty     Patient Name: Sergio Daniel  : 1955  MRN: [id number]  Today's Date: 2022               Admit Date: 2021    Visit Dx:     ICD-10-CM ICD-9-CM   1. Acute respiratory failure with hypoxia (HCC)  J96.01 518.81   2. Pneumonia due to COVID-19 virus  U07.1 480.8    J12.82 079.89   3. Paroxysmal atrial fibrillation (HCC)  I48.0 427.31   4. Pharyngeal dysphagia  R13.13 787.23   5. Oropharyngeal dysphagia  R13.12 787.22     Patient Active Problem List   Diagnosis   • Acute respiratory failure with hypoxia (HCC)   • Acute hypoxemic respiratory failure due to COVID-19 (HCC)     Past Medical History:   Diagnosis Date   • Diabetes mellitus (HCC)      History reviewed. No pertinent surgical history.    SLP Recommendation and Plan     SLP Diet Recommendation: honey thick liquids, soft textures, ground, ice chips between meals after oral care, with supervision (ice chips only by spoon w/supervision) (22)  Recommended Precautions and Strategies: upright posture during/after eating, small bites of food and sips of liquid, no straw, multiple swallows per bite of food, multiple swallows per sip of liquid, check mouth frequently for oral residue/pocketing, general aspiration precautions, reflux precautions, fatigue precautions, 1:1 supervision (22)  SLP Rec. for Method of Medication Administration: meds whole, with thick liquids, as tolerated (22)     Monitor for Signs of Aspiration: no, none - silent aspiration present, fever, upper respiratory, pneumonia (22)  Recommended Diagnostics: reassess via [facility] [place] (22)     Anticipated Discharge Disposition (SLP): inpatient rehabilitation facility (22)     Therapy Frequency (Swallow): daily, at least, 5 days per week (22)  Predicted Duration Therapy Intervention (Days): 2 weeks, until discharge (22)     Daily  Summary of Progress (SLP): progress toward functional goals as expected, prepare for discharge (01/13/22 0912)         Patient/Family Concerns, Anticipated Discharge Disposition (SLP): No concerns reported per patient at this time. (01/13/22 0912)     Treatment Assessment (SLP): Pt continues to demonstrate improvement with his diet. He is able to tolerate soft ground w/o issue except he does demonstrate too large of a bite and too fast of a rate. Can slow down with cues, but does not consistent peform. Tolerating small drinks of thinner honey. Trials of ice chips after meals are toelrated, but spoon size trials of water w/coughing after the swallow. Pt does need reassessment via MBS in order to advance liquids to a thinner consistency. He is to be transferred to [facility] any time after precert obtained. May be best for therapist at next level of care to perform MBS for further goal planning. (01/13/22 0912)  Plan for Continued Treatment (SLP): continue treatment per plan of care, other (see comments) (01/13/22 0912)         Plan of Care Reviewed With: patient, other (see comments) (RN, Evin)  Outcome Summary: SLP: Continues to tolerate diet. Needs repeat MBS to advance diet if possible due to silent aspiration on previous study. Recommend to take pills whole with thickened liquids and may have ice chips after meals, post oral care with supervision to ensure no greater that spoon size bite. If transferring to [facility] today, will defer MBS to the therapist that will follow there.      SWALLOW EVALUATION (last 72 hours)     SLP Adult Swallow Evaluation     Row Name 01/13/22 0912 01/12/22 1319 01/10/22 1337             Rehab Evaluation    Document Type therapy note (daily note)  -AD therapy note (daily note)  -AD therapy note (daily note)  -AD      Subjective Information no complaints  -AD complains of; fatigue  -AD no complaints  -AD      Patient Observations alert; cooperative; agree to therapy  -AD alert;  cooperative; agree to therapy  -AD alert; cooperative; agree to therapy  -AD      Patient/Family/Caregiver Comments/Observations Pt seen sitting upright on the side of the bed for breakfast. Motivated to continue to improve. Ready to go to acute rehab.  -AD Pt seen sidelying in bed on left side  -AD Pt seen upright in bed during lunch. Pillows used to aid with upright positioning. Pt able to assist. Family present at window during session and speaking to pt and therapist via phone.  -AD      Patient Effort good  -AD adequate  -AD good  -AD      Symptoms Noted During/After Treatment none  -AD fatigue  -AD none  -AD              Pain    Additional Documentation --  no current pain reported  -AD --  no pain reported  -AD --  reports pain in his right leg. States it is beginning to 'wake up'. No level given  -AD              General Eating/Swallowing Observations    Respiratory Support Currently in Use room air  -AD room air  -AD nasal cannula  2L  -AD      Eating/Swallowing Skills -- -- self-fed; unsafe self-feeding skills observed; rate is too fast; requires cueing for compensatory strategies and precautions  -AD      Utensils Used -- -- spoon  -AD              Respiratory    Respiratory Status WFL; room air; during swallowing/eating  -AD WFL; room air  -AD WFL  -AD              SLP Treatment Clinical Impressions    Treatment Assessment (SLP) Pt continues to demonstrate improvement with his diet. He is able to tolerate soft ground w/o issue except he does demonstrate too large of a bite and too fast of a rate. Can slow down with cues, but does not consistent peform. Tolerating small drinks of thinner honey. Trials of ice chips after meals are toelrated, but spoon size trials of water w/coughing after the swallow. Pt does need reassessment via MBS in order to advance liquids to a thinner consistency. He is to be transferred to [facility] any time after precert obtained. May be best for therapist at next level of  care to perform MBS for further goal planning.  -AD Pt with improvements noted in voicing and lingual strength. He demonstrates only mild breathiness of voice with effort but is only able to sustain briefly for MPT of 2-3 seconds. Improved resistance with lateralization exercises. Effortful pitch glide continues to be difficulty with significant limitations in pitch variation. Able to vary pitch with consistent verbal prompts and cues for increased effort. Fatigue may have been a factor during today's session.  Continued tolerannce of diet reported but not observed today. Good po intake at meals of both solids and liquids. Consider repeat MBS in next 1-2 days for possible advancement of both solids and liquids.  -AD Pt continues to demonstrate improved tolerance of po diet with good oral intake. Increased impulsivity noted today as pt is feeding himself now. He demonstrates fast rate and large bites. Consistent cues needed in order to slow down and clear each bite before taking another bite. Cues to perform double swallows as well to aid in clearance of pharyngeal residue. Pt demonstrates consistent performance on trials of HTL with spoon administration. Good effort on lingual resistance and pharyngeal exercises to improve airway closure during the swallow. Voice continues to improve, but also continues with breathy quality. May benefit from an ENT consult if no improvement in voicing over the next week. Will need to be done at the next level of care as no ENT available here currently.  -AD      Daily Summary of Progress (SLP) progress toward functional goals as expected; prepare for discharge  -AD progress toward functional goals is good  -AD progress toward functional goals as expected  -AD      Barriers to Overall Progress (SLP) -- Fatigue; Other (see comments)  just during today's session  -AD Medically complex  -AD      Plan for Continued Treatment (SLP) continue treatment per plan of care; other (see comments)   -AD continue treatment per plan of care; further assessment needed (see comments); other (see comments)  consider reassessment via MBS in next 1-2 days  -AD continue treatment per plan of care  -AD      Care Plan Review care plan/treatment goals reviewed; risks/benefits reviewed; current/potential barriers reviewed; patient/other agree to care plan  -AD care plan/treatment goals reviewed; risks/benefits reviewed; current/potential barriers reviewed; patient/other agree to care plan  -AD care plan/treatment goals reviewed; risks/benefits reviewed; current/potential barriers reviewed; patient/other agree to care plan  -AD      Care Plan Review, Other Participant(s) -- -- spouse  via phone and window  -AD              Recommendations    Therapy Frequency (Swallow) daily; at least; 5 days per week  -AD daily; at least; 5 days per week  -AD daily; at least; 5 days per week  -AD      Predicted Duration Therapy Intervention (Days) 2 weeks; until discharge  -AD 2 weeks; until discharge  -AD 2 weeks; until discharge  -AD      SLP Diet Recommendation honey thick liquids; soft textures; ground; ice chips between meals after oral care, with supervision  ice chips only by spoon w/supervision  -AD honey thick liquids; soft textures; ground  -AD honey thick liquids; soft textures; ground  -AD      Recommended Diagnostics reassess via VFSS (MBS)  -AD reassess via VFSS (MBS)  -AD reassess via VFSS (MBS)  -AD      Recommended Precautions and Strategies upright posture during/after eating; small bites of food and sips of liquid; no straw; multiple swallows per bite of food; multiple swallows per sip of liquid; check mouth frequently for oral residue/pocketing; general aspiration precautions; reflux precautions; fatigue precautions; 1:1 supervision  -AD upright posture during/after eating; small bites of food and sips of liquid; no straw; multiple swallows per bite of food; multiple swallows per sip of liquid; check mouth frequently  for oral residue/pocketing; general aspiration precautions; reflux precautions; fatigue precautions; 1:1 supervision  -AD upright posture during/after eating; small bites of food and sips of liquid; no straw; multiple swallows per bite of food; multiple swallows per sip of liquid; check mouth frequently for oral residue/pocketing; general aspiration precautions; reflux precautions; fatigue precautions; 1:1 supervision  -AD      Oral Care Recommendations Oral Care before breakfast, after meals and PRN; Toothbrush  -AD Oral Care before breakfast, after meals and PRN; Toothbrush  -AD Oral Care before breakfast, after meals and PRN; Toothbrush  -AD      SLP Rec. for Method of Medication Administration meds whole; with thick liquids; as tolerated  -AD meds whole; meds crushed; with pudding or applesauce; as tolerated  -AD meds whole; meds crushed; with pudding or applesauce; as tolerated  -AD      Monitor for Signs of Aspiration no; none - silent aspiration present; fever; upper respiratory; pneumonia  -AD no; none - silent aspiration present; fever; upper respiratory; pneumonia  -AD no; none - silent aspiration present; fever; upper respiratory; pneumonia  -AD      Anticipated Discharge Disposition (SLP) inpatient rehabilitation facility  -AD inpatient rehabilitation facility; other (see comments)  awaiting precertification  -AD inpatient rehabilitation facility; skilled nursing facility; anticipate therapy at next level of care  -AD      Patient/Family Concerns, Anticipated Discharge Disposition (SLP) No concerns reported per patient at this time.  -AD Pt eager to move to the next level of care.  -AD No current concerns regarding discharge or therapy per pt or wife.  -AD              Oral Nutrition/Hydration Goal 2 (SLP)    Oral Nutrition/Hydration Goal 2, SLP Pt will demonstrate tolerance of a least restrictive diet w/o clinical s/s of aspiration or complications such as aspiration pneumonia  -AD Pt will demonstrate  tolerance of a least restrictive diet w/o clinical s/s of aspiration or complications such as aspiration pneumonia  -AD Pt will demonstrate tolerance of a least restrictive diet w/o clinical s/s of aspiration or complications such as aspiration pneumonia  -AD      Time Frame (Oral Nutrition/Hydration Goal 2, SLP) short term goal (STG); 2 weeks  -AD short term goal (STG); 2 weeks  -AD short term goal (STG); 2 weeks  -AD      Barriers (Oral Nutrition/Hydration Goal 2, SLP) medically complex  -AD medically complex  -AD medically complex  -AD      Progress/Outcomes (Oral Nutrition/Hydration Goal 2, SLP) good progress toward goal; goal ongoing  Pt demonstrates tolerance of soft, grd diet w/o issues. He is able to tolerate pills whole with thickened drinks. Tolerating nectar thick by small cup sips with cues for effortful swallow and extra swallow after drink/pills.  -AD good progress toward goal; goal ongoing  tolerance of current diet w/o issues; need to reassess via MBS to see if diet/liquids can be upgraded.  -AD good progress toward goal; goal ongoing  Tolerating soft diet w/grd meats and HTL. Needs increased cues for slow rate and small bites due to pt now able to feed himself.  -AD              Lingual Strengthening Goal 1 (SLP)    Activity (Lingual Strengthening Goal 1, SLP) increase lingual tone/sensation/control/coordination/movement; increase tongue back strength  -AD increase lingual tone/sensation/control/coordination/movement; increase tongue back strength  -AD increase lingual tone/sensation/control/coordination/movement; increase tongue back strength  -AD      Increase Lingual Tone/Sensation/Control/Coordination/Movement lingual movement exercises; swallow trials  -AD lingual movement exercises; swallow trials  -AD lingual movement exercises; swallow trials  -AD      Increase Tongue Back Strength -- lingual movement exercises; lingual resistance exercises  -AD lingual movement exercises; lingual resistance  exercises  -AD      [place]/Accuracy (Lingual Strengthening Goal 1, SLP) with minimal cues (75-90% accuracy)  -AD with minimal cues (75-90% accuracy)  -AD with minimal cues (75-90% accuracy)  -AD      Time Frame (Lingual Strengthening Goal 1, SLP) short term goal (STG); 2 weeks  -AD short term goal (STG); 2 weeks  -AD short term goal (STG); 2 weeks  -AD      Barriers (Lingual Strengthening Goal 1, SLP) no barriers  -AD --  no barriers  -AD medically complex  -AD      Progress/Outcomes (Lingual Strengthening Goal 1, SLP) good progress toward goal; goal ongoing  tolerating foods w/o significant oral residue. Able to demonstrate lateralization of the tongue to clear residue. Improved clearance off tongue surface as well with increased effort used. Min cues during meal.  -AD good progress toward goal; goal ongoing  lateralization to left and right w/mild to moderate resistance on 10 reps each. Resistance decreased towards end of reps due to fatigue (rep 7 or 8).  -AD continuing progress toward goal; goal ongoing  lingual resistance for lateralization x10 each right and left w/min to no cues. Fatigue noted after 5 trials each and increased verbal prompts provided. Pt needing encouragement to slow down reps in order to achieve full rest, contraction in between reps  -AD              Pharyngeal Strengthening Exercise Goal 1 (SLP)    Activity (Pharyngeal Strengthening Goal 1, SLP) increase closure at entrance to airway/closure of airway at glottis  -AD increase closure at entrance to airway/closure of airway at glottis  -AD increase closure at entrance to airway/closure of airway at glottis  -AD      Increase Closure at Entrance to Airway/Closure of Airway at Glottis hard effortful swallow; effortful pitch glide (falsetto + pharyngeal squeeze)  -AD hard effortful swallow; effortful pitch glide (falsetto + pharyngeal squeeze)  -AD hard effortful swallow; effortful pitch glide (falsetto + pharyngeal squeeze)  -AD       [place]/Accuracy (Pharyngeal Strengthening Goal 1, SLP) with moderate cues (50-74% accuracy)  -AD with moderate cues (50-74% accuracy)  -AD with moderate cues (50-74% accuracy)  -AD      Time Frame (Pharyngeal Strengthening Goal 1, SLP) short term goal (STG); 2 weeks  -AD short term goal (STG); 2 weeks  -AD short term goal (STG); 2 weeks  -AD      Barriers (Pharyngeal Strengthening Goal 1, SLP) No barriers  -AD No barriers  -AD medically complex  -AD      Progress/Outcomes (Pharyngeal Strengthening Goal 1, SLP) good progress toward goal; goal ongoing  Using effortful swallow for liquids w/min cues on 8 ounce cup of thickened liquids (slightly thinned HTL) on 90% of trials.  -AD continuing progress toward goal; goal ongoing  effortful swallow x8 with good effort on 5 trials, fatigue affecting performance; effortful pitch glide 10 reps with 1-2 pitch variation with max cues; sustained phonation with effort by pushing hands of SLP 10 reps with MPT 2-3 seconds.  -AD continuing progress toward goal; goal ongoing  hard effortful swallow x10, effortful pitch glides x10 w/cues to increase effort vocal cord closure; vocalized /ah/ 10x with increased effort and pull x10 w/sustained vocalization for an average of 2 sec each.  -AD              Swallow Compensatory Strategies Goal 1 (SLP)    Activity (Swallow Compensatory Strategies/Techniques Goal 1, SLP) compensatory strategies; small bites; liquids by teaspoon only; effortful swallow; extra swallow per bolus; throat clear/extra swallow  -AD compensatory strategies; small bites; liquids by teaspoon only; effortful swallow; extra swallow per bolus; throat clear/extra swallow  -AD compensatory strategies; small bites; liquids by teaspoon only; effortful swallow; extra swallow per bolus; throat clear/extra swallow  -AD      [place]/Accuracy (Swallow Compensatory Strategies/Techniques Goal 1, SLP) with moderate cues (50-74% accuracy)  -AD with moderate cues (50-74%  accuracy)  -AD with moderate cues (50-74% accuracy)  -AD      Time Frame (Swallow Compensatory Strategies/Techniques Goal 1, SLP) short term goal (STG); 1 week  -AD short term goal (STG); 1 week  -AD short term goal (STG); 1 week  -AD      Barriers (Swallow Compensatory Strategies/Techniques Goal 1, SLP) no barriers  -AD no barriers  -AD medically complex  -AD      Progress/Outcomes (Swallow Compensatory Strategies/Techniques Goal 1, SLP) progress slower than expected; goal ongoing  Pt able to demonstrate consistent small drinks of Miralax w/oatmeal by spoon after verbal prompt. Able to take small cup drinks consistently on 85% of trials w/intermittent cues. Takes large bites of food despite cues for small bites.  -AD goal ongoing; other (see comments)  goal not targeted this session; pt just finished lunch  -AD good progress toward goal; goal ongoing  Pt required increased verbal prompts for sm bites as he is feeding himself now. Consistent cues for oral clearance before taking another bite. Able to consistently adhere to spoon size drinks w/use of spoon for HTL. Cons cues for double swallows on po.  -AD            User Key  (r) = Recorded By, (t) = Taken By, (c) = Cosigned By    Initials Name Effective Dates    Denisa Mcconnell MS CCC-SLP 06/16/21 -                 EDUCATION  The patient has been educated in the following areas:   Dysphagia (Swallowing Impairment). Pt verbalizes understanding of diet modifications, recommendations and use of compensatory strategies. He requires reinforcement for use of compensatory strategies of small bites and effortful swallow.        SLP GOALS     Row Name 01/13/22 0912 01/12/22 1319 01/10/22 1337       Oral Nutrition/Hydration Goal 2 (SLP)    Oral Nutrition/Hydration Goal 2, SLP Pt will demonstrate tolerance of a least restrictive diet w/o clinical s/s of aspiration or complications such as aspiration pneumonia  -AD Pt will demonstrate tolerance of a least restrictive  diet w/o clinical s/s of aspiration or complications such as aspiration pneumonia  -AD Pt will demonstrate tolerance of a least restrictive diet w/o clinical s/s of aspiration or complications such as aspiration pneumonia  -AD    Time Frame (Oral Nutrition/Hydration Goal 2, SLP) short term goal (STG); 2 weeks  -AD short term goal (STG); 2 weeks  -AD short term goal (STG); 2 weeks  -AD    Barriers (Oral Nutrition/Hydration Goal 2, SLP) medically complex  -AD medically complex  -AD medically complex  -AD    Progress/Outcomes (Oral Nutrition/Hydration Goal 2, SLP) good progress toward goal; goal ongoing  Pt demonstrates tolerance of soft, grd diet w/o issues. He is able to tolerate pills whole with thickened drinks. Tolerating nectar thick by small cup sips with cues for effortful swallow and extra swallow after drink/pills.  -AD good progress toward goal; goal ongoing  tolerance of current diet w/o issues; need to reassess via MBS to see if diet/liquids can be upgraded.  -AD good progress toward goal; goal ongoing  Tolerating soft diet w/grd meats and HTL. Needs increased cues for slow rate and small bites due to pt now able to feed himself.  -AD       Lingual Strengthening Goal 1 (SLP)    Activity (Lingual Strengthening Goal 1, SLP) increase lingual tone/sensation/control/coordination/movement; increase tongue back strength  -AD increase lingual tone/sensation/control/coordination/movement; increase tongue back strength  -AD increase lingual tone/sensation/control/coordination/movement; increase tongue back strength  -AD    Increase Lingual Tone/Sensation/Control/Coordination/Movement lingual movement exercises; swallow trials  -AD lingual movement exercises; swallow trials  -AD lingual movement exercises; swallow trials  -AD    Increase Tongue Back Strength -- lingual movement exercises; lingual resistance exercises  -AD lingual movement exercises; lingual resistance exercises  -AD    [place]/Accuracy (Lingual  Strengthening Goal 1, SLP) with minimal cues (75-90% accuracy)  -AD with minimal cues (75-90% accuracy)  -AD with minimal cues (75-90% accuracy)  -AD    Time Frame (Lingual Strengthening Goal 1, SLP) short term goal (STG); 2 weeks  -AD short term goal (STG); 2 weeks  -AD short term goal (STG); 2 weeks  -AD    Barriers (Lingual Strengthening Goal 1, SLP) no barriers  -AD --  no barriers  -AD medically complex  -AD    Progress/Outcomes (Lingual Strengthening Goal 1, SLP) good progress toward goal; goal ongoing  tolerating foods w/o significant oral residue. Able to demonstrate lateralization of the tongue to clear residue. Improved clearance off tongue surface as well with increased effort used. Min cues during meal.  -AD good progress toward goal; goal ongoing  lateralization to left and right w/mild to moderate resistance on 10 reps each. Resistance decreased towards end of reps due to fatigue (rep 7 or 8).  -AD continuing progress toward goal; goal ongoing  lingual resistance for lateralization x10 each right and left w/min to no cues. Fatigue noted after 5 trials each and increased verbal prompts provided. Pt needing encouragement to slow down reps in order to achieve full rest, contraction in between reps  -AD       Pharyngeal Strengthening Exercise Goal 1 (SLP)    Activity (Pharyngeal Strengthening Goal 1, SLP) increase closure at entrance to airway/closure of airway at glottis  -AD increase closure at entrance to airway/closure of airway at glottis  -AD increase closure at entrance to airway/closure of airway at glottis  -AD    Increase Closure at Entrance to Airway/Closure of Airway at Glottis hard effortful swallow; effortful pitch glide (falsetto + pharyngeal squeeze)  -AD hard effortful swallow; effortful pitch glide (falsetto + pharyngeal squeeze)  -AD hard effortful swallow; effortful pitch glide (falsetto + pharyngeal squeeze)  -AD    [place]/Accuracy (Pharyngeal Strengthening Goal 1, SLP) with  moderate cues (50-74% accuracy)  -AD with moderate cues (50-74% accuracy)  -AD with moderate cues (50-74% accuracy)  -AD    Time Frame (Pharyngeal Strengthening Goal 1, SLP) short term goal (STG); 2 weeks  -AD short term goal (STG); 2 weeks  -AD short term goal (STG); 2 weeks  -AD    Barriers (Pharyngeal Strengthening Goal 1, SLP) No barriers  -AD No barriers  -AD medically complex  -AD    Progress/Outcomes (Pharyngeal Strengthening Goal 1, SLP) good progress toward goal; goal ongoing  Using effortful swallow for liquids w/min cues on 8 ounce cup of thickened liquids (slightly thinned HTL) on 90% of trials.  -AD continuing progress toward goal; goal ongoing  effortful swallow x8 with good effort on 5 trials, fatigue affecting performance; effortful pitch glide 10 reps with 1-2 pitch variation with max cues; sustained phonation with effort by pushing hands of SLP 10 reps with MPT 2-3 seconds.  -AD continuing progress toward goal; goal ongoing  hard effortful swallow x10, effortful pitch glides x10 w/cues to increase effort vocal cord closure; vocalized /ah/ 10x with increased effort and pull x10 w/sustained vocalization for an average of 2 sec each.  -AD       Swallow Compensatory Strategies Goal 1 (SLP)    Activity (Swallow Compensatory Strategies/Techniques Goal 1, SLP) compensatory strategies; small bites; liquids by teaspoon only; effortful swallow; extra swallow per bolus; throat clear/extra swallow  -AD compensatory strategies; small bites; liquids by teaspoon only; effortful swallow; extra swallow per bolus; throat clear/extra swallow  -AD compensatory strategies; small bites; liquids by teaspoon only; effortful swallow; extra swallow per bolus; throat clear/extra swallow  -AD    [place]/Accuracy (Swallow Compensatory Strategies/Techniques Goal 1, SLP) with moderate cues (50-74% accuracy)  -AD with moderate cues (50-74% accuracy)  -AD with moderate cues (50-74% accuracy)  -AD    Time Frame (Swallow  Compensatory Strategies/Techniques Goal 1, SLP) short term goal (STG); 1 week  -AD short term goal (STG); 1 week  -AD short term goal (STG); 1 week  -AD    Barriers (Swallow Compensatory Strategies/Techniques Goal 1, SLP) no barriers  -AD no barriers  -AD medically complex  -AD    Progress/Outcomes (Swallow Compensatory Strategies/Techniques Goal 1, SLP) progress slower than expected; goal ongoing  Pt able to demonstrate consistent small drinks of Miralax w/oatmeal by spoon after verbal prompt. Able to take small cup drinks consistently on 85% of trials w/intermittent cues. Takes large bites of food despite cues for small bites.  -AD goal ongoing; other (see comments)  goal not targeted this session; pt just finished lunch  -AD good progress toward goal; goal ongoing  Pt required increased verbal prompts for sm bites as he is feeding himself now. Consistent cues for oral clearance before taking another bite. Able to consistently adhere to spoon size drinks w/use of spoon for HTL. Cons cues for double swallows on po.  -AD          User Key  (r) = Recorded By, (t) = Taken By, (c) = Cosigned By    Initials Name Provider Type    Denisa Mcconnell MS CCC-SLP Speech and Language Pathologist                   Time Calculation:    Time Calculation- SLP     Row Name 01/13/22 0945             Time Calculation- SLP    SLP Start Time 0837  -AD      SLP Stop Time 0912  -AD      SLP Time Calculation (min) 35 min  -AD      Total Timed Code Minutes- SLP 0 minute(s)  -AD      SLP Non-Billable Time (min) 0 min  -AD      SLP Received On 01/13/22  -AD              Untimed Charges    08580-RL Treatment Swallow Minutes 35  -AD              Total Minutes    Untimed Charges Total Minutes 35  -AD       Total Minutes 35  -AD            User Key  (r) = Recorded By, (t) = Taken By, (c) = Cosigned By    Initials Name Provider Type    Denisa Mcconnell, MS CCC-SLP Speech and Language Pathologist                Therapy Charges for Today      Code Description Service Date Service Provider Modifiers Qty    77488308482 HC ST TREATMENT SWALLOW 2 1/12/2022 Denisa Chua, MS CCC-SLP GN 1    80336447005 HC ST TREATMENT SWALLOW 2 1/13/2022 Denisa Chua, MS CCC-SLP GN 1               Denisa Chua, MS BROOK-SLP  1/13/2022

## 2022-01-13 NOTE — PLAN OF CARE
Goal Outcome Evaluation:  Plan of Care Reviewed With: patient           Outcome Summary: PT: Patient performs supine to sit with mod assist.  pt performs sit to stand x3 from elevated bed surface.  on first attempt, pt requires mod assist x2, subsequent transfers patient requires max assist x2 and unable to obtain full upright posture.  Patient tolerates standing for 5 seconds on first attempt, however unable to maintain standing on subsequent attempts.  Patient reports significant fatigue following activity.  Patient requires max assist x2 for sit to supine transfer.  Patient completes 5-10 reps of supine and seated LE exercises, assistance required for SLR and heel slides.  Will continue to follow for therapy needs, recommend continued rehab at discharge.

## 2022-01-13 NOTE — THERAPY TREATMENT NOTE
Acute Care - Physical Therapy Treatment Note  ARELIS Flaherty     Patient Name: Sergio Daniel  : 1955  MRN: [id number]  Today's Date: 2022      Visit Dx:     ICD-10-CM ICD-9-CM   1. Acute respiratory failure with hypoxia (HCC)  J96.01 518.81   2. Pneumonia due to COVID-19 virus  U07.1 480.8    J12.82 079.89   3. Paroxysmal atrial fibrillation (HCC)  I48.0 427.31   4. Pharyngeal dysphagia  R13.13 787.23   5. Oropharyngeal dysphagia  R13.12 787.22     Patient Active Problem List   Diagnosis   • Acute respiratory failure with hypoxia (HCC)   • Acute hypoxemic respiratory failure due to COVID-19 (HCC)     Past Medical History:   Diagnosis Date   • Diabetes mellitus (HCC)      History reviewed. No pertinent surgical history.  PT Assessment (last 12 hours)     PT Evaluation and Treatment     [facility] Name 22 1040          Physical Therapy Time and Intention    Subjective Information complains of; fatigue  -     Document Type therapy note (daily note)  -     Mode of Treatment physical therapy  -     Patient Effort adequate  -     Symptoms Noted During/After Treatment fatigue  -     Comment pt remains motivated to participate  -[facility] Name 22 1040          General Information    Patient Observations alert; cooperative; agree to therapy  -     Patient/Family/Caregiver Comments/Observations pt supine, agrees to therapy  -     Existing Precautions/Restrictions fall  -     Barriers to Rehab medically complex  -     Row Name 22 1040          Cognition    Personal Safety Interventions gait belt; nonskid shoes/slippers when out of bed  -     Row Name 22 1040          Pain Scale: Numbers Pre/Post-Treatment    Pretreatment Pain Rating 0/10 - no pain  -     Posttreatment Pain Rating 0/10 - no pain  -     Row Name 22 1040          Pain Scale: Word Pre/Post-Treatment    Pre/Posttreatment Pain Comment pt reports no pain, states fatigue with activity  -[facility] Name 22  1040          Bed Mobility    Bed Mobility supine-sit; sit-supine  -     Supine-Sit [place] (Bed Mobility) moderate assist (50% patient effort); verbal cues  -     Sit-Supine [place] (Bed Mobility) maximum assist (25% patient effort); 2 person assist; verbal cues  -     Assistive Device (Bed Mobility) bed rails; draw sheet; head of bed elevated  -     Comment (Bed Mobility) pt requires assistance to move LEs to EOB and obtain upright sitting position.  significant assistance required to return to supine  -[facility] Name 01/13/22 1040          Transfers    Transfers sit-stand transfer; stand-sit transfer  -     Comment (Transfers) pt performs sit to stand x3 from elevated bed surface.  on first attempt, pt requires mod assist x2, subsequent transfers patient requires max assist x2 and unable to obtain full upright posture.  Patient tolerates standing for 5 seconds on first attempt, however unable to maintain standing on subsequent attempts  -     Sit-Stand [place] (Transfers) moderate assist (50% patient effort); maximum assist (25% patient effort); 2 person assist  -     Stand-Sit [place] (Transfers) minimum assist (75% patient effort); verbal cues  -JW     Row Name 01/13/22 1040          Sit-Stand Transfer    Assistive Device (Sit-Stand Transfers) walker, front-wheeled  -[facility] Name 01/13/22 1040          Stand-Sit Transfer    Assistive Device (Stand-Sit Transfers) walker, front-wheeled  -[facility] Name 01/13/22 1040          Gait/Stairs (Locomotion)    Comment (Gait/Stairs) unable to attempt  -JW     Row Name 01/13/22 1040          Balance    Comment, Balance pt able to sit EOB with SBA  -JW     Row Name 01/13/22 1040          Hip (Therapeutic Exercise)    Hip AROM (Therapeutic Exercise) bilateral; flexion; sitting; 5 repetitions  -[facility] Name 01/13/22 1040          Knee (Therapeutic Exercise)    Knee (Therapeutic Exercise) AROM (active range of motion)  -     Knee  AROM (Therapeutic Exercise) bilateral; LAQ (long arc quad); SLR (straight leg raise); heel slides; 10 repetitions  assist required to complete heel slides and SLR  -     Row Name 01/13/22 1040          Ankle (Therapeutic Exercise)    Ankle (Therapeutic Exercise) AROM (active range of motion)  -     Ankle AROM (Therapeutic Exercise) bilateral; dorsiflexion; plantarflexion; 10 repetitions  -     Row Name             Wound Right hand Blisters    Wound - Properties Group Present on Hospital Admission: N  -LC Side: Right  -LC Location: hand  -LC, includes hand and wrist  Primary Wound Type: Blisters  -LC     Retired Wound - Properties Group Present on Hospital Admission: N  -LC Side: Right  -LC Location: hand  -LC, includes hand and wrist  Primary Wound Type: Blisters  -LC     Row Name             Wound 01/10/22 1322 Right gluteal Other (comment)    Wound - Properties Group Placement Date: 01/10/22  -LC Placement Time: 1322  -LC Side: Right  -LC Location: gluteal  -LC Primary Wound Type: Other  -LC, friction skin injury      Retired Wound - Properties Group Date first assessed: 01/10/22  - Time first assessed: 1322  -LC Side: Right  -LC Location: gluteal  -LC Primary Wound Type: Other  -LC, friction skin injury      Row Name 01/13/22 1040          Plan of Care Review    Outcome Summary PT: Patient performs supine to sit with mod assist.  pt performs sit to stand x3 from elevated bed surface.  on first attempt, pt requires mod assist x2, subsequent transfers patient requires max assist x2 and unable to obtain full upright posture.  Patient tolerates standing for 5 seconds on first attempt, however unable to maintain standing on subsequent attempts.  Patient reports significant fatigue following activity.  Patient requires max assist x2 for sit to supine transfer.  Patient completes 5-10 reps of supine and seated LE exercises, assistance required for SLR and heel slides.  Will continue to follow for therapy needs,  recommend continued rehab at discharge.  -     Row Name 01/13/22 1040          Positioning and Restraints    Pre-Treatment Position in bed  -     Post Treatment Position bed  -JW     In Bed notified nsg; supine; call light within reach; encouraged to call for assist  -     Row Name 01/13/22 1040          Progress Summary (PT)    Progress Toward Functional Goals (PT) progress toward functional goals is good  -     Barriers to Overall Progress (PT) fatigue  -           User Key  (r) = Recorded By, (t) = Taken By, (c) = Cosigned By    Initials Name Provider Type     Chitra Hale, RN, CWON Registered Nurse    Rosy Arevalo, PT Physical Therapist                Physical Therapy Education                 Title: PT OT SLP Therapies (Done)     Topic: Physical Therapy (Done)     Point: Mobility training (Done)     Learning Progress Summary           Patient Acceptance, E,TB, VU by  at 1/13/2022 1305    Acceptance, E,TB, VU by  at 1/12/2022 1109    Acceptance, E,TB, VU by  at 1/11/2022 1005    Acceptance, E,TB, VU by  at 1/10/2022 1139    Acceptance, E,TB, VU by  at 1/9/2022 0944    Acceptance, E,TB, VU by  at 1/8/2022 1045    Acceptance, E,TB, VU by  at 1/7/2022 1140    Acceptance, E,TB, VU by  at 1/6/2022 1036    Acceptance, E,TB, VU by  at 1/5/2022 1018    Acceptance, E,TB, VU by  at 1/4/2022 1023    Acceptance, E,TB, VU,NR by  at 1/3/2022 1216                               User Key     Initials Effective Dates Name Provider Type Discipline     06/16/21 -  Alba Albright, PT Physical Therapist PT     06/16/21 -  Rosy Harmon, JASMIN Physical Therapist PT              PT Recommendation and Plan  Anticipated Discharge Disposition (PT): skilled nursing facility, inpatient rehabilitation facility  Planned Therapy Interventions (PT): balance training, bed mobility training, gait training, home exercise program, patient/family education, strengthening, transfer  training  Therapy Frequency (PT): daily  Progress Summary (PT)  Progress Toward Functional Goals (PT): progress toward functional goals is good  Barriers to Overall Progress (PT): fatigue  Plan of Care Reviewed With: patient  Outcome Summary: PT: Patient performs supine to sit with mod assist.  pt performs sit to stand x3 from elevated bed surface.  on first attempt, pt requires mod assist x2, subsequent transfers patient requires max assist x2 and unable to obtain full upright posture.  Patient tolerates standing for 5 seconds on first attempt, however unable to maintain standing on subsequent attempts.  Patient reports significant fatigue following activity.  Patient requires max assist x2 for sit to supine transfer.  Patient completes 5-10 reps of supine and seated LE exercises, assistance required for SLR and heel slides.  Will continue to follow for therapy needs, recommend continued rehab at discharge.   Outcome Measures     Row Name 01/13/22 1040 01/12/22 1400 01/12/22 1100       How much help from another person do you currently need...    Turning from your back to your side while in flat bed without using bedrails? 2  -JW -- 2  -LH    Moving from lying on back to sitting on the side of a flat bed without bedrails? 2  -JW -- 2  -LH    Moving to and from a bed to a chair (including a wheelchair)? 1  -JW -- 1  -LH    Standing up from a chair using your arms (e.g., wheelchair, bedside chair)? 1  -JW -- 2  -LH    Climbing 3-5 steps with a railing? 1  -JW -- 1  -LH    To walk in hospital room? 1  -JW -- 1  -LH    AM-PAC 6 Clicks Score (PT) 8  -JW -- 9  -LH       How much help from another is currently needed...    Putting on and taking off regular lower body clothing? -- 2  -EN --    Bathing (including washing, rinsing, and drying) -- 2  -EN --    Toileting (which includes using toilet bed pan or urinal) -- 2  -EN --    Putting on and taking off regular upper body clothing -- 2  -EN --    Taking care of personal  grooming (such as brushing teeth) -- 3  -EN --    Eating meals -- 3  -EN --    AM-PAC 6 Clicks Score (OT) -- 14  -EN --       Functional Assessment    Outcome Measure Options -[facility] 6 Clicks Basic Mobility (PT)  - -- -PAC 6 Clicks Basic Mobility (PT)  -    Row Name 01/11/22 1200 01/11/22 1000          How much help from another person do you currently need...    Turning from your back to your side while in flat bed without using bedrails? -- 2  -LH     Moving from lying on back to sitting on the side of a flat bed without bedrails? -- 2  -LH     Moving to and from a bed to a chair (including a wheelchair)? -- 1  -LH     Standing up from a chair using your arms (e.g., wheelchair, bedside chair)? -- 2  -LH     Climbing 3-5 steps with a railing? -- 1  -LH     To walk in hospital room? -- 1  -     AM-PAC 6 Clicks Score (PT) -- 9  -LH            How much help from another is currently needed...    Putting on and taking off regular lower body clothing? 2  -EN --     Bathing (including washing, rinsing, and drying) 2  -EN --     Toileting (which includes using toilet bed pan or urinal) 2  -EN --     Putting on and taking off regular upper body clothing 2  -EN --     Taking care of personal grooming (such as brushing teeth) 3  -EN --     Eating meals 3  -EN --     AM-PAC 6 Clicks Score (OT) 14  -EN --            Functional Assessment    Outcome Measure Options -- -[facility] 6 Clicks Basic Mobility (PT)  -           User Key  (r) = Recorded By, (t) = Taken By, (c) = Cosigned By    Initials Name Provider Type     Alba Albright, PT Physical Therapist    Isi Wiggins, OTR Occupational Therapist    Rosy Arevalo, JASMIN Physical Therapist                 Time Calculation:    PT Charges     Row Name 01/13/22 1310             Time Calculation    Start Time 1040  -      Stop Time 1110  -      Time Calculation (min) 30 min  -MONTEZ      PT Received On 01/13/22  -MONTEZ      PT - Next Appointment 01/14/22  -MONTEZ               Timed Charges    92159 - PT Therapeutic Exercise Minutes 30  -JW              Total Minutes    Timed Charges Total Minutes 30  -JW       Total Minutes 30  -JW            User Key  (r) = Recorded By, (t) = Taken By, (c) = Cosigned By    Initials Name Provider Type    Rosy Arevalo, PT Physical Therapist              Therapy Charges for Today     Code Description Service Date Service Provider Modifiers Qty    84793252110 HC PT THER PROC EA 15 MIN 1/13/2022 Rosy Harmon, JASMIN GP 2          PT G-Codes  Outcome Measure Options: AM-PAC 6 Clicks Basic Mobility (PT)  AM-PAC 6 Clicks Score (PT): 8  AM-PAC 6 Clicks Score (OT): 14    Rosy Harmon PT  1/13/2022

## 2022-01-13 NOTE — PROGRESS NOTES
"Hospitalist Team      Patient Care Team:  Farhan Jean Jr., MD as PCP - General (Family Medicine)      Chief Complaint:  Follow-up Acute Hypoxic Respiratory Failure due COVID-19    Subjective    Weakness only overriding issue with no other new complaints    Objective    Vital Signs  Temp:  [96.3 °F (35.7 °C)-98.1 °F (36.7 °C)] 96.3 °F (35.7 °C)  Heart Rate:  [] 102  Resp:  [16-18] 18  BP: (104-111)/(69-80) 107/69  Oxygen Therapy  SpO2: 93 %  Pulse Oximetry Type: Continuous  Device (Oxygen Therapy): ventilator  Device (Oxygen Therapy): room air  $ High Flow Nasal Cannula Set-Up: yes  Flow (L/min): 2  Oxygen Concentration (%): 35  ETCO2 (mmHg): 35 mmHg  Probe Placed On (Pulse Ox): Left:, finger  Probe Removed From (Pulse Ox): Right:, finger}  Flowsheet Rows      First Filed Value   Admission Height 185.4 cm (73\") Documented at 12/16/2021 1515   Admission Weight 120 kg (265 lb) Documented at 12/16/2021 1515          Physical Exam:  Vitals reviewed  General: Acute distress  HEENT:  Normocephalic  Lungs: Somewhat diminished at the bases otherwise no distress  CV: Rate stable  Abdomen: Soft with active bowel sounds.  MSK: Moves extremities spontaneously  Skin: Warm dry  Psych:  Following commands    Results Review:     I reviewed the patient's new clinical results.    Lab Results (last 24 hours)     Procedure Component Value Units Date/Time    POC Glucose Once [[id number]]  (Abnormal) Collected: 01/13/22 1145    Specimen: Blood Updated: 01/13/22 1151     Glucose 155 mg/dL      Comment: Meter: [id number] : [id number] Carisa Meehan RN       POC Glucose Once [[id number]]  (Abnormal) Collected: 01/13/22 0751    Specimen: Blood Updated: 01/13/22 0759     Glucose 180 mg/dL      Comment: Meter: [id number] : [id number] Carisa Meehan RN       POC Glucose Once [[id number]]  (Abnormal) Collected: 01/12/22 2028    Specimen: Blood Updated: 01/12/22 2035     Glucose 199 mg/dL      Comment: Meter: [id number] " : [id number] Fco Llanos CNA       POC Glucose Once [[id number]]  (Abnormal) Collected: 01/12/22 1721    Specimen: Blood Updated: 01/12/22 1737     Glucose 203 mg/dL      Comment: Meter: [id number] : [id number] Sixtofrank Casie NURSING ASSISTANT             Imaging Results (Last 24 Hours)     ** No results found for the last 24 hours. **          Xray reviewed personally by physician.      Medication Review:   I have reviewed the patient's current medication list    Current Facility-Administered Medications:   •  acetaminophen (TYLENOL) tablet 650 mg, 650 mg, Oral, Q6H PRN, Jayden Hyatt DO, 650 mg at 12/26/21 1153  •  [COMPLETED] apixaban (ELIQUIS) tablet 10 mg, 10 mg, Nasogastric, Q12H, 10 mg at 01/05/22 1528 **FOLLOWED BY** apixaban (ELIQUIS) tablet 5 mg, 5 mg, Oral, Q12H, Dakota Moore MD, 5 mg at 01/13/22 0845  •  Aquaphor Advanced Therapy ointment 1 application, 1 application, Topical, Q12H, Dakota Moore MD, 1 application at 01/13/22 0846  •  bisacodyl (DULCOLAX) suppository 10 mg, 10 mg, Rectal, Daily, Dakota Moore MD, 10 mg at 01/10/22 0850  •  chlorhexidine (PERIDEX) 0.12 % solution 15 mL, 15 mL, Mouth/Throat, Q12H, Spencer Carmichael MD, 15 mL at 01/13/22 1230  •  dexamethasone (DECADRON) tablet 3 mg, 3 mg, Oral, Daily With Breakfast, 3 mg at 01/13/22 0844 **OR** [DISCONTINUED] dexamethasone (DECADRON) injection 6 mg, 6 mg, Intravenous, Daily With Breakfast, Charli Ayala MD, 6 mg at 01/05/22 0900  •  dextromethorphan polistirex ER (DELSYM) 30 MG/5ML oral suspension 60 mg, 60 mg, Oral, Q12H PRN, Jaleesa Cárdenas MD, 60 mg at 01/06/22 0922  •  dextrose (D50W) (25 g/50 mL) IV injection 25 g, 25 g, Intravenous, Q15 Min PRN, Dakota Moore MD, 25 g at 12/29/21 0804  •  dextrose (GLUTOSE) oral gel 15 g, 15 g, Oral, Q15 Min PRN, Dakota Moore MD  •  dilTIAZem CD (CARDIZEM CD) 24 hr capsule 240 mg, 240 mg, Oral, Q24H, Dakota Moore MD, 240 mg at 01/13/22 0844  •   famotidine (PEPCID) tablet 20 mg, 20 mg, Oral, Daily, Spencer Carmichael MD, 20 mg at 01/13/22 0845  •  glipizide (GLUCOTROL) tablet 5 mg, 5 mg, Oral, BID AC, Dakota Moore MD, 5 mg at 01/13/22 0845  •  glucagon (GLUCAGEN) injection 1 mg, 1 mg, Subcutaneous, Q15 Min PRN, Dakota Moore MD  •  hydrALAZINE (APRESOLINE) injection 10 mg, 10 mg, Intravenous, Q6H PRN, Jayden Hyatt,   •  insulin aspart (novoLOG) injection 0-24 Units, 0-24 Units, Subcutaneous, TID With Meals, Jayden Hyatt DO, 4 Units at 01/13/22 1229  •  ipratropium-albuterol (DUO-NEB) nebulizer solution 3 mL, 3 mL, Nebulization, Q6H PRN, Jaleesa Cárdenas MD  •  linagliptin (TRADJENTA) tablet 5 mg, 5 mg, Oral, Daily, Dakota Moore MD, 5 mg at 01/13/22 0845  •  metFORMIN (GLUCOPHAGE) tablet 500 mg, 500 mg, Oral, BID With Meals, Dakota Moore MD, 500 mg at 01/13/22 0845  •  polyethylene glycol (MIRALAX) packet 17 g, 17 g, Oral, Daily, Luca Hermosillo MD, 17 g at 01/13/22 0845  •  potassium chloride 10 mEq in 100 mL IVPB, 10 mEq, Intravenous, Q1H PRN, Jaleesa Cárdenas MD, Stopped at 12/28/21 0940  •  potassium phosphate 45 mmol in sodium chloride 0.9 % 500 mL infusion, 45 mmol, Intravenous, PRN **OR** potassium phosphate 30 mmol in sodium chloride 0.9 % 250 mL infusion, 30 mmol, Intravenous, PRN **OR** potassium phosphate 15 mmol in sodium chloride 0.9 % 100 mL infusion, 15 mmol, Intravenous, PRN **OR** sodium phosphates 45 mmol in sodium chloride 0.9 % 500 mL IVPB, 45 mmol, Intravenous, PRN **OR** sodium phosphates 30 mmol in sodium chloride 0.9 % 250 mL IVPB, 30 mmol, Intravenous, PRN **OR** sodium phosphates 15 mmol in sodium chloride 0.9 % 250 mL IVPB, 15 mmol, Intravenous, PRN, Jaleesa Cárdenas MD, Last Rate: 62.5 mL/hr at 12/18/21 0429, 15 mmol at 12/18/21 0429  •  [COMPLETED] Insert peripheral IV, , , Once **AND** sodium chloride 0.9 % flush 10 mL, 10 mL, Intravenous, PRN, Jaleesa Cárdenas  MD CÉSAR, 10 mL at 01/13/22 0850  •  sodium chloride 0.9 % flush 10 mL, 10 mL, Intravenous, PRN, Jeana Grubbs MD  •  sodium chloride 0.9 % flush 10 mL, 10 mL, Intravenous, Q12H, Dakota Moore MD, 10 mL at 01/13/22 0851  •  sodium chloride 0.9 % flush 10 mL, 10 mL, Intravenous, Q12H, Dakota Moore MD, 10 mL at 01/13/22 0846  •  sodium chloride 0.9 % flush 10 mL, 10 mL, Intravenous, Q12H, Dakota Moore MD, 10 mL at 01/13/22 0846  •  sodium chloride 0.9 % flush 10 mL, 10 mL, Intravenous, PRNOscar Rusty T, MD  •  sodium chloride 0.9 % flush 20 mL, 20 mL, Intravenous, Romie HARRINGTON Rami, MD  •  sodium chloride 0.9 % flush 20 mL, 20 mL, Intravenous, JENNIFERNOscar Rusty T, MD      Assessment/Plan      1.  Acute Hypoxic Respiratory Failure due to COVID-19 Pneumonia w/ Superimposed MSSA and Klebsiella pneumonia:    -Extubated 1/1/22.  Continues on room air  -Sputum culture 12/29/2021: MSSA, Citrobacter resistant to ceftriaxone and stenotrophomonas sensitive to Levaquin  -completed the Rocephin and Levaquin course  -Pulmonary now signed off     2.  Diabetes Mellitus, Type 2 in Obese w/ Hyperglycemia:  -Blood glucose stable continue to follow  - Continue basal and SSI   - follow and adjust prn    3.  Right Hand Wound:   -Continue local wound care     4.  Atrial Fibrillation: Remains in NSR.    -On Diltiazem and Eliquis.     5.   LLE DVT:   -On Eliquis.     6. Generalized weakness  -Continue PT OT    7. Dysphagia, severe malnutrition  -tolerating current diet         Plan for disposition: Pre-CERT was denied ischial rehab choice and thus evaluating other placement options hopefully by tomorrow.    Jayden Hyatt,   01/13/22  14:39 EST

## 2022-01-13 NOTE — PROGRESS NOTES
Adult Nutrition  Assessment/PES    Patient Name:  Sergio Daniel  YOB: 1955  MRN: [id number]  Admit Date:  12/16/2021    Assessment Date:  1/13/2022    Comments:  Pt good po intake.   Recommend: Add Boost Glucose Control 1-2 per day to provide easily consumable source of nutrition and insure pt meeting pro needs  Will cont to follow.      Reason for Assessment     Row Name 01/13/22 1304          Reason for Assessment    Reason For Assessment follow-up protocol     Diagnosis pulmonary disease; diabetes diagnosis/complications; infection/sepsis  AHRF COVID PNA MSSA PNA DM DVT Dysphagia                Nutrition/Diet History     Row Name 01/13/22 1304          Nutrition/Diet History    Typical Food/Fluid Intake Pt possible to [place] today. SLP working on further swallow study soon                Anthropometrics     Row Name 01/13/22 1306          Anthropometrics    Weight --  no new wt            Body Mass Index (BMI)    BMI Assessment BMI 35-39.9: obesity grade II                Labs/Tests/Procedures/Meds     Row Name 01/13/22 1306          Labs/Procedures/Meds    Lab Results Reviewed reviewed     Lab Results Comments glu 180, 199, 203            Diagnostic Tests/Procedures    Diagnostic Test/Procedure Reviewed reviewed     Diagnostic Test/Procedures Comments SLP following            Medications    Pertinent Medications Reviewed reviewed     Pertinent Medications Comments decadron, novolog, glucotrol, miralax, glucophage, tradjenta                Physical Findings     Row Name 01/13/22 1307          Physical Findings    Skin other (see comments)  hand wound, R gluteal 1/10                  Nutrition Prescription Ordered     Row Name 01/13/22 1307          Nutrition Prescription PO    Current PO Diet Soft Texture     Texture Ground     Fluid Consistency Honey thick                Evaluation of Received Nutrient/Fluid Intake     Row Name 01/13/22 1307          Fluid Intake Evaluation    Oral Fluid (mL)  523  ave x 3, 25%            PO Evaluation    Number of Meals 6     % PO Intake 92                     Problem/Interventions:   Problem 1     Row Name 01/13/22 1308          Nutrition Diagnoses Problem 1    Problem 1 Inadequate Nutrient Intake     Etiology (related to) Medical Diagnosis; Factors Affecting Nutrition     Signs/Symptoms (evidenced by) Report/Observation     Resolved? --                      Intervention Goal     Row Name 01/13/22 1309          Intervention Goal    General Maintain nutrition     PO Maintain intake; PO intake (%)     PO Intake % 80 %  or greater                Nutrition Intervention     Row Name 01/13/22 1309          Nutrition Intervention    RD/Tech Action Follow Tx progress; Encourage intake; Recommend/ordered     Recommended/Ordered Supplement                Nutrition Prescription     Row Name 01/13/22 1309          Nutrition Prescription PO    PO Prescription Begin/change supplement     Supplement Boost Glucose Control                Education/Evaluation     Row Name 01/13/22 1309          Education    Education Other (comment)  encourage po and try oral supplement help meet pro needs            Monitor/Evaluation    Monitor Per protocol; I&O; PO intake; Supplement intake; Pertinent labs; Weight; Symptoms                 Electronically signed by:  Le Fregoso RD  01/13/22 13:10 EST

## 2022-01-13 NOTE — PLAN OF CARE
Goal Outcome Evaluation:  Plan of Care Reviewed With: patient        Progress: improving  Outcome Summary: Pt rested well overnight, no complaints, VSS. Q2 turn, skin care provided- cream applied to bottom. Pt tolerated honey thick liquids and did well with meds. Waiting for acute rehab placement.

## 2022-01-13 NOTE — PLAN OF CARE
Goal Outcome Evaluation:  Plan of Care Reviewed With: patient, other (see comments) (RN, Evin)           Outcome Summary: SLP: Continues to tolerate diet. Needs repeat MBS to advance diet if possible due to silent aspiration on previous study. Recommend to take pills whole with thickened liquids and may have ice chips after meals, post oral care with supervision to ensure no greater that spoon size bite. If transferring to [facility] today, will defer MBS to the therapist that will follow there.

## 2022-01-13 NOTE — DISCHARGE SUMMARY
Date of Admission: 12/16/2021    Date of Discharge:  1/13/2022    Length of stay:  LOS: 28 days     Presenting Problem:   Paroxysmal atrial fibrillation (HCC) [I48.0]  Acute respiratory failure with hypoxia (HCC) [J96.01]  Pneumonia due to COVID-19 virus [U07.1, J12.82]      Active Diagnosis During Hospital Stay/Discharge Diagnoses/Course by Diagnoses:       1.  Acute Hypoxic Respiratory Failure due to COVID-19 Pneumonia w/ Superimposed MSSA and Klebsiella pneumonia:    -Extubated 1/1/22.  Continues on room air  -Sputum culture 12/29/2021: MSSA, Citrobacter resistant to ceftriaxone and stenotrophomonas sensitive to Levaquin  -completed the Rocephin and Levaquin course  -Pulmonary now signed off     2.  Diabetes Mellitus, Type 2 in Obese w/ Hyperglycemia:  -Medications below at discharge and can use [facility] rehab     3.  Right Hand Wound:   -Continue local wound care     4.  Atrial Fibrillation: Remains in NSR.    -On Diltiazem and Eliquis.  -Follow-up cardiology outpatient     5.   LLE DVT:   -On Eliquis.     6. Generalized weakness  -Continue PT OT     7. Dysphagia, severe malnutrition  -Continue current modified diet rehab can be advanced there       Active Hospital Problems   No active problems to display.      Resolved Hospital Problems    Diagnosis Date Resolved POA   • **Acute hypoxemic respiratory failure due to COVID-19 (HCC) [U07.1, J96.01] 01/13/2022 Yes   • Cytokine release syndrome, grade 4 [D89.834] 01/13/2022 No   • Acute respiratory failure with hypoxia (HCC) [J96.01] 01/13/2022 Yes         Hospital Course  Patient is a 66 y.o. male presented with issues as noted above had a long lengthy hospitalization where he had to be intubated from hypoxic restaurant failure due to COVID-19.  Eventually he was stabilized on the vent extubated on 1/1/2022.  After extubation he did remarkably well was able to be weaned to room air.  Overriding issue of the patient was weakness, and fortunately he was able to be  accepted to short-term rehab he will continue strengthening there.        Procedures Performed:as noted above         Consults:   Consults     Date and Time Order Name Status Description    12/26/2021 10:16 AM Inpatient Infectious Diseases Consult Completed     12/19/2021  9:36 AM Inpatient Anesthesiology Consult      12/16/2021 10:52 PM Inpatient Pulmonology Consult Completed           Pertinent Test Results:     Results from last 7 days   Lab Units 01/11/22 0357 01/09/22 0535 01/08/22  0434   WBC 10*3/mm3 10.73 12.65* 10.13   HEMOGLOBIN g/dL 11.8* 11.7* 10.5*   HEMATOCRIT % 37.4* 37.0* 33.7*   PLATELETS 10*3/mm3 359 332 327     Results from last 7 days   Lab Units 01/11/22 0357 01/09/22 0535 01/07/22  0852   SODIUM mmol/L 135* 137 137   POTASSIUM mmol/L 3.7 3.7 4.0   CHLORIDE mmol/L 99 102 101   CO2 mmol/L 24.1 24.9 27.0   BUN mg/dL 21 25* 21   CREATININE mg/dL 0.51* 0.58* 0.54*   CALCIUM mg/dL 8.7 8.6 8.9   BILIRUBIN mg/dL  --  0.8  --    ALK PHOS U/L  --  144*  --    ALT (SGPT) U/L  --  94*  --    AST (SGOT) U/L  --  27  --    GLUCOSE mg/dL 151* 141* 195*       Microbiology Results (last 10 days)     ** No results found for the last 240 hours. 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 Intermittent  aphonia.     TECHNIQUE:  Video swallowing study was conducted by the speech pathologist in my  presence and recorded on digital video disc.     Fluoroscopy time 3.4 minutes. A single spot image was recorded for  documentation purposes.     FINDINGS:  The patient was administered various barium coated consistencies.  Penetration and aspiration of nectar thick consistency on at least one  or 2 swallows. Vallecular residue with multiple  consistencies and some  spillage into the piriform sinuses with multiple consistencies as well.  Mild delay of oral pharyngeal phase. Please see the full report of the  speech pathologist for further details and dietary recommendations.       Impression:      Penetration and aspiration as described. Please see the full  report of the speech pathologist for further details.     This report was finalized on 1/5/2022 5:52 PM by Dr. Te Kulkarni MD.       XR Abdomen KUB [[id number]] Collected: 01/05/22 0028     Updated: 01/05/22 0030    Narrative:      CR Abdomen 1 Vw    INDICATION:   Dobbhoff tube placement    COMPARISON:   None available    FINDINGS:  AP radiograph(s) of the abdomen. The renal shadows are symmetric. No renal calculi. No bladder calculi.    The bowel gas pattern is nonobstructive. No acute osseous abnormalities. Dobbhoff tube is coiled in the stomach      Impression:      Dobbhoff tube is coiled in the stomach    Signer Name: Van Carpenter MD   Signed: 1/5/2022 12:28 AM   Workstation Name: [employer]    [facility]iology [facility]    XR Abdomen KUB [[id number]] Collected: 01/04/22 2253     Updated: 01/04/22 2255    Narrative:      CR Abdomen 1 Vw    INDICATION:   Dobbhoff tube placement    COMPARISON:   12/27/2021    FINDINGS:  AP radiograph(s) of the abdomen. The renal shadows are symmetric. No renal calculi. No bladder calculi.    The bowel gas pattern is nonobstructive. No acute osseous abnormalities. The Dobbhoff tube tip is in the body the stomach      Impression:      Dobbhoff tube is in good position    Signer Name: Van Carpenter MD   Signed: 1/4/2022 10:53 PM   Workstation Name: [employer]    [facility]    XR Chest 1 View [[id number]] Collected: 01/04/22 2200     Updated: 01/04/22 2203    Narrative:      CR Chest 1 Vw    INDICATION:   Dobbhoff tube placement.     COMPARISON:    Chest 1/1/2022    FINDINGS:  Portable AP view(s) of the chest.    Weighted enteric feeding  tube tip projects over the distal esophagus. Advancement recommended. Interval removal of the endotracheal tube. Left PICC line is again noted to be directed cephalad in the mid SVC. No pneumothorax. No change in patchy bilateral  pulmonary opacities. Heart size is stable.        Impression:        1. Weighted enteric feeding tube tip projects over the distal esophagus. Advancement recommended.  2. Left PICC line tip is again noted to be directed cephalad in the mid SVC.  3. No pneumothorax.  4. Removal of the endotracheal tube.  5. Persistent patchy bilateral pulmonary opacities.    Signer Name: Saran Roach MD   Signed: 1/4/2022 10:00 PM   Workstation Name: [email or web address]    [facility]    XR Chest 1 View [[id number]] Collected: 01/01/22 0821     Updated: 01/01/22 0823    Narrative:      CR Chest 1 Vw    INDICATION:   : 19 positive. Ventilator patient.     COMPARISON:    12/31/2021    FINDINGS:  Portable AP view(s) of the chest.    Enteric tube tip below the diaphragm but not in the field of view. Endotracheal tube in good position above the rochelle. Left-sided PICC line remains present. The tip of the PICC line is directed cephalad at the level of the mid SVC.    Stable cardiac silhouette. Mid and lower lung zone opacities bilaterally persist. Stable to slight interval worsening on the left. No distinct pneumothorax.       Impression:        1. Tubes and lines in position as described. The tip of the left-sided PICC line is now directed cephalad. No pneumothorax.  2. Bilateral pneumonia. Stable to interval worsening appearance compared to prior.    Signer Name: Te Kulkarni MD   Signed: 1/1/2022 8:21 AM   Workstation Name: [email or web address]    [facility]    XR Chest 1 View [[id number]] Collected: 12/31/21 0719     Updated: 12/31/21 0721    Narrative:      CHEST X-RAY, 12/31/2021      HISTORY:    66-year-old male in the ICU with respiratory failure, COVID-19 pneumonia.  Patient on ventilator. Follow-up pulmonary status.      TECHNIQUE:  AP portable chest x-ray. Supine.    COMPARISON:  *  Chest x-ray, 12/30/2021.    FINDINGS:  Moderately dense patchy multifocal pulmonary airspace infiltrates, greatest in the right upper lobe. These are unchanged accounting for slightly improved overall lung expansion today. No visible recurrent pneumomediastinum. Improving soft tissue air in  the chest wall.    Right IJ central line is been removed, and a well-positioned left arm PICC is now in place. Endotracheal tube tip in the low thoracic trachea about 2.5 cm above the rochelle. Feeding tube below the diaphragm.      Impression:      1.  Stable portable chest x-ray, unchanged since yesterday.  2.  Support equipment in good position as noted.  3.  No visible recurrent pneumomediastinum.    Signer Name: Denny Marshall MD   Signed: 12/31/2021 7:19 AM   Workstation Name: [email or web address]    [facility]    XR Chest 1 View [[id number]] Collected: 12/30/21 0630     Updated: 12/30/21 0632    Narrative:      PROCEDURE: CR Chest 1 Vw    COMPARISON:  12/29/2021     INDICATIONS: vent; Acute respiratory failure with hypoxia;COVID-19;Pneumonia due to coronavirus disease 2019;Paroxysmal atrial fibrillation  Relevant clinical info: Follow up covid pneumonia/vent.     TECHNIQUE: Single AP  view of the chest    FINDINGS: Tubes and support lines unchanged. Lung volumes low. Cardiomediastinal silhouette is enlarged but stable. Some improvement in aeration in the right lung and slight decrease in peripheral infiltrates in the right lung. Right perihilar infiltrate  however is increased. Left lung is stable with patchy infiltrates. Osseous structures are intact.      Impression:      Minimal improvement in aeration in the right lung but continued bilateral infiltrates         Signer Name: Kia Goff MD   Signed: 12/30/2021 6:30 AM   Workstation Name: [employer]    [facility]   [place]    XR Chest 1 View [[id number]] Collected: 12/29/21 0702     Updated: 12/29/21 0708    Narrative:      CHEST X-RAY, 12/29/2021         HISTORY:  66-year-old male ICU patient with Covid 19 pneumonia, respiratory  failure. Intubated. Follow-up pulmonary status.     TECHNIQUE:  AP portable chest x-ray.     COMPARISON:  *  Chest x-ray, 12/28/2021, 12/26/2021 and 12/20/2021..     FINDINGS:  Endotracheal tube in good position with tip in the distal thoracic  trachea about 2.7 cm above the rochelle. Right IJ central line in good  position with tip in the mid SVC. Feeding tube below the diaphragm.     Soft tissue emphysema in the chest wall and neck has decreased since  12/20/2021, and pneumomediastinum is no longer visible. No pneumothorax.     Moderate patchy airspace infiltrates scattered throughout both lungs.  Low lung volumes. No visible lobar or segmental consolidation or pleural  effusion.       Impression:      1.  Stable portable chest x-ray, unchanged since yesterday.  2.  Support equipment in good position as detailed above.  3.  Resolved pneumomediastinum. Improving soft tissue air in the chest  wall and neck.     This report was finalized on 12/29/2021 7:06 AM by Dr. Denny Marshall MD.       XR Chest 1 View [[id number]] Collected: 12/28/21 0545     Updated: 12/28/21 0547    Narrative:      CR Chest 1 Vw    INDICATION:   Respiratory failure. Covid positive.     COMPARISON:    12/26/2021    FINDINGS:  Single portable AP view(s) of the chest.    Endotracheal tube tip is just above the rochelle. Right IJ line tip is in the SVC. Feeding tube tip is below the diaphragm. Heart size is stable. Again seen are patchy bilateral infiltrates throughout the lungs. Findings are slightly worsened on the right  but otherwise stable. No visible pneumothorax. There is some subcutaneous emphysema noted. This is also present on the prior study.      Impression:      Slight worsening of right lung infiltrates. Left lung  infiltrates are stable. No visible pneumothorax.    Signer Name: Farhan Martins MD   Signed: 12/28/2021 5:45 AM   Workstation Name: [email or web address]    [facility]    XR Abdomen KUB [[id number]] Collected: 12/27/21 1536     Updated: 12/27/21 1539    Narrative:      CR Abdomen 1 Vw    INDICATION:   Abdominal distention. Acute respiratory failure with hypoxia.    COMPARISON:   12/22/2021    FINDINGS:  AP radiograph(s) of the abdomen. Weighted tip of flexible feeding tube in the left upper quadrant possibly within the stomach.    Increasing abdominal distention predominantly colonic in nature may represent ileus. No acute osseous abnormalities. Catheter projects over the pelvic region likely representing a Garcia catheter.      Impression:      Increased colonic distention may reflect ileus. No convincing evidence of high-grade small bowel obstruction. Flexible feeding tube and presumed Garcia catheter remain in place.    Signer Name: VALENTIN Becker MD   Signed: 12/27/2021 3:36 PM   Workstation Name: [email or web address]    [facility]    XR Chest 1 View [[id number]] Collected: 12/26/21 1040     Updated: 12/26/21 1042    Narrative:      PROCEDURE: CR Chest 1 Vw    COMPARISON: 12/22/2021    INDICATIONS: Fever, ET tube; Acute respiratory failure with hypoxia;COVID-19;Pneumonia due to coronavirus disease 2019;Paroxysmal atrial fibrillation  Relevant clinical info: ICU COVID+;F/U ET placement. Fever   TECHNIQUE: Single AP  view of the chest    FINDINGS: Extensive artifact present. Continued marked subcutaneous emphysema along both chest walls seen in the pectoralis fascia and in both supra clavicular regions but improved since prior study. Despite the presence of the subcutaneous emphysema, a  definite pneumothorax is not appreciated on this portable radiograph. Patchy infiltrates seen bilaterally with consolidation left lower lobe. Tubes and support lines grossly unchanged. Cardiac silhouette  and osseous structures are stable.        Impression:      No significant change from prior study with continued extensive subcutaneous emphysema but no definite pneumothorax. Continued bilateral infiltrates.         Signer Name: Kia Goff MD   Signed: 12/26/2021 10:40 AM   Workstation Name: [email or web address]    [facility] [place]    XR Abdomen KUB [[id number]] Collected: 12/22/21 1141     Updated: 12/22/21 1144    Narrative:      XR ABDOMEN KUB-: 12/22/2021 11:28 AM     INDICATION:   Feeding tube placement.     COMPARISON:   12/19/2021.     FINDINGS:  AP view of the abdomen. The tip of a flexible feeding tube is noted in  the proximal gastric body. Bowel gas pattern is nonspecific but  nonobstructive.       Impression:      Feeding tube tip in the proximal gastric body.     This report was finalized on 12/22/2021 11:42 AM by Dr. Farhan Martins MD.       XR Chest 1 View [[id number]] Collected: 12/22/21 1139     Updated: 12/22/21 1143    Narrative:      AP PORTABLE CHEST,     HISTORY:  COVID positive. Ventilator patient.     COMPARISON:  12/20/2021     TECHNIQUE:  AP portable chest x-ray.     FINDINGS:  Endotracheal tube is in good position in the mid trachea. Right IJ line  tip is difficult to see, but it does appear to be at the SVC level.  Feeding tube tip extends below the diaphragm. Heart remains enlarged.  Bilateral pulmonary infiltrates, slightly worse on the right relative to  the left, are similar to the previous study. There is continued  subcutaneous gas in the chest wall and in both supraclavicular regions.  No definitive pneumothorax is seen.       Impression:      Stable appearance of the chest.     This report was finalized on 12/22/2021 11:41 AM by Dr. Farhan Martins MD.       XR Chest 1 View [[id number]] Collected: 12/20/21 0958     Updated: 12/20/21 1002    Narrative:      XR CHEST 1 VW-: 12/20/2021 9:37 AM     INDICATION:   Covid positive. Change in condition. Ventilator patient.       COMPARISON:    12/19/2021.     FINDINGS:  Single Portable AP view(s) of the chest. Enteric tube below the  diaphragm but not included in the field-of-view. The endotracheal tube  is about 5.5 cm proximal to the rochelle. Right-sided central line is  unchanged. Overlying support equipment artifact.     Stable cardiac silhouette. Patchy opacities throughout the right lung  are stable to slightly worse. Opacities in the left lung do not appear  appreciably changed. There is no distinct pneumothorax. Subcutaneous gas  remains present but appears less conspicuous on the left compared to the  prior study.       Impression:         1. Tubes and lines in satisfactory position. Redemonstration of  subcutaneous gas but no distinct pneumothorax.  2. Stable to interval worsening appearance of the right lung.     This report was finalized on 12/20/2021 10:00 AM by Dr. Te Kulkarni MD.       XR Chest 1 View [[id number]] Collected: 12/19/21 1252     Updated: 12/19/21 1254    Narrative:      CR Chest 1 Vw    INDICATION:   Central line placement. COVID-19 positive.    COMPARISON:    Chest 12/19/2021    FINDINGS:  Portable AP view(s) of the chest.    Placement of a right internal jugular central venous catheter with tip projecting over the mid SVC. Advancement of the enteric tube below the diaphragm. Tip not included on the exam. ET tube is stable. No pneumothorax. No change in diffuse bilateral  pulmonary opacities. Heart size and mediastinum are stable.      Impression:        1. Right IJ central venous catheter tip projects over the mid SVC. No pneumothorax.  2. Advancement of the enteric tube below the diaphragm. Tip not included on the exam.  3. No change in diffuse bilateral pulmonary opacities.    Signer Name: Saran Roach MD   Signed: 12/19/2021 12:52 PM   Workstation Name: [email or web address]-    [facility] [employer] Portable [[id number]] Resulted: 12/19/21 1129     Updated: 12/19/21 1129     Narrative:      This procedure was auto-finalized with no dictation required.    XR Abdomen KUB [[id number]] Collected: 12/19/21 0557     Updated: 12/19/21 0559    Narrative:      CR Abdomen 1 Vw    INDICATION:   Dobbhoff tube repositioning    COMPARISON:   Earlier today    FINDINGS:  AP radiograph(s) of the abdomen. The Dobbhoff tube tip is in the body of stomach. The visible bowel gas pattern is normal          Impression:      Dobbhoff tube tip is in the body the stomach    Signer Name: Van Carpenter MD   Signed: 12/19/2021 5:57 AM   Workstation Name: [facility]    Radiology [facility]    XR Chest 1 View [[id number]] Collected: 12/19/21 0532     Updated: 12/19/21 0534    Narrative:      CR Chest 1 Vw    INDICATION:   Evaluate Dobbhoff tube placement     COMPARISON:    Earlier this morning    FINDINGS:  Portable AP view(s) of the chest.    The only change from the study less from than 1 hour ago is the position of the Dobbhoff tube. Tip is in the upper mediastinum and appears to be in the proximal esophagus.          Impression:      Dobbhoff tube tip appears be in the upper esophagus. Otherwise no change    Signer Name: Van Carpenter MD   Signed: 12/19/2021 5:32 AM   Workstation Name: [facility]    [facility]    XR Chest 1 View [[id number]] Collected: 12/19/21 0513     Updated: 12/19/21 0515    Narrative:      CR Chest 1 Vw    INDICATION:   Follow-up Covid pneumonia, Dobbhoff tube placement     COMPARISON:    12/18/2021    FINDINGS:  Portable AP view(s) of the chest.    Dobbhoff tube tip is in the upper mediastinum. It is Superimposed upon the central portion of the mediastinum and it could be within the trachea or esophagus. I believe is a faint endotracheal tube present with its tip 1 cm above the rochelle.    Extensive diffuse bilateral infiltrates are stable. There is some new subcutaneous emphysema in the supraclavicular regions on both sides right side greater than left. There  is no pneumothorax visible.       Impression:      New supraclavicular subcutaneous air bilaterally    No pneumothorax is visible    The endotracheal tube tip is 1 cm above the rochelle    The Dobbhoff tube tip is either in the mid esophagus or in the distal trachea and should be withdrawn. An position    Signer Name: Van Carpenter MD   Signed: 12/19/2021 5:13 AM   Workstation Name: [email or web address]    [facility] [place]    CT Angiogram Chest With & Without Contrast [[id number]] Collected: 12/18/21 1252     Updated: 12/18/21 1255    Narrative:      CT ANGIOGRAM CHEST W WO CONTRAST    INDICATION:   COVID positive. On that. History of DVT. Acute respiratory failure with hypoxia.    TECHNIQUE:   CT angiogram of the chest with 100 cc of IV contrast. 3-D reconstructions were obtained and reviewed.   Radiation dose reduction techniques included automated exposure control or exposure modulation based on body size. Count of known CT and cardiac nuc  med studies performed in previous 12 months: 0.     COMPARISON:   None available.    FINDINGS:   Contrast timing is appropriate for adequate sensitivity.     The main pulmonary trunk is of normal caliber. No filling defects in the central, lobar, or segmental pulmonary arteries. No interventricular septal bowing or hepatic reflux of contrast to suggest right heart strain.    Heart size is normal. No pericardial effusion. The aorta is non aneurysmal without evidence of dissection.    Central airways are patent. No endobronchial lesions.    There is evidence of pneumomediastinum. Subcutaneous emphysema extends into the soft tissues of the neck and upper right chest.    There are extensive bilateral interstitial infiltrates. Commonly reported imaging features of COVID-19 pneumonia are present. Other processes such as influenza pneumonia and organizing pneumonia can cause a similar imaging pattern. No pleural effusion or  pneumothorax.    No threshold enlarged mediastinal,  hilar or axillary lymph nodes.    No acute findings in visualized upper abdomen.     Regional osseous structures show no acute or aggressive bone lesions.      Impression:      Impression:    1. No evidence of pulmonary embolus on this study.    2. There are extensive bilateral interstitial infiltrates. Commonly reported imaging features of COVID-19 pneumonia are present. Other processes such as influenza pneumonia and organizing pneumonia can cause a similar imaging pattern.  3. There is evidence of pneumomediastinum. Subcutaneous emphysema also extends into the soft tissues of the neck and into the upper right chest.    Signer Name: Sergio Roach MD   Signed: 12/18/2021 12:52 PM   Workstation Name: [facility]    [facility]    XR Chest 1 View [[id number]] Collected: 12/18/21 0223     Updated: 12/18/21 0226    Narrative:      CR Chest 1 Vw    INDICATION:   Evaluate endotracheal tube placement. Infiltrates     COMPARISON:    12/16/2021    FINDINGS:  Portable AP view(s) of the chest.    Tracheal tube tip is 2 cm above the rochelle.    Bilateral diffuse infiltrates are stable. Heart size normal       Impression:      Stable diffuse infiltrates    Endotracheal tube is in good position    Signer Name: Van Carpenter MD   Signed: 12/18/2021 2:23 AM   Workstation Name: [email or web address]    [facility] Venous Doppler Lower Extremity Bilateral (duplex) [[id number]] Collected: 12/17/21 1058     Updated: 12/17/21 1108    Narrative:      VENOUS DOPPLER ULTRASOUND, BILATERAL LOWER EXTREMITIES, 12/17/2021     HISTORY:   66-year-old male hospital inpatient with Covid 19 pneumonia. 1 week  history of bilateral lower extremity edema and swelling. D-dimer is  elevated.     TECHNIQUE:   Lower extremity grayscale, spectral Doppler and color flow Doppler  ultrasound imaging. Imaged segments include the femoral veins in the  thigh, popliteal vein at the knee and the anterior and posterior  tibial  and peroneal veins in the calf as well as the greater saphenous vein.     FINDINGS:   LEFT: Extensive occlusive DVT from the groin to the calf. Occluded vein  segments include the entire femoral vein, proximal deep femoral vein,  popliteal vein, posterior tibial vein and peroneal vein. Partial  thrombosis of the common femoral vein. Greater saphenous vein is patent.     RIGHT: There is no right leg DVT from the groin to the lower calf. Note  is made of short segment superficial venous thrombosis within the mid  calf greater saphenous vein.       Impression:      1.  Extensive occlusive DVT throughout the left leg from the groin to  the lower calf as detailed above.  2.  No right leg DVT.  3.  Short segment SVT within the right mid calf greater saphenous vein.  4.  I have communicated stat result to the ordering physician, RT Oscar MD, prior to this dictation.     This report was finalized on 12/17/2021 11:06 AM by Dr. Denny Marshall MD.       XR Chest 1 View [[id number]] Collected: 12/16/21 1623     Updated: 12/16/21 1625    Narrative:      CR Chest 1 Vw    INDICATION:   Hypoxia with cough and fever. Low sats for 5 days.     COMPARISON:    None available.    FINDINGS:  Portable AP view(s) of the chest.  Cardiac silhouette is partly obscured by the fairly extensive bilateral pulmonary parenchymal infiltrates. There are bilateral infiltrates more confluent at the mid to lower lungs. Left costophrenic angle excluded. No  obvious layering pleural fluid or pneumothorax. Findings are consistent with clinical diagnosis of Covid pneumonia.      Impression:      Fairly extensive bilateral alveolar infiltrates consistent with Covid pneumonia. Follow-up recommended to exclude other pathology    Signer Name: Tiana Kaye MD   Signed: 12/16/2021 4:23 PM   Workstation Name: [facility]    [facility]            Condition on Discharge:  Stable     Vital Signs  Temp:  [96.3 °F (35.7 °C)-98.1 °F  (36.7 °C)] 97 °F (36.1 °C)  Heart Rate:  [] 83  Resp:  [16-19] 19  BP: (102-107)/(63-75) 102/63    Physical Exam:  Physical Exam    See progress note 1/13/22 for PE     Discharge Disposition  Rehab Facility or Unit (DC - External)    Discharge Medications     Discharge Medications      New Medications      Instructions Start Date   apixaban 5 MG tablet tablet  Commonly known as: ELIQUIS   5 mg, Oral, Every 12 Hours Scheduled      dexamethasone 1.5 MG tablet  Commonly known as: DECADRON   3 mg, Oral, Daily With Breakfast   Start Date: January 14, 2022     dextromethorphan polistirex ER 30 MG/5ML Suspension Extended Release oral suspension  Commonly known as: DELSYM   60 mg, Oral, Every 12 Hours PRN      dilTIAZem  MG 24 hr capsule  Commonly known as: CARDIZEM CD   240 mg, Oral, Every 24 Hours Scheduled   Start Date: January 14, 2022     linagliptin 5 MG tablet tablet  Commonly known as: TRADJENTA   5 mg, Oral, Daily   Start Date: January 14, 2022     metFORMIN 500 MG tablet  Commonly known as: GLUCOPHAGE   500 mg, Oral, 2 Times Daily With Meals             Discharge Diet:   Diet Instructions     Diet: Soft Texture; Honey Thick Liquids; Ground      Discharge Diet: Soft Texture    Fluid Consistency: Honey Thick Liquids    Soft Options: Ground          Activity at Discharge:   Activity Instructions     Activity as Tolerated            Follow-up Appointments  No future appointments.  Additional Instructions for the Follow-ups that You Need to Schedule     Discharge Follow-up with Specified Provider: cardiology; 2 Weeks   As directed      To: cardiology    Follow Up: 2 Weeks               Test Results Pending at Discharge       Risk for Readmission (LACE) Score: 9 (1/13/2022  6:01 AM)      This patient has been examined wearing appropriate Personal Protective Equipment . 01/13/22      Time: Discharge 36 min with face-to-face history exam, writing of prescriptions, and documenting discharge data including care  coordination with the nursing staff.      Electronically signed by Jayden Hyatt DO, 01/13/22, 16:44 EST.

## 2022-01-13 NOTE — CASE MANAGEMENT/SOCIAL WORK
Continued Stay Note  ARELIS Flaherty     Patient Name: Sergio Daniel  MRN: [id number]  Today's Date: 1/13/2022    Admit Date: 12/16/2021     Discharge Plan     Row Name 01/13/22 1312       Plan    Plan Comments I received notification that the patient had an insurance denial for [facility] and a peer to peer was completed by Dr. King and the denial stands.  I spoke with the patient and his wife and advised them of same.  A new referral was placed to [facility] as well as [facility]. CM will continue to follow.               Discharge Codes    No documentation.                     Maris Thomas RN

## 2022-01-13 NOTE — PLAN OF CARE
Goal Outcome Evaluation:  Plan of Care Reviewed With: patient           Outcome Summary: OT: Pt demonstrated improved active rom of BUE's today which allowed for active participation with basic adl tasks. He was able to initiate light grooming activity after set up assistance (wash face, brush hair and shave with an electric razor). Pt has limited activity tolerance and needs assistance to fully complete light grooming activity. Pt continues to require significant assistance for bed mobility and to stand from EOB. Education provided regarding benefits of activity and BUE rom program. Plan is for transfer to SNF upon discharge.

## 2022-01-14 NOTE — CASE MANAGEMENT/SOCIAL WORK
Case Management Discharge Note      Final Note: Discharged to [facility], skilled    Provided Post Acute Provider List?:  (will need to discuss when out of ICU)    Selected Continued Care - Discharged on 1/13/2022 Admission date: 12/16/2021 - Discharge disposition: Rehab Facility or Unit (DC - External)    Destination Coordination complete.    Service Provider Selected Services Address Phone Fax Patient Preferred    [facility] [place] [address] [place] [address] [phone] --          Durable Medical Equipment    No services have been selected for the patient.              Dialysis/Infusion    No services have been selected for the patient.              Home Medical Care    No services have been selected for the patient.              Therapy    No services have been selected for the patient.              Community Resources    No services have been selected for the patient.              Community & DME    No services have been selected for the patient.                       Final Discharge Disposition Code: 03 - skilled nursing facility (SNF)

## 2022-02-02 ENCOUNTER — TELEPHONE (OUTPATIENT)
Dept: INTERNAL MEDICINE | Facility: CLINIC | Age: 67
End: 2022-02-02

## 2022-02-02 NOTE — TELEPHONE ENCOUNTER
HOME HEALTH NEEDS VERBAL ORDERS FOR THE PATIENT'S PHYSICAL THERAPY. THIS THERAPY WILL CONSIST OF 2X PER WEEK FOR 3 WEEKS, 1X PER WEEK FOR 1 WEEK, AND 2 TIMES PER WEEK FOR 2 WEEKS. PLEASE ADVISE BY CALLING [phone].

## 2022-02-10 ENCOUNTER — OFFICE VISIT (OUTPATIENT)
Dept: INTERNAL MEDICINE | Facility: CLINIC | Age: 67
End: 2022-02-10

## 2022-02-10 VITALS
TEMPERATURE: 98 F | RESPIRATION RATE: 18 BRPM | DIASTOLIC BLOOD PRESSURE: 70 MMHG | WEIGHT: 235 LBS | OXYGEN SATURATION: 97 % | HEART RATE: 97 BPM | BODY MASS INDEX: 30.16 KG/M2 | SYSTOLIC BLOOD PRESSURE: 120 MMHG | HEIGHT: 74 IN

## 2022-02-10 DIAGNOSIS — U07.1 PNEUMONIA DUE TO COVID-19 VIRUS: ICD-10-CM

## 2022-02-10 DIAGNOSIS — I48.0 PAROXYSMAL ATRIAL FIBRILLATION: ICD-10-CM

## 2022-02-10 DIAGNOSIS — J96.01 ACUTE RESPIRATORY FAILURE WITH HYPOXIA: Primary | ICD-10-CM

## 2022-02-10 DIAGNOSIS — E11.9 TYPE 2 DIABETES MELLITUS WITHOUT COMPLICATION, WITHOUT LONG-TERM CURRENT USE OF INSULIN: ICD-10-CM

## 2022-02-10 DIAGNOSIS — J12.82 PNEUMONIA DUE TO COVID-19 VIRUS: ICD-10-CM

## 2022-02-10 DIAGNOSIS — U07.1 COVID-19: ICD-10-CM

## 2022-02-10 PROCEDURE — 99215 OFFICE O/P EST HI 40 MIN: CPT | Performed by: NURSE PRACTITIONER

## 2022-02-10 PROCEDURE — 93000 ELECTROCARDIOGRAM COMPLETE: CPT | Performed by: NURSE PRACTITIONER

## 2022-02-10 RX ORDER — INSULIN DEGLUDEC INJECTION 100 U/ML
10 INJECTION, SOLUTION SUBCUTANEOUS DAILY
Qty: 3 ML | Refills: 1 | Status: SHIPPED | OUTPATIENT
Start: 2022-02-10 | End: 2022-02-25 | Stop reason: CLARIF

## 2022-02-10 NOTE — PROGRESS NOTES
Subjective    Sergio Daniel is a 66 y.o. male presenting today for   Chief Complaint   Patient presents with   • Establish Care   • Hospital Follow Up Visit   • Pneumonia   • COVID-19       History of Present Illness     Sergio Daniel presents today as a new patient to me to establish care.   [facility] PCP was [facility]. However, it would seem that it has been at least 2 yrs since he was seen there.      Current/chronic health conditions include:    Patient Active Problem List   Diagnosis   • Acute respiratory failure with hypoxia (HCC)   • Diabetes mellitus (HCC)   • Paroxysmal atrial fibrillation (HCC)   • COVID-19   • Pneumonia due to COVID-19 virus       Outpatient Medications Marked as Taking for the 2/10/22 encounter (Office Visit) with Viviana Whitt APRN   Medication Sig Dispense Refill   • apixaban (ELIQUIS) 5 MG tablet tablet Take 1 tablet by mouth Every 12 (Twelve) Hours. Indications: DVT/PE (active thrombosis) 60 tablet    • dexamethasone (DECADRON) 1.5 MG tablet Take 2 tablets by mouth Daily With Breakfast. Indications: COVID-19 Confirmed Infection (Patient taking differently: Take 2 mg by mouth Daily With Breakfast. Indications: COVID-19 Confirmed Infection) 6 tablet 0   • dilTIAZem CD (CARDIZEM CD) 240 MG 24 hr capsule Take 1 capsule by mouth Daily.     • insulin aspart (novoLOG FLEXPEN) 100 UNIT/ML solution pen-injector sc pen Inject 100 Units under the skin into the appropriate area as directed 3 (Three) Times a Day With Meals.     • linagliptin (TRADJENTA) 5 MG tablet tablet Take 1 tablet by mouth Daily. 30 tablet    • metFORMIN (GLUCOPHAGE) 500 MG tablet Take 1 tablet by mouth 2 (Two) Times a Day With Meals. (Patient taking differently: Take 1,000 mg by mouth 2 (Two) Times a Day With Meals.)     • VITAMIN A PO Take  by mouth.     • VITAMIN C, CALCIUM ASCORBATE, PO Take  by mouth.     • Zinc 40 MG tablet Take  by mouth.       Date of Admission: 12/16/2021     Date of Discharge:  1/13/2022     Length  of stay:  LOS: 28 days      Presenting Problem:   Paroxysmal atrial fibrillation (HCC) [I48.0]  Acute respiratory failure with hypoxia (HCC) [J96.01]  Pneumonia due to COVID-19 virus [U07.1, J12.82]        Active Diagnosis During Hospital Stay/Discharge Diagnoses/Course by Diagnoses:         1.  Acute Hypoxic Respiratory Failure due to COVID-19 Pneumonia w/ Superimposed MSSA and Klebsiella pneumonia:    -Extubated 1/1/22.  Continues on room air  -Sputum culture 12/29/2021: MSSA, Citrobacter resistant to ceftriaxone and stenotrophomonas sensitive to Levaquin  -completed the Rocephin and Levaquin course  -Pulmonary now signed off     2.  Diabetes Mellitus, Type 2 in Obese w/ Hyperglycemia:  -Medications below at discharge and can use [facility] rehab     3.  Right Hand Wound:   -Continue local wound care     4.  Atrial Fibrillation: Remains in NSR.    -On Diltiazem and Eliquis.  -Follow-up cardiology outpatient     5.   LLE DVT:   -On Eliquis.     6. Generalized weakness  -Continue PT OT     7. Dysphagia, severe malnutrition  -Continue current modified diet rehab can be advanced there     Hospital Course  Patient is a 66 y.o. male presented with issues as noted above had a long lengthy hospitalization where he had to be intubated from hypoxic restaurant failure due to COVID-19. Eventually he was stabilized on the vent extubated on 1/1/2022.  After extubation he did remarkably well was able to be weaned to room air.     He went to [facility] for rehab following hospital d/c. D/C from [facility] on 01/30/2022.    [facility] = PT 2x/wk, ST 2x/wk, still waiting for OT eval    For some unknown reason, he continues to take Decadron #3 1/2 tablets QD    DM - dx was approx 10 yrs ago. He has never had medication prior to recent hospital admission. Prior to admission he would randomly check his -240s. A1c on admission was 11.0.  Current therapy includes Metformin BID, Tradjenta and Novolog per SS.    BG checks @ 0800, 1200, 1700,  "2200  -200 = 2IU  201-250 = 4IU  251-300 = 6IU  301-350 = 8IU  351-400 = 10IU    0800 =  160-220s  2200 =  210-350s      H/O ANNELIESE w/ CPAP prior to admission. No longer requiring CPAP since hospital d/c. 50# wgt loss since presentation to ED.    The following portions of the patient's history were reviewed and updated as appropriate: allergies, current medications, problem list, past medical history, past surgical history, family history, and social history.     Review of Systems   Constitutional: Positive for fatigue.   Respiratory: Negative for cough, shortness of breath and wheezing.    Cardiovascular: Negative for chest pain and palpitations.   Gastrointestinal: Negative for abdominal pain, blood in stool, constipation, diarrhea, nausea and vomiting.   Genitourinary: Negative for dysuria, frequency, hematuria and urgency.   Musculoskeletal: Positive for gait problem.   Neurological: Negative for dizziness and headache.       Objective    Vitals:    02/10/22 1110   BP: 120/70   Pulse: 97   Resp: 18   Temp: 98 °F (36.7 °C)   SpO2: 97%   Weight: 107 kg (235 lb)   Height: 188 cm (74.02\")     Body mass index is 30.16 kg/m².    Nursing notes and vitals reviewed.    Physical Exam  Constitutional:       General: He is not in acute distress.     Appearance: He is well-developed.      Comments: Seated in wheelchair   Cardiovascular:      Rate and Rhythm: Regular rhythm.      Heart sounds: Normal heart sounds.   Pulmonary:      Effort: Pulmonary effort is normal.      Breath sounds: Normal breath sounds.   Neurological:      Mental Status: He is alert and oriented to person, place, and time.   Psychiatric:         Attention and Perception: He is attentive.         Mood and Affect: Mood and affect normal.         Speech: Speech normal.         Behavior: Behavior normal.         Thought Content: Thought content normal.       Pt brings an EKG which was performed 02/09 at [facility] and independently interpretted by " me.    ECG 12 Lead    Date/Time: 2/10/2022 12:50 PM  Performed by: Viviana Whitt APRN  Authorized by: Viviana Whitt APRN   Comparison: not compared with previous ECG   Rhythm: sinus rhythm  BPM: 91    Clinical impression: abnormal EKG  Comments: Left axis deviation  Nonspecific St and T wave abnormality              Assessment and Plan    Diagnoses and all orders for this visit:    1. Acute respiratory failure with hypoxia (HCC) (Primary)    2. Pneumonia due to COVID-19 virus    3. COVID-19    4. Type 2 diabetes mellitus without complication, without long-term current use of insulin (HCC)  -     insulin degludec (Tresiba FlexTouch) 100 UNIT/ML solution pen-injector injection; Inject 10 Units under the skin into the appropriate area as directed Daily.  Dispense: 3 mL; Refill: 1  -     Ambulatory Referral to Endocrinology    5. Paroxysmal atrial fibrillation (HCC)    Other orders  -     ECG 12 Lead        Keep scheduled f/u w/ Cardiology.  Continue all other current meds for now.  Cont Novolog per SS.  Add Tresiba.  Cont PT, OT, and ST per HH.    May consider PFTs in future. May consider repeating sleep study in future.      Medications, including side effects, were discussed with the patient. Patient verbalized understanding.  The plan of care was discussed. All questions were answered. Patient verbalized understanding.        Return in about 4 weeks (around 3/10/2022).     I spent 73 minutes caring for Sergio on this date of service. This time includes time spent by me in the following activities:preparing for the visit, reviewing tests, obtaining and/or reviewing a separately obtained history, performing a medically appropriate examination and/or evaluation , counseling and educating the patient/family/caregiver, ordering medications, tests, or procedures, referring and communicating with other health care professionals , documenting information in the medical record, independently interpreting  results and communicating that information with the patient/family/caregiver and care coordination

## 2022-02-10 NOTE — PATIENT INSTRUCTIONS
Wean from Decadron -   - decrease 1 tablet daily for 1 week  - then decrease to 1/2 tablet daily for 1 week  - then decrease to 1/2 tablet every other day for one week   - then stop

## 2022-02-14 PROBLEM — U07.1 COVID-19: Status: RESOLVED | Noted: 2022-02-10 | Resolved: 2022-02-14

## 2022-02-14 PROBLEM — J96.01 ACUTE RESPIRATORY FAILURE WITH HYPOXIA (HCC): Status: RESOLVED | Noted: 2021-12-16 | Resolved: 2022-02-14

## 2022-02-17 NOTE — PROGRESS NOTES
Sergio Daniel, 67 y.o., Gender: male    Assessment/Plan    1.  Pt with DM Type 2 uncontrolled w/ neuropathy.  A1c 11% 12/21 prior to onset of anemia which occurred end of Dec.  Lab after visit showed estimated A1c was 6.5-7% so had improved compared to previous and anemia had just resolved, though will be another 3-4 mons before A1c will be reliable again.  A1c not reliable in this case sec to chronic anemia so will use fructosamine in order to obtain A1c equivalent. Counseled that conservative goal A1c is to be <7 % and aggressive 6.5 %.  Counseled pt that overall risk with diabetes is related to long term complications of both small and large blood vessels and that the risk for CAD, MI, and stroke is much higher than general population.  Counseled pt on long term effects of prolonged poor control of diabetes.  Counseled that the goal of tx is prevention of further complications.  Counseled on lifestyle change as a key part of this process to improve all parameters related to diabetes.  Pt counseled on how to do freq bg checks with fasting AM, before meals, before bed, occasionally 2 hours after a meal, and at 2-3 am if awake.  Pt is checking enough as directed based on current treatment plan to check 4-6 times a day.  Note pt remains on high dose steroids post COVID infection and has been doing a steroid taper over the past month to finish in another 1-2 weeks.  Rec cont Metformin XR at 1000 mg twice daily after AM and PM meals as that is optimal dose.  Tradjenta 5 mg daily will finish what he has as they paid for it and will then stop as not formulary with [facility].  Pt never received any basal insulin as incorrect item was prescribed and [facility] would not fill and they could not afford and no other basal was given to replace it.  Pt counseled to start Lantus at 15 units every morning and was given forced titration sheet to self adjust to get to goal of fasting blood glucose <130 mg/dL.  Ultimately Tresiba might not work  to overcome affect of the steroid.  Pt was counseled on how to use short acting analog for correction coverage, meal, and bedtime coverage.  Also have a standing meal dose of 5,5,5 units.  Spent extra time explaining to pt and his wife how they will have to likely adjust the doses down in next 2-3 weeks once steroids are stopped.  That steroid will take up to a full month to get out of his system.  Request for DM Edu placed for [place] location as pt lives up there.  Will obtain some missing lab data today so have baseline for the future.  Will have f/u labs before pt returns.  Counseled must keep log and bring to apts to cont tx plan.  Will work to optimize treatment plan in coming months and get back to PCP.  2.  Counseled on hypoglycemia as pt is on insulin.  Counseled about need to monitor bg related to driving as public safety concern.  Also counseled on proper use of glucagon emergency kit.  Pt reported full understanding.  3.  Hypertension goal of <140/90.  Pt not on tx for renal protection.  Last microalbumin/creatinine ratio urine check not known so will get with next labs.  4.  Hyperlipidemia LDL goal <100 and trig goal <150.  Pt not on tx and have no lab data.  Labs after visit did show all fractions were abnormal.  Will recheck prior to return to see if improve once steroids stopped if not will then start directed tx as pt previously has not been on tx.  Will have f/u labs.    5.  Related to long term use of high dose steroids, standard dose of Hydrocortisone is total of 15 mg a day, Prednisone 5 mg a day, and Decadron is 0.5 mg a day, pt is now considered adrenal insufficient for up to a year after the steroids are stopped, which they continue to be used at this time at 2 mg daily.  Counseled pt if any febrile illness, major trauma, or surgery occur during that time frame will need stress dose steroids with gradual taper off over subsequent weeks.  6.  Complicated situation with chart not fully  reflecting events from prolonged hospital stay in Dec to Jan and pt needing items to be organized for him to get meds from the [facility].  Took awhile to get items figured out then organized correctly as ultimately found out that since discharge from the hospital 6 weeks ago pt never got any basal insulin as the wrong item was dispensed related to [facility] formulary and no one else had attempted to fix this as yet.    7.  Counseled to have eye exam updated as is past due.          Time Counseled: 35  Minutes  Total Time: 60  Minutes    Return to office in:   2-3   Months      Random Glucose: 199 mg/dL      HPI Summary    Pt is here for evaluation of DM reported to be Type 2.  Pt reports DM since '10 but was diet control till recently when insulin started 12/21.  Pt had not seen a doctor for about 2 yrs prior to getting sick w/ COVID 12/21 and was in the hospital for a month w/ subsequent DVT and A-fib.  Pt has been on high dose steroids since was in the hospital.  Pt reports blood glucoses are 160-450 with record mostly around 200 or higher.  Found out pt never got any form of basal insulin has only been using sliding scale Novolog and oral tx.  Pt reports weight has been up since hospital stay related to steroids.  Pt has report of fatigue.  Pt has complaint of general muscle weakness.  Pt has sig deconditioning is still doing home PT and requires assistance and use of wheelchair.  Pt denies any increased thirst or urination.  Pt denies any chest pain following w/ cardiology for A-fib.  Pt reports shortness of breath w/ activity.  Pt denies any abdominal complaints.  Pt denies any early satiety or postprandial bloating.  Pt reports blurry vision.  Pt reports last seen by eye doctor > 2 yrs ago no retinopathy.  Pt reports problems with feet w/ n/t.  Pt denies any skin wounds but has edema and states the LLE was worse related to DVT but has gotten better.  Pt has no report of depression.  Pt reports fair to poor sleep quality  without apnea symptoms.  Pt reports trying to follow a diet to lose weight and limited to no exercise.  Pt reports never having pneumonia vaccine in the past and did not get flu shot this last season.  Last education class was never done and wife did ask for this local to them.  Pt without any other complaints related to diabetes at this time.    Review of Systems  see HPI      Patient History    Past History (reviewed):    Medical:   Past Medical History:   Diagnosis Date   • Abnormal LFTs (liver function tests) 12/2021    developed during prolonged hospital stay for COVID infection   • Anemia 12/29/2021    developed during prolonged hospital stay for COVID infection   • COVID-19 virus infection 12/2021   • DVT of deep femoral vein, left (HCC) 12/2021   • Long term systemic steroid user 12/2021    high dose steroids in use during and after hospital stay for COVID related infection   • Obesity    • Paroxysmal atrial fibrillation (HCC) 2/10/2022   • Pneumonia due to COVID-19 virus 12/2021   • Secondary adrenal insufficiency ([facility]) 01/2022    considered adrenal insufficient related to long term use of high dose steroids started 12/21 for sig COVID infection   • Type II diabetes mellitus with neurological manifestations ([facility])    • Type II diabetes mellitus, uncontrolled (HCC) 2010    was diet controlled till late '21 then insulin started after sig illness and high dose steroids       Surgery:   Past Surgical History:   Procedure Laterality Date   • NO PAST SURGERIES           Social History (reviewed):  - Smoking, - ETOH, - Drugs, , 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    Allergies:  No Known Allergies    Physical Exam    VITALS:    Vitals:    02/25/22 1053   BP: 122/70   Pulse: 63   Temp: 97.6 °F (36.4 °C)   SpO2: 94%     Body mass index is 32.06 kg/m².    GENERAL:   Looks stated age, Mod obese, in wheelchair  HEAD/EYES:  N/C, A/T, Mask in place  EXTREMITIES:  FROM, edema noted L>R, skin changes of chronic edema noted  CNS:  A&Ox3, dec sensation to fine touch above ankles    Full exam not done today.      Labs/Imaging  All available lab and imaging data were reviewed.        Britton Amos MD, DESTINY, FACE, [facility]  2/25/2022  12:14 EST

## 2022-02-21 ENCOUNTER — OFFICE VISIT (OUTPATIENT)
Dept: CARDIOLOGY | Facility: CLINIC | Age: 67
End: 2022-02-21

## 2022-02-21 VITALS
DIASTOLIC BLOOD PRESSURE: 76 MMHG | SYSTOLIC BLOOD PRESSURE: 110 MMHG | HEART RATE: 97 BPM | WEIGHT: 221 LBS | BODY MASS INDEX: 28.36 KG/M2 | HEIGHT: 74 IN | OXYGEN SATURATION: 96 %

## 2022-02-21 DIAGNOSIS — I48.0 PAROXYSMAL ATRIAL FIBRILLATION: Chronic | ICD-10-CM

## 2022-02-21 DIAGNOSIS — R06.02 EXERTIONAL SHORTNESS OF BREATH: Primary | ICD-10-CM

## 2022-02-21 PROCEDURE — 93000 ELECTROCARDIOGRAM COMPLETE: CPT | Performed by: INTERNAL MEDICINE

## 2022-02-21 PROCEDURE — 99204 OFFICE O/P NEW MOD 45 MIN: CPT | Performed by: INTERNAL MEDICINE

## 2022-02-21 NOTE — PROGRESS NOTES
PATIENTINFORMATION    Date of Office Visit: 2022  Encounter Provider: Simon Carson MD  Place of Service: [facility] CARDIOLOGY  Patient Name: Sergio Daniel  : 1955    Subjective:     Encounter Date:2022      Patient ID: Sergio Daniel is a 67 y.o. male.    No chief complaint on file.    HPI  Mr. Daniel is a pleasant 68 yo gentleman who came to cardiology clinic accompanied by his wife for evaluation and follow-up of atrial fibrillation.  He had extended hospital stay in 2022 for severe COVID-19 and bacterial pneumonia that needed mechanical ventilatory support and stayed in hospital for about 20 days.  During same hospitalization he went into atrial fibrillation for few days and spontaneously converted to sinus.  He was released on diltiazem and Eliquis.  He was released to rehab where he stayed for about 2 weeks.  He is currently doing home health and physical therapy twice a week.  He reports significantly improved strength but he still needs a walker to get around.  Other significant past medical problems include type 2 diabetes mellitus.  He may have some exertional shortness of breath otherwise otherwise patient denies any chest pain,  orthopnea, PND, extremity swelling, palpitations, presyncope or syncope.  He was diagnosed with sleep apnea in the past and has an old machine that he quit using since after discharge from hospital.  When he was sick with Covid he lost about 50 pounds.  No prior cardiac testing.  Denies any current tobacco, alcohol or recreational drug use.    ROS  All systems reviewed and negative except as noted in HPI.    Past Medical History:   Diagnosis Date   • COVID-19 virus infection 2021   • Paroxysmal atrial fibrillation (HCC) 2/10/2022   • Pneumonia due to COVID-19 virus 2021   • Type II diabetes mellitus, uncontrolled (HCC)        No past surgical history on file.    Social History     Socioeconomic History   • Marital  "status:    Tobacco Use   • Smoking status: Former Smoker     Types: Cigars     Quit date: 1991     Years since quittin.0   • Smokeless tobacco: Former User     Quit date:    • Tobacco comment: caffine use: 1 coffee daily   Substance and Sexual Activity   • Alcohol use: Not Currently     Comment: last year   • Drug use: Not Currently       No family history on file.        ECG 12 Lead    Date/Time: 2022 10:54 AM  Performed by: Simon Carson MD  Authorized by: Simon Carson MD   Comparison: compared with previous ECG from 2021  Comparison to previous ECG: Sinus rhythm replaces afib on this rhythm tracing   Rhythm: sinus tachycardia  Rate: normal  Conduction: incomplete right bundle branch block  Q waves: III and aVF    ST Segments: ST segments normal  T Waves: T waves normal  QRS axis: normal  Other: no other findings  Other findings: low voltage    Clinical impression: abnormal EKG               Objective:     /76   Pulse 97   Ht 188 cm (74\")   Wt 100 kg (221 lb)   SpO2 96%   BMI 28.37 kg/m²  Body mass index is 28.37 kg/m².     Constitutional:       General: Not in acute distress.     Appearance: Well-developed. Not diaphoretic.   Eyes:      Pupils: Pupils are equal, round, and reactive to light.   HENT:      Head: Normocephalic and atraumatic.   Neck:      Thyroid: No thyromegaly.   Pulmonary:      Effort: Pulmonary effort is normal. No respiratory distress.      Breath sounds: Normal breath sounds. No wheezing. No rales.   Chest:      Chest wall: Not tender to palpatation.   Cardiovascular:      Normal rate. Regular rhythm.      No gallop.   Pulses:     Intact distal pulses.   Edema:     Peripheral edema absent.   Abdominal:      General: Bowel sounds are normal. There is no distension.      Palpations: Abdomen is soft.      Tenderness: There is no guarding.   Musculoskeletal: Normal range of motion.         General: No deformity.      Cervical back: Normal " range of motion and neck supple. Skin:     General: Skin is warm and dry.      Findings: No rash.   Neurological:      Mental Status: Alert and oriented to person, place, and time.      Cranial Nerves: No cranial nerve deficit.      Deep Tendon Reflexes: Reflexes are normal and symmetric.   Psychiatric:         Judgment: Judgment normal.         Review Of Data: I have reviewed pertinent recent labs, images and documents and pertinent findings included in HPI or assessment below.          Assessment/Plan:           1.  Paroxysmal atrial fibrillation when sick with severe COVID-19 pneumonia  2.  Recovered from severe COVID-19/bacterial pneumonia that needed hospital stay for 28 days and needed mechanical ventilator  -Currently doing physical therapy  -Still on tapering steroid dose  3.  Type II DM  4.  History of obesity-lost about 50 pounds when sick with Covid.  Current BMI is 28 kg/m²  5.  History of obstructive sleep apnea-plans to be retested  6.  Left lower extremity DVT during admission for Covid    I will get echocardiogram  Likely A. fib was precipitated by acute illness  Continue Eliquis for about 6 months and I will do a ZIO monitoring and may discontinue anticoagulation if echocardiogram is normal.    Return to clinic in 6 months or sooner with any concerning symptoms.      Diagnosis and plan of care discussed with patient and verbalized understanding.            Your medication list          Accurate as of February 21, 2022 11:14 AM. If you have any questions, ask your nurse or doctor.            CHANGE how you take these medications      Instructions Last Dose Given Next Dose Due   dexamethasone 1.5 MG tablet  Commonly known as: DECADRON  What changed: how much to take      Take 2 tablets by mouth Daily With Breakfast. Indications: COVID-19 Confirmed Infection       metFORMIN 500 MG tablet  Commonly known as: GLUCOPHAGE  What changed: how much to take      Take 1 tablet by mouth 2 (Two) Times a Day With  Meals.          CONTINUE taking these medications      Instructions Last Dose Given Next Dose Due   apixaban 5 MG tablet tablet  Commonly known as: ELIQUIS      Take 1 tablet by mouth Every 12 (Twelve) Hours. Indications: DVT/PE (active thrombosis)       dilTIAZem  MG 24 hr capsule  Commonly known as: CARDIZEM CD      Take 1 capsule by mouth Daily.       insulin aspart 100 UNIT/ML solution pen-injector sc pen  Commonly known as: novoLOG FLEXPEN      Inject 100 Units under the skin into the appropriate area as directed 3 (Three) Times a Day With Meals.       linagliptin 5 MG tablet tablet  Commonly known as: TRADJENTA      Take 1 tablet by mouth Daily.       LIQUID CALCIUM/VITAMIN D PO      Take  by mouth.       Tresiba FlexTouch 100 UNIT/ML solution pen-injector injection  Generic drug: insulin degludec      Inject 10 Units under the skin into the appropriate area as directed Daily.       VITAMIN A PO      Take  by mouth.       VITAMIN B12 PO      Take 5 mcg by mouth 2 (Two) Times a Day.       VITAMIN C (CALCIUM ASCORBATE) PO      Take  by mouth.       Zinc 40 MG tablet      Take  by mouth.                  Simon Carson MD  02/21/22  11:14 EST

## 2022-02-25 ENCOUNTER — OFFICE VISIT (OUTPATIENT)
Dept: ENDOCRINOLOGY | Age: 67
End: 2022-02-25

## 2022-02-25 VITALS
HEIGHT: 73 IN | TEMPERATURE: 97.6 F | WEIGHT: 243 LBS | OXYGEN SATURATION: 94 % | BODY MASS INDEX: 32.2 KG/M2 | DIASTOLIC BLOOD PRESSURE: 70 MMHG | HEART RATE: 63 BPM | SYSTOLIC BLOOD PRESSURE: 122 MMHG

## 2022-02-25 DIAGNOSIS — D64.9 ANEMIA, UNSPECIFIED TYPE: ICD-10-CM

## 2022-02-25 DIAGNOSIS — R79.89 ABNORMAL LFTS (LIVER FUNCTION TESTS): ICD-10-CM

## 2022-02-25 DIAGNOSIS — R53.1 WEAKNESS: ICD-10-CM

## 2022-02-25 DIAGNOSIS — E11.49 TYPE II DIABETES MELLITUS WITH NEUROLOGICAL MANIFESTATIONS: ICD-10-CM

## 2022-02-25 DIAGNOSIS — Z79.52 LONG TERM SYSTEMIC STEROID USER: ICD-10-CM

## 2022-02-25 DIAGNOSIS — E11.65 UNCONTROLLED TYPE 2 DIABETES MELLITUS WITH HYPERGLYCEMIA: Primary | ICD-10-CM

## 2022-02-25 DIAGNOSIS — E27.49 SECONDARY ADRENAL INSUFFICIENCY: ICD-10-CM

## 2022-02-25 DIAGNOSIS — E78.2 MIXED HYPERLIPIDEMIA: ICD-10-CM

## 2022-02-25 PROCEDURE — 99205 OFFICE O/P NEW HI 60 MIN: CPT | Performed by: INTERNAL MEDICINE

## 2022-02-25 RX ORDER — INSULIN ASPART 100 [IU]/ML
INJECTION, SOLUTION INTRAVENOUS; SUBCUTANEOUS
Qty: 45 ML | Refills: 2 | Status: SHIPPED | OUTPATIENT
Start: 2022-02-25 | End: 2022-04-29 | Stop reason: SDUPTHER

## 2022-02-25 RX ORDER — DEXAMETHASONE 2 MG/1
2 TABLET ORAL
COMMUNITY
End: 2022-03-11

## 2022-02-25 RX ORDER — BLOOD-GLUCOSE METER
KIT MISCELLANEOUS
Qty: 500 EACH | Refills: 4 | Status: SHIPPED | OUTPATIENT
Start: 2022-02-25 | End: 2022-04-29

## 2022-02-25 RX ORDER — INSULIN GLARGINE 100 [IU]/ML
INJECTION, SOLUTION SUBCUTANEOUS
Qty: 30 ML | Refills: 2 | Status: SHIPPED | OUTPATIENT
Start: 2022-02-25 | End: 2022-04-29 | Stop reason: CLARIF

## 2022-02-25 RX ORDER — FLURBIPROFEN SODIUM 0.3 MG/ML
SOLUTION/ DROPS OPHTHALMIC
Qty: 500 EACH | Refills: 4 | Status: SHIPPED | OUTPATIENT
Start: 2022-02-25 | End: 2022-04-29 | Stop reason: SDUPTHER

## 2022-02-25 RX ORDER — METFORMIN HYDROCHLORIDE 500 MG/1
TABLET, EXTENDED RELEASE ORAL
Qty: 360 TABLET | Refills: 2 | Status: SHIPPED | OUTPATIENT
Start: 2022-02-25 | End: 2022-04-29 | Stop reason: SDUPTHER

## 2022-02-25 RX ORDER — BLOOD-GLUCOSE METER
1 KIT MISCELLANEOUS ONCE
Qty: 1 KIT | Refills: 0 | Status: SHIPPED | OUTPATIENT
Start: 2022-02-25 | End: 2022-02-25

## 2022-02-25 RX ORDER — LANCETS 28 GAUGE
EACH MISCELLANEOUS
Qty: 400 EACH | Refills: 4 | Status: SHIPPED | OUTPATIENT
Start: 2022-02-25 | End: 2022-04-29

## 2022-02-25 NOTE — PATIENT INSTRUCTIONS
As discussed changes made to current insulin treatment plan.  Pay attention to insulin doses after high dose steroids are stopped.  Finish current Tradjenta and then will stop that.     As discussed related to prolonged use of high dose steroids you are considered adrenal insufficient for up to a year.  If any febrile illness, major trauma, or surgery occur during that time frame you will need stress dose steroids with gradual taper off over subsequent weeks.    Labs to be done at least 10 days before return appointment.  If labs are not done within 3 days of scheduled return appointment the appointment will be canceled and rescheduled to a later date with requirement for labs to be done as directed.      Bring med list, meter and/or log data to all appointments.

## 2022-02-26 LAB
25(OH)D3+25(OH)D2 SERPL-MCNC: 34.4 NG/ML (ref 30–100)
C PEPTIDE SERPL-MCNC: 2.9 NG/ML (ref 1.1–4.4)
CHOLEST SERPL-MCNC: 249 MG/DL (ref 100–199)
FRUCTOSAMINE SERPL-SCNC: 269 UMOL/L (ref 0–285)
HCT VFR BLD AUTO: 41.2 % (ref 37.5–51)
HDLC SERPL-MCNC: 67 MG/DL
HGB BLD-MCNC: 13.6 G/DL (ref 13–17.7)
IMP & REVIEW OF LAB RESULTS: NORMAL
INSULIN SERPL-ACNC: 9.3 UIU/ML (ref 2.6–24.9)
LDLC SERPL CALC-MCNC: 138 MG/DL (ref 0–99)
TRIGL SERPL-MCNC: 250 MG/DL (ref 0–149)
TSH SERPL DL<=0.005 MIU/L-ACNC: 1.23 UIU/ML (ref 0.45–4.5)
VLDLC SERPL CALC-MCNC: 44 MG/DL (ref 5–40)

## 2022-03-02 ENCOUNTER — HOSPITAL ENCOUNTER (OUTPATIENT)
Dept: CARDIOLOGY | Facility: HOSPITAL | Age: 67
Discharge: HOME | End: 2022-03-02
Admitting: INTERNAL MEDICINE

## 2022-03-02 VITALS
BODY MASS INDEX: 32.2 KG/M2 | WEIGHT: 243 LBS | SYSTOLIC BLOOD PRESSURE: 110 MMHG | HEIGHT: 73 IN | DIASTOLIC BLOOD PRESSURE: 76 MMHG

## 2022-03-02 DIAGNOSIS — R06.02 EXERTIONAL SHORTNESS OF BREATH: ICD-10-CM

## 2022-03-02 LAB
AORTIC DIMENSIONLESS INDEX: 0.8 (DI)
BH CV ECHO MEAS - ACS: 2.14 CM
BH CV ECHO MEAS - AO MAX PG: 5 MMHG
BH CV ECHO MEAS - AO MEAN PG: 2.07 MMHG
BH CV ECHO MEAS - AO ROOT DIAM: 3.7 CM
BH CV ECHO MEAS - AO V2 MAX: 111 CM/SEC
BH CV ECHO MEAS - AO V2 VTI: 17.8 CM
BH CV ECHO MEAS - AVA(I,D): 2.7 CM2
BH CV ECHO MEAS - EDV(CUBED): 147.1 ML
BH CV ECHO MEAS - EDV(MOD-SP2): 105 ML
BH CV ECHO MEAS - EDV(MOD-SP4): 128 ML
BH CV ECHO MEAS - EF(MOD-BP): 59.8 %
BH CV ECHO MEAS - EF(MOD-SP2): 58.7 %
BH CV ECHO MEAS - EF(MOD-SP4): 58 %
BH CV ECHO MEAS - ESV(CUBED): 58.3 ML
BH CV ECHO MEAS - ESV(MOD-SP2): 43.4 ML
BH CV ECHO MEAS - ESV(MOD-SP4): 53.8 ML
BH CV ECHO MEAS - FS: 26.6 %
BH CV ECHO MEAS - IVS/LVPW: 0.99 CM
BH CV ECHO MEAS - IVSD: 1.36 CM
BH CV ECHO MEAS - LAT PEAK E' VEL: 12.4 CM/SEC
BH CV ECHO MEAS - LV DIASTOLIC VOL/BSA (35-75): 54.8 CM2
BH CV ECHO MEAS - LV MASS(C)D: 305.3 GRAMS
BH CV ECHO MEAS - LV MAX PG: 2.6 MMHG
BH CV ECHO MEAS - LV MEAN PG: 1.21 MMHG
BH CV ECHO MEAS - LV SYSTOLIC VOL/BSA (12-30): 23 CM2
BH CV ECHO MEAS - LV V1 MAX: 80 CM/SEC
BH CV ECHO MEAS - LV V1 VTI: 14.5 CM
BH CV ECHO MEAS - LVIDD: 5.3 CM
BH CV ECHO MEAS - LVIDS: 3.9 CM
BH CV ECHO MEAS - LVOT AREA: 3.4 CM2
BH CV ECHO MEAS - LVOT DIAM: 2.07 CM
BH CV ECHO MEAS - LVPWD: 1.37 CM
BH CV ECHO MEAS - MED PEAK E' VEL: 6.1 CM/SEC
BH CV ECHO MEAS - MR MAX PG: 17.5 MMHG
BH CV ECHO MEAS - MR MAX VEL: 209.4 CM/SEC
BH CV ECHO MEAS - MV A MAX VEL: 80.1 CM/SEC
BH CV ECHO MEAS - MV DEC SLOPE: 299.3 CM/SEC2
BH CV ECHO MEAS - MV DEC TIME: 299 MSEC
BH CV ECHO MEAS - MV E MAX VEL: 45.8 CM/SEC
BH CV ECHO MEAS - MV E/A: 0.57
BH CV ECHO MEAS - MV MEAN PG: 1.23 MMHG
BH CV ECHO MEAS - MV V2 VTI: 14.2 CM
BH CV ECHO MEAS - MVA(VTI): 3.4 CM2
BH CV ECHO MEAS - PA V2 MAX: 158.8 CM/SEC
BH CV ECHO MEAS - RAP SYSTOLE: 8 MMHG
BH CV ECHO MEAS - RV V1 VTI: 10.7 CM
BH CV ECHO MEAS - RVOT DIAM: 2.7 CM
BH CV ECHO MEAS - SI(MOD-SP2): 26.4 ML/M2
BH CV ECHO MEAS - SI(MOD-SP4): 31.7 ML/M2
BH CV ECHO MEAS - SV(LVOT): 48.7 ML
BH CV ECHO MEAS - SV(MOD-SP2): 61.6 ML
BH CV ECHO MEAS - SV(MOD-SP4): 74.2 ML
BH CV ECHO MEAS - SV(RVOT): 61.6 ML
BH CV ECHO MEAS - TAPSE (>1.6): 1.47 CM
BH CV ECHO MEASUREMENTS AVERAGE E/E' RATIO: 4.95
BH CV XLRA - TDI S': 13.2 CM/SEC
LEFT ATRIUM VOLUME INDEX: 22.9 ML/M2
LV EF 2D ECHO EST: 60 %
MAXIMAL PREDICTED HEART RATE: 154 BPM
SINUS: 3.7 CM
STRESS TARGET HR: 131 BPM

## 2022-03-02 PROCEDURE — 93306 TTE W/DOPPLER COMPLETE: CPT

## 2022-03-02 PROCEDURE — 93306 TTE W/DOPPLER COMPLETE: CPT | Performed by: INTERNAL MEDICINE

## 2022-03-02 NOTE — PROGRESS NOTES
Please notify patient that echo is normal  FU in 6 months. Will copy Nicci for schedule.  Thank you

## 2022-03-09 ENCOUNTER — TELEPHONE (OUTPATIENT)
Dept: INTERNAL MEDICINE | Facility: CLINIC | Age: 67
End: 2022-03-09

## 2022-03-09 NOTE — TELEPHONE ENCOUNTER
[facility] called and was curious if we could put in orders for a modified barium swollow? Please advise.     Sergio Daniel    Male, 67 y.o., 1955    MRN:   [id number]  CSN:   [id number]  Phone:   [phone]      This is the number that called:   [phone]

## 2022-03-11 ENCOUNTER — OFFICE VISIT (OUTPATIENT)
Dept: INTERNAL MEDICINE | Facility: CLINIC | Age: 67
End: 2022-03-11

## 2022-03-11 VITALS
WEIGHT: 248 LBS | TEMPERATURE: 98 F | OXYGEN SATURATION: 94 % | HEIGHT: 73 IN | BODY MASS INDEX: 32.87 KG/M2 | HEART RATE: 110 BPM | SYSTOLIC BLOOD PRESSURE: 110 MMHG | DIASTOLIC BLOOD PRESSURE: 70 MMHG

## 2022-03-11 DIAGNOSIS — E11.65 UNCONTROLLED TYPE 2 DIABETES MELLITUS WITH HYPERGLYCEMIA: ICD-10-CM

## 2022-03-11 DIAGNOSIS — U09.9 POST-ACUTE SEQUELAE OF COVID-19 (PASC): Primary | ICD-10-CM

## 2022-03-11 DIAGNOSIS — R79.89 ABNORMAL LFTS (LIVER FUNCTION TESTS): ICD-10-CM

## 2022-03-11 DIAGNOSIS — R13.10 DYSPHAGIA, UNSPECIFIED TYPE: ICD-10-CM

## 2022-03-11 DIAGNOSIS — I48.0 PAROXYSMAL ATRIAL FIBRILLATION: ICD-10-CM

## 2022-03-11 PROCEDURE — 99214 OFFICE O/P EST MOD 30 MIN: CPT | Performed by: NURSE PRACTITIONER

## 2022-03-11 RX ORDER — DILTIAZEM HYDROCHLORIDE 240 MG/1
240 CAPSULE, COATED, EXTENDED RELEASE ORAL DAILY
Qty: 30 CAPSULE | Refills: 5 | Status: SHIPPED | OUTPATIENT
Start: 2022-03-11 | End: 2022-09-20

## 2022-03-11 NOTE — PROGRESS NOTES
Subjective   Sergio Daniel is a 67 y.o. 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 Indications: DVT/PE (active thrombosis) 60 tablet    • Calcium Carbonate-Vitamin D (LIQUID CALCIUM/VITAMIN D PO) Take  by mouth.     • Cyanocobalamin (VITAMIN B12 PO) Take 5 mcg by mouth 2 (Two) Times a Day.     • dilTIAZem CD (CARDIZEM CD) 240 MG 24 hr capsule Take 1 capsule by mouth Daily. 30 capsule 5   • glucagon (GLUCAGEN) 1 MG injection For emergency use for severe low glucose: first inject, then call 911, once awake try to give oral treatment. Kit expires in 6-12 months. 1 kit 6   • glucose blood (FREESTYLE LITE) test strip Check 4 times a day on insulin tx, poor control, hypoglycemia. Dx: E 11.65 500 each 4   • insulin aspart (NovoLOG FlexPen) 100 UNIT/ML solution pen-injector sc pen Use as directed for meal & correction coverage up to 50 units a day. 45 mL 2   • Insulin Glargine (Lantus SoloStar) 100 UNIT/ML injection pen Inject 15 units once daily in the AM and titrate as directed up to 50 units a day. 30 mL 2   • Insulin Pen Needle (B-D UF III MINI PEN NEEDLES) 31G X 5 MM misc For use with pen device up to 5 times a day. Can substitute based on availability and insurance. 500 each 4   •  "Lancets (freestyle) lancets Check 4 times a day on insulin tx, poor control, hypoglycemia. Dx: E 11.65 400 each 4   • metFORMIN ER (GLUCOPHAGE-XR) 500 MG 24 hr tablet By mouth take 2 tabs twice daily after AM & PM meals. 360 tablet 2   • VITAMIN A PO Take  by mouth.     • VITAMIN C, CALCIUM ASCORBATE, PO Take  by mouth.     • Zinc 40 MG tablet Take  by mouth.     • [DISCONTINUED] dilTIAZem CD (CARDIZEM CD) 240 MG 24 hr capsule Take 1 capsule by mouth Daily.         Hospitalization 12/16/21-01/13/22 for Acute hypoxic resp failure d/t COVID pneumonia w/ superimposed MSSA and Klebsiella pneumonia, new onset afib, LLE DVT.    He is progressing w/ HH PT, OT, and ST. Plans are in progress for repeat swallow study. He is now established as an outpatient w/ Cards and Endo.    Office Visit with Simon Carson MD (02/21/2022)  Office Visit with Britton Amos MD (02/25/2022)  Results for orders placed during the hospital encounter of 03/02/22    Adult Transthoracic Echo Complete W/ Cont if Necessary Per Protocol    Interpretation Summary  · Estimated left ventricular EF = 60% Left ventricular systolic function is normal.  · Left ventricular diastolic function is consistent with (grade I) impaired relaxation.  · Left ventricular wall thickness is consistent with mild concentric hypertrophy.      The following portions of the patient's history were reviewed and updated as appropriate: allergies, current medications, past family history, past medical history, past social history, past surgical history and problem list.        Objective   Vitals:    03/11/22 1014   BP: 110/70   BP Location: Left arm   Patient Position: Sitting   Cuff Size: Adult   Pulse: 110   Temp: 98 °F (36.7 °C)   TempSrc: Skin   SpO2: 94%   Weight: 112 kg (248 lb)   Height: 185.4 cm (73\")       BP Readings from Last 3 Encounters:   03/11/22 110/70   03/02/22 110/76   02/25/22 122/70        Wt Readings from Last 3 Encounters:   03/11/22 112 kg " (248 lb)   22 110 kg (243 lb)   22 110 kg (243 lb)        Body mass index is 32.72 kg/m².  Nursing notes and vitals reviewed.    Physical Exam  Constitutional:       General: He is not in acute distress.     Appearance: He is well-developed.      Comments: Seated in wheelchair   Cardiovascular:      Rate and Rhythm: Regular rhythm.      Heart sounds: Normal heart sounds.   Pulmonary:      Effort: Pulmonary effort is normal.      Breath sounds: Normal breath sounds.   Musculoskeletal:      Right lower le+ Edema present.      Left lower le+ Edema present.   Skin:     Comments: Skin breakdown and discoloration of venous stasis. No S&S of secondary infection.   Neurological:      Mental Status: He is alert and oriented to person, place, and time.   Psychiatric:         Attention and Perception: He is attentive.         Mood and Affect: Mood and affect normal.         Speech: Speech normal.         Behavior: Behavior normal.         Thought Content: Thought content normal.               Assessment/Plan   Diagnoses and all orders for this visit:    1. Post-acute sequelae of COVID-19 (PASC) (Primary)  Comments:  - cont PT, OT, ST    2. Abnormal LFTs (liver function tests)  -     Comprehensive Metabolic Panel    3. Paroxysmal atrial fibrillation (HCC)  Comments:  - cards note reviewed  - essentially nml echo  - plans for Zio patch in future  Orders:  -     dilTIAZem CD (CARDIZEM CD) 240 MG 24 hr capsule; Take 1 capsule by mouth Daily.  Dispense: 30 capsule; Refill: 5    4. Uncontrolled type 2 diabetes mellitus with hyperglycemia (HCC)  Comments:  - now managed by Endo    5. Dysphagia, unspecified type  Comments:  - cont ST  - swallow study in process of scheduling              Medications, including side effects, were discussed with the patient. Patient verbalized understanding.  The plan of care was discussed. All questions were answered. Patient verbalized understanding.      Return in about 2 months  (around 5/11/2022).

## 2022-03-12 LAB
ALBUMIN SERPL-MCNC: 3.8 G/DL (ref 3.8–4.8)
ALBUMIN/GLOB SERPL: 1.3 {RATIO} (ref 1.2–2.2)
ALP SERPL-CCNC: 112 IU/L (ref 44–121)
ALT SERPL-CCNC: 19 IU/L (ref 0–44)
AST SERPL-CCNC: 15 IU/L (ref 0–40)
BILIRUB SERPL-MCNC: 0.2 MG/DL (ref 0–1.2)
BUN SERPL-MCNC: 10 MG/DL (ref 8–27)
BUN/CREAT SERPL: 19 (ref 10–24)
CALCIUM SERPL-MCNC: 9.5 MG/DL (ref 8.6–10.2)
CHLORIDE SERPL-SCNC: 98 MMOL/L (ref 96–106)
CO2 SERPL-SCNC: 20 MMOL/L (ref 20–29)
CREAT SERPL-MCNC: 0.52 MG/DL (ref 0.76–1.27)
EGFR GENE MUT ANL BLD/T: 111 ML/MIN/1.73
GLOBULIN SER CALC-MCNC: 2.9 G/DL (ref 1.5–4.5)
GLUCOSE SERPL-MCNC: 125 MG/DL (ref 65–99)
POTASSIUM SERPL-SCNC: 3.8 MMOL/L (ref 3.5–5.2)
PROT SERPL-MCNC: 6.7 G/DL (ref 6–8.5)
SODIUM SERPL-SCNC: 140 MMOL/L (ref 134–144)

## 2022-03-29 ENCOUNTER — TELEPHONE (OUTPATIENT)
Dept: INTERNAL MEDICINE | Facility: CLINIC | Age: 67
End: 2022-03-29

## 2022-03-29 NOTE — TELEPHONE ENCOUNTER
[facility] IS  IS NEEDING MORE VISITS.  1 TIME A WEEK FOR 1 WK, 2 TIMES A WEEK FOR TWO WEEKS, 1 WEEK FOR 1 WEEK, THEN 2 TIMES A WEEK FOR TWO WEEKS. 7 WEEKS TOTAL ALTERNATING.    [phone]

## 2022-04-04 ENCOUNTER — TELEPHONE (OUTPATIENT)
Dept: INTERNAL MEDICINE | Facility: CLINIC | Age: 67
End: 2022-04-04

## 2022-04-04 NOTE — TELEPHONE ENCOUNTER
Pt has not heard anything Swallow test. In 03/29/2022 visit I see where you said that this is in process. Please place order and advise to if this is still in process or needs to be completed. I did not see an order in chart.  DICK

## 2022-04-04 NOTE — TELEPHONE ENCOUNTER
Caller: MANUEL FULLER    Relationship: Emergency Contact    Best call back number: [phone]    What orders are you requesting (i.e. lab or imaging): SWALLOW TEST     In what timeframe would the patient need to come in:     Where will you receive your lab/imaging services:     Additional notes: SWALLOW TEST WAS REQUESTED 3-9-22. PATIENT HAS NOT BEEN ADVISED IF [facility]UN[facility] LEONE APPROVED/ORDERED TEST.  PLEASE ADVISE

## 2022-04-10 NOTE — TELEPHONE ENCOUNTER
I am not aware that I am ordering a swallow study. At last OV, pt and wife told me that this was in process of being arranged for w/ speech therapy.

## 2022-04-11 NOTE — TELEPHONE ENCOUNTER
Tried to call patient back but was unable to reach and left a voicemail with the results ok per verbal release form. Informed them that ST would be arranging this with them

## 2022-04-19 ENCOUNTER — LAB (OUTPATIENT)
Dept: LAB | Facility: HOSPITAL | Age: 67
End: 2022-04-19

## 2022-04-19 ENCOUNTER — TELEPHONE (OUTPATIENT)
Dept: INTERNAL MEDICINE | Facility: CLINIC | Age: 67
End: 2022-04-19

## 2022-04-19 DIAGNOSIS — D64.9 ANEMIA, UNSPECIFIED TYPE: ICD-10-CM

## 2022-04-19 DIAGNOSIS — R79.89 ABNORMAL LFTS (LIVER FUNCTION TESTS): ICD-10-CM

## 2022-04-19 DIAGNOSIS — R13.10 DYSPHAGIA, UNSPECIFIED TYPE: Primary | ICD-10-CM

## 2022-04-19 DIAGNOSIS — E11.65 UNCONTROLLED TYPE 2 DIABETES MELLITUS WITH HYPERGLYCEMIA: ICD-10-CM

## 2022-04-19 LAB
ALBUMIN SERPL-MCNC: 4.1 G/DL (ref 3.5–5.2)
ALBUMIN/GLOB SERPL: 1.5 G/DL
ALP SERPL-CCNC: 97 U/L (ref 39–117)
ALT SERPL W P-5'-P-CCNC: 22 U/L (ref 1–41)
ANION GAP SERPL CALCULATED.3IONS-SCNC: 12 MMOL/L (ref 5–15)
AST SERPL-CCNC: 18 U/L (ref 1–40)
BILIRUB SERPL-MCNC: 0.3 MG/DL (ref 0–1.2)
BUN SERPL-MCNC: 13 MG/DL (ref 8–23)
BUN/CREAT SERPL: 24.1 (ref 7–25)
CALCIUM SPEC-SCNC: 9.6 MG/DL (ref 8.6–10.5)
CHLORIDE SERPL-SCNC: 105 MMOL/L (ref 98–107)
CHOLEST SERPL-MCNC: 194 MG/DL (ref 0–200)
CO2 SERPL-SCNC: 23 MMOL/L (ref 22–29)
CREAT SERPL-MCNC: 0.54 MG/DL (ref 0.76–1.27)
EGFRCR SERPLBLD CKD-EPI 2021: 109.9 ML/MIN/1.73
GLOBULIN UR ELPH-MCNC: 2.7 GM/DL
GLUCOSE SERPL-MCNC: 127 MG/DL (ref 65–99)
HCT VFR BLD AUTO: 40 % (ref 37.5–51)
HDLC SERPL-MCNC: 50 MG/DL (ref 40–60)
HGB BLD-MCNC: 13.3 G/DL (ref 13–17.7)
LDLC SERPL CALC-MCNC: 107 MG/DL (ref 0–100)
LDLC/HDLC SERPL: 2.02 {RATIO}
POTASSIUM SERPL-SCNC: 3.8 MMOL/L (ref 3.5–5.2)
PROT SERPL-MCNC: 6.8 G/DL (ref 6–8.5)
SODIUM SERPL-SCNC: 140 MMOL/L (ref 136–145)
TRIGL SERPL-MCNC: 216 MG/DL (ref 0–150)
VLDLC SERPL-MCNC: 37 MG/DL (ref 5–40)

## 2022-04-19 PROCEDURE — 80061 LIPID PANEL: CPT

## 2022-04-19 PROCEDURE — 36415 COLL VENOUS BLD VENIPUNCTURE: CPT

## 2022-04-19 PROCEDURE — 80053 COMPREHEN METABOLIC PANEL: CPT

## 2022-04-19 PROCEDURE — 85014 HEMATOCRIT: CPT

## 2022-04-19 PROCEDURE — 82985 ASSAY OF GLYCATED PROTEIN: CPT

## 2022-04-19 PROCEDURE — 85018 HEMOGLOBIN: CPT

## 2022-04-19 NOTE — TELEPHONE ENCOUNTER
Vanessa from speech therapy calls. Pt's wife called her for an appt. Vanessa needs an order to [facility] pt. Can you placed the order and she can schedule the pt. The last time they were here there may have been some confusion about this appt but it hasn't been [facility]'d nor is there an order anywhere in the system. Thanks for your help

## 2022-04-20 LAB — FRUCTOSAMINE SERPL-SCNC: 216 UMOL/L (ref 0–285)

## 2022-04-26 ENCOUNTER — TELEPHONE (OUTPATIENT)
Dept: INTERNAL MEDICINE | Facility: CLINIC | Age: 67
End: 2022-04-26

## 2022-04-26 DIAGNOSIS — U09.9 POST-ACUTE SEQUELAE OF COVID-19 (PASC): ICD-10-CM

## 2022-04-26 DIAGNOSIS — R13.10 DYSPHAGIA, UNSPECIFIED TYPE: Primary | ICD-10-CM

## 2022-04-26 DIAGNOSIS — R26.89 BALANCE PROBLEM: ICD-10-CM

## 2022-04-26 NOTE — TELEPHONE ENCOUNTER
----- Message from Denisa Chua, MS CCC-SLP sent at 4/26/2022  2:15 PM EDT -----  Regarding: MBS order and PT order  Salvador Lloyd to bother you. I know there has been a lot of communication back and forth with different providers for this patient. On license of [facility] was recommending a Modified Barium Swallow Study for this patient prior to coming to outpatient dysphagia therapy (thank you for that order). I was wondering if you could place an order for the MBS and we could get that scheduled before coming to therapy. He has been discharged from home health as of this week. The MBS can be placed under an ambulatory referral to Radiology at [facility]. It is located under Imaging, then Fluoroscopy, FL Video Swallow w/Speech. It is [id number]. If you need any help, just give me a call at [phone].     His wife is also requested an outpatient Physical Therapy Eval and treat order as she says he is still having some balance issues when walking/moving around.     Thank you so much for your time and patience.    Vanessa Chua

## 2022-04-27 ENCOUNTER — APPOINTMENT (OUTPATIENT)
Dept: DIABETES SERVICES | Facility: HOSPITAL | Age: 67
End: 2022-04-27

## 2022-04-27 ENCOUNTER — HOSPITAL ENCOUNTER (OUTPATIENT)
Dept: DIABETES SERVICES | Facility: HOSPITAL | Age: 67
Discharge: HOME | End: 2022-04-27
Admitting: INTERNAL MEDICINE

## 2022-04-27 PROCEDURE — G0109 DIAB MANAGE TRN IND/GROUP: HCPCS

## 2022-04-27 NOTE — CONSULTS
Mr. Daniel was seen today by RN Diabetes Educator and Registered Dietitian for the Diabetes Education Class. Comprehensive American Diabetes Association assessment will be sent to medical records to attach to this encounter.      Mr. Daniel has been encouraged to call our office with questions or additional education needs. Please place referral for additional services or followup should need arise. Thank you for the referral.

## 2022-04-29 ENCOUNTER — OFFICE VISIT (OUTPATIENT)
Dept: ENDOCRINOLOGY | Age: 67
End: 2022-04-29

## 2022-04-29 VITALS
TEMPERATURE: 98.3 F | OXYGEN SATURATION: 98 % | HEART RATE: 103 BPM | BODY MASS INDEX: 34.59 KG/M2 | WEIGHT: 261 LBS | SYSTOLIC BLOOD PRESSURE: 126 MMHG | HEIGHT: 73 IN | DIASTOLIC BLOOD PRESSURE: 70 MMHG

## 2022-04-29 DIAGNOSIS — E11.65 TYPE 2 DIABETES MELLITUS WITH HYPERGLYCEMIA, UNSPECIFIED WHETHER LONG TERM INSULIN USE: Primary | ICD-10-CM

## 2022-04-29 DIAGNOSIS — R79.89 ABNORMAL LFTS (LIVER FUNCTION TESTS): ICD-10-CM

## 2022-04-29 DIAGNOSIS — E11.49 TYPE II DIABETES MELLITUS WITH NEUROLOGICAL MANIFESTATIONS: ICD-10-CM

## 2022-04-29 DIAGNOSIS — E78.2 MIXED HYPERLIPIDEMIA: ICD-10-CM

## 2022-04-29 DIAGNOSIS — E11.65 UNCONTROLLED TYPE 2 DIABETES MELLITUS WITH HYPERGLYCEMIA: ICD-10-CM

## 2022-04-29 DIAGNOSIS — D64.9 ANEMIA, UNSPECIFIED TYPE: ICD-10-CM

## 2022-04-29 DIAGNOSIS — R29.898 WEAKNESS OF BOTH LOWER EXTREMITIES: ICD-10-CM

## 2022-04-29 DIAGNOSIS — E27.49 SECONDARY ADRENAL INSUFFICIENCY: ICD-10-CM

## 2022-04-29 PROCEDURE — 99214 OFFICE O/P EST MOD 30 MIN: CPT | Performed by: INTERNAL MEDICINE

## 2022-04-29 RX ORDER — INSULIN GLARGINE 100 [IU]/ML
INJECTION, SOLUTION SUBCUTANEOUS
Qty: 30 ML | Refills: 2 | Status: SHIPPED | OUTPATIENT
Start: 2022-04-29 | End: 2022-07-22 | Stop reason: SDUPTHER

## 2022-04-29 RX ORDER — INSULIN ASPART 100 [IU]/ML
INJECTION, SOLUTION INTRAVENOUS; SUBCUTANEOUS
Qty: 30 ML | Refills: 2 | Status: SHIPPED | OUTPATIENT
Start: 2022-04-29 | End: 2022-07-22 | Stop reason: SDUPTHER

## 2022-04-29 RX ORDER — BLOOD-GLUCOSE METER
1 EACH MISCELLANEOUS ONCE
Qty: 1 KIT | Refills: 0
Start: 2022-04-29 | End: 2022-04-29

## 2022-04-29 RX ORDER — FLURBIPROFEN SODIUM 0.3 MG/ML
SOLUTION/ DROPS OPHTHALMIC
Qty: 500 EACH | Refills: 4 | Status: SHIPPED | OUTPATIENT
Start: 2022-04-29 | End: 2022-12-02 | Stop reason: SDUPTHER

## 2022-04-29 RX ORDER — BLOOD SUGAR DIAGNOSTIC
1 STRIP MISCELLANEOUS AS NEEDED
COMMUNITY
End: 2022-04-29 | Stop reason: SDUPTHER

## 2022-04-29 RX ORDER — LANCETS
EACH MISCELLANEOUS
COMMUNITY
End: 2022-04-29 | Stop reason: SDUPTHER

## 2022-04-29 RX ORDER — LANCETS
EACH MISCELLANEOUS
Qty: 400 EACH | Refills: 4 | Status: SHIPPED | OUTPATIENT
Start: 2022-04-29

## 2022-04-29 RX ORDER — METFORMIN HYDROCHLORIDE 500 MG/1
TABLET, EXTENDED RELEASE ORAL
Qty: 360 TABLET | Refills: 2 | Status: SHIPPED | OUTPATIENT
Start: 2022-04-29 | End: 2022-07-22 | Stop reason: SDUPTHER

## 2022-04-29 RX ORDER — BLOOD SUGAR DIAGNOSTIC
STRIP MISCELLANEOUS
Qty: 400 EACH | Refills: 4 | Status: SHIPPED | OUTPATIENT
Start: 2022-04-29

## 2022-04-29 NOTE — PROGRESS NOTES
Sergio Daniel, 67 y.o., Gender: male    Assessment/Plan    1.  Pt with DM Type 2 controlled w/ neuropathy.  A1c estimated to be 6-6.5% 4/22, 6.5-7% 2/22, 11% 12/21 prior to onset of anemia which occurred end of Dec.  A1c not reliable in this case sec to chronic anemia so will use fructosamine in order to obtain A1c equivalent.  Anemia recently resolved so will plan to transition back to using A1c in future.  Counseled that conservative goal A1c is to be <7 % and aggressive 6.5 %.  Counseled pt that overall risk with diabetes is related to long term complications of both small and large blood vessels and that the risk for CAD, MI, and stroke is much higher than general population.  Counseled pt on long term effects of prolonged poor control of diabetes.  Counseled that the goal of tx is prevention of further complications.  Pt counseled on how to do freq bg checks with fasting AM, before meals, before bed, occasionally 2 hours after a meal, and at 2-3 am if awake.  Pt is checking enough as directed based on current treatment plan to check 4-6 times a day.  Note pt is now off high dose steroids post COVID infection.  Rec cont Metformin XR at 1000 mg twice daily after AM and PM meals as that is optimal dose.  Pt gets all meds from [facility] secondary to costs.  Pt counseled to change to a generic Lantus at 23 units every morning and was given forced titration sheet to self adjust to get to goal of fasting blood glucose <130 mg/dL.  Pt was counseled on how to use short acting analog for correction coverage, meal, and bedtime coverage.  Also have a standing meal dose of 5,5,5 units.  Will have f/u labs before pt returns.  Counseled must keep log and bring to apts to cont tx plan.  Will work to optimize treatment plan in coming months and get back to PCP.  2.  Counseled on hypoglycemia as pt is on insulin.  Counseled about need to monitor bg related to driving as public safety concern.  Also counseled on proper use of glucagon  emergency kit.  Pt reported full understanding.  3.  Hypertension goal of <140/90.  Pt not on tx for renal protection.  Last microalbumin/creatinine ratio urine check not known was ordered but was not done.  Will get today as pt voices can provide sample today but could not recently when did labs.  4.  Hyperlipidemia LDL goal <100 and trig goal <150.  Pt not on tx and recent data does show it is needed.  Rec starting low dose statin and Fenofibrate but pt declined at this time saying wants to see if things improve come next visit as he is getting more activity.  Will have f/u labs.    5.  Related to long term use of high dose steroids, standard dose of Hydrocortisone is total of 15 mg a day, Prednisone 5 mg a day, and Decadron is 0.5 mg a day, pt is now considered adrenal insufficient for up to a year after the steroids are stopped, which they continue to be used at this time at 2 mg daily.  Counseled pt if any febrile illness, major trauma, or surgery occur during that time frame will need stress dose steroids with gradual taper off over subsequent weeks.  6.  Counseled to have eye exam updated as is past due.    7.  Note visit started around 1020 as pt arrived early.         Time Counseled: 20  Minutes  Total Time: 30  Minutes    Return to office in:   3-4   Months      Random Glucose: 151 mg/dL      HPI Summary    Pt is here for evaluation of DM reported to be Type 2.  Pt reports DM since '10 but was diet control till recently when insulin started 12/21.  Pt now off all steroids for COVID tx earlier this year.  Pt reports blood glucoses are  with record mostly <140.  Pt reports weight has been up since hospital stay related to steroids.  Pt has report of fatigue.  Pt has complaint of general muscle weakness.  Pt has some deconditioning is still doing home PT and is using a cane most of the time and has walker for other times.  Pt to have eval for balance concern in coming weeks.  Otherwise pt reports  feeling better and doing more then has in some time.  Pt denies any increased thirst or urination.  Pt denies any chest pain following w/ cardiology for A-fib.  Pt reports shortness of breath w/ activity.  Pt denies any abdominal complaints.  Pt denies any early satiety or postprandial bloating.  Pt reports blurry vision.  Pt reports last seen by eye doctor > 2 yrs ago no retinopathy.  Pt reports problems with feet w/ n/t.  Pt denies any skin wounds but has edema and LLE had recent DVT.  Pt has no report of depression.  Pt reports fair to good sleep quality without apnea symptoms.  Pt reports trying to follow a diet to lose weight and limited to no exercise.  Pt reports never having pneumonia vaccine in the past and did not get flu shot this last season.  Last education class was never done and wife did ask for this local to them.  Pt without any other complaints related to diabetes at this time.    Review of Systems  see HPI      Patient History    Past History (reviewed):    Medical:   Past Medical History:   Diagnosis Date   • Abnormal LFTs (liver function tests) 12/2021    developed during prolonged hospital stay for COVID infection   • Anemia 12/29/2021    developed during prolonged hospital stay for COVID infection   • COVID-19 virus infection 12/2021   • DVT of deep femoral vein, left (HCC) 12/2021   • Insomnia    • Long term systemic steroid user 12/2021    high dose steroids in use during and after hospital stay for COVID related infection   • Mixed hyperlipidemia    • Obesity    • Paroxysmal atrial fibrillation (HCC) 02/10/2022   • Pneumonia due to COVID-19 virus 12/2021   • Secondary adrenal insufficiency (HCC) 01/2022    considered adrenal insufficient related to long term use of high dose steroids started 12/21 for sig COVID infection   • Type 2 diabetes mellitus with hyperglycemia (HCC) 2010    was diet controlled till late '21 then insulin started after sig illness and high dose steroids   • Type II  diabetes mellitus with neurological manifestations (HCC)        Surgery:   Past Surgical History:   Procedure Laterality Date   • NO PAST SURGERIES           Social History (reviewed):  - Smoking, - ETOH, - Drugs, , 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    Allergies:  No Known Allergies    Physical Exam    VITALS:    Vitals:    04/29/22 1019   BP: 126/70   Pulse: 103   Temp: 98.3 °F (36.8 °C)   SpO2: 98%     Body mass index is 34.43 kg/m².    GENERAL:  Looks stated age, Mod obese, using walker  HEAD/EYES:  N/C, A/T, Mask in place  EXTREMITIES:  FROM, skin changes of chronic edema noted 2/22  CNS:  A&Ox3, dec sensation to fine touch above ankles noted 2/22    Full exam not done today.      Labs/Imaging  All available lab and imaging data were reviewed.        Britton Amos MD, DESTINY, FACE, ECNU  4/29/2022  10:53 EDT

## 2022-04-29 NOTE — PATIENT INSTRUCTIONS
As discussed continue with current insulin doses as well as Metformin.  Also the [facility] has changed over to a new generic form of Lantus so updated script for that.      Labs to be done at least 10 days before return appointment.  If labs are not done within 3 days of scheduled return appointment the appointment will be canceled and rescheduled to a later date with requirement for labs to be done as directed.      Bring med list, meter and/or log data to all appointments.

## 2022-04-30 LAB
ALBUMIN/CREAT UR: 15 MG/G CREAT (ref 0–29)
CREAT UR-MCNC: 83.5 MG/DL
MICROALBUMIN UR-MCNC: 12.8 UG/ML

## 2022-05-03 ENCOUNTER — HOSPITAL ENCOUNTER (OUTPATIENT)
Dept: PHYSICAL THERAPY | Facility: HOSPITAL | Age: 67
Setting detail: THERAPIES SERIES
Discharge: HOME | End: 2022-05-03

## 2022-05-03 DIAGNOSIS — U09.9 POST-ACUTE SEQUELAE OF COVID-19 (PASC): ICD-10-CM

## 2022-05-03 DIAGNOSIS — R53.1 WEAKNESS: Primary | ICD-10-CM

## 2022-05-03 PROCEDURE — 97162 PT EVAL MOD COMPLEX 30 MIN: CPT

## 2022-05-03 NOTE — THERAPY EVALUATION
.Outpatient Physical Therapy Neuro Initial Evaluation  ARELIS Flaherty     Patient Name: Sergio Daniel  : 1955  MRN: [id number]  Today's Date: 5/3/2022      Visit Date: 2022    Patient Active Problem List   Diagnosis   • Paroxysmal atrial fibrillation (HCC)   • Pneumonia due to COVID-19 virus   • Long term systemic steroid user   • Secondary adrenal insufficiency (HCC)   • Anemia   • Abnormal LFTs (liver function tests)   • Type II diabetes mellitus with neurological manifestations (HCC)   • Mixed hyperlipidemia   • Type 2 diabetes mellitus with hyperglycemia (HCC)        Past Medical History:   Diagnosis Date   • Abnormal LFTs (liver function tests) 2021    developed during prolonged hospital stay for COVID infection   • Anemia 2021    developed during prolonged hospital stay for COVID infection   • COVID-19 virus infection 2021   • DVT of deep femoral vein, left (HCC) 2021   • Insomnia    • Long term systemic steroid user 2021    high dose steroids in use during and after hospital stay for COVID related infection   • Mixed hyperlipidemia    • Obesity    • Paroxysmal atrial fibrillation (HCC) 02/10/2022   • Pneumonia due to COVID-19 virus 2021   • Secondary adrenal insufficiency (HCC) 2022    considered adrenal insufficient related to long term use of high dose steroids started  for sig COVID infection   • Type 2 diabetes mellitus with hyperglycemia (HCC)     was diet controlled till late  then insulin started after sig illness and high dose steroids   • 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 Post-acute sequelae of COVID-19 ([facility])  U09.9 139.8        Patient History     Row Name 22 1100             History    Chief Complaint Difficulty Walking;Difficulty with daily activities;Balance Problems;Fatigue/poor  endurance;Muscle weakness;Problems breathing/shortness of breath  -JV      Date Current Problem(s) Began 12/15/21  -JV      Brief Description of Current Complaint The pt was adm to acute care on 12/15/21 with Covid19 and required vent support x 2 weeks. Pt was transferred to SNF for short term rehab on 1/13/22 and discharged home on 1/30/22. Pt received  PT and SLP until 4/25/22 and is now referred for PT evaluation and tx by PCP. Pt has a MBS study scheduled for 6/13/22, but is currently on regular diet with thin liquids.  -JV      Previous treatment for THIS PROBLEM Medication;Rehabilitation  -JV      Patient/Caregiver Goals Return to prior level of function;Return to work;Improve mobility;Improve strength  -JV      Current Tobacco Use no  -JV      Patient's Rating of General Health Fair  -JV      Hand Dominance right-handed  -JV      Occupation/sports/leisure activities tax prep  -JV              Fall Risk Assessment    Any falls in the past year: No  -JV      Does patient have a fear of falling Yes (comment)  -JV      Previous Functional Level Other (comment)  Independent in the community, working FT  -JV              Services    Are you currently receiving Home Health services No  -JV      Do you plan to receive Home Health services in the near future No  -JV              Daily Activities    Primary Language English  -JV      How does patient learn best? Listening  -JV      Patient is concerned about/has problems with Climbing Stairs;Coordination;Difficulty with self care (i.e. bathing, dressing, toileting:;Performing job responsibilities/community activities (work, school,;Walking  -JV      Does patient have problems with the following? None  -JV      Barriers to learning None  -JV      Pt Participated in POC and Goals Yes  -JV              Safety    Are you being hurt, hit, or frightened by anyone at home or in your life? No  -JV      Are you being neglected by a caregiver No  -JV            User Key  (r) =  Recorded By, (t) = Taken By, (c) = Cosigned By    Initials Name Provider Type    JV Elise Gipson, PT Physical Therapist                     PT Neuro     Row Name 05/03/22 1100             Subjective Comments    Subjective Comments Pt reports he has resumed driving and only uses cane outside of the house.  -JV              Precautions and Contraindications    Precautions/Limitations no known precautions/limitations  -JV              Subjective Pain    Able to rate subjective pain? yes  -JV      Pre-Treatment Pain Level 0  -JV      Post-Treatment Pain Level 0  -JV              Home Living    Current Living Arrangements home  -JV      Home Accessibility stairs to enter home  -JV      Number of Stairs, Main Entrance four  -JV      Stair Railings, Main Entrance railings safe and in good condition  -JV      Home Equipment Rolling walker;Cane  bath seat  -JV              Cognition    Overall Cognitive Status WFL  -JV      Orientation Level Oriented X4  -JV              Sensation    Light Touch No apparent deficits  -JV              DTR- Lower Quarter Clearing    Patellar tendon (L2-4) Right:;2- Normal response;Left:;3- Slightly hyperactive response  -JV              Posture/Observations    Alignment Options Forward head;Rounded shoulders  -JV      Forward Head Mild;Increased  -JV      Rounded Shoulders Mild;Increased  -JV              General ROM    GENERAL ROM COMMENTS Se LE ROM is WFL's  -JV              MMT (Manual Muscle Testing)    Rt Lower Ext Comments  -JV      Lt Lower Ext Comments  -JV              MMT Right Lower Ext    Rt Lower Extremity Comments  R LE is 4-/5 at the hip, 4/5 at the knee and ankle.  -JV              MMT Left Lower Ext    Lt Lower Extremity Comments  L LE is 3+/5 at the hip, 4-/5 at the knee and 3/5 at the ankle.  -JV              Bed Mobility    Comment, (Bed Mobility) NT  -JV              Transfers    Bed-Chair [place] (Transfers) modified independence  -JV      Chair-Bed [place]  (Transfers) modified independence  -JV      Transfers, Bed-Chair-Bed, Assist Device straight cane  -JV      Sit-Stand [place] (Transfers) independent  -JV      Stand-Sit [place] (Transfers) independent  -JV              Gait/Stairs (Locomotion)    [place] Level (Gait) modified independence  -JV      Assistive Device (Gait) cane, straight  -JV      Distance in Feet (Gait) 125  -JV      Pattern (Gait) step-through  -JV      Bilateral Gait Deviations decreased arm swing;forward flexed posture  -JV      Left Sided Gait Deviations foot drop/toe drag;heel strike decreased  -JV      [place] Level (Stairs) not tested  -JV      Comment, (Gait/Stairs) The pt has a foot drop on the L side and reports this was pre-morbid to hospital admission. Foot drop was improved with AFO and pt/wife were educated regarding gradual build up of wear time and inspecting skin for reddened areas.  -JV              Balance Skills Training    Sitting-Level of Assistance Independent  -JV      Sitting-Balance Support Feet unsupported  -JV      Standing-Level of Assistance Independent  -JV      Static Standing Balance Support No upper extremity supported  -JV      Gait Balance-Level of Assistance Close supervision  -JV      Gait Balance Support assistive device  -JV      Balance Comments See Tinetti balance assessment  -JV            User Key  (r) = Recorded By, (t) = Taken By, (c) = Cosigned By    Initials Name Provider Type    Elise Singh PT Physical Therapist                        Therapy Education  Education Details: Asked pt to continue CKC LE strength exercises provided by  PT.  Given: HEP  Program: Reinforced  How Provided: Verbal, Demonstration  Provided to: Patient, Caregiver  Level of Understanding: Teach back education performed, Verbalized     PT OP Goals     Row Name 05/03/22 1100          PT Short Term Goals    STG Date to Achieve 05/17/22  -JV     STG 1 Pt will improve score on Tinetti balance assessment  "by 2-3 pts.  -JV     STG 2 Pt will skip 30 min of functional activity without SOA.  -JV     STG 3 Pt will be I with HEP  -JV            Long Term Goals    LTG Date to Achieve 06/02/22  -JV     LTG 1 Pt will improve score on LEFS by 9 points.  -JV     LTG 2 Pt will amb community distance without AD and no LOB.  -JV     LTG 3 Pt will improve L hip strength to 4-/5.  -JV            Time Calculation    PT Goal Re-Cert Due Date 06/02/22  -JV           User Key  (r) = Recorded By, (t) = Taken By, (c) = Cosigned By    Initials Name Provider Type    Elise Singh, JASMIN Physical Therapist                 PT Assessment/Plan     Row Name 05/03/22 1507          PT Assessment    Functional Limitations Impaired gait;Limitation in home management;Limitations in community activities;Performance in self-care ADL;Performance in work activities  -JV     Impairments Balance;Endurance;Gait;Muscle strength  -JV     Assessment Comments This pt is a 68 y/o M with hx of prolonged hospitalization following Covid-19 infection requiring vent support x 2 weeks. The pt went to a SNF for short term rehab after acute care and was D/C'd home on 1/30/22. The pt received HH PT and SLP until 4/25/22, and is now referred by PCP for PT eval and tx. The pt reports he has resumed driving and only uses cane when going out of the house. Pt's wife reports decreased balance, coordination and activity skip. Pt reports weakness in the L LE is pre-morbid, and states he \"has had a limp since I was in my 20's\". The pt does have weakness in the Se LE's, but L LE is weaker and has increased DTR's when compared to the R LE. The pt is observed to have a resting head tremor, and reports poor eye-hand coordination for sports like golf. The pt does have a L foot drop that results in a foot slap at heel strike, but he is able to clear toes during swing phase. Pt was provided with an AFO for the L LE which improved his gait quality and speed. Score on the Tinetti balance " assessment places him at moderately increased risk for falls. The pt would benefit from skilled PT intervention to help improve problems noted above. A Neurology consult is recommended due to symptoms including foot drop and resting head tremor that may not be due to PASC.  -JV     Please refer to paper survey for additional self-reported information Yes  -JV     Rehab Potential Good  -JV     Patient/caregiver participated in establishment of treatment plan and goals Yes  -JV     Patient would benefit from skilled therapy intervention Yes  -JV            PT Plan    PT Frequency Other (comment)  Bi-weekly  -JV     Predicted Duration of Therapy Intervention (PT) 4 weeks  -JV     Planned CPT's? PT EVAL LOW COMPLEXITY: 09918;PT THER PROC EA 15 MIN: 61414;PT THER ACT EA 15 MIN: 97991;PT NEUROMUSC RE-EDUCATION EA 15 MIN: 69115;PT GAIT TRAINING EA 15 MIN: 32948;PT ELECTRICAL STIM UNATTEND: ;PT ELECTRICAL STIM ATTD EA 15 MIN: 87855  -JV     Physical Therapy Interventions (Optional Details) balance training;gait training;home exercise program;modalities;motor coordination training;neuromuscular re-education;orthotic fitting/training;patient/family education;stair training;strengthening  -JV     PT Plan Comments Plan to see pt as noted above to help progress him to his prior level of functional independence.  -JV           User Key  (r) = Recorded By, (t) = Taken By, (c) = Cosigned By    Initials Name Provider Type    Elise Singh PT Physical Therapist                    OP Exercises     Row Name 05/03/22 1100             Subjective Comments    Subjective Comments Pt reports he has resumed driving and only uses cane outside of the house.  -JV              Subjective Pain    Able to rate subjective pain? yes  -JV      Pre-Treatment Pain Level 0  -JV      Post-Treatment Pain Level 0  -JV            User Key  (r) = Recorded By, (t) = Taken By, (c) = Cosigned By    Initials Name Provider Type    Elise Singh PT  Physical Therapist                            Outcome Measure Options: Lower Extremity Functional Scale (LEFS), Tinetti  Lower Extremity Functional Index  Any of your usual work, housework or school activities: Moderate difficulty  Your usual hobbies, recreational or sporting activities: Moderate difficulty  Getting into or out of the bath: Moderate difficulty  Walking between rooms: Moderate difficulty  Putting on your shoes or socks: Moderate difficulty  Squatting: Quite a bit of difficulty  Lifting an object, like a bag of groceries from the floor: Quite a bit of difficulty  Performing light activities around your home: Quite a bit of difficulty  Performing heavy activities around your home: Moderate difficulty  Getting into or out of a car: Quite a bit of difficulty  Walking 2 blocks: Moderate difficulty  Walking a mile: Extreme difficulty or unable to perform activity  Going up or down 10 stairs (about 1 flight of stairs): Quite a bit of difficulty  Standing for 1 hour: Extreme difficulty or unable to perform activity  Sitting for 1 hour: No difficulty  Running on even ground: Extreme difficulty or unable to perform activity  Running on uneven ground: Extreme difficulty or unable to perform activity  Making sharp turns while running fast: Extreme difficulty or unable to perform activity  Hopping: Extreme difficulty or unable to perform activity  Rolling over in bed: Moderate difficulty  Total: 25  Tinetti Assessment  Tinetti Assessment: yes  Sitting Balance: Steady,safe  Arises: Able, uses arms  Attempts to Rise: Able in 1 attempt  Immediate Standing Balance (first 5 sec): Steady without support  Standing Balance: Narrow stance, without support  Sternal Nudge (feet close together): Steady  Eyes Closed (feet close together): Steady  Turning 360 Degrees- Steps: Continuous steps  Turning 360 Degrees- Steadiness: Steady  Sitting Down: Uses arms or not a smooth motion  Tinetti Balance Score: 14  Gait Initiation  "(immediate after told \"go\"): No hesitancy  Step Length- Right Swing: Right swing foot passes Left stance leg  Step Length- Left Swing: Left swing foot passes Right  Foot Clearance- Right Foot: Right foot completely clears floor  Foot Clearance- Left Foot: Left foot completely clears floor  Step Symmetry: Right and Left step length equal  Step Continuity: Steps appear continuous  Path (excursion): Mild/moderate deviation or use of aid  Trunk: Marked sway or uses device  Base of Support: Heels close while walking  Gait Score: 9  Tinetti Total Score: 23  Tinetti Assistive Device: straight cane    Time Calculation:   Start Time: 1100  Stop Time: 1200  Time Calculation (min): 60 min   Therapy Charges for Today     Code Description Service Date Service Provider Modifiers Qty    45622308012 HC PT EVAL MOD COMPLEXITY 4 5/3/2022 Elise Gipson, PT GP 1          PT G-Codes  Outcome Measure Options: Lower Extremity Functional Scale (LEFS), Tinetti  Total: 25  Tinetti Total Score: 23         Elise Gipson, PT  5/3/2022     "

## 2022-05-17 ENCOUNTER — HOSPITAL ENCOUNTER (OUTPATIENT)
Dept: PHYSICAL THERAPY | Facility: HOSPITAL | Age: 67
Setting detail: THERAPIES SERIES
Discharge: HOME | End: 2022-05-17

## 2022-05-17 DIAGNOSIS — R53.1 WEAKNESS: Primary | ICD-10-CM

## 2022-05-17 PROCEDURE — 97116 GAIT TRAINING THERAPY: CPT

## 2022-05-17 PROCEDURE — 97110 THERAPEUTIC EXERCISES: CPT

## 2022-05-17 PROCEDURE — 97112 NEUROMUSCULAR REEDUCATION: CPT

## 2022-05-17 NOTE — THERAPY TREATMENT NOTE
Outpatient Physical Therapy Neuro Treatment Note  ARELIS Hien Neely     Patient Name: Sergio Daniel  : 1955  MRN: [id number]  Today's Date: 2022      Visit Date: 2022    Visit Dx:    ICD-10-CM ICD-9-CM   1. Weakness  R53.1 780.79       Patient Active Problem List   Diagnosis   • Paroxysmal atrial fibrillation (HCC)   • Pneumonia due to COVID-19 virus   • Long term systemic steroid user   • Secondary adrenal insufficiency (HCC)   • Anemia   • Abnormal LFTs (liver function tests)   • Type II diabetes mellitus with neurological manifestations (HCC)   • Mixed hyperlipidemia   • Type 2 diabetes mellitus with hyperglycemia (HCC)                        PT Assessment/Plan     Row Name 22 1622          PT Assessment    Assessment Comments The pt presents for tx this date and reports he did not like the AFO on his L foot. Pt reports ongoing compliance with LE strengthening exercises, returned to work on a limited basis. Pt rode recumbent bike x 5 min with good skip, and was educated regarding the balance progression. Pt worked on balance in corner with chair in front for safety. Pt able to hold balance on firm surface with normal Trisha, head mov't up/down and side to side with eyes open and closed. Pt also worked on balance with blue foam pad with normal and narrow Trisha with eyes open and no head mov't.  -JV     Please refer to paper survey for additional self-reported information Yes  -JV     Rehab Potential Good  -JV     Patient/caregiver participated in establishment of treatment plan and goals Yes  -JV     Patient would benefit from skilled therapy intervention Yes  -JV            PT Plan    PT Plan Comments Cont plan to progress toward goals.  -JV           User Key  (r) = Recorded By, (t) = Taken By, (c) = Cosigned By    Initials Name Provider Type    Elise Singh, PT Physical Therapist                    OP Exercises     Row Name 22 1300             Precautions    Existing  Precautions/Restrictions no known precautions/restrictions  -JV              Subjective Comments    Subjective Comments Pt reports he did not like the AFO.  -JV              Subjective Pain    Able to rate subjective pain? yes  -JV      Pre-Treatment Pain Level 0  -JV      Post-Treatment Pain Level 0  -JV              Exercise 1    Exercise Name 1 Recumbent bike  -JV      Time 1 5 min  -JV              Exercise 2    Exercise Name 2 Balance progression in corner with chair in front for safety. Pt was able to maintain balance with wide Trisha, head mov't up/down and side to side with eyes open and closed.  -JV              Exercise 3    Exercise Name 3 Pt worked on balance with blue foam pad with normal and narrow Trisha.  -JV            User Key  (r) = Recorded By, (t) = Taken By, (c) = Cosigned By    Initials Name Provider Type    Elise Singh PT Physical Therapist                                Therapy Education  Education Details: Added balance progression too HEP  Given: HEP  Program: New  How Provided: Verbal, Demonstration, Written  Provided to: Patient  Level of Understanding: Teach back education performed, Verbalized, Demonstrated              Time Calculation:   Start Time: 1300  Stop Time: 1400  Time Calculation (min): 60 min   Therapy Charges for Today     Code Description Service Date Service Provider Modifiers Qty    75124732420 HC PT THER PROC EA 15 MIN 5/17/2022 Elise Gipson, PT GP 1    [id number]  PT NEUROMUSC RE EDUCATION EA 15 MIN 5/17/2022 Elise Gipson, PT GP 2    [id number] HC GAIT TRAINING EA 15 MIN 5/17/2022 Elise Gipson, PT GP 1                    Elise Gipson PT  5/17/2022

## 2022-05-20 ENCOUNTER — OFFICE VISIT (OUTPATIENT)
Dept: INTERNAL MEDICINE | Facility: CLINIC | Age: 67
End: 2022-05-20

## 2022-05-20 VITALS
DIASTOLIC BLOOD PRESSURE: 88 MMHG | WEIGHT: 263.2 LBS | TEMPERATURE: 98.5 F | HEIGHT: 73 IN | BODY MASS INDEX: 34.88 KG/M2 | OXYGEN SATURATION: 97 % | HEART RATE: 98 BPM | SYSTOLIC BLOOD PRESSURE: 136 MMHG

## 2022-05-20 DIAGNOSIS — I48.0 PAROXYSMAL ATRIAL FIBRILLATION: Chronic | ICD-10-CM

## 2022-05-20 DIAGNOSIS — R13.10 DYSPHAGIA, UNSPECIFIED TYPE: ICD-10-CM

## 2022-05-20 DIAGNOSIS — U09.9 POST-ACUTE SEQUELAE OF COVID-19 (PASC): Primary | ICD-10-CM

## 2022-05-20 DIAGNOSIS — E78.2 MIXED HYPERLIPIDEMIA: ICD-10-CM

## 2022-05-20 DIAGNOSIS — Z12.5 ENCOUNTER FOR SCREENING FOR MALIGNANT NEOPLASM OF PROSTATE: ICD-10-CM

## 2022-05-20 PROBLEM — U07.1 PNEUMONIA DUE TO COVID-19 VIRUS: Status: RESOLVED | Noted: 2022-02-10 | Resolved: 2022-05-20

## 2022-05-20 PROBLEM — J12.82 PNEUMONIA DUE TO COVID-19 VIRUS: Status: RESOLVED | Noted: 2022-02-10 | Resolved: 2022-05-20

## 2022-05-20 PROCEDURE — 99214 OFFICE O/P EST MOD 30 MIN: CPT | Performed by: NURSE PRACTITIONER

## 2022-05-20 NOTE — PROGRESS NOTES
Subjective   Sergio Daniel is a 67 y.o. 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 Dx: E 11.65 400 each 4   • apixaban (ELIQUIS) 5 MG tablet tablet Take 1 tablet by mouth Every 12 (Twelve) Hours. Indications: DVT/PE (active thrombosis) 60 tablet    • Cholecalciferol (Vitamin D-3) 125 MCG (5000 UT) tablet Take  by mouth.     • Cyanocobalamin (VITAMIN B12 PO) Take 5 mcg by mouth 2 (Two) Times a Day.     • glucagon (GLUCAGEN) 1 MG injection For emergency use for severe low glucose: first inject, then call 911, once awake try to give oral treatment. Kit expires in 6-12 months. 1 kit 6   • glucose blood (Accu-Chek Guide) test strip Check 4 times a day on insulin tx, poor control, hypoglycemia. Dx: E 11.65 400 each 4   • insulin aspart (NovoLOG FlexPen) 100 UNIT/ML solution pen-injector sc pen Use as directed for meal & correction coverage up to 35 units a day. 30 mL 2   • Insulin Glargine (Semglee) 100 UNIT/ML injection pen Inject 23 units once daily in the AM and titrate as directed up to 35 units a day. 30 mL 2   • Insulin Pen Needle (B-D UF III MINI PEN NEEDLES) 31G X 5 MM misc For use with pen device up to 5 times a day. Can substitute based on availability and insurance. 500 each 4   • metFORMIN ER  "(GLUCOPHAGE-XR) 500 MG 24 hr tablet By mouth take 2 tabs twice daily after AM & PM meals. 360 tablet 2   • VITAMIN A PO Take  by mouth.     • VITAMIN C, CALCIUM ASCORBATE, PO Take  by mouth.     • Zinc 40 MG tablet Take  by mouth.         He continues to meet w/ outpt PT. PT expressed concern about L sided muscle weakness and tremors. She felt there might be a neurologic deficit. Pt has FH of Parkinson in his father. She suggested an MRI. [facility] and [facility] is scheduled for 06/14.    HLD - Endo recommended statin and fibrate. Pt declined.    Swallow test is pending for next week.    He experienced several episodes of hypotension = 90s/60s. He and hiss wife decided to d/c Diltiazem. They did not discuss this w/ Cards.    He notes continued low stamina. He sts his stamina is approx 50-60% of his pre-COVID level.    The following portions of the patient's history were reviewed and updated as appropriate: allergies, current medications, past family history, past medical history, past social history, past surgical history and problem list.        Objective   Vitals:    05/20/22 1241   BP: 136/88   Pulse: 98   Temp: 98.5 °F (36.9 °C)   TempSrc: Oral   SpO2: 97%   Weight: 119 kg (263 lb 3.2 oz)   Height: 185.4 cm (73\")       BP Readings from Last 3 Encounters:   05/20/22 136/88   04/29/22 126/70   03/11/22 110/70        Wt Readings from Last 3 Encounters:   05/20/22 119 kg (263 lb 3.2 oz)   04/29/22 118 kg (261 lb)   03/11/22 112 kg (248 lb)        Body mass index is 34.73 kg/m².  Nursing notes and vitals reviewed.    Physical Exam  Constitutional:       General: He is not in acute distress.     Appearance: He is well-developed.   Neck:      Thyroid: No thyroid mass or thyromegaly.   Cardiovascular:      Rate and Rhythm: Regular rhythm.      Heart sounds: Normal heart sounds.   Pulmonary:      Effort: Pulmonary effort is normal.      Breath sounds: Normal breath sounds.   Musculoskeletal:      Cervical back: " Neck supple.   Lymphadenopathy:      Cervical: No cervical adenopathy.   Neurological:      Mental Status: He is alert and oriented to person, place, and time.   Psychiatric:         Attention and Perception: He is attentive.         Mood and Affect: Mood and affect normal.         Speech: Speech normal.         Behavior: Behavior normal.         Thought Content: Thought content normal.             Assessment & Plan   Diagnoses and all orders for this visit:    1. 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 Mixed hyperlipidemia   - declined statin therapy   - focusing on TLCs    3. Dysphagia, unspecified type   - SS pending    4. Paroxysmal atrial fibrillation (HCC)   - advised to call Cardiology office today to discuss Cardizem    5. Encounter for screening for malignant neoplasm of prostate  -     PSA Screen              Medications, including side effects, were discussed with the patient. Patient verbalized understanding.  The plan of care was discussed. All questions were answered. Patient verbalized understanding.      Return in about 4 months (around 9/20/2022) for Medicare Wellness.

## 2022-05-23 ENCOUNTER — HOSPITAL ENCOUNTER (OUTPATIENT)
Dept: GENERAL RADIOLOGY | Facility: HOSPITAL | Age: 67
Discharge: HOME | End: 2022-05-23
Admitting: NURSE PRACTITIONER

## 2022-05-23 DIAGNOSIS — R13.10 DYSPHAGIA, UNSPECIFIED TYPE: Primary | ICD-10-CM

## 2022-05-23 PROCEDURE — 74230 X-RAY XM SWLNG FUNCJ C+: CPT

## 2022-05-23 PROCEDURE — 92611 MOTION FLUOROSCOPY/SWALLOW: CPT

## 2022-05-23 NOTE — MBS/VFSS/FEES
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)   • Mixed hyperlipidemia   • Type 2 diabetes mellitus with hyperglycemia (HCC)        Past Medical History:   Diagnosis Date   • Abnormal LFTs (liver function tests) 2021    developed during prolonged hospital stay for COVID infection   • Anemia 2021    developed during prolonged hospital stay for COVID infection   • COVID-19 virus infection 2021   • DVT of deep femoral vein, left (HCC) 2021   • Insomnia    • Long term systemic steroid user 2021    high dose steroids in use during and after hospital stay for COVID related infection   • Mixed hyperlipidemia    • Obesity    • Paroxysmal atrial fibrillation (HCC) 02/10/2022   • Pneumonia due to COVID-19 virus 2021   • Secondary adrenal insufficiency (HCC) 2022    considered adrenal insufficient related to long term use of high dose steroids started  for sig COVID infection   • Type 2 diabetes mellitus with hyperglycemia (HCC)     was diet controlled till late  then insulin started after sig illness and high dose steroids   • 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 Dysphagia, unspecified type  R13.10 787.20         MBS Summary:  Pt presents with a functional oropharyngeal swallow. Occasional decreased oral control of bolus on 1/2 trials of puree and thins on 2/5 trials with  one from spoon and one from small cup drink. Pt with loss of bolus to floor of mouth on thins with recollection and minimal residue in the floor of mouth x2. Pt with premature spilll of puree on 1/2 trials to the base of the tongue before triggering of the swallow. Mild delay of swallow on thins from single, small cup drink to base of tongue as well. No penetration, aspiration or significant pharyngeal residue noted after all trials and consistencies. Noted decreased primarily anterior hyoid movement during the swallow, but no significant effect on pharyngeal functioning.       OP SLP Assessment/Plan - 05/23/22 1050        SLP Assessment    Functional Problems Swallowing  -AD    Please refer to items scanned into chart for additional diagnostic informaiton and handouts as provided by clinician Yes   PACS images -AD    SLP Diagnosis Functional oropharyngeal swallow  -AD    Patient would benefit from skilled therapy intervention No  -AD          User Key  (r) = Recorded By, (t) = Taken By, (c) = Cosigned By    Initials Name Provider Type    AD Denisa Chua MS CCC-SLP Speech and Language Pathologist                 SLP Adult Swallow Evaluation     Row Name 05/23/22 1050       Rehab Evaluation    Document Type evaluation  -AD    Subjective Information no complaints  -AD    Patient Observations alert;cooperative  -AD    Patient/Family/Caregiver Comments/Observations Pt seen seated upright in a chair for the study. Pt ambulatory and able to maintain fully upight, seated position w/o assistance.  -AD    Patient Effort good  -AD    Comment Pt put forth his best effort on all tasks/trials.  -AD    Symptoms Noted During/After Treatment none  -AD       General Information    Patient Profile Reviewed yes  -AD    Pertinent History Of Current Problem Pt is a 66 y/o male referred for a swallow study to assess safety of current po intake. Recent history of Covid infection in December 2021 w/pneumonia requiring ventilation  for 2 weeks. Dysphagia with silent aspiration of thins per initial MBS. Pt participated in therapy while in the hospital, SNF and home health. He participated in therapy in acute care hospital, SNF unit and home health. Pt reports FEES was completed during SNF stay. He was advanced to a regular diet with thin liquids per home health and recommended follow up MBS to assess safety of diet recommendations due to hx of silent aspiration. Pt initially with severe dysphonia following extubation and during acute hospitalization that has significantly improved. Pt reports it is not back to it's baseline, but is functional.  -AD    Current Method of Nutrition regular textures;thin liquids  -AD    Precautions/Limitations, Vision WFL;for purposes of eval  -AD    Precautions/Limitations, Hearing WFL  -AD    Prior Level of Function-Communication WFL  -AD    Prior Level of Function-Swallowing no diet consistency restrictions;other (see comments)  prior to hospitalization in Dec 2021  -AD    Plans/Goals Discussed with patient;agreed upon  -AD    Barriers to Rehab none identified  -AD    Patient's Goals for Discharge no concerns voiced  -AD       Pain    Additional Documentation --  no current pain reported  -AD       Oral Motor Structure and Function    Oral Lesions or Structural Abnormalities and/or variants none  -AD    Dentition Assessment natural, present and adequate  -AD    Secretion Management WNL/WFL  -AD    Mucosal Quality moist, healthy  -AD    Volitional Swallow WFL  -AD    Volitional Cough WFL  -AD       Oral Musculature and Cranial Nerve Assessment    Oral Motor General Assessment WFL  -AD       General Eating/Swallowing Observations    Respiratory Support Currently in Use room air  -AD    Eating/Swallowing Skills fed by SLP;self-fed;appropriate self-feeding skills observed;other (see comments)  Pt controlled cup and straw drinks  -AD    Positioning During Eating upright 90 degree;upright in chair  -AD        Respiratory    Respiratory Status WFL;room air;during swallowing/eating  -AD       MBS/VFSS    Utensils Used spoon;cup;straw  -AD    Consistencies Trialed regular textures;pureed;thin liquids;nectar/syrup-thick liquids;honey-thick liquids  -AD       MBS/VFSS Interpretation    Oral Prep Phase WFL  -AD    Oral Transit Phase WFL  -AD    Oral Residue WFL  -AD    VFSS Summary Pt presents with a functional oropharyngeal swallow. Occasional decreased oral control of bolus on 1/2 trials of puree and thins on 2/5 trials with one from spoon and one from small cup drink. Pt with loss of bolus to floor of mouth on thins with recollection and minimal residue in the floor of mouth x2. Pt with premature spilll of puree on 1/2 trials to the base of the tongue before triggering of the swallow. Mild delay of swallow on thins from single, small cup drink to base of tongue as well. No penetration, aspiration or significant pharyngeal residue noted after all trials and consistencies. Noted decreased primarily anterior hyoid movement during the swallow, but no significant effect on pharyngeal functioning.  -AD    Oral Phase, Comment Mild decreased bolus control/transit issues with thins and puree. May be due to decreased lingual control but overall appears WFL. Mild loss of liquids to floor of mouth. Minimal premature spill of puree on 1/2 trials to BOT.  -AD       Initiation of Pharyngeal Swallow    Initiation of Pharyngeal Swallow WFL  -AD    Pharyngeal Phase functional pharyngeal phase of swallowing  -AD    Rosenbek's Scale thin:;nectar:;honey:;pudding/puree:;regular textures:;1--->level 1  -AD    Pharyngeal Phase, Comment Functional pharyngeal phase of swallowing. Pt w/noted decreased anterior hyoid movement. No significant effect on the swallowing. No penetration or aspiration viewed. Swallow was timely overall across all trials/consistencies. No significant pharyngeal residue after the swallow on all trials and consistencies.  -AD        SLP Evaluation Clinical Impression    SLP Swallowing Diagnosis swallow WFL;functional oral phase;functional pharyngeal phase  -AD    Functional Impact no impact on function  -AD    Swallow Criteria for Skilled Therapeutic Interventions Met no problems identified which require skilled intervention;other (see comments)  Significant improvement in swallowing since Jan 2022.  -AD       Recommendations    Therapy Frequency (Swallow) evaluation only  -AD    SLP Diet Recommendation regular textures;thin liquids  -AD    Recommended Diagnostics No further SLP services recommended  -AD    Recommended Precautions and Strategies upright posture during/after eating  -AD    Oral Care Recommendations Oral Care before breakfast, after meals and PRN;Toothbrush  -AD    SLP Rec. for Method of Medication Administration meds whole;with thin liquids;as tolerated  -AD    Monitor for Signs of Aspiration no;notify SLP if any concerns  -AD    Patient/Family Concerns, Anticipated Discharge Disposition (SLP) No current concerns reported per patient at this time.  -AD          User Key  (r) = Recorded By, (t) = Taken By, (c) = Cosigned By    Initials Name Provider Type    Denisa Mcconnell MS CCC-SLP Speech and Language Pathologist                               OP SLP Education     Row Name 05/23/22 1050       Education    Barriers to Learning No barriers identified  -AD    Education Provided Described results of evaluation;Patient expressed understanding of evaluation  -AD    Assessed Learning needs;Learning motivation;Learning preferences;Learning readiness  -AD    Learning Motivation Strong  -AD    Learning Method Explanation  -AD    Teaching Response Verbalized understanding  -AD          User Key  (r) = Recorded By, (t) = Taken By, (c) = Cosigned By    Initials Name Effective Dates    Denisa Mcconnell MS CCC-SLP 06/16/21 -                          Time Calculation:   SLP Start Time: 1050  SLP Stop Time: 1158  SLP Time  Calculation (min): 68 min  SLP Non-Billable Time (min): 0 min  Total Timed Code Minutes- SLP: 0 minute(s)  Untimed Charges  SLP Eval/Re-eval : ST Motion Fluoro Eval Swallow - 38212  13176-DN Motion Fluoro Eval Swallow Minutes: 68  Total Minutes  Untimed Charges Total Minutes: 68   Total Minutes: 68    Therapy Charges for Today     Code Description Service Date Service Provider Modifiers Qty    56914651188  ST MOTION FLUORO EVAL SWALLOW 5 5/23/2022 Denisa Chua, MS CCC-SLP GN 1                   Denisa Chua MS CCC-SLP  5/23/2022

## 2022-05-24 ENCOUNTER — HOSPITAL ENCOUNTER (OUTPATIENT)
Dept: PHYSICAL THERAPY | Facility: HOSPITAL | Age: 67
Setting detail: THERAPIES SERIES
Discharge: HOME | End: 2022-05-24

## 2022-05-24 DIAGNOSIS — R53.1 WEAKNESS: Primary | ICD-10-CM

## 2022-05-24 PROCEDURE — 97112 NEUROMUSCULAR REEDUCATION: CPT

## 2022-05-24 PROCEDURE — 97110 THERAPEUTIC EXERCISES: CPT

## 2022-05-24 NOTE — THERAPY TREATMENT NOTE
Outpatient Physical Therapy Neuro Treatment Note  [facility] [place]     Patient Name: Sergio Daniel  : 1955  MRN: [id number]  Today's Date: 2022      Visit Date: 2022    Visit Dx:    ICD-10-CM ICD-9-CM   1. 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 Pt reports compliance with LE strengthening and balance exercises. Pt increased time on recumbent bike and we progressed balance activities to include a variety of compliant surfaces. Balance appears to be steadily improving.  -JV     Rehab Potential Good  -JV     Patient/caregiver participated in establishment of treatment plan and goals Yes  -JV     Patient would benefit from skilled therapy intervention Yes  -JV            PT Plan    PT Plan Comments Cont plan to progress toward goals.  -JV           User Key  (r) = Recorded By, (t) = Taken By, (c) = Cosigned By    Initials Name Provider Type    JV Elise Gipson, PT Physical Therapist                    OP Exercises     Row Name 22 1300             Precautions    Existing Precautions/Restrictions no known precautions/restrictions  -JV              Subjective Comments    Subjective Comments Pt reports he  is slowly getting his strength and endurance back.  -JV              Subjective Pain    Able to rate subjective pain? yes  -JV      Pre-Treatment Pain Level 0  -JV      Post-Treatment Pain Level 0  -JV              Exercise 1    Exercise Name 1 Recumbent bike  -JV      Time 1 8 min  -JV              Exercise 2    Exercise Name 2 Pt worked on balance with 1/2 foam roll and BAPS board.  -JV              Exercise 3    Exercise Name 3 Pt worked on balance with blue foam pad with normal and narrow Trisha.  -JV              Exercise 4    Exercise Name 4 Pt worked on balance with 2 blue air discs in parallel bars.  -JV              Exercise 5    Exercise Name 5 Pt worked on trunk sitting on pink air disc.  -JV            User Key  (r) = Recorded By, (t) = Taken By, (c) = Cosigned By    Initials Name Provider Type    Elise Singh PT Physical Therapist                                Therapy Education  Education Details: Encouraged pt to work on balance progression with narrow Trisha at home.  Given: HEP  Program: Progressed  How Provided: Verbal, Demonstration  Provided to: Patient  Level of Understanding: Teach back education performed, Verbalized, Demonstrated              Time Calculation:   Start Time: 1300  Stop Time: 1400  Time Calculation (min): 60 min   Therapy Charges for Today     Code Description Service Date Service Provider Modifiers Qty    10261912477  PT THER PROC EA 15 MIN 5/24/2022 Elsie Gipson, PT GP 2    [id number]  PT NEUROMUSC RE EDUCATION EA 15 MIN 5/24/2022 Elise Gipson, PT GP 2                    Elise Gipson PT  5/24/2022

## 2022-06-07 ENCOUNTER — HOSPITAL ENCOUNTER (OUTPATIENT)
Dept: PHYSICAL THERAPY | Facility: HOSPITAL | Age: 67
Setting detail: THERAPIES SERIES
Discharge: HOME | End: 2022-06-07

## 2022-06-07 DIAGNOSIS — R53.1 WEAKNESS: Primary | ICD-10-CM

## 2022-06-07 PROCEDURE — G0283 ELEC STIM OTHER THAN WOUND: HCPCS

## 2022-06-07 PROCEDURE — 97110 THERAPEUTIC EXERCISES: CPT

## 2022-06-07 PROCEDURE — 97112 NEUROMUSCULAR REEDUCATION: CPT

## 2022-06-07 NOTE — THERAPY PROGRESS REPORT/RE-CERT
Outpatient Physical Therapy Neuro Progress Note  [facility]ELIS [facility]     Patient Name: Sergio Daniel  : 1955  MRN: [id number]  Today's Date: 2022      Visit Date: 2022    Visit Dx:    ICD-10-CM ICD-9-CM   1. Weakness  R53.1 780.79       Patient Active Problem List   Diagnosis   • Paroxysmal atrial fibrillation (HCC)   • Long term systemic steroid user   • Secondary adrenal insufficiency (HCC)   • Anemia   • Abnormal LFTs (liver function tests)   • Type II diabetes mellitus with neurological manifestations (HCC)   • Mixed hyperlipidemia   • Type 2 diabetes mellitus with hyperglycemia (HCC)            PT Neuro     Row Name 22 1400             Subjective Comments    Subjective Comments Pt reports he is no longer using cane for ambulation.  -JV              Subjective Pain    Able to rate subjective pain? yes  -JV      Pre-Treatment Pain Level 0  -JV      Post-Treatment Pain Level 0  -JV              MMT Right Lower Ext    Rt Lower Extremity Comments  R LE is 4/5 at the hip, 4+/5 at the knee and ankle.  -JV              MMT Left Lower Ext    Lt Lower Extremity Comments  L LE is 4-/5 at the hip, 4/5 at the knee and 3+/5 at the ankle.  -JV              Gait/Stairs (Locomotion)    Mellette Level (Gait) modified independence  -JV      Distance in Feet (Gait) 200'  -JV      Pattern (Gait) step-through  -JV      Deviations/Abnormal Patterns (Gait) left sided deviations  -JV      Bilateral Gait Deviations decreased arm swing;forward flexed posture  -JV      Left Sided Gait Deviations foot drop/toe drag;heel strike decreased  -JV      Mellette Level (Stairs) not tested  -JV            User Key  (r) = Recorded By, (t) = Taken By, (c) = Cosigned By    Initials Name Provider Type    JV Elise Gipson, PT Physical Therapist                         PT Assessment/Plan     Row Name 22 1532          PT Assessment    Functional Limitations Impaired gait;Limitation in home management;Limitations in  community activities;Performance in self-care ADL;Performance in work activities  -JV     Impairments Balance;Endurance;Gait;Muscle strength  -JV     Assessment Comments The pt presents for tx and re-assessment this date, reports he is no longer using cane for ambulation.  Pt is making good progress with treatment and has improved Se LE strength, activity skip, and balance. The pt's score on the Tinetti improved by 4 pts and indicates that he is at normal risk for falling. The pt cont to have a mild L foot drop with an audible forefoot slap at heel strike. [facility] Overall, the pt is progressing well and has met all outcomes set at evaluation. The pt would cont to benefit from skilled PT intervention to help progress to an optimal level of functional I.  -JV     Please refer to paper survey for additional self-reported information Yes  -JV     Rehab Potential Good  -JV     Patient/caregiver participated in establishment of treatment plan and goals Yes  -JV     Patient would benefit from skilled therapy intervention Yes  -JV            PT Plan    PT Plan Comments Cont plan to progress toward updated goals.  -JV           User Key  (r) = Recorded By, (t) = Taken By, (c) = Cosigned By    Initials Name Provider Type    Elise Singh, PT Physical Therapist                  Modalities     Row Name 06/07/22 1400             Precautions    Existing Precautions/Restrictions no known precautions/restrictions  -JV              ELECTRICAL STIMULATION    Attended/Unattended Unattended  -JV      Stimulation Type Russian  -JV      Max mAmp 40  -JV      Location/Electrode Placement/Other ant tib/peroneals Se LE's  -JV      PT E-Stim Unattended Minutes 10  -JV            User Key  (r) = Recorded By, (t) = Taken By, (c) = Cosigned By    Initials Name Provider Type    Elise Singh, PT Physical Therapist               OP Exercises     Row Name 06/07/22 1400             Precautions     Existing Precautions/Restrictions no known precautions/restrictions  -JV              Subjective Comments    Subjective Comments Pt reports he is no longer using cane for ambulation.  -JV              Subjective Pain    Able to rate subjective pain? yes  -JV      Pre-Treatment Pain Level 0  -JV      Post-Treatment Pain Level 0  -JV              Exercise 1    Exercise Name 1 Recumbent bike  -JV      Time 1 8 min  -JV              Exercise 2    Exercise Name 2 Se ankle DF  -JV      Time 2 10 min  -JV      Additional Comments FES  -JV              Exercise 3    Exercise Name 3 Re-adm the Tinetti balance assessment.  -JV            User Key  (r) = Recorded By, (t) = Taken By, (c) = Cosigned By    Initials Name Provider Type    Elise Singh, PT Physical Therapist                             PT OP Goals     Row Name 06/07/22 1300          PT Short Term Goals    STG Date to Achieve 07/07/22  -JV     STG 1 Pt will improve score on Tinetti balance assessment by 2-3 pts.  -JV     STG 1 Progress Met  -JV     STG 2 Pt will skip 30 min of functional activity without SOA.  -JV     STG 2 Progress Met  -JV     STG 3 Pt will be I with HEP  -JV     STG 3 Progress Met  -JV     STG 4 Pt will amb up/down a flight of steps with mod I using reciprocal gait pattern.  -JV     STG 4 Progress New  -JV            Long Term Goals    LTG Date to Achieve 08/04/22  -JV     LTG 1 Pt will improve score on LEFS by 9 points.  -JV     LTG 1 Progress Met  -JV     LTG 2 Pt will amb community distance without AD and no LOB.  -JV     LTG 2 Progress Met  -JV     LTG 3 Pt will improve L hip strength to 4-/5.  -JV     LTG 3 Progress Met  -JV     LTG 4 Pt will improve strength L ankle DF to decrease foot slap by 50% while ambulating.  -JV     LTG 4 Progress New  -JV            Time Calculation    PT Goal Re-Cert Due Date 07/07/22  -JV           User Key  (r) = Recorded By, (t) = Taken By, (c) = Cosigned By    Initials Name Provider Type    SANDRITA Gipson  "Elise PT Physical Therapist                Therapy Education  Education Details: Educated pt regarding E-stim and possible use to improve motor control of L LE to decrease foot drop.  Given: HEP, Symptoms/condition management  Program: Reinforced  How Provided: Verbal, Demonstration  Provided to: Patient  Level of Understanding: Teach back education performed, Verbalized, Demonstrated    Outcome Measure Options: Tinetti  Tinetti Assessment  Tinetti Assessment: yes  Sitting Balance: Steady,safe  Arises: Able in 1 attempt  Attempts to Rise: Able in 1 attempt  Immediate Standing Balance (first 5 sec): Steady without support  Standing Balance: Steady, stance > 4 inch FARTUN & requires support  Sternal Nudge (feet close together): Steady  Eyes Closed (feet close together): Steady  Turning 360 Degrees- Steps: Continuous steps  Turning 360 Degrees- Steadiness: Steady  Sitting Down: Safe, smooth motion  Tinetti Balance Score: 15  Gait Initiation (immediate after told \"go\"): No hesitancy  Step Length- Right Swing: Right swing foot passes Left stance leg  Step Length- Left Swing: Left swing foot passes Right  Foot Clearance- Right Foot: Right foot completely clears floor  Foot Clearance- Left Foot: Left foot completely clears floor  Step Symmetry: Right and Left step length equal  Step Continuity: Steps appear continuous  Path (excursion): Straight without device  Trunk: None of the above  Base of Support: Heels close while walking  Gait Score: 12  Tinetti Total Score: 27  Tinetti Assessment Comments: No AD      Time Calculation:   Start Time: 1300  Stop Time: 1345  Time Calculation (min): 45 min  Untimed Charges  PT E-Stim Unattended Minutes: 10  Total Minutes  Untimed Charges Total Minutes: 10   Total Minutes: 10   Therapy Charges for Today     Code Description Service Date Service Provider Modifiers Qty    31468286006 HC PT ELECTRICAL STIM UNATTENDED 6/7/2022 Elise Gipson, PT  1    52287208497 HC PT THER PROC EA 15 MIN " 6/7/2022 Elise Gipson, PT GP 1    42661787690 HC PT NEUROMUSC RE EDUCATION EA 15 MIN 6/7/2022 Elise Gipson, PT GP 1          PT G-Codes  Outcome Measure Options: Tinetti  Tinetti Total Score: 27         Elise Gipson, PT  6/7/2022

## 2022-06-13 ENCOUNTER — APPOINTMENT (OUTPATIENT)
Dept: GENERAL RADIOLOGY | Facility: HOSPITAL | Age: 67
End: 2022-06-13

## 2022-06-21 ENCOUNTER — HOSPITAL ENCOUNTER (OUTPATIENT)
Dept: PHYSICAL THERAPY | Facility: HOSPITAL | Age: 67
Setting detail: THERAPIES SERIES
Discharge: HOME | End: 2022-06-21

## 2022-06-21 DIAGNOSIS — R53.1 WEAKNESS: Primary | ICD-10-CM

## 2022-06-21 PROCEDURE — 97110 THERAPEUTIC EXERCISES: CPT

## 2022-06-21 PROCEDURE — 97116 GAIT TRAINING THERAPY: CPT

## 2022-06-21 PROCEDURE — 97535 SELF CARE MNGMENT TRAINING: CPT

## 2022-06-21 NOTE — THERAPY TREATMENT NOTE
Outpatient Physical Therapy Neuro Treatment Note  [facility]IS [facility]     Patient Name: Sergio Daniel  : 1955  MRN: [id number]  Today's Date: 2022      Visit Date: 2022    Visit Dx:    ICD-10-CM ICD-9-CM   1. 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  -JV              Precautions and Contraindications    Precautions/Limitations fall precautions  -JV            User Key  (r) = Recorded By, (t) = Taken By, (c) = Cosigned By    Initials Name Provider Type    JV Elise Gipson, PT Physical Therapist                         PT Assessment/Plan     Row Name 22 1444          PT Assessment    Functional Limitations Impaired gait;Limitation in home management;Limitations in community activities;Performance in self-care ADL;Performance in work activities  -JV     Impairments Balance;Endurance;Gait;Muscle strength  -JV     Assessment Comments The pt presents for tx this date and reports that [facility]. Pt states he tripped over his L foot on the way out and fell, lacerating his L forehead. Pt received 6 stitches and got a head CT to rule out a bleed. Pt did not have an AD and was not wearing AFO on L LE. Pt did indicate that fatigue played a part in the fall. We discussed fall prevention, use of AD and trying another type of orthotic for L foot drop that may be better skip than the AFO. Pt was encouraged to add Es gastroc stretching to HEP, and  order OTC orthotic for L foot.  -JV     Please refer to paper survey for additional self-reported information Yes  -JV     Rehab Potential Good  -JV     Patient/caregiver participated in establishment of treatment plan and goals Yes  -JV     Patient would benefit from skilled therapy intervention Yes  -JV            PT Plan    PT Plan Comments Cont plan to progress toward goals.  -JV           User Key  (r) = Recorded By, (t) = Taken By, (c) = Cosigned By    Initials Name Provider Type    Elise Singh PT Physical Therapist                    OP Exercises     Row Name 06/21/22 1300             Precautions    Existing Precautions/Restrictions fall  -JV              Subjective Comments    Subjective Comments Pt reports [facility], but unfortunately fell leaving the hospital.  -JV              Subjective Pain    Able to rate subjective pain? yes  -JV      Pre-Treatment Pain Level 0  -JV      Post-Treatment Pain Level 0  -JV              Exercise 1    Exercise Name 1 Recumbent bike  -JV      Time 1 8 min  -JV              Exercise 2    Exercise Name 2 Se ankle AAROM/ gastroc stretch with black T'band.  -JV      Time 2 5 min  -JV              Exercise 3    Exercise Name 3 Applied black T'band to L LE to facilitate ankle DF during swing phase. Pt amb around 200' with S, good toe clearance but occasional slapping of L forefoot on the ground at heel strike.  -JV              Exercise 4    Exercise Name 4 Pt was educated regarding falls prevention and will order another type of orthotic to try to help with L foot drop.  -JV            User Key  (r) = Recorded By, (t) = Taken By, (c) = Cosigned By    Initials Name Provider Type    Elise Singh PT Physical Therapist                                Therapy Education  Education Details: Pt was educated regarding falls prevention, and role that fatigue may play in increasing tripping on L LE. Pt was encouraged to order an OTC orthotic to try as an  alternative to AFO for L foot drop. Add gastroc stretching to HEP.  Given: HEP, Symptoms/condition management  Program: New, Reinforced  How Provided: Verbal, Demonstration  Provided to: Patient  Level of Understanding: Teach back education performed, Verbalized, Demonstrated              Time Calculation:   Start Time: 1300  Stop Time: 1345  Time Calculation (min): 45 min   Therapy Charges for Today     Code Description Service Date Service Provider Modifiers Qty    27078351917 HC PT THER PROC EA 15 MIN 6/21/2022 Elise Gipson, PT GP 1    09182417954 HC GAIT TRAINING EA 15 MIN 6/21/2022 Elise Gipson, PT GP 1    [id number]  PT SELF CARE/MGMT/TRAIN EA 15 MIN 6/21/2022 Elise Gipson, PT GP 1                    Elise Gipson, PT  6/21/2022

## 2022-07-05 ENCOUNTER — HOSPITAL ENCOUNTER (OUTPATIENT)
Dept: PHYSICAL THERAPY | Facility: HOSPITAL | Age: 67
Setting detail: THERAPIES SERIES
Discharge: HOME | End: 2022-07-05

## 2022-07-05 DIAGNOSIS — R53.1 WEAKNESS: Primary | ICD-10-CM

## 2022-07-05 PROCEDURE — 97110 THERAPEUTIC EXERCISES: CPT

## 2022-07-05 PROCEDURE — 97116 GAIT TRAINING THERAPY: CPT

## 2022-07-05 PROCEDURE — 97112 NEUROMUSCULAR REEDUCATION: CPT

## 2022-07-05 NOTE — THERAPY DISCHARGE NOTE
Outpatient Physical Therapy Neuro Progress Note/Discharge Summary   Hien Neely     Patient Name: Sergio Daniel  : 1955  MRN: [id number]  Today's Date: 2022      Visit Date: 2022    Visit Dx:    ICD-10-CM ICD-9-CM   1. 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 Pt did obtain a new ankle DF assist strap for the L foot and reports that it is helping. The pt has anti-gravity L ankle DF through about 50% ROM and experiences a forefoot slap at heelstrike. Pt's gait is more symmetrical with use of ankle DF assist and forefoot slap is eliminated at least 75% of the time. The pt is I and compliant with HEP and ready for D/C at this time. All STG's and LTG's #1-3 have been met. LTG #4 is progressing and considered partially met.  -JV     Rehab Potential Good  -JV     Patient/caregiver participated in establishment of treatment plan and goals Yes  -JV            PT Plan    PT Plan Comments Will D/C pt at this time, no further follow-up is planned.  -JV           User Key  (r) = Recorded By, (t) = Taken By, (c) = Cosigned By    Initials Name Provider Type    Elise Singh, PT Physical Therapist                      OP Exercises     Row Name  07/05/22 1300             Precautions    Existing Precautions/Restrictions fall  -JV              Subjective Comments    Subjective Comments Pt reports no new falls, has been wearing L ankle DF assist strap and thinks it's helping.  -JV              Subjective Pain    Able to rate subjective pain? yes  -JV      Pre-Treatment Pain Level 0  -JV      Post-Treatment Pain Level 0  -JV              Exercise 1    Exercise Name 1 Recumbent bike  -JV      Time 1 8 min  -JV              Exercise 2    Exercise Name 2 Pt worked on balance progression in corner with narrow Trisha with eyes open and closed with head mov't up/down and side to side. Pt with episode of LOB while moving head up/down with eyes open.  -JV              Exercise 3    Exercise Name 3 Pt worked on balance with blue foam pad with normal and narrow Trisha.  -JV              Exercise 4    Exercise Name 4 Pt was wearing ankle DF assist strap upon arrival and it is providing increased control of forefoot at heelstrike. Pt's gait pattern is symmetrical and less antalgic with use of strap.  -JV            User Key  (r) = Recorded By, (t) = Taken By, (c) = Cosigned By    Initials Name Provider Type    Elise Singh, PT Physical Therapist                               PT OP Goals     Row Name 07/05/22 1300          PT Short Term Goals    STG Date to Achieve 07/07/22  -JV     STG 1 Pt will improve score on Tinetti balance assessment by 2-3 pts.  -JV     STG 1 Progress Met  -JV     STG 2 Pt will skip 30 min of functional activity without SOA.  -JV     STG 2 Progress Met  -JV     STG 3 Pt will be I with HEP  -JV     STG 3 Progress Met  -JV     STG 4 Pt will amb up/down a flight of steps with mod I using reciprocal gait pattern.  -JV     STG 4 Progress Met  -JV            Long Term Goals    LTG Date to Achieve 08/04/22  -JV     LTG 1 Pt will improve score on LEFS by 9 points.  -JV     LTG 1 Progress Met  -JV     LTG 2 Pt will amb community distance without AD and no LOB.   -JV     LTG 2 Progress Met  -JV     LTG 3 Pt will improve L hip strength to 4-/5.  -JV     LTG 3 Progress Met  -JV     LTG 4 Pt will improve strength L ankle DF to decrease foot slap by 50% while ambulating.  -JV     LTG 4 Progress Progressing;Partially Met  -JV           User Key  (r) = Recorded By, (t) = Taken By, (c) = Cosigned By    Initials Name Provider Type    Elise Singh PT Physical Therapist                Therapy Education  Education Details: Encouraged pt to cont stretching and strengthening activities at home, wear L ankle DF assist as needed to improve gait pattern and decrease fatigue.  Given: HEP, Symptoms/condition management  Program: Reinforced  How Provided: Verbal, Demonstration  Provided to: Patient  Level of Understanding: Teach back education performed, Verbalized, Demonstrated            Time Calculation:   Start Time: 1300  Stop Time: 1345  Time Calculation (min): 45 min     Therapy Charges for Today     Code Description Service Date Service Provider Modifiers Qty    70045002648 HC PT THER PROC EA 15 MIN 7/5/2022 Elise Gipson, PT GP 1    15651028384 HC PT NEUROMUSC RE EDUCATION EA 15 MIN 7/5/2022 Elise Gipson, PT GP 1    31939927298 HC GAIT TRAINING EA 15 MIN 7/5/2022 Elise Gipson, PT GP 1                         Elise Gipson PT  7/5/2022

## 2022-07-12 ENCOUNTER — LAB (OUTPATIENT)
Dept: LAB | Facility: HOSPITAL | Age: 67
End: 2022-07-12

## 2022-07-12 DIAGNOSIS — E11.65 TYPE 2 DIABETES MELLITUS WITH HYPERGLYCEMIA, UNSPECIFIED WHETHER LONG TERM INSULIN USE: ICD-10-CM

## 2022-07-12 DIAGNOSIS — E11.49 TYPE II DIABETES MELLITUS WITH NEUROLOGICAL MANIFESTATIONS: ICD-10-CM

## 2022-07-12 DIAGNOSIS — E78.2 MIXED HYPERLIPIDEMIA: ICD-10-CM

## 2022-07-12 DIAGNOSIS — E27.49 SECONDARY ADRENAL INSUFFICIENCY: ICD-10-CM

## 2022-07-12 LAB
ALBUMIN SERPL-MCNC: 4.3 G/DL (ref 3.5–5.2)
ALBUMIN/GLOB SERPL: 1.8 G/DL
ALP SERPL-CCNC: 83 U/L (ref 39–117)
ALT SERPL W P-5'-P-CCNC: 22 U/L (ref 1–41)
ANION GAP SERPL CALCULATED.3IONS-SCNC: 13.5 MMOL/L (ref 5–15)
AST SERPL-CCNC: 17 U/L (ref 1–40)
BILIRUB SERPL-MCNC: 0.4 MG/DL (ref 0–1.2)
BUN SERPL-MCNC: 18 MG/DL (ref 8–23)
BUN/CREAT SERPL: 23.7 (ref 7–25)
CALCIUM SPEC-SCNC: 9.6 MG/DL (ref 8.6–10.5)
CHLORIDE SERPL-SCNC: 104 MMOL/L (ref 98–107)
CHOLEST SERPL-MCNC: 198 MG/DL (ref 0–200)
CO2 SERPL-SCNC: 22.5 MMOL/L (ref 22–29)
CREAT SERPL-MCNC: 0.76 MG/DL (ref 0.76–1.27)
EGFRCR SERPLBLD CKD-EPI 2021: 99.1 ML/MIN/1.73
GLOBULIN UR ELPH-MCNC: 2.4 GM/DL
GLUCOSE SERPL-MCNC: 122 MG/DL (ref 65–99)
HBA1C MFR BLD: 6.7 % (ref 4.8–5.6)
HDLC SERPL-MCNC: 57 MG/DL (ref 40–60)
LDLC SERPL CALC-MCNC: 112 MG/DL (ref 0–100)
LDLC/HDLC SERPL: 1.88 {RATIO}
POTASSIUM SERPL-SCNC: 4.3 MMOL/L (ref 3.5–5.2)
PROT SERPL-MCNC: 6.7 G/DL (ref 6–8.5)
SODIUM SERPL-SCNC: 140 MMOL/L (ref 136–145)
TRIGL SERPL-MCNC: 169 MG/DL (ref 0–150)
VLDLC SERPL-MCNC: 29 MG/DL (ref 5–40)

## 2022-07-12 PROCEDURE — 83036 HEMOGLOBIN GLYCOSYLATED A1C: CPT

## 2022-07-12 PROCEDURE — 36415 COLL VENOUS BLD VENIPUNCTURE: CPT

## 2022-07-12 PROCEDURE — 82985 ASSAY OF GLYCATED PROTEIN: CPT

## 2022-07-12 PROCEDURE — 80053 COMPREHEN METABOLIC PANEL: CPT

## 2022-07-12 PROCEDURE — 80061 LIPID PANEL: CPT

## 2022-07-13 LAB — FRUCTOSAMINE SERPL-SCNC: 274 UMOL/L (ref 0–285)

## 2022-07-20 NOTE — PROGRESS NOTES
Sergio Daniel, 67 y.o., Gender: male    Assessment/Plan    1.  Pt with DM Type 2 controlled w/ neuropathy.  A1c estimated 6.5-7% and actual 6.7% 7/22, estimated to be 6-6.5% 4/22, 6.5-7% 2/22, 11% 12/21 prior to onset of anemia which occurred end of Dec.  A1c not reliable in this case sec to chronic anemia so will use fructosamine in order to obtain A1c equivalent.  Anemia recently resolved so will plan to transition back to using A1c in future.  Counseled that conservative goal A1c is to be <7 % and aggressive 6.5 %.  Counseled pt that overall risk with diabetes is related to long term complications of both small and large blood vessels and that the risk for CAD, MI, and stroke is much higher than general population.  Counseled pt on long term effects of prolonged poor control of diabetes.  Counseled that the goal of tx is prevention of further complications.  Pt counseled on how to do freq bg checks with fasting AM, before meals, before bed, occasionally 2 hours after a meal, and at 2-3 am if awake.  Pt is checking enough as directed based on current treatment plan to check 4-6 times a day.  Rec cont Metformin XR at 1000 mg twice daily after AM and PM meals as that is optimal dose.  Pt gets all meds from [facility] secondary to costs.  Pt counseled to change to a generic Lantus at 26 units every morning and feel that is optimal dose.  Pt was counseled on how to use short acting analog for correction coverage, meal, and bedtime coverage.  Also have a standing meal dose of 5,5,5 units.  Will have f/u labs before pt returns.  Counseled must keep log and bring to apts to cont tx plan.  Will work to optimize treatment plan in coming months and get back to PCP.  2.  Counseled on hypoglycemia as pt is on insulin.  Counseled about need to monitor bg related to driving as public safety concern.  Also counseled on proper use of glucagon emergency kit.  Pt reported full understanding.  3.  Hypertension goal of <140/90.  Pt not on  tx for renal protection.  Last microalbumin/creatinine ratio urine check 4/22 was normal.  Mild dec in GFR noted on recent labs not there previously will cont to monitor.   4.  Hyperlipidemia LDL goal <100 and trig goal <150.  At last visit I rec starting low dose statin and Fenofibrate but pt declined at this time saying wants to see if things improve come next visit as he is getting more activity.  Labs again show need for at least statin and this was discussed again today.  Pt reluctantly agreed so sent for Lipitor 10 mg to start.  Will have f/u labs.    5.  Related to long term use of high dose steroids, standard dose of Hydrocortisone is total of 15 mg a day, Prednisone 5 mg a day, and Decadron is 0.5 mg a day, pt is considered adrenal insufficient for up to a year after the steroids are stopped, which that was stopped as of April '22.  Counseled pt if any febrile illness, major trauma, or surgery occur during that time frame will need stress dose steroids with gradual taper off over subsequent weeks.  6.  Counseled to have eye exam updated as is past due.  Discussed how pt can get this done at the [facility].  7.  Pt reports having eval for tremor with concern for Parkinson's dz awaiting visit with neurology.         Time Counseled: 15  Minutes  Total Time: 25  Minutes    Return to office in:   3-4   Months      Random Glucose: 137 mg/dL      HPI Summary    Pt is here for evaluation of DM reported to be Type 2.  Pt reports DM since '10 but was diet control till recently when insulin started 12/21.  Pt now off all steroids for COVID tx earlier this year.  Pt reports blood glucoses are  with record mostly <140.  Pt reports weight has been up since hospital stay related to steroids.  Pt has report of fatigue.  Pt has complaint of general muscle weakness.  Pt has some deconditioning is still doing home PT and is using a cane most of the time and has walker for other times.  Pt to have eval for balance concern in  coming weeks.  Otherwise pt reports feeling better and doing more then has in some time.  Pt denies any increased thirst or urination.  Pt denies any chest pain following w/ cardiology for A-fib.  Pt reports shortness of breath w/ activity.  Pt denies any abdominal complaints.  Pt denies any early satiety or postprandial bloating.  Pt reports blurry vision.  Pt reports last seen by eye doctor > 2 yrs ago no retinopathy.  Pt reports problems with feet w/ n/t.  Pt denies any skin wounds but has edema and LLE had recent DVT.  Pt has no report of depression.  Pt reports fair to good sleep quality without apnea symptoms.  Pt reports trying to follow a diet to lose weight and limited to no exercise.  Pt reports never having pneumonia vaccine in the past and did not get flu shot this last season.  Last education class was never done and wife did ask for this local to them.  Pt without any other complaints related to diabetes at this time.    Review of Systems  see HPI      Patient History    Past History (reviewed):    Medical:   Past Medical History:   Diagnosis Date   • Abnormal LFTs (liver function tests) 12/2021    developed during prolonged hospital stay for COVID infection   • Anemia 12/29/2021    developed during prolonged hospital stay for COVID infection   • COVID-19 virus infection 12/2021   • DVT of deep femoral vein, left (HCC) 12/2021   • Insomnia    • Long term systemic steroid user 12/2021    high dose steroids in use during and after hospital stay for COVID related infection stopped around April '22   • Mixed hyperlipidemia    • Obesity    • Paroxysmal atrial fibrillation (HCC) 02/10/2022   • Pneumonia due to COVID-19 virus 12/2021   • Secondary adrenal insufficiency (HCC) 01/2022    considered adrenal insufficient related to long term use of high dose steroids started 12/21 for sig COVID infection   • Tremor 05/2022    having eval for Parkinson dz   • Type 2 diabetes mellitus with hyperglycemia ([facility]) 2010     was diet controlled till late '21 then insulin started after sig illness and high dose steroids   • Type II diabetes mellitus with neurological manifestations (HCC)        Surgery:   Past Surgical History:   Procedure Laterality Date   • NO PAST SURGERIES           Social History (reviewed):  - Smoking, - ETOH, - Drugs, , 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    Allergies:  No Known Allergies    Physical Exam    VITALS:    Vitals:    07/22/22 1020   BP: 128/70   Pulse: 96   Temp: 97.9 °F (36.6 °C)   SpO2: 98%     Body mass index is 35.64 kg/m².    GENERAL:  Looks stated age, Mod obese, using walker  HEAD/EYES:  N/C, A/T, Mask in place  EXTREMITIES:  FROM, skin changes of chronic edema noted 2/22  CNS:  A&Ox3, dec sensation to fine touch above ankles noted 2/22    Full exam not done today.      Labs/Imaging  All available lab and imaging data were reviewed.        Britton Amos MD, DESTINY, FACE, ECNU  7/22/2022  11:05 EDT

## 2022-07-22 ENCOUNTER — OFFICE VISIT (OUTPATIENT)
Dept: ENDOCRINOLOGY | Age: 67
End: 2022-07-22

## 2022-07-22 VITALS
HEIGHT: 73 IN | HEART RATE: 96 BPM | TEMPERATURE: 97.9 F | SYSTOLIC BLOOD PRESSURE: 128 MMHG | BODY MASS INDEX: 35.8 KG/M2 | WEIGHT: 270.1 LBS | DIASTOLIC BLOOD PRESSURE: 70 MMHG | OXYGEN SATURATION: 98 %

## 2022-07-22 DIAGNOSIS — E11.65 TYPE 2 DIABETES MELLITUS WITH HYPERGLYCEMIA, UNSPECIFIED WHETHER LONG TERM INSULIN USE: Primary | ICD-10-CM

## 2022-07-22 DIAGNOSIS — R79.89 ABNORMAL LFTS (LIVER FUNCTION TESTS): ICD-10-CM

## 2022-07-22 DIAGNOSIS — E27.49 SECONDARY ADRENAL INSUFFICIENCY: ICD-10-CM

## 2022-07-22 DIAGNOSIS — E78.2 MIXED HYPERLIPIDEMIA: ICD-10-CM

## 2022-07-22 DIAGNOSIS — E11.49 TYPE II DIABETES MELLITUS WITH NEUROLOGICAL MANIFESTATIONS: ICD-10-CM

## 2022-07-22 LAB — GLUCOSE BLDC GLUCOMTR-MCNC: 137 MG/DL (ref 70–130)

## 2022-07-22 PROCEDURE — 99214 OFFICE O/P EST MOD 30 MIN: CPT | Performed by: INTERNAL MEDICINE

## 2022-07-22 PROCEDURE — 82962 GLUCOSE BLOOD TEST: CPT | Performed by: INTERNAL MEDICINE

## 2022-07-22 RX ORDER — INSULIN ASPART 100 [IU]/ML
INJECTION, SOLUTION INTRAVENOUS; SUBCUTANEOUS
Qty: 30 ML | Refills: 2 | Status: SHIPPED | OUTPATIENT
Start: 2022-07-22 | End: 2022-12-02 | Stop reason: SDUPTHER

## 2022-07-22 RX ORDER — METFORMIN HYDROCHLORIDE 500 MG/1
TABLET, EXTENDED RELEASE ORAL
Qty: 360 TABLET | Refills: 2 | Status: SHIPPED | OUTPATIENT
Start: 2022-07-22 | End: 2022-12-02 | Stop reason: SDUPTHER

## 2022-07-22 RX ORDER — ATORVASTATIN CALCIUM 10 MG/1
TABLET, FILM COATED ORAL
Qty: 90 TABLET | Refills: 2 | Status: SHIPPED | OUTPATIENT
Start: 2022-07-22 | End: 2022-12-02 | Stop reason: SDUPTHER

## 2022-07-22 RX ORDER — INSULIN GLARGINE 100 [IU]/ML
INJECTION, SOLUTION SUBCUTANEOUS
Qty: 30 ML | Refills: 2 | Status: SHIPPED | OUTPATIENT
Start: 2022-07-22 | End: 2022-12-02 | Stop reason: SDUPTHER

## 2022-07-22 NOTE — PATIENT INSTRUCTIONS
As discussed overall glucose has improved significantly and feel insulin dosing is optimal at this time so no further dose changes are needed just continue with plan as it is.    As discussed the lipids are the only current problem that would be best to address with low dose of medication.  Ordered for that today.    Labs to be done at least 10 days before return appointment.  If labs are not done within 3 days of scheduled return appointment the appointment will be canceled and rescheduled to a later date with requirement for labs to be done as directed.      Bring med list, meter and/or log data to all appointments.

## 2022-09-20 ENCOUNTER — OFFICE VISIT (OUTPATIENT)
Dept: INTERNAL MEDICINE | Facility: CLINIC | Age: 67
End: 2022-09-20

## 2022-09-20 VITALS
BODY MASS INDEX: 36.23 KG/M2 | OXYGEN SATURATION: 98 % | SYSTOLIC BLOOD PRESSURE: 136 MMHG | TEMPERATURE: 97.8 F | WEIGHT: 273.4 LBS | DIASTOLIC BLOOD PRESSURE: 88 MMHG | HEIGHT: 73 IN | HEART RATE: 97 BPM | RESPIRATION RATE: 17 BRPM

## 2022-09-20 DIAGNOSIS — E11.9 ENCOUNTER FOR DIABETIC FOOT EXAM: ICD-10-CM

## 2022-09-20 DIAGNOSIS — Z00.00 ENCOUNTER FOR MEDICARE ANNUAL WELLNESS EXAM: Primary | ICD-10-CM

## 2022-09-20 PROCEDURE — G0439 PPPS, SUBSEQ VISIT: HCPCS | Performed by: NURSE PRACTITIONER

## 2022-09-20 PROCEDURE — 1160F RVW MEDS BY RX/DR IN RCRD: CPT | Performed by: NURSE PRACTITIONER

## 2022-09-20 PROCEDURE — 96160 PT-FOCUSED HLTH RISK ASSMT: CPT | Performed by: NURSE PRACTITIONER

## 2022-09-20 PROCEDURE — 1170F FXNL STATUS ASSESSED: CPT | Performed by: NURSE PRACTITIONER

## 2022-09-20 NOTE — PROGRESS NOTES
The ABCs of the Annual Wellness Visit  Subsequent Medicare Wellness Visit    Chief Complaint   Patient presents with   • Medicare Wellness-subsequent       Subjective   History of Present Illness:  Sergio Daniel is a 67 y.o. male who presents for a Subsequent Medicare Wellness Visit as well as follow up of his chronic health conditions.        Patient Active Problem List   Diagnosis   • Paroxysmal atrial fibrillation (HCC)   • Long term systemic steroid user   • Secondary adrenal insufficiency (HCC)   • Anemia   • Abnormal LFTs (liver function tests)   • Type II diabetes mellitus with neurological manifestations (HCC)   • Mixed hyperlipidemia   • Type 2 diabetes mellitus with hyperglycemia (HCC)       Outpatient Medications Marked as Taking for the 9/20/22 encounter (Office Visit) with Viviana Whitt APRN   Medication Sig Dispense Refill   • Accu-Chek Softclix Lancets lancets Check 4 times a day on insulin tx, poor control, hypoglycemia. Dx: E 11.65 400 each 4   • apixaban (ELIQUIS) 5 MG tablet tablet Take 1 tablet by mouth Every 12 (Twelve) Hours. Indications: DVT/PE (active thrombosis) 60 tablet    • atorvastatin (LIPITOR) 10 MG tablet By mouth take 1 tab daily before bed or after evening meal. 90 tablet 2   • Cholecalciferol (Vitamin D-3) 125 MCG (5000 UT) tablet Take  by mouth.     • Cyanocobalamin (VITAMIN B12 PO) Take 5 mcg by mouth 2 (Two) Times a Day.     • glucagon (GLUCAGEN) 1 MG injection For emergency use for severe low glucose: first inject, then call 911, once awake try to give oral treatment. Kit expires in 6-12 months. 1 kit 6   • glucose blood (Accu-Chek Guide) test strip Check 4 times a day on insulin tx, poor control, hypoglycemia. Dx: E 11.65 400 each 4   • insulin aspart (NovoLOG FlexPen) 100 UNIT/ML solution pen-injector sc pen Use as directed for meal & correction coverage up to 35 units a day. 30 mL 2   • Insulin Glargine (Semglee) 100 UNIT/ML injection pen Inject 26 units once daily  in the AM 30 mL 2   • Insulin Pen Needle (B-D UF III MINI PEN NEEDLES) 31G X 5 MM misc For use with pen device up to 5 times a day. Can substitute based on availability and insurance. 500 each 4   • metFORMIN ER (GLUCOPHAGE-XR) 500 MG 24 hr tablet By mouth take 2 tabs twice daily after AM & PM meals. 360 tablet 2   • VITAMIN A PO Take  by mouth.     • VITAMIN C, CALCIUM ASCORBATE, PO Take  by mouth.     • Zinc 40 MG tablet Take  by mouth.           HEALTH RISK ASSESSMENT    Recent Hospitalizations:  None recently    Current Medical Providers:  Patient Care Team:  Viviana Whitt APRN as PCP - General (Family Medicine)  Simon Carson MD as Consulting Physician (Cardiology)  Britton Amos MD as Consulting Physician (Endocrinology)    Smoking Status:  Social History     Tobacco Use   Smoking Status Former Smoker   • Packs/day: 0.10   • Years: 3.00   • Pack years: 0.30   • Types: Cigars   • Quit date: 1991   • Years since quittin.6   Smokeless Tobacco Former User   • Quit date:    Tobacco Comment    caffine use: 1 coffee daily       Alcohol Consumption:  Social History     Substance and Sexual Activity   Alcohol Use Not Currently    Comment: last year       Depression Screen:   PHQ-2/PHQ-9 Depression Screening 2022   Retired PHQ-9 Total Score -   Retired Total Score -   Little Interest or Pleasure in Doing Things 0-->not at all   Feeling Down, Depressed or Hopeless 0-->not at all   PHQ-9: Brief Depression Severity Measure Score 0       Fall Risk Screen:  NICK Fall Risk Assessment was completed, and patient is at HIGH risk for falls. Assessment completed on:2022    Health Habits and Functional and Cognitive Screening:  Functional & Cognitive Status 2022   Do you have difficulty preparing food and eating? No   Do you have difficulty bathing yourself, getting dressed or grooming yourself? No   Do you have difficulty using the toilet? No   Do you have difficulty moving  around from place to place? No   Do you have trouble with steps or getting out of a bed or a chair? No   Current Diet Well Balanced Diet   Dental Exam Up to date   Eye Exam Not up to date   Exercise (times per week) 3 times per week   Current Exercises Include Walking;Weightlifting   Do you need help using the phone?  No   Are you deaf or do you have serious difficulty hearing?  Yes   Do you need help with transportation? No   Do you need help shopping? No   Do you need help preparing meals?  No   Do you need help with housework?  No   Do you need help with laundry? No   Do you need help taking your medications? No   Do you need help managing money? No   Do you ever drive or ride in a car without wearing a seat belt? No   Have you felt unusual stress, anger or loneliness in the last month? Yes   Who do you live with? Spouse   If you need help, do you have trouble finding someone available to you? No   Have you been bothered in the last four weeks by sexual problems? No   Do you have difficulty concentrating, remembering or making decisions? No         Does the patient have evidence of cognitive impairment? No    Asprin use counseling:Does not need ASA (and currently is not on it)    Age-appropriate Screening Schedule:  Refer to the list below for future screening recommendations based on patient's age, sex and/or medical conditions. Orders for these recommended tests are listed in the plan section. The patient has been provided with a written plan.    Health Maintenance   Topic Date Due   • TDAP/TD VACCINES (1 - Tdap) Never done   • ZOSTER VACCINE (1 of 2) Never done   • DIABETIC EYE EXAM  Never done   • HEMOGLOBIN A1C  01/12/2023   • URINE MICROALBUMIN  04/29/2023   • LIPID PANEL  07/12/2023   • DIABETIC FOOT EXAM  09/20/2023   • INFLUENZA VACCINE  Discontinued          The following portions of the patient's history were reviewed and updated as appropriate: allergies, current medications, past family history, past  "medical history, past social history, past surgical history, and problem list.      Compared to one year ago, the patient feels his physical health is better.  Compared to one year ago, the patient feels his mental health is better.    Reviewed chart for potential of high risk medication in the elderly: Yes  Reviewed chart for potential of harmful drug interactions in the elderly:Yes          Objective         Vitals:    09/20/22 1453   BP: 136/88   BP Location: Left arm   Patient Position: Sitting   Cuff Size: Adult   Pulse: 97   Resp: 17   Temp: 97.8 °F (36.6 °C)   TempSrc: Infrared   SpO2: 98%   Weight: 124 kg (273 lb 6.4 oz)   Height: 185.4 cm (73\")       Body mass index is 36.07 kg/m².  Discussed the patient's BMI with him. The BMI is above average; BMI management plan is completed.  Class 2 Severe Obesity (BMI >=35 and <=39.9). Obesity-related health conditions include the following: hypertension, diabetes mellitus and dyslipidemias. Obesity is worsening. BMI is is above average; BMI management plan is completed. We discussed portion control and increasing exercise.        Physical Exam  Constitutional:       General: He is not in acute distress.     Appearance: Normal appearance. He is well-developed.   HENT:      Head: Normocephalic.      Right Ear: Hearing, tympanic membrane, ear canal and external ear normal.      Left Ear: Hearing, tympanic membrane, ear canal and external ear normal.      Nose: Nose normal. No mucosal edema or rhinorrhea.      Mouth/Throat:      Mouth: Mucous membranes are moist.      Pharynx: Oropharynx is clear. Uvula midline.   Eyes:      General: Lids are normal.      Extraocular Movements: Extraocular movements intact.      Conjunctiva/sclera: Conjunctivae normal.      Pupils: Pupils are equal, round, and reactive to light.   Neck:      Thyroid: No thyroid mass or thyromegaly.   Cardiovascular:      Rate and Rhythm: Regular rhythm.      Pulses:           Dorsalis pedis pulses are 1+ " on the right side and 1+ on the left side.        Posterior tibial pulses are 1+ on the right side and 1+ on the left side.      Heart sounds: S1 normal and S2 normal. No murmur heard.    No friction rub. No gallop.   Pulmonary:      Effort: Pulmonary effort is normal.      Breath sounds: Normal breath sounds. No wheezing, rhonchi or rales.   Abdominal:      General: Bowel sounds are normal.      Palpations: Abdomen is soft.      Tenderness: There is no abdominal tenderness. There is no guarding.      Hernia: No hernia is present.   Musculoskeletal:         General: No deformity. Normal range of motion.      Cervical back: Normal range of motion and neck supple.      Right foot: Normal range of motion. No deformity.      Left foot: Normal range of motion. No deformity.   Feet:      Right foot:      Protective Sensation: 5 sites tested. 5 sites sensed.      Skin integrity: Skin integrity normal.      Toenail Condition: Right toenails are abnormally thick and long.      Left foot:      Protective Sensation: 5 sites tested. 5 sites sensed.      Skin integrity: Skin integrity normal.      Toenail Condition: Left toenails are abnormally thick and long.   Lymphadenopathy:      Cervical: No cervical adenopathy.   Skin:     General: Skin is warm and dry.      Findings: No lesion or rash.   Neurological:      General: No focal deficit present.      Mental Status: He is alert and oriented to person, place, and time.      Cranial Nerves: No cranial nerve deficit.      Sensory: No sensory deficit.      Motor: Motor function is intact.      Coordination: Coordination is intact.      Gait: Gait normal.      Deep Tendon Reflexes: Reflexes are normal and symmetric.   Psychiatric:         Attention and Perception: He is attentive.         Mood and Affect: Mood and affect normal.         Speech: Speech normal.         Behavior: Behavior normal.         Thought Content: Thought content normal.           Comprehensive Metabolic Panel  (07/12/2022 13:08)  Hemoglobin A1c (07/12/2022 13:08)  Lipid Panel (07/12/2022 13:08)  PSA Screen (05/20/2022 13:18)    Each of these lab results were discussed individually in detail with the patient.      Assessment & Plan     Diagnoses and all orders for this visit:    1. Encounter for Medicare annual wellness exam (Primary)    2. Encounter for diabetic foot exam (HCC)        Medicare Risks and Personalized Health Plan    Advanced Care Planning:  ACP discussion was held with the patient during this visit. Patient does not have an advance directive, information provided.    CMS Preventative Services Quick Reference  Advance Directive Discussion  Colon Cancer Screening  Immunizations Discussed/Encouraged (specific immunizations; Pt refuses all vaccines )  Obesity/Overweight   Prostate Cancer Screening         The above risks/problems have been discussed with the patient.  Pertinent information has been shared with the patient in the After Visit Summary.  Follow up plans and orders are seen below in the Assessment/Plan Section.    An After Visit Summary and PPPS were given to the patient.

## 2022-10-31 ENCOUNTER — OFFICE VISIT (OUTPATIENT)
Dept: CARDIOLOGY | Facility: CLINIC | Age: 67
End: 2022-10-31

## 2022-10-31 VITALS
DIASTOLIC BLOOD PRESSURE: 84 MMHG | WEIGHT: 278 LBS | HEIGHT: 73 IN | BODY MASS INDEX: 36.84 KG/M2 | SYSTOLIC BLOOD PRESSURE: 130 MMHG | HEART RATE: 90 BPM

## 2022-10-31 DIAGNOSIS — E78.2 MIXED HYPERLIPIDEMIA: ICD-10-CM

## 2022-10-31 DIAGNOSIS — I48.0 PAROXYSMAL ATRIAL FIBRILLATION: Primary | Chronic | ICD-10-CM

## 2022-10-31 PROCEDURE — 93000 ELECTROCARDIOGRAM COMPLETE: CPT | Performed by: INTERNAL MEDICINE

## 2022-10-31 PROCEDURE — 99213 OFFICE O/P EST LOW 20 MIN: CPT | Performed by: INTERNAL MEDICINE

## 2022-10-31 RX ORDER — ASPIRIN 81 MG/1
81 TABLET ORAL DAILY
Qty: 90 TABLET | Refills: 3 | Status: SHIPPED | OUTPATIENT
Start: 2022-10-31 | End: 2022-11-04 | Stop reason: SDUPTHER

## 2022-10-31 NOTE — PROGRESS NOTES
PATIENTINFORMATION    Date of Office Visit: 10/31/2022  Encounter Provider: Simon Carson MD  Place of Service: [facility] CARDIOLOGY  Patient Name: Sergio Daniel  : 1955    Subjective:     Encounter Date:10/31/2022      Patient ID: Sergio Daniel is a 67 y.o. male.    Chief Complaint   Patient presents with   • Follow-up     HPI  Mr. Daniel is a pleasant 67 years old gentleman who came to cardiology clinic for follow-up visit.  He denies any recurrence of palpitations, significant change in breathing, chest pain, orthopnea, PND.  Extremity swelling has improved.  His strength has significantly improved and is able to exercise 3 times weekly during stationary bike and walking a lot better.  No ER visit or hospitalization since last clinic visit.      ROS  All systems reviewed and negative except as noted in HPI.    Past Medical History:   Diagnosis Date   • Abnormal LFTs (liver function tests) 2021    developed during prolonged hospital stay for COVID infection   • Anemia 2021    developed during prolonged hospital stay for COVID infection   • COVID-19 virus infection 2021   • DVT of deep femoral vein, left (HCC) 2021   • Insomnia    • Long term systemic steroid user 2021    high dose steroids in use during and after hospital stay for COVID related infection stopped around    • Mixed hyperlipidemia    • Obesity    • Paroxysmal atrial fibrillation (HCC) 02/10/2022   • Pneumonia due to COVID-19 virus 2021   • Secondary adrenal insufficiency (HCC) 2022    considered adrenal insufficient related to long term use of high dose steroids started  for sig COVID infection   • Tremor 2022    having eval for Parkinson dz   • Type 2 diabetes mellitus with hyperglycemia (HCC)     was diet controlled till late  then insulin started after sig illness and high dose steroids   • Type II diabetes mellitus with neurological manifestations ([facility])        Past  "Surgical History:   Procedure Laterality Date   • NO PAST SURGERIES         Social History     Socioeconomic History   • Marital status:    Tobacco Use   • Smoking status: Former     Packs/day: 0.10     Years: 3.00     Pack years: 0.30     Types: Cigars, Cigarettes     Quit date: 1991     Years since quittin.7   • Smokeless tobacco: Former     Quit date:    • Tobacco comments:     caffine use: 1 coffee daily   Vaping Use   • Vaping Use: Never used   Substance and Sexual Activity   • Alcohol use: Not Currently     Comment: last year   • Drug use: Not Currently   • Sexual activity: Defer       Family History   Problem Relation Age of Onset   • Cancer Mother    • Cancer Father            ECG 12 Lead    Date/Time: 10/31/2022 1:08 PM  Performed by: Simon Carson MD  Authorized by: Simon Carson MD   Comparison: compared with previous ECG from 2022  Similar to previous ECG  Rhythm: sinus rhythm  Rate: normal  Conduction: right bundle branch block  ST Segments: ST segments normal  T Waves: T waves normal  QRS axis: normal  Other: no other findings    Clinical impression: abnormal EKG               Objective:     /84 (BP Location: Left arm, Patient Position: Sitting)   Pulse 90   Ht 185.4 cm (73\")   Wt 126 kg (278 lb)   BMI 36.68 kg/m²  Body mass index is 36.68 kg/m².     Constitutional:       General: Not in acute distress.     Appearance: Well-developed. Not diaphoretic.   Eyes:      Pupils: Pupils are equal, round, and reactive to light.   HENT:      Head: Normocephalic and atraumatic.   Neck:      Thyroid: No thyromegaly.   Pulmonary:      Effort: Pulmonary effort is normal. No respiratory distress.      Breath sounds: Normal breath sounds. No wheezing. No rales.   Chest:      Chest wall: Not tender to palpatation.   Cardiovascular:      Normal rate. Regular rhythm.      No gallop.   Pulses:     Intact distal pulses.   Edema:     Peripheral edema absent.   Abdominal: "      General: Bowel sounds are normal. There is no distension.      Palpations: Abdomen is soft.      Tenderness: There is no guarding.   Musculoskeletal: Normal range of motion.         General: No deformity.      Cervical back: Normal range of motion and neck supple. Skin:     General: Skin is warm and dry.      Findings: No rash.   Neurological:      Mental Status: Alert and oriented to person, place, and time.      Cranial Nerves: No cranial nerve deficit.      Deep Tendon Reflexes: Reflexes are normal and symmetric.   Psychiatric:         Judgment: Judgment normal.         Review Of Data: I have reviewed pertinent recent labs, images and documents and pertinent findings included in HPI or assessment below.    Lipid Panel    Lipid Panel 2/25/22 4/19/22 7/12/22   Total Cholesterol  194 198   Total Cholesterol 249 (A)     Triglycerides 250 (A) 216 (A) 169 (A)   HDL Cholesterol 67 50 57   VLDL Cholesterol 44 (A) 37 29   LDL Cholesterol  138 (A) 107 (A) 112 (A)   LDL/HDL Ratio  2.02 1.88   (A) Abnormal value                Assessment/Plan:         1.  Paroxysmal atrial fibrillation when sick with severe COVID-19 pneumonia  2.  History of severe COVID-19 pneumonia with mechanical ventilation and 28 days hospital stay and 2 weeks rehab back in December 2021  3.  Type II DM -overall fairly controlled  4.  Obesity with sleep apnea  5.  History of DVT when sick with COVID    No known recurrence of A. Fib  Has been off Eliquis for a while now  [facility]  Encouraged to walk more.  Start taking aspirin 81 mg daily    Return to clinic in 1 year or sooner with any concerning symptoms  Diagnosis and plan of care discussed with patient and verbalized understanding.            Your medication list          Accurate as of October 31, 2022  1:23 PM. If you have any questions, ask your nurse or doctor.            CONTINUE taking these medications      Instructions Last Dose Given Next Dose Due    Accu-Chek Guide test strip  Generic drug: glucose blood      Check 4 times a day on insulin tx, poor control, hypoglycemia. Dx: E 11.65       Accu-Chek Softclix Lancets lancets      Check 4 times a day on insulin tx, poor control, hypoglycemia. Dx: E 11.65       apixaban 5 MG tablet tablet  Commonly known as: ELIQUIS      Take 1 tablet by mouth Every 12 (Twelve) Hours. Indications: DVT/PE (active thrombosis)       atorvastatin 10 MG tablet  Commonly known as: LIPITOR      By mouth take 1 tab daily before bed or after evening meal.       B-D UF III MINI PEN NEEDLES 31G X 5 MM misc  Generic drug: Insulin Pen Needle      For use with pen device up to 5 times a day. Can substitute based on availability and insurance.       glucagon 1 MG injection  Commonly known as: GLUCAGEN      For emergency use for severe low glucose: first inject, then call 911, once awake try to give oral treatment. Kit expires in 6-12 months.       metFORMIN  MG 24 hr tablet  Commonly known as: GLUCOPHAGE-XR      By mouth take 2 tabs twice daily after AM & PM meals.       NovoLOG FlexPen 100 UNIT/ML solution pen-injector sc pen  Generic drug: insulin aspart      Use as directed for meal & correction coverage up to 35 units a day.       Semglee 100 UNIT/ML injection pen  Generic drug: Insulin Glargine      Inject 26 units once daily in the AM       VITAMIN A PO      Take  by mouth.       VITAMIN B12 PO      Take 5 mcg by mouth 2 (Two) Times a Day.       VITAMIN C (CALCIUM ASCORBATE) PO      Take  by mouth.       Vitamin D-3 125 MCG (5000 UT) tablet      Take  by mouth.       Zinc 40 MG tablet      Take  by mouth.                  Simon Carson MD  10/31/22  13:23 EDT

## 2022-11-04 RX ORDER — ASPIRIN 81 MG/1
81 TABLET ORAL DAILY
Qty: 90 TABLET | Refills: 3 | Status: SHIPPED | OUTPATIENT
Start: 2022-11-04

## 2022-11-25 ENCOUNTER — LAB (OUTPATIENT)
Dept: LAB | Facility: HOSPITAL | Age: 67
End: 2022-11-25

## 2022-11-25 DIAGNOSIS — R79.89 ABNORMAL LFTS (LIVER FUNCTION TESTS): ICD-10-CM

## 2022-11-25 DIAGNOSIS — E78.2 MIXED HYPERLIPIDEMIA: ICD-10-CM

## 2022-11-25 DIAGNOSIS — E11.65 TYPE 2 DIABETES MELLITUS WITH HYPERGLYCEMIA, UNSPECIFIED WHETHER LONG TERM INSULIN USE: ICD-10-CM

## 2022-11-25 LAB
ALBUMIN SERPL-MCNC: 4.1 G/DL (ref 3.5–5.2)
ALBUMIN/GLOB SERPL: 1.5 G/DL
ALP SERPL-CCNC: 86 U/L (ref 39–117)
ALT SERPL W P-5'-P-CCNC: 19 U/L (ref 1–41)
ANION GAP SERPL CALCULATED.3IONS-SCNC: 12 MMOL/L (ref 5–15)
AST SERPL-CCNC: 18 U/L (ref 1–40)
BILIRUB SERPL-MCNC: 0.4 MG/DL (ref 0–1.2)
BUN SERPL-MCNC: 17 MG/DL (ref 8–23)
BUN/CREAT SERPL: 18.5 (ref 7–25)
CALCIUM SPEC-SCNC: 9.2 MG/DL (ref 8.6–10.5)
CHLORIDE SERPL-SCNC: 102 MMOL/L (ref 98–107)
CHOLEST SERPL-MCNC: 166 MG/DL (ref 0–200)
CO2 SERPL-SCNC: 23 MMOL/L (ref 22–29)
CREAT SERPL-MCNC: 0.92 MG/DL (ref 0.76–1.27)
EGFRCR SERPLBLD CKD-EPI 2021: 91.7 ML/MIN/1.73
GLOBULIN UR ELPH-MCNC: 2.7 GM/DL
GLUCOSE SERPL-MCNC: 171 MG/DL (ref 65–99)
HBA1C MFR BLD: 6.9 % (ref 4.8–5.6)
HDLC SERPL-MCNC: 44 MG/DL (ref 40–60)
LDLC SERPL CALC-MCNC: 101 MG/DL (ref 0–100)
LDLC/HDLC SERPL: 2.25 {RATIO}
POTASSIUM SERPL-SCNC: 4.2 MMOL/L (ref 3.5–5.2)
PROT SERPL-MCNC: 6.8 G/DL (ref 6–8.5)
SODIUM SERPL-SCNC: 137 MMOL/L (ref 136–145)
TRIGL SERPL-MCNC: 115 MG/DL (ref 0–150)
TSH SERPL DL<=0.05 MIU/L-ACNC: 1.31 UIU/ML (ref 0.27–4.2)
VLDLC SERPL-MCNC: 21 MG/DL (ref 5–40)

## 2022-11-25 PROCEDURE — 83036 HEMOGLOBIN GLYCOSYLATED A1C: CPT

## 2022-11-25 PROCEDURE — 36415 COLL VENOUS BLD VENIPUNCTURE: CPT

## 2022-11-25 PROCEDURE — 84443 ASSAY THYROID STIM HORMONE: CPT

## 2022-11-25 PROCEDURE — 80061 LIPID PANEL: CPT

## 2022-11-25 PROCEDURE — 80053 COMPREHEN METABOLIC PANEL: CPT

## 2022-12-02 ENCOUNTER — OFFICE VISIT (OUTPATIENT)
Dept: ENDOCRINOLOGY | Age: 67
End: 2022-12-02

## 2022-12-02 VITALS
OXYGEN SATURATION: 96 % | DIASTOLIC BLOOD PRESSURE: 92 MMHG | HEIGHT: 61 IN | BODY MASS INDEX: 38.59 KG/M2 | SYSTOLIC BLOOD PRESSURE: 150 MMHG | HEART RATE: 96 BPM | WEIGHT: 204.4 LBS | TEMPERATURE: 96.4 F

## 2022-12-02 DIAGNOSIS — E27.49 SECONDARY ADRENAL INSUFFICIENCY: ICD-10-CM

## 2022-12-02 DIAGNOSIS — E78.2 MIXED HYPERLIPIDEMIA: ICD-10-CM

## 2022-12-02 DIAGNOSIS — E11.65 TYPE 2 DIABETES MELLITUS WITH HYPERGLYCEMIA, UNSPECIFIED WHETHER LONG TERM INSULIN USE: Primary | ICD-10-CM

## 2022-12-02 DIAGNOSIS — E11.49 TYPE II DIABETES MELLITUS WITH NEUROLOGICAL MANIFESTATIONS: ICD-10-CM

## 2022-12-02 LAB — GLUCOSE BLDC GLUCOMTR-MCNC: 153 MG/DL (ref 70–130)

## 2022-12-02 PROCEDURE — 82962 GLUCOSE BLOOD TEST: CPT | Performed by: INTERNAL MEDICINE

## 2022-12-02 PROCEDURE — 99214 OFFICE O/P EST MOD 30 MIN: CPT | Performed by: INTERNAL MEDICINE

## 2022-12-02 RX ORDER — INSULIN GLARGINE 100 [IU]/ML
INJECTION, SOLUTION SUBCUTANEOUS
Qty: 30 ML | Refills: 2 | Status: SHIPPED | OUTPATIENT
Start: 2022-12-02

## 2022-12-02 RX ORDER — ATORVASTATIN CALCIUM 10 MG/1
TABLET, FILM COATED ORAL
Qty: 90 TABLET | Refills: 2 | Status: SHIPPED | OUTPATIENT
Start: 2022-12-02

## 2022-12-02 RX ORDER — METFORMIN HYDROCHLORIDE 500 MG/1
TABLET, EXTENDED RELEASE ORAL
Qty: 360 TABLET | Refills: 2 | Status: SHIPPED | OUTPATIENT
Start: 2022-12-02

## 2022-12-02 RX ORDER — FLURBIPROFEN SODIUM 0.3 MG/ML
SOLUTION/ DROPS OPHTHALMIC
Qty: 500 EACH | Refills: 4 | Status: SHIPPED | OUTPATIENT
Start: 2022-12-02

## 2022-12-02 RX ORDER — INSULIN ASPART 100 [IU]/ML
INJECTION, SOLUTION INTRAVENOUS; SUBCUTANEOUS
Qty: 30 ML | Refills: 2 | Status: SHIPPED | OUTPATIENT
Start: 2022-12-02

## 2022-12-02 NOTE — PATIENT INSTRUCTIONS
As discussed all items remain good so no changed needed at this time.    Make sure the provider at the 's facility takes over all medications next time you are there.

## 2022-12-02 NOTE — PROGRESS NOTES
Sergio Daniel, 67 y.o., Gender: male    Assessment/Plan    1.  Pt with DM Type 2 controlled w/ neuropathy.  A1c 6.9% 11/22, estimated 6.5-7% and actual 6.7% 7/22, estimated to be 6-6.5% 4/22, 6.5-7% 2/22, 11% 12/21 prior to onset of anemia which occurred end of Dec.  A1c not reliable in this case sec to chronic anemia so will use fructosamine in order to obtain A1c equivalent.  Anemia recently resolved so will plan to transition back to using A1c in future.  Counseled that conservative goal A1c is to be <7 % and aggressive 6.5 %.  Counseled pt that overall risk with diabetes is related to long term complications of both small and large blood vessels and that the risk for CAD, MI, and stroke is much higher than general population.  Counseled pt on long term effects of prolonged poor control of diabetes.  Counseled that the goal of tx is prevention of further complications.  Pt counseled on how to do freq bg checks with fasting AM, before meals, before bed, occasionally 2 hours after a meal, and at 2-3 am if awake.  Pt is checking enough as directed based on current treatment plan to check 4-6 times a day.  Rec cont Metformin XR at 1000 mg twice daily after AM and PM meals as that is optimal dose.  Pt gets all meds from [facility] secondary to costs.  Pt counseled to cont generic Lantus at 26 units every morning and feel that is optimal dose.  Pt was counseled on how to use short acting analog for correction coverage, meal, and bedtime coverage.  Also have a standing meal dose of 5,5,5 units.  See outline below.  As control has improved and pt on stable plan now for most of '22 pt opted to go back to his PCP at the [facility] as he is already going there on regular basis and is easier for him to get to compared to coming here.  2.  Counseled on hypoglycemia as pt is on insulin.  Counseled about need to monitor bg related to driving as public safety concern.  Also counseled on proper use of glucagon emergency kit.  Pt reported full  understanding.  3.  Hypertension goal of <140/90.  Pt not on tx for renal protection.  Last microalbumin/creatinine ratio urine check 4/22 was normal.  Mild dec in GFR noted on recent labs not there previously will cont to monitor.   4.  Hyperlipidemia LDL goal <100 and trig goal <150.  Rec cont Lipitor 10 mg as lipids did improve on this low dose.  Will have f/u labs.    5.  Related to long term use of high dose steroids, standard dose of Hydrocortisone is total of 15 mg a day, Prednisone 5 mg a day, and Decadron is 0.5 mg a day, pt is considered adrenal insufficient for up to a year after the steroids are stopped, which that was stopped as of April '22.  Counseled pt if any febrile illness, major trauma, or surgery occur during that time frame will need stress dose steroids with gradual taper off over subsequent weeks.  6.  Note visit started around 1035 related to delay getting pt ready.                         Corrective Scale for Bolus Insulin    2  - units   150-200         4 - units   201-250         +      Standing Meal Dose  6 - units   251-300          5 - units   Breakfast   8 - units   301-350                                          5 - units   Lunch  10 - units   351-400                 5 - units   Dinner  12 - units   401-450    14 - units   >450    At bedtime, if blood glucose is >200, take Half the dosage by the above noted scale.  There is NO standing dose for bedtime.        Time Counseled: 12  Minutes  Total Time: 20  Minutes    Return to office in:   pt going to [facility] from this point      Random Glucose: 153 mg/dL      HPI Summary    Pt is here for evaluation of DM reported to be Type 2.  Pt reports DM since '10 but was diet control till insulin started 12/21.  Pt now off all steroids for COVID tx earlier this year.  Pt reports blood glucoses are unknown without record.  Pt reports weight has been up since hospital stay related to steroids and not coming down.  Pt overall feeling better after sig  illness that caused deconditioning earlier this year.  Pt has report of fatigue.  Pt has complaint of general muscle weakness. Pt denies any increased thirst or urination.  Pt denies any chest pain following w/ cardiology for A-fib.  Pt reports shortness of breath w/ activity.  Pt denies any abdominal complaints.  Pt denies any early satiety or postprandial bloating.  Pt reports blurry vision.  Pt reports last seen by eye doctor > 2 yrs ago no retinopathy to have at [facility].  Pt reports problems with feet w/ n/t.  Pt denies any skin wounds but has edema and LLE had recent DVT.  Pt has no report of depression.  Pt reports fair to good sleep quality without apnea symptoms.  Pt reports trying to follow a diet to lose weight and limited to no exercise.  Pt reports never having pneumonia vaccine in the past and did not get flu shot this last season.  Pt without any other complaints related to diabetes at this time.    Review of Systems  see HPI      Patient History    Past History (reviewed):    Medical:   Past Medical History:   Diagnosis Date   • Abnormal LFTs (liver function tests) 12/2021    developed during prolonged hospital stay for COVID infection   • Anemia 12/29/2021    developed during prolonged hospital stay for COVID infection   • COVID-19 virus infection 12/2021   • DVT of deep femoral vein, left (HCC) 12/2021   • Insomnia    • Long term systemic steroid user 12/2021    high dose steroids in use during and after hospital stay for COVID related infection stopped around April '22   • Mixed hyperlipidemia    • Obesity    • Paroxysmal atrial fibrillation (HCC) 02/10/2022   • Pneumonia due to COVID-19 virus 12/2021   • Secondary adrenal insufficiency (HCC) 01/2022    considered adrenal insufficient related to long term use of high dose steroids started 12/21 for sig COVID infection   • Tremor 05/2022    having eval for Parkinson dz   • Type 2 diabetes mellitus with hyperglycemia (HCC) 2010    was diet controlled  till late '21 then insulin started after sig illness and high dose steroids   • Type II diabetes mellitus with neurological manifestations (HCC)        Surgery:   Past Surgical History:   Procedure Laterality Date   • NO PAST SURGERIES           Social History (reviewed):  - Smoking, - ETOH, - Drugs, , 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    Allergies:  No Known Allergies    Physical Exam    VITALS:    Vitals:    12/02/22 1032   BP: 150/92   Pulse: 96   Temp: 96.4 °F (35.8 °C)   SpO2: 96%     Body mass index is 38.62 kg/m².    GENERAL:  Looks stated age, Mod obese, using walker  HEAD/EYES:  N/C, A/T, Mask in place  EXTREMITIES:  FROM, skin changes of chronic edema noted 2/22  CNS:  A&Ox3, dec sensation to fine touch above ankles noted 2/22    Full exam not done today.      Labs/Imaging  All available lab and imaging data were reviewed.        Britton Amos MD, DESTINY, FACE, ECNU  12/2/2022  10:54 EST

## 2024-06-20 ENCOUNTER — TELEPHONE (OUTPATIENT)
Dept: INTERNAL MEDICINE | Facility: CLINIC | Age: 69
End: 2024-06-20
Payer: MEDICARE